# Patient Record
Sex: FEMALE | Race: WHITE | Employment: FULL TIME | URBAN - METROPOLITAN AREA
[De-identification: names, ages, dates, MRNs, and addresses within clinical notes are randomized per-mention and may not be internally consistent; named-entity substitution may affect disease eponyms.]

---

## 2015-10-23 LAB — HEMOCCULT STL QL: NEGATIVE

## 2017-02-06 DIAGNOSIS — L29.9 PRURITIC DERMATITIS: ICD-10-CM

## 2017-02-06 NOTE — TELEPHONE ENCOUNTER
She on the schedule for follow up mid March. Labs current as of December 2016.  She had visit for well woman in November

## 2017-02-09 RX ORDER — HYDROXYZINE 25 MG/1
TABLET, FILM COATED ORAL
Qty: 90 TAB | Refills: 1 | Status: SHIPPED | OUTPATIENT
Start: 2017-02-09 | End: 2017-04-07 | Stop reason: SDUPTHER

## 2017-02-16 DIAGNOSIS — M54.5 CHRONIC LOW BACK PAIN, UNSPECIFIED BACK PAIN LATERALITY, WITH SCIATICA PRESENCE UNSPECIFIED: ICD-10-CM

## 2017-02-16 DIAGNOSIS — G89.29 CHRONIC LOW BACK PAIN, UNSPECIFIED BACK PAIN LATERALITY, WITH SCIATICA PRESENCE UNSPECIFIED: ICD-10-CM

## 2017-02-17 RX ORDER — CYCLOBENZAPRINE HCL 10 MG
TABLET ORAL
Qty: 90 TAB | Refills: 2 | Status: SHIPPED | OUTPATIENT
Start: 2017-02-17 | End: 2017-06-22 | Stop reason: SDUPTHER

## 2017-02-17 NOTE — TELEPHONE ENCOUNTER
She was in the office for her well woman exam in November and is on the schedule for follow up in March

## 2017-03-10 ENCOUNTER — OFFICE VISIT (OUTPATIENT)
Dept: FAMILY MEDICINE CLINIC | Age: 47
End: 2017-03-10

## 2017-03-10 VITALS
DIASTOLIC BLOOD PRESSURE: 80 MMHG | HEIGHT: 61 IN | TEMPERATURE: 98.3 F | SYSTOLIC BLOOD PRESSURE: 122 MMHG | OXYGEN SATURATION: 99 % | WEIGHT: 293 LBS | BODY MASS INDEX: 55.32 KG/M2 | HEART RATE: 58 BPM | RESPIRATION RATE: 18 BRPM

## 2017-03-10 DIAGNOSIS — H91.91 HEARING LOSS, RIGHT: ICD-10-CM

## 2017-03-10 DIAGNOSIS — G43.809 OTHER MIGRAINE WITHOUT STATUS MIGRAINOSUS, NOT INTRACTABLE: ICD-10-CM

## 2017-03-10 DIAGNOSIS — F43.22 ADJUSTMENT DISORDER WITH ANXIOUS MOOD: ICD-10-CM

## 2017-03-10 DIAGNOSIS — R20.0 NUMBNESS AND TINGLING OF LEFT SIDE OF FACE: ICD-10-CM

## 2017-03-10 DIAGNOSIS — J32.1 CHRONIC FRONTAL SINUSITIS: Primary | ICD-10-CM

## 2017-03-10 DIAGNOSIS — H10.13 ALLERGIC CONJUNCTIVITIS AND RHINITIS, BILATERAL: ICD-10-CM

## 2017-03-10 DIAGNOSIS — E03.4 HYPOTHYROIDISM DUE TO ACQUIRED ATROPHY OF THYROID: ICD-10-CM

## 2017-03-10 DIAGNOSIS — H40.053 INTRAOCULAR PRESSURE INCREASE, BILATERAL: ICD-10-CM

## 2017-03-10 DIAGNOSIS — R63.5 WEIGHT GAIN: ICD-10-CM

## 2017-03-10 DIAGNOSIS — E55.9 VITAMIN D DEFICIENCY: ICD-10-CM

## 2017-03-10 DIAGNOSIS — E78.5 DYSLIPIDEMIA: ICD-10-CM

## 2017-03-10 DIAGNOSIS — J30.9 ALLERGIC CONJUNCTIVITIS AND RHINITIS, BILATERAL: ICD-10-CM

## 2017-03-10 DIAGNOSIS — L71.9 ACNE ROSACEA: ICD-10-CM

## 2017-03-10 DIAGNOSIS — H53.9 VISION CHANGES: ICD-10-CM

## 2017-03-10 DIAGNOSIS — R20.2 NUMBNESS AND TINGLING OF LEFT SIDE OF FACE: ICD-10-CM

## 2017-03-10 DIAGNOSIS — D50.8 OTHER IRON DEFICIENCY ANEMIA: ICD-10-CM

## 2017-03-10 RX ORDER — CIPROFLOXACIN 250 MG/1
250 TABLET, FILM COATED ORAL EVERY 12 HOURS
Qty: 42 TAB | Refills: 0 | Status: SHIPPED | OUTPATIENT
Start: 2017-03-10 | End: 2017-03-31

## 2017-03-10 RX ORDER — ALPRAZOLAM 0.5 MG/1
0.5 TABLET ORAL
Qty: 30 TAB | Refills: 0 | Status: SHIPPED | OUTPATIENT
Start: 2017-03-10 | End: 2017-06-29 | Stop reason: SDUPTHER

## 2017-03-10 RX ORDER — AZELASTINE 1 MG/ML
1 SPRAY, METERED NASAL 2 TIMES DAILY
Qty: 1 BOTTLE | Refills: 5 | Status: SHIPPED | OUTPATIENT
Start: 2017-03-10 | End: 2017-11-09 | Stop reason: SDUPTHER

## 2017-03-10 RX ORDER — DOXYCYCLINE HYCLATE 50 MG/1
CAPSULE ORAL
COMMUNITY
Start: 2017-03-05 | End: 2017-04-18

## 2017-03-10 NOTE — PROGRESS NOTES
Chief Complaint   Patient presents with    Medication Refill    Sinus Infection     1. Have you been to the ER, urgent care clinic since your last visit? Hospitalized since your last visit? No    2. Have you seen or consulted any other health care providers outside of the [address] [place] [address] since your last visit? Include any pap smears or colon screening. Yes, PT went to see ENT MD, Dr. Alycia Munoz, in 11/2016 and 12/2016    In the event something were to happen to you and you were unable to speak on your behalf, do you have an Advance Directive/ Living Will in place stating your wishes? NO    If yes, do we have a copy on file NO    If no, would you like information:    Patient offered and declined. PT wants thinks she has a sinus infection and would like to talk to Dr. Rajni Solorzano about it. PT did not bring bottles with her to this visit.

## 2017-03-10 NOTE — ACP (ADVANCE CARE PLANNING)
Discussed ACP with patient. Gave pt an [facility]. Patient prefers to read it on her own. Declines referral to [facility] team at this time. Patient will bring document to her next office visit or attach it to her MyChart record.

## 2017-03-10 NOTE — MR AVS SNAPSHOT
Visit Information Date & Time Provider Department Dept. Phone Encounter #  
 3/10/2017  8:45 AM [facility] [phone] [id number] Follow-up Instructions Return in about 3 months (around 6/10/2017) for med refill. Upcoming Health Maintenance Date Due  
 PAP AKA CERVICAL CYTOLOGY 8/18/2017* BREAST CANCER SCRN MAMMOGRAM 12/2/2017 DTaP/Tdap/Td series (2 - Td) 11/3/2025 *Topic was postponed. The date shown is not the original due date. Allergies as of 3/10/2017  Review Complete On: 3/10/2017 By: Mary Hargrove DO Severity Noted Reaction Type Reactions Latex  11/20/2014    Rash Pcn [Penicillins] High 07/31/2009    Hives Prednisone High 07/31/2009    Hives Sulfa (Sulfonamide Antibiotics) High 07/31/2009    Hives Garamycin [Gentamicin]  07/31/2009    Other (comments) Itchy eyes due to sulfate Metformin  11/03/2015    Diarrhea Other Medication  07/31/2009    Hives, Rash SUN Current Immunizations  Reviewed on 3/10/2017 Name Date Influenza Vaccine 11/7/2016, 10/15/2013 Influenza Vaccine Alcario Ravens) 11/3/2015 Influenza Vaccine (Quad) PF 11/7/2016, 11/20/2014 Influenza Vaccine Split 11/7/2012, 11/18/2011 Influenza Vaccine Whole 10/1/2010 TD Vaccine 9/1/2003 Tdap 11/3/2015 Reviewed by Mary Hargrove DO on 3/10/2017 at  9:54 AM  
You Were Diagnosed With   
  
 Codes Comments Chronic frontal sinusitis    -  Primary ICD-10-CM: J32.1 ICD-9-CM: 451.1 Other migraine without status migrainosus, not intractable     ICD-10-CM: R43.148 Allergic conjunctivitis and rhinitis, bilateral     ICD-10-CM: H10.13, J30.9 ICD-9-CM: 372.05, 477.9 Hypothyroidism due to acquired atrophy of thyroid     ICD-10-CM: E03.4 ICD-9-CM: 244.8, 246.8 stable  Adjustment disorder with anxious mood     ICD-10-CM: M33.93 
 ICD-9-CM: 309.24 worse with grief from Mom's death 2012 and cat's death vs friends father's recent   
 Numbness and tingling of left side of face     ICD-10-CM: R20.0 ICD-9-CM: 782.0 due migraine vs other Vision changes     ICD-10-CM: H53.9 ICD-9-CM: 368.9 Left Hearing loss, right     ICD-10-CM: H91.91 
ICD-9-CM: 389. 9 Acne rosacea     ICD-10-CM: L71.9 ICD-9-CM: 695.3 Dyslipidemia     ICD-10-CM: E78.5 ICD-9-CM: 272.4 Intraocular pressure increase, bilateral     ICD-10-CM: H40.053 ICD-9-CM: 365.00 Other iron deficiency anemia     ICD-10-CM: D50.8 RESOLVED Vitamin D deficiency     ICD-10-CM: E55.9 ICD-9-CM: 268.9 stable Weight gain     ICD-10-CM: R63.5 ICD-9-CM: 783.1 due to holiday eating Vitals BP Pulse Temp Resp Height(growth percentile) Weight(growth percentile) 122/80 (BP 1 Location: Right arm, BP Patient Position: Sitting) (!) 58 98.3 °F (36.8 °C) (Oral) 18 5' 1\" (1.549 m) 299 lb 4.8 oz (135.8 kg) LMP SpO2 BMI OB Status Smoking Status 2017 (Within Days) 99% 56.55 kg/m2 Having regular periods Never Smoker BMI and BSA Data Body Mass Index Body Surface Area 56.55 kg/m 2 2.42 m 2 Preferred Pharmacy Pharmacy Name Phone [facility][id number] - [place], [address] [phone] Your Updated Medication List  
  
   
This list is accurate as of: 3/10/17 10:01 AM.  Always use your most recent med list.  
  
  
  
  
 albuterol 90 mcg/actuation inhaler Commonly known as:  PROVENTIL HFA, VENTOLIN HFA, PROAIR HFA Take 2 Puffs by inhalation every four (4) hours as needed for Wheezing. Indications: BRONCHOSPASM PREVENTION  
  
 ALLEGRA 180 mg tablet Generic drug:  fexofenadine Take  by mouth daily. ALPRAZolam 0.5 mg tablet Commonly known as:  Salma Cortes Take 1 Tab by mouth two (2) times daily as needed for Anxiety.  Max Daily Amount: 1 mg. Indications: ANXIETY  
  
 azelastine 137 mcg (0.1 %) nasal spray Commonly known as:  ASTELIN  
1 Spray by Both Nostrils route two (2) times a day. Use in each nostril as directed  Indications: SEASONAL ALLERGIC RHINITIS  
  
 butalbital-acetaminophen-caffeine -40 mg per tablet Commonly known as:  FIORICET, ESGIC  
TAKE 1 TAB BY MOUTH EVERY SIX (6) HOURS AS NEEDED FOR PAIN OR HEADACHE. ciprofloxacin HCl 250 mg tablet Commonly known as:  CIPRO Take 1 Tab by mouth every twelve (12) hours for 21 days. cyclobenzaprine 10 mg tablet Commonly known as:  FLEXERIL  
TAKE 1 TABLET BY MOUTH 3 TIMES A DAY AS NEEDED FOR MUSCLE SPASM  
  
 diclofenac EC 75 mg EC tablet Commonly known as:  VOLTAREN Take 75 mg by mouth two (2) times a day. doxycycline 50 mg capsule Commonly known as:  VIBRAMYCIN  
  
 doxycycline 50 mg tablet Commonly known as:  ADOXA Take 50 mg by mouth two (2) times a day. EPICERAM Emul Generic drug:  Emollient Combination No.32  
by Apply Externally route. From Dr Charmayne Phalen. FISH OIL 1,000 mg Cap Generic drug:  omega-3 fatty acids-vitamin e Take 1 Cap by mouth daily. FLONASE 50 mcg/actuation nasal spray Generic drug:  fluticasone  
nightly. hydrOXYzine HCl 25 mg tablet Commonly known as:  ATARAX TAKE 1 TAB BY MOUTH EVERY EIGHT (8) HOURS AS NEEDED FOR ITCHING. INDICATIONS: PRURITUS OF SKIN  
  
 JUBLIA Nereyda topical solution Generic drug:  efinaconazole JARRETT TO AFFECTED NAILS QD  
  
 latanoprost 0.005 % ophthalmic solution Commonly known as:  Latasha Furbish Administer 1 Drop to both eyes nightly. levothyroxine 125 mcg tablet Commonly known as:  SYNTHROID Take 1 Tab by mouth Daily (before breakfast). Indications: hypothyroidism LOCOID 0.1 % Lotn Generic drug:  Hydrocortisone Butyrate  
  
 magnesium oxide 400 mg tablet Commonly known as:  MAG-OX Take 400 mg by mouth daily. METROGEL 1 % topical gel Generic drug:  metroNIDAZOLE Apply  to affected area daily. Use a thin layer to affected areas after washing from Dr Mayfield Nail   Indications: ACNE ROSACEA  
  
 montelukast 10 mg tablet Commonly known as:  SINGULAIR Take 1 Tab by mouth daily. Indications: ALLERGIC RHINITIS, MAINTENANCE THERAPY FOR ASTHMA MOTRIN 800 mg tablet Generic drug:  ibuprofen Take  by mouth every six (6) hours as needed for Pain.  
  
 multivitamin tablet Commonly known as:  ONE A DAY Take 1 tablet by mouth daily. NAFTIN 2 % Gel Generic drug:  naftifine APPLY EXTERNALLY TWICE A DAY  
  
 norethindrone-ethinyl estradiol 1 mg-20 mcg (21)/75 mg (7) Tab Commonly known as:  Sarah [place] FE 1/20 Take  by mouth. omeprazole 20 mg capsule Commonly known as:  PRILOSEC  
TAKE ONE CAPSULE BY MOUTH EVERY DAY  
  
 OTHER(NON-FORMULARY) Osteo Biflex PROBIOTIC COMPLEX PO Take  by mouth daily. SYSTANE (PF) OP Apply  to eye four (4) times daily. * terconazole 80 mg vaginal suppository Commonly known as:  TERAZOL 3  
  
 * terconazole 0.4 % vaginal cream  
Commonly known as:  TERAZOL 7  
  
 valACYclovir 500 mg tablet Commonly known as:  VALTREX  
  
 VITAMIN D3 1,000 unit tablet Generic drug:  cholecalciferol Take 1,000 Units by mouth daily. * Notice: This list has 2 medication(s) that are the same as other medications prescribed for you. Read the directions carefully, and ask your doctor or other care provider to review them with you. Prescriptions Printed Refills ALPRAZolam (XANAX) 0.5 mg tablet 0 Sig: Take 1 Tab by mouth two (2) times daily as needed for Anxiety. Max Daily Amount: 1 mg. Indications: ANXIETY Class: Print Route: Oral  
  
Prescriptions Sent to Pharmacy Refills  
 ciprofloxacin HCl (CIPRO) 250 mg tablet 0 Sig: Take 1 Tab by mouth every twelve (12) hours for 21 days.   
 Class: Normal  
 Pharmacy: [facility]/pharmacy #[id number] - [address] Ph #: [phone] Route: Oral  
 azelastine (ASTELIN) 137 mcg (0.1 %) nasal spray 5 [place] by Both Nostrils route two (2) times a day. Use in each nostril as directed  Indications: SEASONAL ALLERGIC RHINITIS Class: Normal  
 Pharmacy: [facility] [address] Ph #: [phone] Route: Both Nostrils We Performed the Following REFERRAL TO NEUROLOGY [JCD55 Custom] Comments:  
 Please evaluate patient for migraine worse with L face numbness and vision loss, recurrent Follow-up Instructions Return in about 3 months (around 6/10/2017) for med refill. Referral Information Referral ID Referred By Referred To  
  
 [id number] Janet Arden Natan Dy, [address] [facility], [facility] Phone: [phone] Fax: [phone] Visits Status Start Date End Date 1 New Request 3/10/17 3/10/18 If your referral has a status of pending review or denied, additional information will be sent to support the outcome of this decision. Patient Instructions Numbness and Tingling: Care Instructions Your Care Instructions Many things can cause numbness or tingling. Swelling may put pressure on a nerve. This could cause you to lose feeling or have a pins-and-needles sensation on part of your body. Nerves may be damaged from trauma, toxins, or diseases, such as diabetes or multiple sclerosis (MS). Sometimes, though, the cause is not clear. If there is no clear reason for your symptoms, and you are not having any other symptoms, your doctor may suggest watching and waiting for a while to see if the numbness or tingling goes away on its own.  Your doctor may want you to have blood or nerve tests to find the cause of your symptoms. Follow-up care is a key part of your treatment and safety. Be sure to make and go to all appointments, and call your doctor if you are having problems. It's also a good idea to know your test results and keep a list of the medicines you take. How can you care for yourself at home? · If your doctor prescribes medicine, take it exactly as directed. Call your doctor if you think you are having a problem with your medicine. · If you have any swelling, put ice or a cold pack on the area for 10 to 20 minutes at a time. Put a thin cloth between the ice and your skin. When should you call for help? Call 911 anytime you think you may need emergency care. For example, call if: 
· You have weakness, numbness, or tingling in both legs. · You lose bowel or bladder control. · You have symptoms of a stroke. These may include: 
¨ Sudden numbness, tingling, weakness, or loss of movement in your face, arm, or leg, especially on only one side of your body. ¨ Sudden vision changes. ¨ Sudden trouble speaking. ¨ Sudden confusion or trouble understanding simple statements. ¨ Sudden problems with walking or balance. ¨ A sudden, severe headache that is different from past headaches. Watch closely for changes in your health, and be sure to contact your doctor if you have any problems, or if: 
· You do not get better as expected. Where can you learn more? Go to http://alejandro-eddie.info/. Enter J515 in the search box to learn more about \"Numbness and Tingling: Care Instructions. \" Current as of: February 19, 2016 Content Version: 11.1 © 9425-5537 [employer]. Care instructions adapted under license by [employer] (which disclaims liability or warranty for this information).  If you have questions about a medical condition or this instruction, always ask your healthcare professional. [employer] disclaims any warranty or liability for your use of this information. Saline Nasal Washes: Care Instructions Your Care Instructions Saline nasal washes help keep the nasal passages open by washing out thick or dried mucus. This simple remedy can help relieve symptoms of allergies, sinusitis, and colds. It also can make the nose feel more comfortable by keeping the mucous membranes moist. You may notice a little burning sensation in your nose the first few times you use the solution, but this usually gets better in a few days. Follow-up care is a key part of your treatment and safety. Be sure to make and go to all appointments, and call your doctor if you are having problems. It's also a good idea to know your test results and keep a list of the medicines you take. How can you care for yourself at home? · You can buy premixed saline solution in a squeeze bottle or other sinus rinse products at a drugstore. Read and follow the instructions on the label. · You also can make your own saline solution by adding 1 teaspoon of salt and 1 teaspoon of baking soda to 2 cups of distilled water. · If you use a homemade solution, pour a small amount into a clean bowl. Using a rubber bulb syringe, squeeze the syringe and place the tip in the salt water. Pull a small amount of the salt water into the syringe by relaxing your hand. · Sit down with your head tilted slightly back. Do not lie down. Put the tip of the bulb syringe or the squeeze bottle a little way into one of your nostrils. Gently drip or squirt a few drops into the nostril. Repeat with the other nostril. Some sneezing and gagging are normal at first. 
· Gently blow your nose. · Wipe the syringe or bottle tip clean after each use. · Repeat this 2 or 3 times a day. · Use nasal washes gently if you have nosebleeds often. When should you call for help? Watch closely for changes in your health, and be sure to contact your doctor if: · You often get nosebleeds. · You have problems doing the nasal washes. Where can you learn more? Go to http://alejandro-eddie.info/. Enter [id number] in the search box to learn more about \"Saline Nasal Washes: Care Instructions. \" Current as of: July 29, 2016 Content Version: 11.1 © 6907-5922 [employer]anda[employer]. Care instructions adapted under license by [employer] (which disclaims liability or warranty for this information). If you have questions about a medical condition or this instruction, always ask your healthcare professional. [place] [address] any warranty or liability for your use of this information. Sinusitis: Care Instructions Your Care Instructions Sinusitis is an infection of the lining of the sinus cavities in your head. Sinusitis often follows a cold. It causes pain and pressure in your head and face. In most cases, sinusitis gets better on its own in 1 to 2 weeks. But some mild symptoms may last for several weeks. Sometimes antibiotics are needed. Follow-up care is a key part of your treatment and safety. Be sure to make and go to all appointments, and call your doctor if you are having problems. It's also a good idea to know your test results and keep a list of the medicines you take. How can you care for yourself at home? · Take an over-the-counter pain medicine, such as acetaminophen (Tylenol), ibuprofen (Advil, Motrin), or naproxen (Aleve). Read and follow all instructions on the label. · If the doctor prescribed antibiotics, take them as directed. Do not stop taking them just because you feel better. You need to take the full course of antibiotics. · Be careful when taking over-the-counter cold or flu medicines and Tylenol at the same time. Many of these medicines have acetaminophen, which is Tylenol. Read the labels to make sure that you are not taking more than the recommended dose.  Too much acetaminophen (Tylenol) can be harmful. · Breathe warm, moist air from a steamy shower, a hot bath, or a sink filled with hot water. Avoid cold, dry air. Using a humidifier in your home may help. Follow the directions for cleaning the machine. · Use saline (saltwater) nasal washes to help keep your nasal passages open and wash out mucus and bacteria. You can buy saline nose drops at a grocery store or drugstore. Or you can make your own at home by adding 1 teaspoon of salt and 1 teaspoon of baking soda to 2 cups of distilled water. If you make your own, fill a bulb syringe with the solution, insert the tip into your nostril, and squeeze gently. Daniele Rafa your nose. · Put a hot, wet towel or a warm gel pack on your face 3 or 4 times a day for 5 to 10 minutes each time. · Try a decongestant nasal spray like oxymetazoline (Afrin). Do not use it for more than 3 days in a row. Using it for more than 3 days can make your congestion worse. When should you call for help? Call your doctor now or seek immediate medical care if: 
· You have new or worse swelling or redness in your face or around your eyes. · You have a new or higher fever. Watch closely for changes in your health, and be sure to contact your doctor if: 
· You have new or worse facial pain. · The mucus from your nose becomes thicker (like pus) or has new blood in it. · You are not getting better as expected. Where can you learn more? Go to http://alejandro-eddie.info/. Enter E030 in the search box to learn more about \"Sinusitis: Care Instructions. \" Current as of: July 29, 2016 Content Version: 11.1 © 4930-5687 [employer] Company. Care instructions adapted under license by [employer] (which disclaims liability or warranty for this information). If you have questions about a medical condition or this instruction, always ask your healthcare professional. [address] any warranty or liability for your use of this information. Introducing [facility]! Dear Orellana Area: Thank you for requesting a iSentium account. Our records indicate that you already have an active iSentium account. You can access your account anytime at [email or web address] Did you know that you can access your hospital and ER discharge instructions at any time in iSentium? You can also review all of your test results from your hospital stay or ER visit. Additional Information If you have questions, please visit the Frequently Asked Questions section of the iSentium website at [email or web address] Remember, iSentium is NOT to be used for urgent needs. For medical emergencies, dial 911. Now available from your iPhone and Android! Please provide this summary of care documentation to your next provider. Your primary care clinician is listed as Magdaleno Nielson. If you have any questions after today's visit, please call [phone].

## 2017-03-10 NOTE — PROGRESS NOTES
HISTORY OF PRESENT ILLNESS  Elda Rodriguez is a 48 y.o. female presents with Medication Refill; Sinus Infection; Migraine; Vision Change; Numbness; Referral Follow Up; and Results    Agree with nurse note. Pt with hypothyroidism, dyslipidemia, Vit D deficiency, and hx of iron deficiency anemia presents to the office with a BP of 122/80 and 58 bpm. She requests her most recent labs from 12/2/16. CBC was normal. Iron was 96, up from 77. CMP was normal. LDL was 109. Vit D was 36.9. In 08/2016, TSH was 1.51. She takes OTC Vit D 1,000IU daily. For hypothyroidism, she takes Synthroid 125 mcg daily, tolerating well. Pt weighs 299 lbs, gained 5 lbs since last ov, which she attributes to her menstrual cycle starting next week. She reports that last week she weighed 295 and the week prior 291. Pt with adjustments disorder with anxious mood. She takes Xanax 0.5 mg PRN up to BID x2 days weekly. Requests refill. Pt complains of sinus pain, watery eyes, sneezing, productive cough with yellow sputum off and on, and nasal congestion with green mucus and sometimes blood-tinged x2 weeks. She alternates with Allegra and Allegra-D. She takes Singulair 10 mg and uses Flonase. Of note, she takes Doxycycline 50 mg daily to treat acne rosacea. She is allergic to Penicillin and Sulfa. Per chart review, she sent an email to our office on 11/12/16 after her 11/5/16 visit. I sent in Z-theodore due to her worsening cough and congestion. She reports that Z-theodore did not work. She followed up with her ENT, Dr. Cheri William who rx'd Doxycycline, which also did not work. She followed up with Dr. Nicole Haney again and he rx'd Cipro. Per pt, Dr. Nicoel Haney planned to order an MRI if her sxs persisted. Pt had a migraine yesterday with L sided facial numbness and she felt like her L eye \"glazed over\" because things seemed magnified. This occurred about 30 minutes after walking on the treadmill x45 minutes on a higher incline.  She took a Fioricet with relief. She recalls a previous episode on the R side a few months ago. Denies weakness, slurred speech, or other associated sxs. She has an appointment with her ophthalmologist, Dr. Jarret Calvillo today and plans to mention these episodes to him. Health Maintenance    Pt is followed by GYN, Dr. Kandice Moore, whom she sees yearly. Pt's most recent mammogram was on 12/2/16 at the [address] [place] [address]; normal.      Written by tracy Mariee, as dictated by Dr. Charity Justin DO.    ROS    Review of Systems negative except as noted above in HPI. ALLERGIES:    Allergies   Allergen Reactions    Latex Rash    Pcn [Penicillins] Hives    Prednisone Hives    Sulfa (Sulfonamide Antibiotics) Hives    Garamycin [Gentamicin] Other (comments)     Itchy eyes due to sulfate    Metformin Diarrhea    Other Medication Hives and Rash     SUN       CURRENT MEDICATIONS:      PAST MEDICAL HISTORY:    Past Medical History:   Diagnosis Date    Acne rosacea     Dr. Chaz Streeter.  Allergy, unspecified not elsewhere classified childhood     Txd with immunotherapy. Dr. Guillermina Rodríguez Anemia NEC     borderline    Ankle sprain 2007    Right. Dr. Anton Camargo    Ankle sprain 11/2012    Right. Dr. Ramez Strickland.  Asthma childhood    Chickenpox childhood    Chronic low back pain with right-sided sciatica     and SI joint dysfunction. Dr. Haleigh Art.  Chronic otitis media     Dr. Guillermina Rodríguez Dry eye syndrome 2013    Dr. Michael Calvillo.  EBV infection 6/27/2011    Hearing loss     Mild high frequency sensorineuronal hearing loss. Dr. Shannen Santamaria murmur     Hernia of abdominal wall 09/2003, 65/3648    umbilical.  Dr. Jessica Beckett. Dr. Meri Benoit    Hyperglycemia 2013    Hypothyroidism 06/2010    Dr. Cheema Cargo pressure increase 12/2011    Dr. Princess Rodas. Dr. Michael Calvillo. Dr. Jarret Calvillo.     Knee pain, left 04/2012    Left. Dr. Arti Arita Measles childhood    Migraine     Mumps childhood    Plantar fasciitis, bilateral 2010    Dr. Rohan Santo    Sciatica 2008    Right.  with OA. Dr. Moon Prim Tinnitus of right ear 2012    Dr. Candace Pompa:    Past Surgical History:   Procedure Laterality Date   Selma Bianchi  2011    Dr. Keegan Richardson.  HAND/FINGER SURGERY UNLISTED  09/2003    Right Index finger repair due to cut    HX HERNIA REPAIR  67/2732    Umbilical.  Dr. Florencio Gilman.  HX HERNIA REPAIR  9/23/2011    recurrent umbilical.  Laparoscopy incisional.  Dr. Sotero Wright.     HX TONSILLECTOMY  childhood    LAP,CHOLECYSTECTOMY  07/2001       FAMILY HISTORY:    Family History   Problem Relation Age of Onset    Hypertension Mother     Cancer Mother      small cell lung with bone mets to spine/MELANOMA    Cancer Father      melanoma    Arthritis-osteo Father     Colon Polyps Father     Diabetes Maternal Grandmother     Stroke Maternal Grandmother     Seizures Maternal Grandmother     Colon Polyps Maternal Grandmother     Breast Cancer Maternal Grandmother     Heart Disease Paternal Grandmother      heart failure    Heart Attack Paternal Grandmother     Asthma Paternal Grandmother        SOCIAL HISTORY:    Social History     Social History    Marital status: SINGLE     Spouse name: N/A    Number of children: N/A    Years of education: N/A     Social History Main Topics    Smoking status: Never Smoker    Smokeless tobacco: Never Used      Comment: lived with smoker dad and mom then step mom x 20 yrs    Alcohol use 0.0 oz/week      Comment: RARE    Drug use: No    Sexual activity: No     Other Topics Concern    None     Social History Narrative       IMMUNIZATIONS:    Immunization History   Administered Date(s) Administered    Influenza Vaccine 10/15/2013, 11/07/2016    Influenza Vaccine (Quad) 11/03/2015    Influenza Vaccine (Quad) PF 11/20/2014, 11/07/2016    Influenza Vaccine Split 11/18/2011, 11/07/2012    Influenza Vaccine Whole 10/01/2010    TD Vaccine 09/01/2003    Tdap 11/03/2015         PHYSICAL EXAMINATION    Vital Signs    Visit Vitals    /80 (BP 1 Location: Right arm, BP Patient Position: Sitting)    Pulse (!) 58    Temp 98.3 °F (36.8 °C) (Oral)    Resp 18    Ht 5' 1\" (1.549 m)    Wt 299 lb 4.8 oz (135.8 kg)    LMP 02/20/2017 (Within Days)    SpO2 99%    BMI 56.55 kg/m2       Weight Metrics 3/10/2017 11/7/2016 8/22/2016 4/21/2016 11/3/2015 6/23/2015 9/22/2014   Weight 299 lb 4.8 oz 294 lb 3.2 oz 297 lb 11.2 oz 285 lb 3.2 oz 285 lb 9.6 oz 285 lb 290 lb 6.4 oz   BMI 56.55 kg/m2 54.84 kg/m2 56.25 kg/m2 53.92 kg/m2 52.69 kg/m2 52.58 kg/m2 53.58 kg/m2       General appearance - Well nourished. Well appearing. Well developed. No acute distress. Obese. Head - Normocephalic. Atraumatic. Non tender sinuses x 4. Eyes - pupils equal and reactive. Extraocular eye movements intact. Sclera anicteric. Mildly injected sclera. Ears - Hearing is grossly normal bilaterally. Nose - normal and patent. No polyps noted. No erythema. No discharge. Mouth - mucous membranes with adequate moisture. Posterior pharynx normal with cobblestone appearance. No erythema, white exudate or obstruction. Neck - supple. Midline trachea. No carotid bruits noted bilaterally. No thyromegaly noted. Chest - clear to auscultation bilaterally anteriorly and posteriorly. No wheezes. No rales or rhonchi. Breath sounds are symmetrical bilaterally. Unlabored respirations. Heart - normal rate. Regular rhythm. Normal S1, S2. No murmur noted. No rubs, clicks or gallops noted. Abdomen - soft and distended. No masses or organomegaly. No rebound, rigidity or guarding. Bowel sounds normal x 4 quadrants. No tenderness noted. Neurological - awake, alert and oriented to person, place, and time and event.   Cranial nerves II through XII intact. Clear speech. Muscle strength is +5/5 x 4 extremities. Sensation is intact to light touch bilaterally. Steady gait. Heme/Lymph - peripheral pulses normal x 4 extremities. No peripheral edema is noted. Musculoskeletal - Intact x 4 extremities. Full ROM x 4 extremities. No pain with movement. Back exam - normal range of motion. No pain on palpation of the spinous processes in the cervical, thoracic, lumbar, sacral regions. No CVA tenderness. Skin - no rashes, erythema, ecchymosis, lacerations, abrasions, suspicious moles noted  Psychological -   normal behavior, dress and thought processes. Good insight. Good eye contact. Normal affect. Appropriate mood. Normal speech. DATA REVIEWED    Results for orders placed or performed in visit on 11/07/16   LIPID PANEL   Result Value Ref Range    Cholesterol, total 177 100 - 199 mg/dL    Triglyceride 81 0 - 149 mg/dL    HDL Cholesterol 52 >39 mg/dL    VLDL, calculated 16 5 - 40 mg/dL    LDL, calculated 109 (H) 0 - 99 mg/dL   METABOLIC PANEL, COMPREHENSIVE   Result Value Ref Range    Glucose 85 65 - 99 mg/dL    BUN 13 6 - 24 mg/dL    Creatinine 0.70 0.57 - 1.00 mg/dL    GFR est non- >59 mL/min/1.73    GFR est  >59 mL/min/1.73    BUN/Creatinine ratio 19 9 - 23    Sodium 138 136 - 144 mmol/L    Potassium 5.0 3.5 - 5.2 mmol/L    Chloride 102 97 - 106 mmol/L    CO2 21 18 - 29 mmol/L    Calcium 9.2 8.7 - 10.2 mg/dL    Protein, total 6.4 6.0 - 8.5 g/dL    Albumin 4.1 3.5 - 5.5 g/dL    GLOBULIN, TOTAL 2.3 1.5 - 4.5 g/dL    A-G Ratio 1.8 1.1 - 2.5    Bilirubin, total 0.4 0.0 - 1.2 mg/dL    Alk.  phosphatase 65 39 - 117 IU/L    AST (SGOT) 14 0 - 40 IU/L    ALT (SGPT) 20 0 - 32 IU/L   VITAMIN D, 25 HYDROXY   Result Value Ref Range    VITAMIN D, 25-HYDROXY 36.9 30.0 - 100.0 ng/mL   URINALYSIS W/ RFLX MICROSCOPIC   Result Value Ref Range    Specific Gravity 1.029 1.005 - 1.030    pH (UA) 5.5 5.0 - 7.5    Color Yellow Yellow    Appearance Clear Clear    Leukocyte Esterase 2+ (A) Negative    Protein Negative Negative/Trace    Glucose Negative Negative    Ketone Negative Negative    Blood Negative Negative    Bilirubin Negative Negative    Urobilinogen 0.2 0.2 - 1.0 mg/dL    Nitrites Negative Negative    Microscopic Examination See additional order    CBC W/O DIFF   Result Value Ref Range    WBC 7.6 3.4 - 10.8 x10E3/uL    RBC 4.22 3.77 - 5.28 x10E6/uL    HGB 12.5 11.1 - 15.9 g/dL    HCT 37.7 34.0 - 46.6 %    MCV 89 79 - 97 fL    MCH 29.6 26.6 - 33.0 pg    MCHC 33.2 31.5 - 35.7 g/dL    RDW 13.7 12.3 - 15.4 %    PLATELET 556 952 - 122 x10E3/uL   IRON   Result Value Ref Range    Iron 96 27 - 159 ug/dL   MICROSCOPIC EXAMINATION   Result Value Ref Range    WBC 11-30 (A) 0 - 5 /hpf    RBC 0-2 0 - 2 /hpf    Epithelial cells 0-10 0 - 10 /hpf    Casts None seen None seen /lpf    Mucus Present Not Estab. Bacteria Few None seen/Few   CVD REPORT   Result Value Ref Range    INTERPRETATION Note      Lab Results   Component Value Date/Time    TSH 1.510 2016 03:44 PM    T4, Free 1.48 2016 03:44 PM       ASSESSMENT and PLAN      ICD-10-CM ICD-9-CM    1. Chronic frontal sinusitis J32.1 473.1 ciprofloxacin HCl (CIPRO) 250 mg tablet   2. Other migraine without status migrainosus, not intractable G43.809  REFERRAL TO NEUROLOGY   3. Allergic conjunctivitis and rhinitis, bilateral H10.13 372.05 azelastine (ASTELIN) 137 mcg (0.1 %) nasal spray    J30.9 477.9    4. Hypothyroidism due to acquired atrophy of thyroid E03.4 244.8      246.8     stable   5. Adjustment disorder with anxious mood F43.22 309.24 ALPRAZolam (XANAX) 0.5 mg tablet    worse with grief from Mom's death 2012 and cat's death vs friends father's recent    6. Numbness and tingling of left side of face R20.0 782.0 REFERRAL TO NEUROLOGY    due migraine vs other   7. Vision changes H53.9 368.9 REFERRAL TO NEUROLOGY    Left   8. Hearing loss, right H91.91 389.9    9.  Acne rosacea L71.9 695.3 doxycycline (VIBRAMYCIN) 50 mg capsule   10. Dyslipidemia E78.5 272.4    11. Intraocular pressure increase, bilateral H40.053 365.00    12. Other iron deficiency anemia D50.8      RESOLVED   13. Vitamin D deficiency E55.9 268.9     stable   14. Weight gain R63.5 783.1     due to holiday eating       Discussed the patient's BMI with her. The BMI follow up plan is as follows: I have counseled this patient on diet and exercise regimens. Addressed weight, diet and exercise with patient. Decrease carbohydrates (white foods, sweet foods, sweet drinks and alcohol), increase green leafy vegetables and protein (lean meats and beans) with each meal.  Avoid fried foods. Eat 3-5 small meals daily. Do not skip meals. Increase water intake. Increase physical activity to 30 minutes daily for health benefit or 60 minutes daily to prevent weight regain, as tolerated. Get 7-8 hours uninterrupted sleep nightly. Chart reviewed and updated. [facility] reviewed. Xanax 0.5 mg last filled on 2/12/17 for 30 tablets. Controlled Substance Agreement reviewed and signed. Advise pt to bring in pill bottle(s) for pill counts. Continue current medications and care. Start Cipro 250 mg BID x21 days. Advised pt to avoid Atarax use while taking Cipro. Recommend OTC Vitamin D 5,000IU daily during the winter months. Prescriptions written and sent to pharmacy; medication side effects discussed. Cipro 250 mg.   Prescription given to patient during office visit today. Xanax 0.5 mg. Cautioned pt about addictive potential and drowsiness. Most recent tests reviewed from 12/2/16. Get recent office visit notes from Dr. Clarita Madison and Dr. Jt Mane. Advised pt to sign release. Referrals given; patient urged to keep appointments with specialists. Neurology. Counseled patient on health concerns:  Vision change, facial numbness, migraines, chronic sinusitis, cholesterol, and Vit D deficiency.    Relevant handouts given and discussed with patient. Immunizations noted. Offered empathy, support, legitimation, prayers, partnership to patient. Praised patient for progress. Advance Care Booklet given at office visit. Discussed with pt today. Declines honoring choices referral.   Follow-up Disposition:  Return in about 3 months (around 6/10/2017) for med refill. Patient was offered a choice/choices in the treatment plan today. Patient expresses understanding of the plan and agrees with recommendations. More than 40 mins spent face to face with patient and more than 50% of this time spent in counseling and coordinating care. Written by tracy Mcintosh, as dictated by Dr. Cony Bell DO. Documentation True and Accepted by Blanche Flor. Frederic Elkins. Patient Instructions        Numbness and Tingling: Care Instructions  Your Care Instructions  Many things can cause numbness or tingling. Swelling may put pressure on a nerve. This could cause you to lose feeling or have a pins-and-needles sensation on part of your body. Nerves may be damaged from trauma, toxins, or diseases, such as diabetes or multiple sclerosis (MS). Sometimes, though, the cause is not clear. If there is no clear reason for your symptoms, and you are not having any other symptoms, your doctor may suggest watching and waiting for a while to see if the numbness or tingling goes away on its own. Your doctor may want you to have blood or nerve tests to find the cause of your symptoms. Follow-up care is a key part of your treatment and safety. Be sure to make and go to all appointments, and call your doctor if you are having problems. It's also a good idea to know your test results and keep a list of the medicines you take. How can you care for yourself at home? · If your doctor prescribes medicine, take it exactly as directed. Call your doctor if you think you are having a problem with your medicine.   · If you have any swelling, put ice or a cold pack on the area for 10 to 20 minutes at a time. Put a thin cloth between the ice and your skin. When should you call for help? Call 911 anytime you think you may need emergency care. For example, call if:  · You have weakness, numbness, or tingling in both legs. · You lose bowel or bladder control. · You have symptoms of a stroke. These may include:  ¨ Sudden numbness, tingling, weakness, or loss of movement in your face, arm, or leg, especially on only one side of your body. ¨ Sudden vision changes. ¨ Sudden trouble speaking. ¨ Sudden confusion or trouble understanding simple statements. ¨ Sudden problems with walking or balance. ¨ A sudden, severe headache that is different from past headaches. Watch closely for changes in your health, and be sure to contact your doctor if you have any problems, or if:  · You do not get better as expected. Where can you learn more? Go to http://alejandroGeneral Mobile Corporationeddie.info/. Enter O822 in the search box to learn more about \"Numbness and Tingling: Care Instructions. \"  Current as of: February 19, 2016  Content Version: 11.1  © 3971-6739 [employer]. Care instructions adapted under license by [employer] (which disclaims liability or warranty for this information). If you have questions about a medical condition or this instruction, always ask your healthcare professional. [address] any warranty or liability for your use of this information. Saline Nasal Washes: Care Instructions  Your Care Instructions  Saline nasal washes help keep the nasal passages open by washing out thick or dried mucus. This simple remedy can help relieve symptoms of allergies, sinusitis, and colds. It also can make the nose feel more comfortable by keeping the mucous membranes moist. You may notice a little burning sensation in your nose the first few times you use the solution, but this usually gets better in a few days.   Follow-up care is a key part of your treatment and safety. Be sure to make and go to all appointments, and call your doctor if you are having problems. It's also a good idea to know your test results and keep a list of the medicines you take. How can you care for yourself at home? · You can buy premixed saline solution in a squeeze bottle or other sinus rinse products at a drugstore. Read and follow the instructions on the label. · You also can make your own saline solution by adding 1 teaspoon of salt and 1 teaspoon of baking soda to 2 cups of distilled water. · If you use a homemade solution, pour a small amount into a clean bowl. Using a rubber bulb syringe, squeeze the syringe and place the tip in the salt water. Pull a small amount of the salt water into the syringe by relaxing your hand. · Sit down with your head tilted slightly back. Do not lie down. Put the tip of the bulb syringe or the squeeze bottle a little way into one of your nostrils. Gently drip or squirt a few drops into the nostril. Repeat with the other nostril. Some sneezing and gagging are normal at first.  · Gently blow your nose. · Wipe the syringe or bottle tip clean after each use. · Repeat this 2 or 3 times a day. · Use nasal washes gently if you have nosebleeds often. When should you call for help? Watch closely for changes in your health, and be sure to contact your doctor if:  · You often get nosebleeds. · You have problems doing the nasal washes. Where can you learn more? Go to http://alejandro-eddie.info/. Enter 071 [id number] [id number] in the search box to learn more about \"Saline Nasal Washes: Care Instructions. \"  Current as of: July 29, 2016  Content Version: 11.1  © 2097-3934 [employer]. Care instructions adapted under license by [employer] (which disclaims liability or warranty for this information).  If you have questions about a medical condition or this instruction, always ask your healthcare professional. [employer], [facility] disclaims any warranty or liability for your use of this information. Sinusitis: Care Instructions  Your Care Instructions    Sinusitis is an infection of the lining of the sinus cavities in your head. Sinusitis often follows a cold. It causes pain and pressure in your head and face. In most cases, sinusitis gets better on its own in 1 to 2 weeks. But some mild symptoms may last for several weeks. Sometimes antibiotics are needed. Follow-up care is a key part of your treatment and safety. Be sure to make and go to all appointments, and call your doctor if you are having problems. It's also a good idea to know your test results and keep a list of the medicines you take. How can you care for yourself at home? · Take an over-the-counter pain medicine, such as acetaminophen (Tylenol), ibuprofen (Advil, Motrin), or naproxen (Aleve). Read and follow all instructions on the label. · If the doctor prescribed antibiotics, take them as directed. Do not stop taking them just because you feel better. You need to take the full course of antibiotics. · Be careful when taking over-the-counter cold or flu medicines and Tylenol at the same time. Many of these medicines have acetaminophen, which is Tylenol. Read the labels to make sure that you are not taking more than the recommended dose. Too much acetaminophen (Tylenol) can be harmful. · Breathe warm, moist air from a steamy shower, a hot bath, or a sink filled with hot water. Avoid cold, dry air. Using a humidifier in your home may help. Follow the directions for cleaning the machine. · Use saline (saltwater) nasal washes to help keep your nasal passages open and wash out mucus and bacteria. You can buy saline nose drops at a grocery store or drugstore. Or you can make your own at home by adding 1 teaspoon of salt and 1 teaspoon of baking soda to 2 cups of distilled water.  If you make your own, fill a bulb syringe with the solution, insert the tip into your nostril, and squeeze gently. Sherice Rueda your nose. · Put a hot, wet towel or a warm gel pack on your face 3 or 4 times a day for 5 to 10 minutes each time. · Try a decongestant nasal spray like oxymetazoline (Afrin). Do not use it for more than 3 days in a row. Using it for more than 3 days can make your congestion worse. When should you call for help? Call your doctor now or seek immediate medical care if:  · You have new or worse swelling or redness in your face or around your eyes. · You have a new or higher fever. Watch closely for changes in your health, and be sure to contact your doctor if:  · You have new or worse facial pain. · The mucus from your nose becomes thicker (like pus) or has new blood in it. · You are not getting better as expected. Where can you learn more? Go to http://alejandro-eddie.info/. Enter R700 in the search box to learn more about \"Sinusitis: Care Instructions. \"  Current as of: July 29, 2016  Content Version: 11.1  © 1240-8008 [employer]. Care instructions adapted under license by [employer] (which disclaims liability or warranty for this information). If you have questions about a medical condition or this instruction, always ask your healthcare professional. [address] any warranty or liability for your use of this information.

## 2017-03-10 NOTE — PATIENT INSTRUCTIONS
Numbness and Tingling: Care Instructions  Your Care Instructions  Many things can cause numbness or tingling. Swelling may put pressure on a nerve. This could cause you to lose feeling or have a pins-and-needles sensation on part of your body. Nerves may be damaged from trauma, toxins, or diseases, such as diabetes or multiple sclerosis (MS). Sometimes, though, the cause is not clear. If there is no clear reason for your symptoms, and you are not having any other symptoms, your doctor may suggest watching and waiting for a while to see if the numbness or tingling goes away on its own. Your doctor may want you to have blood or nerve tests to find the cause of your symptoms. Follow-up care is a key part of your treatment and safety. Be sure to make and go to all appointments, and call your doctor if you are having problems. It's also a good idea to know your test results and keep a list of the medicines you take. How can you care for yourself at home? · If your doctor prescribes medicine, take it exactly as directed. Call your doctor if you think you are having a problem with your medicine. · If you have any swelling, put ice or a cold pack on the area for 10 to 20 minutes at a time. Put a thin cloth between the ice and your skin. When should you call for help? Call 911 anytime you think you may need emergency care. For example, call if:  · You have weakness, numbness, or tingling in both legs. · You lose bowel or bladder control. · You have symptoms of a stroke. These may include:  ¨ Sudden numbness, tingling, weakness, or loss of movement in your face, arm, or leg, especially on only one side of your body. ¨ Sudden vision changes. ¨ Sudden trouble speaking. ¨ Sudden confusion or trouble understanding simple statements. ¨ Sudden problems with walking or balance. ¨ A sudden, severe headache that is different from past headaches.   Watch closely for changes in your health, and be sure to contact your doctor if you have any problems, or if:  · You do not get better as expected. Where can you learn more? Go to http://alejandro-eddie.info/. Enter D271 in the search box to learn more about \"Numbness and Tingling: Care Instructions. \"  Current as of: February 19, 2016  Content Version: 11.1  © 1107-4519 [employer]. Care instructions adapted under license by [employer] (which disclaims liability or warranty for this information). If you have questions about a medical condition or this instruction, always ask your healthcare professional. [place] [address] any warranty or liability for your use of this information. Saline Nasal Washes: Care Instructions  Your Care Instructions  Saline nasal washes help keep the nasal passages open by washing out thick or dried mucus. This simple remedy can help relieve symptoms of allergies, sinusitis, and colds. It also can make the nose feel more comfortable by keeping the mucous membranes moist. You may notice a little burning sensation in your nose the first few times you use the solution, but this usually gets better in a few days. Follow-up care is a key part of your treatment and safety. Be sure to make and go to all appointments, and call your doctor if you are having problems. It's also a good idea to know your test results and keep a list of the medicines you take. How can you care for yourself at home? · You can buy premixed saline solution in a squeeze bottle or other sinus rinse products at a drugstore. Read and follow the instructions on the label. · You also can make your own saline solution by adding 1 teaspoon of salt and 1 teaspoon of baking soda to 2 cups of distilled water. · If you use a homemade solution, pour a small amount into a clean bowl. Using a rubber bulb syringe, squeeze the syringe and place the tip in the salt water.  Pull a small amount of the salt water into the syringe by relaxing your hand. · Sit down with your head tilted slightly back. Do not lie down. Put the tip of the bulb syringe or the squeeze bottle a little way into one of your nostrils. Gently drip or squirt a few drops into the nostril. Repeat with the other nostril. Some sneezing and gagging are normal at first.  · Gently blow your nose. · Wipe the syringe or bottle tip clean after each use. · Repeat this 2 or 3 times a day. · Use nasal washes gently if you have nosebleeds often. When should you call for help? Watch closely for changes in your health, and be sure to contact your doctor if:  · You often get nosebleeds. · You have problems doing the nasal washes. Where can you learn more? Go to http://alejandro-eddie.info/. Enter [id number] in the search box to learn more about \"Saline Nasal Washes: Care Instructions. \"  Current as of: July 29, 2016  Content Version: 11.1  © 1914-5685 [employer]. Care instructions adapted under license by [employer] (which disclaims liability or warranty for this information). If you have questions about a medical condition or this instruction, always ask your healthcare professional. [place] [address] any warranty or liability for your use of this information. Sinusitis: Care Instructions  Your Care Instructions    Sinusitis is an infection of the lining of the sinus cavities in your head. Sinusitis often follows a cold. It causes pain and pressure in your head and face. In most cases, sinusitis gets better on its own in 1 to 2 weeks. But some mild symptoms may last for several weeks. Sometimes antibiotics are needed. Follow-up care is a key part of your treatment and safety. Be sure to make and go to all appointments, and call your doctor if you are having problems. It's also a good idea to know your test results and keep a list of the medicines you take. How can you care for yourself at home?   · Take an over-the-counter pain medicine, such as acetaminophen (Tylenol), ibuprofen (Advil, Motrin), or naproxen (Aleve). Read and follow all instructions on the label. · If the doctor prescribed antibiotics, take them as directed. Do not stop taking them just because you feel better. You need to take the full course of antibiotics. · Be careful when taking over-the-counter cold or flu medicines and Tylenol at the same time. Many of these medicines have acetaminophen, which is Tylenol. Read the labels to make sure that you are not taking more than the recommended dose. Too much acetaminophen (Tylenol) can be harmful. · Breathe warm, moist air from a steamy shower, a hot bath, or a sink filled with hot water. Avoid cold, dry air. Using a humidifier in your home may help. Follow the directions for cleaning the machine. · Use saline (saltwater) nasal washes to help keep your nasal passages open and wash out mucus and bacteria. You can buy saline nose drops at a grocery store or drugstore. Or you can make your own at home by adding 1 teaspoon of salt and 1 teaspoon of baking soda to 2 cups of distilled water. If you make your own, fill a bulb syringe with the solution, insert the tip into your nostril, and squeeze gently. [place]  your nose. · Put a hot, wet towel or a warm gel pack on your face 3 or 4 times a day for 5 to 10 minutes each time. · Try a decongestant nasal spray like oxymetazoline (Afrin). Do not use it for more than 3 days in a row. Using it for more than 3 days can make your congestion worse. When should you call for help? Call your doctor now or seek immediate medical care if:  · You have new or worse swelling or redness in your face or around your eyes. · You have a new or higher fever. Watch closely for changes in your health, and be sure to contact your doctor if:  · You have new or worse facial pain. · The mucus from your nose becomes thicker (like pus) or has new blood in it.   · You are not getting better as expected. Where can you learn more? Go to http://alejandro-eddie.info/. Enter U423 in the search box to learn more about \"Sinusitis: Care Instructions. \"  Current as of: July 29, 2016  Content Version: 11.1  © 1803-5475 [employer]. Care instructions adapted under license by [employer] (which disclaims liability or warranty for this information). If you have questions about a medical condition or this instruction, always ask your healthcare professional. [address] any warranty or liability for your use of this information.

## 2017-03-10 NOTE — LETTER
NOTIFICATION RETURN TO WORK / SCHOOL 
 
3/10/2017 10:16 AM 
 
Ms. Tennis Reyes [address] [address] To Whom It May Concern: 
 
Tennis Reyes is currently under the care of Derek [facility]. She will return to work/school on: 3/13/17 If there are questions or concerns please have the patient contact our office.  
 
 
 
Sincerely, 
 
 
Yee Po, DO

## 2017-04-07 DIAGNOSIS — L29.9 PRURITIC DERMATITIS: ICD-10-CM

## 2017-04-07 RX ORDER — HYDROXYZINE 25 MG/1
TABLET, FILM COATED ORAL
Qty: 90 TAB | Refills: 0 | Status: SHIPPED | OUTPATIENT
Start: 2017-04-07 | End: 2017-05-04 | Stop reason: SDUPTHER

## 2017-04-18 ENCOUNTER — OFFICE VISIT (OUTPATIENT)
Dept: NEUROLOGY | Age: 47
End: 2017-04-18

## 2017-04-18 VITALS
BODY MASS INDEX: 54.83 KG/M2 | HEIGHT: 61 IN | HEART RATE: 62 BPM | RESPIRATION RATE: 18 BRPM | WEIGHT: 290.4 LBS | SYSTOLIC BLOOD PRESSURE: 138 MMHG | OXYGEN SATURATION: 98 % | DIASTOLIC BLOOD PRESSURE: 84 MMHG

## 2017-04-18 DIAGNOSIS — G43.109 MIGRAINE WITH AURA AND WITHOUT STATUS MIGRAINOSUS, NOT INTRACTABLE: Primary | ICD-10-CM

## 2017-04-18 NOTE — PATIENT INSTRUCTIONS
[facility]   Statement to Patients  April 1, 2014      In an effort to ensure the large volume of patient prescription refills is processed in the most efficient and expeditious manner, we are asking our patients to assist us by calling your Pharmacy for all prescription refills, this will include also your  Mail Order Pharmacy. The pharmacy will contact our office electronically to continue the refill process. Please do not wait until the last minute to call your pharmacy. We need at least 48 hours (2days) to fill prescriptions. We also encourage you to call your pharmacy before going to  your prescription to make sure it is ready. With regard to controlled substance prescription refill requests (narcotic refills) that need to be picked up at our office, we ask your cooperation by providing us with at least 72 hours (3days) notice that you will need a refill. We will not refill narcotic prescription refill requests after 4:00pm on any weekday, Monday through Thursday, or after 2:00pm on Fridays, or on the weekends. We encourage everyone to explore another way of getting your prescription refill request processed using [employer], our patient web portal through our electronic medical record system. [employer] is an efficient and effective way to communicate your medication request directly to the office and  downloadable as an russell on your smart phone . [employer] also features a review functionality that allows you to view your medication list as well as leave messages for your physician. Are you ready to get connected? If so please review the attatched instructions or speak to any of our staff to get you set up right away! Thank you so much for your cooperation. Should you have any questions please contact our Practice Administrator.     The Physicians and Staff,  [facility]

## 2017-04-18 NOTE — MR AVS SNAPSHOT
Visit Information Date & Time Provider Department Dept. Phone Encounter #  
 4/18/2017  8:00 AM Jasson Pratt DO [facility] at [address] [id number] Follow-up Instructions Return if symptoms worsen or fail to improve. Your Appointments 6/29/2017  1:30 PM  
ROUTINE CARE with Myra Ordoñez DO [address] ([facility]) Appt Note: 3 MO F/U Med Refill [address] [facility] [address] [place] [address]  
[phone]  
  
   
 [address] [facility] [address] Upcoming Health Maintenance Date Due  
 BREAST CANCER SCRN MAMMOGRAM 12/2/2017 PAP AKA CERVICAL CYTOLOGY 11/7/2019 DTaP/Tdap/Td series (2 - Td) 11/3/2025 Allergies as of 4/18/2017  Review Complete On: 4/18/2017 By: Jasson Pratt DO Severity Noted Reaction Type Reactions Latex  11/20/2014    Rash Pcn [Penicillins] High 07/31/2009    Hives Prednisone High 07/31/2009    Hives Sulfa (Sulfonamide Antibiotics) High 07/31/2009    Hives Garamycin [Gentamicin]  07/31/2009    Other (comments) Itchy eyes due to sulfate Metformin  11/03/2015    Diarrhea Other Medication  07/31/2009    Hives, Rash SUN Current Immunizations  Reviewed on 3/10/2017 Name Date Influenza Vaccine 11/7/2016, 10/15/2013 Influenza Vaccine Levi Roney) 11/3/2015 Influenza Vaccine (Quad) PF 11/7/2016, 11/20/2014 Influenza Vaccine Split 11/7/2012, 11/18/2011 Influenza Vaccine Whole 10/1/2010 TD Vaccine 9/1/2003 Tdap 11/3/2015 Not reviewed this visit You Were Diagnosed With   
  
 Codes Comments Migraine with aura and without status migrainosus, not intractable    -  Primary ICD-10-CM: G43.109 ICD-9-CM: 346.00 Vitals BP Pulse Resp Height(growth percentile) Weight(growth percentile) SpO2  
 138/84 62 18 5' 1\" (1.549 m) 290 lb 6.4 oz (131.7 kg) 98% BMI OB Status Smoking Status 54.87 kg/m2 Having regular periods Never Smoker Vitals History BMI and BSA Data Body Mass Index Body Surface Area 54.87 kg/m 2 2.38 m 2 Preferred Pharmacy Pharmacy Name Phone [facility]Katharina PRAKASH [phone] Your Updated Medication List  
  
   
This list is accurate as of: 4/18/17  8:41 AM.  Always use your most recent med list.  
  
  
  
  
 albuterol 90 mcg/actuation inhaler Commonly known as:  PROVENTIL HFA, VENTOLIN HFA, PROAIR HFA Take 2 Puffs by inhalation every four (4) hours as needed for Wheezing. Indications: BRONCHOSPASM PREVENTION  
  
 ALLEGRA 180 mg tablet Generic drug:  fexofenadine Take  by mouth daily. ALPRAZolam 0.5 mg tablet Commonly known as:  Fatoumata Chandu Take 1 Tab by mouth two (2) times daily as needed for Anxiety. Max Daily Amount: 1 mg. Indications: ANXIETY  
  
 azelastine 137 mcg (0.1 %) nasal spray Commonly known as:  ASTELIN  
1 Spray by Both Nostrils route two (2) times a day. Use in each nostril as directed  Indications: SEASONAL ALLERGIC RHINITIS  
  
 butalbital-acetaminophen-caffeine -40 mg per tablet Commonly known as:  FIORICET, ESGIC  
TAKE 1 TAB BY MOUTH EVERY SIX (6) HOURS AS NEEDED FOR PAIN OR HEADACHE. cyclobenzaprine 10 mg tablet Commonly known as:  FLEXERIL  
TAKE 1 TABLET BY MOUTH 3 TIMES A DAY AS NEEDED FOR MUSCLE SPASM  
  
 diclofenac EC 75 mg EC tablet Commonly known as:  VOLTAREN Take 75 mg by mouth two (2) times a day. doxycycline 50 mg capsule Commonly known as:  VIBRAMYCIN  
  
 doxycycline 50 mg tablet Commonly known as:  ADOXA Take 50 mg by mouth two (2) times a day. EPICERAM Emul Generic drug:  Emollient Combination No.32  
by Apply Externally route. From Dr Monica Rodriges. FISH OIL 1,000 mg Cap Generic drug:  omega-3 fatty acids-vitamin e  
 Take 1 Cap by mouth daily. FLONASE 50 mcg/actuation nasal spray Generic drug:  fluticasone  
nightly. hydrOXYzine HCl 25 mg tablet Commonly known as:  ATARAX TAKE 1 TAB BY MOUTH EVERY EIGHT (8) HOURS AS NEEDED FOR ITCHING. INDICATIONS: PRURITUS OF SKIN  
  
 JUBLIA Nereyda topical solution Generic drug:  efinaconazole JARRETT TO AFFECTED NAILS QD  
  
 latanoprost 0.005 % ophthalmic solution Commonly known as:  Albesa Lesch Administer 1 Drop to both eyes nightly. levothyroxine 125 mcg tablet Commonly known as:  SYNTHROID Take 1 Tab by mouth Daily (before breakfast). Indications: hypothyroidism LOCOID 0.1 % Lotn Generic drug:  Hydrocortisone Butyrate  
  
 magnesium oxide 400 mg tablet Commonly known as:  MAG-OX Take 400 mg by mouth daily. METROGEL 1 % topical gel Generic drug:  metroNIDAZOLE Apply  to affected area daily. Use a thin layer to affected areas after washing from Dr Guerline Luis   Indications: ACNE ROSACEA  
  
 montelukast 10 mg tablet Commonly known as:  SINGULAIR Take 1 Tab by mouth daily. Indications: ALLERGIC RHINITIS, MAINTENANCE THERAPY FOR ASTHMA MOTRIN 800 mg tablet Generic drug:  ibuprofen Take  by mouth every six (6) hours as needed for Pain.  
  
 multivitamin tablet Commonly known as:  ONE A DAY Take 1 tablet by mouth daily. NAFTIN 2 % Gel Generic drug:  naftifine APPLY EXTERNALLY TWICE A DAY  
  
 norethindrone-ethinyl estradiol 1 mg-20 mcg (21)/75 mg (7) Tab Commonly known as:  Sherman Noss FE 1/20 Take  by mouth. omeprazole 20 mg capsule Commonly known as:  PRILOSEC  
TAKE ONE CAPSULE BY MOUTH EVERY DAY  
  
 OTHER(NON-FORMULARY) Osteo Biflex PROBIOTIC COMPLEX PO Take  by mouth daily. SYSTANE (PF) OP Apply  to eye four (4) times daily. * terconazole 80 mg vaginal suppository Commonly known as:  TERAZOL 3  
  
 * terconazole 0.4 % vaginal cream  
Commonly known as:  TERAZOL 7  
  
 valACYclovir 500 mg tablet Commonly known as:  VALTREX  
  
 VITAMIN D3 1,000 unit tablet Generic drug:  cholecalciferol Take 1,000 Units by mouth daily. * Notice: This list has 2 medication(s) that are the same as other medications prescribed for you. Read the directions carefully, and ask your doctor or other care provider to review them with you. Follow-up Instructions Return if symptoms worsen or fail to improve. Patient Instructions PRESCRIPTION REFILL POLICY [facility] Statement to Patients April 1, 2014 In an effort to ensure the large volume of patient prescription refills is processed in the most efficient and expeditious manner, we are asking our patients to assist us by calling your Pharmacy for all prescription refills, this will include also your  Mail Order Pharmacy. The pharmacy will contact our office electronically to continue the refill process. Please do not wait until the last minute to call your pharmacy. We need at least 48 hours (2days) to fill prescriptions. We also encourage you to call your pharmacy before going to  your prescription to make sure it is ready. With regard to controlled substance prescription refill requests (narcotic refills) that need to be picked up at our office, we ask your cooperation by providing us with at least 72 hours (3days) notice that you will need a refill. We will not refill narcotic prescription refill requests after 4:00pm on any weekday, Monday through Thursday, or after 2:00pm on Fridays, or on the weekends. We encourage everyone to explore another way of getting your prescription refill request processed using [employer], our patient web portal through our electronic medical record system. [employer] is an efficient and effective way to communicate your medication request directly to the office and  downloadable as an russell on your smart phone .  [employer] also features a review functionality that allows you to view your medication list as well as leave messages for your physician. Are you ready to get connected? If so please review the attatched instructions or speak to any of our staff to get you set up right away! Thank you so much for your cooperation. Should you have any questions please contact our Practice Administrator. The Physicians and Staff,  [facility] Introducing [facility] SERVICES! Dear Alfred Manner: Thank you for requesting a [email or web address] account. Our records indicate that you already have an active [email or web address] account. You can access your account anytime at [email or web address] Did you know that you can access your hospital and ER discharge instructions at any time in [email or web address]? You can also review all of your test results from your hospital stay or ER visit. Additional Information If you have questions, please visit the Frequently Asked Questions section of the Moment.me website at [email or web address]/"[email or web address][email or web address] Remember, Moment.me is NOT to be used for urgent needs. For medical emergencies, dial 911. Now available from your iPhone and Android! Please provide this summary of care documentation to your next provider. Your primary care clinician is listed as Jono Ocampo. If you have any questions after today's visit, please call [phone].

## 2017-04-18 NOTE — PROGRESS NOTES
[facility] PATIENT EVALUATION/CONSULTATION       PATIENT NAME: Mello Worthy    MRN: [id number]    REASON FOR CONSULTATION: Headaches    04/18/17      Previous records (physician notes, laboratory reports, and radiology reports) and imaging studies were reviewed and summarized. My recommendations will be communicated back to the patient's physician(s) via electronic medical record and/or by [facility] mail. HISTORY OF PRESENT ILLNESS:  Mello Worthy is a 48 y.o. right handed female presenting for evaluation of headaches. HAs present x 10 years. She reports her last headache was associated with L eye blurred vision and L facial numbness prompting current evaluation. Location: R hemisphere/temporal  Character: throbbing  Intensity: On average 10/10  Frequency: 2x/monthly  # HA free days per month: 28-29  Duration: >4 hours  Aura: yes  Associated Sx with HA: no nausea/vomiting, +photophobia. Denies unilateral ptosis, conjunctival injection, lacrimation, sweating  Neurological ROS: Denies focal weakness associated with headaches. Rarely associated focal numbness, recent blurred vision/vision loss  Systemic ROS:   Caffeine use: not using this daily  H/O Head trauma: yes, no LOC  Depressive or anxiety Sx: +anxiety    Any change in pattern of HA? As above    Triggers: Lack of sleep. Alleviating factors: Ice pack  FHx HA/migraine: Sister migraines, maternal aunt    Treatment so far: Excedrin migraine or Fioricet    Investigations so far:  MRI/MRA/MRV performed by ENT 2 years ago- normal per pt (no reports available)      PAST MEDICAL HISTORY:  Past Medical History:   Diagnosis Date    Acne rosacea     Dr. Emil Cage.  Allergy, unspecified not elsewhere classified childhood     Txd with immunotherapy. Dr. Anita Sanchez Anemia NEC     borderline    Ankle sprain 2007    Right. Dr. Kylah Rich    Ankle sprain 11/2012    Right. Dr. Charly Garcia.     Asthma childhood    Chickenpox childhood    Chronic low back pain with right-sided sciatica     and SI joint dysfunction. Dr. Ranjana Barba.  Chronic otitis media     Dr. Zaynab Gandara Dry eye syndrome 2013    Dr. Nona Cisneros.  EBV infection 6/27/2011    Hearing loss     Mild high frequency sensorineuronal hearing loss. Dr. Richard Barragan Led murmur     Hernia of abdominal wall 09/2003, 70/2073    umbilical.  Dr. Danielle Comer. Dr. Argelia Romano    Hyperglycemia 2013    Hypothyroidism 06/2010    Dr. Severa Guile pressure increase 12/2011    Dr. Nicolle Cornelius. Dr. Nona Cisneros. Dr. Tiffanie Garrido.  Knee pain, left 04/2012    Left. Dr. Corinne Newman Measles childhood    Migraine     Mumps childhood    Plantar fasciitis, bilateral 2010    Dr. Nathalia Camacho    Sciatica 2008    Right.  with OA. Dr. Alejandro Sun Tinnitus of right ear 2012    Dr. Margaret Liao:  Past Surgical History:   Procedure Laterality Date   Yue Pierson  2011    Dr. Mary Obrien.  HAND/FINGER SURGERY UNLISTED  09/2003    Right Index finger repair due to cut    HX HERNIA REPAIR  69/9190    Umbilical.  Dr. Danielle Comer.  HX HERNIA REPAIR  9/23/2011    recurrent umbilical.  Laparoscopy incisional.  Dr. Argelia Romano.     HX TONSILLECTOMY  childhood    LAP,CHOLECYSTECTOMY  07/2001       FAMILY HISTORY:   Family History   Problem Relation Age of Onset    Hypertension Mother     Cancer Mother      small cell lung with bone mets to spine/MELANOMA    Cancer Father      melanoma    Arthritis-osteo Father     Colon Polyps Father     Diabetes Maternal Grandmother     Stroke Maternal Grandmother     Seizures Maternal Grandmother     Colon Polyps Maternal Grandmother     Breast Cancer Maternal Grandmother     Heart Disease Paternal Grandmother      heart failure    Heart Attack Paternal Grandmother     Asthma Paternal Grandmother          SOCIAL HISTORY:  Social History     Social History    Marital status: SINGLE     Spouse name: N/A    Number of children: N/A    Years of education: N/A     Social History Main Topics    Smoking status: Never Smoker    Smokeless tobacco: Never Used      Comment: lived with smoker dad and mom then step mom x 20 yrs    Alcohol use 0.0 oz/week      Comment: RARE    Drug use: No    Sexual activity: No     Other Topics Concern    None     Social History Narrative         MEDICATIONS:   Current Outpatient Prescriptions   Medication Sig Dispense Refill    hydrOXYzine HCl (ATARAX) 25 mg tablet TAKE 1 TAB BY MOUTH EVERY EIGHT (8) HOURS AS NEEDED FOR ITCHING. INDICATIONS: PRURITUS OF SKIN 90 Tab 0    ALPRAZolam (XANAX) 0.5 mg tablet Take 1 Tab by mouth two (2) times daily as needed for Anxiety. Max Daily Amount: 1 mg. Indications: ANXIETY 30 Tab 0    azelastine (ASTELIN) 137 mcg (0.1 %) nasal spray 1 [place] by Both Nostrils route two (2) times a day. Use in each nostril as directed  Indications: SEASONAL ALLERGIC RHINITIS 1 Bottle 5    cyclobenzaprine (FLEXERIL) 10 mg tablet TAKE 1 TABLET BY MOUTH 3 TIMES A DAY AS NEEDED FOR MUSCLE SPASM 90 Tab 2    levothyroxine (SYNTHROID) 125 mcg tablet Take 1 Tab by mouth Daily (before breakfast). Indications: hypothyroidism 90 Tab 3    albuterol (PROVENTIL HFA, VENTOLIN HFA, PROAIR HFA) 90 mcg/actuation inhaler Take 2 Puffs by inhalation every four (4) hours as needed for Wheezing. Indications: BRONCHOSPASM PREVENTION 1 Inhaler 5    montelukast (SINGULAIR) 10 mg tablet Take 1 Tab by mouth daily. Indications: ALLERGIC RHINITIS, MAINTENANCE THERAPY FOR ASTHMA 30 Tab 11    NAFTIN 2 % gel APPLY EXTERNALLY TWICE A DAY  2    butalbital-acetaminophen-caffeine (FIORICET, ESGIC) -40 mg per tablet TAKE 1 TAB BY MOUTH EVERY SIX (6) HOURS AS NEEDED FOR PAIN OR HEADACHE. 40 Tab 2    norethindrone-ethinyl estradiol (JUNEL FE 1/20) 1 mg-20 mcg (21)/75 mg (7) tab Take  by mouth.       terconazole (TERAZOL 7) 0.4 % vaginal cream   6    LOCOID 0.1 % lotn   1    fluticasone (FLONASE) 50 mcg/actuation nasal spray nightly.  valACYclovir (VALTREX) 500 mg tablet   1    terconazole (TERAZOL 3) 80 mg vaginal suppository   4    multivitamin (ONE A DAY) tablet Take 1 tablet by mouth daily.  omeprazole (PRILOSEC) 20 mg capsule TAKE ONE CAPSULE BY MOUTH EVERY DAY 30 Cap 1    PROPYLENE GLYCOL//PF (SYSTANE, PF, OP) Apply  to eye four (4) times daily.  magnesium oxide (MAG-OX) 400 mg tablet Take 400 mg by mouth daily.  doxycycline (ADOXA) 50 mg tablet Take 50 mg by mouth two (2) times a day.  Emollient Combination No.32 (EPICERAM) Emul by Apply Externally route. From Dr Caterina Cho.  metroNIDAZOLE (METROGEL) 1 % topical gel Apply  to affected area daily. Use a thin layer to affected areas after washing from Dr Caterina Cho   Indications: ACNE ROSACEA      diclofenac EC (VOLTAREN) 75 mg EC tablet Take 75 mg by mouth two (2) times a day.  latanoprost (XALATAN) 0.005 % ophthalmic solution Administer 1 Drop to both eyes nightly.  OTHER,NON-FORMULARY, Osteo Biflex       omega-3 fatty acids-vitamin e (FISH OIL) 1,000 mg Cap Take 1 Cap by mouth daily.  fexofenadine (ALLEGRA) 180 mg tablet Take  by mouth daily.  LACTOBACILLUS/FOS/PECTIN (PROBIOTIC COMPLEX PO) Take  by mouth daily.  cholecalciferol, vitamin d3, (VITAMIN D) 1,000 unit tablet Take 1,000 Units by mouth daily.  ibuprofen (MOTRIN) 800 mg tablet Take  by mouth every six (6) hours as needed for Pain.       doxycycline (VIBRAMYCIN) 50 mg capsule       JUBLIA anatoliy topical solution JARRETT TO AFFECTED NAILS QD  11         ALLERGIES:  Allergies   Allergen Reactions    Latex Rash    Pcn [Penicillins] Hives    Prednisone Hives    Sulfa (Sulfonamide Antibiotics) Hives    Garamycin [Gentamicin] Other (comments)     Itchy eyes due to sulfate    Metformin Diarrhea    Other Medication Hives and Rash SUN         REVIEW OF SYSTEMS:  10 point ROS reviewed with patient. Please see scanned document under media. PHYSICAL EXAM:  Vital Signs:   Visit Vitals    /84    Pulse 62    Resp 18    Ht 5' 1\" (1.549 m)    Wt 131.7 kg (290 lb 6.4 oz)    SpO2 98%    BMI 54.87 kg/m2        General Medical Exam:  General:  Well appearing, comfortable, in no apparent distress. Eyes/ENT: see cranial nerve examination. Neck: No masses appreciated. Full range of motion without tenderness. Respiratory:  Clear to auscultation, good air entry bilaterally. Cardiac:  Regular rate and rhythm, no murmur. GI:  Soft, non-tender, non-distended abdomen. Bowel sounds normal. No masses, organomegaly. Extremities:  No deformities, edema, or skin discoloration. Skin:  No rashes or lesions. Neurological:  · Mental Status:  Alert and oriented to person, place, and time with fluent speech. · Cranial Nerves:   CNII/III/IV/VI: Optic disc w/clear margins b/l, visual fields full to confrontation, EOMI, PERRL, no ptosis or nystagmus. CN V: Facial sensation intact bilaterally, masseter 5/5   CN VII: Facial muscles symmetric and strong   CN VIII: Hears finger rub well bilaterally, intact vestibular function   CN IX/X: Normal palatal movement   CN XI: Full strength shoulder shrug bilaterally   CN XII: Tongue protrusion full and midline without fasciculation or atrophy  · Motor: Normal tone and muscle bulk with no pronator drift. No atrophy or fasciculations present on examination.   Individual muscle group testing:  Shoulder abduction:   Left:5/5   Right : 5/5    Shoulder adduction:   Left:5/5   Right : 5/5    Elbow flexion:      Left:5/5   Right : 5/5  Elbow extension:    Left:5/5   Right : 5/5   Wrist flexion:    Left:5/5   Right : 5/5  Wrist extension:    Left:5/5   Right : 5/5  Arm pronation:   Left:5/5   Right : 5/5  Arm supination:   Left:5/5   Right : 5/5    Finger flexion:    Left:5/5   Right : 5/5    Finger extension:   Left:5/5   Right : 5/5   Finger abduction:  Left:5/5   Right : 5/5   Finger adduction:   Left:5/5   Right : 5/5  Hip flexion:     Left:5/5   Right : 5/5         Hip extension:   Left:5/5   Right : 5/5    Knee flexion:    Left:5/5   Right : 5/5    Knee extension:   Left:5/5   Right : 5/5    Dorsiflexion:     Left:5/5   Right : 5/5  Plantar flexion:    Left:5/5   Right : 5/5      · MSRs: No crossed adductors or clonus. RIGHT  LEFT   Brachioradialis 2+ 2+   Biceps 2+ 2+   Triceps 2+ 2+   Knee 2+ 2+   Achilles 2+ 2+        Plantar response Downward Downward          · Sensation: Normal and symmetric perception of pinprick, temperature, light touch, proprioception, and vibration; (-) Romberg. · Coordination: No dysmetria. Normal rapid alternating movements; finger-to-nose and heel-to- shin testing are within normal limits. · Gait: Normal native and stress (tandem/heel/toe walking). ASSESSMENT:      ICD-10-CM ICD-9-CM    1. Migraine with aura and without status migrainosus, not intractable G43.109 29.00    48year old pleasant female presenting with intermittent migraine w/aura, recent onset of focal paresthesias and blurred vision. Neurological examination is non-focal and essentially normal.    Reassurance provided that transient focal paresthesias and blurred vision are likely associated migraine phenomenon. Discussed migraine and related stroke risk especially in combination with OCP use- she was advised to discontinue OCPs. If headaches should worsen or fail to respond to current therapy, she should return for repeat clinical assessment. Headache education  Discussed pathophysiology of headache. Discussed use of headache diary. Discussed treatment options, both abortive and preventive medications. Instructed patient about medications and potential side effects. PLAN:  · Cont.  Fioricet and/or Excedrin for abortive headache therapy  · D/C OCP due to stroke risk    Follow-up Disposition:  Return if symptoms worsen or fail to improve. Bianca Paula DO  Staff Neurologist  Diplomate, American Board of Psychiatry & Neurology       CC Referring provider:    Collin Cordoba DO

## 2017-05-04 DIAGNOSIS — L29.9 PRURITIC DERMATITIS: ICD-10-CM

## 2017-05-04 RX ORDER — HYDROXYZINE 25 MG/1
TABLET, FILM COATED ORAL
Qty: 90 TAB | Refills: 3 | Status: SHIPPED | OUTPATIENT
Start: 2017-05-04 | End: 2017-09-01 | Stop reason: SDUPTHER

## 2017-06-22 DIAGNOSIS — M54.5 CHRONIC LOW BACK PAIN, UNSPECIFIED BACK PAIN LATERALITY, WITH SCIATICA PRESENCE UNSPECIFIED: ICD-10-CM

## 2017-06-22 DIAGNOSIS — G89.29 CHRONIC LOW BACK PAIN, UNSPECIFIED BACK PAIN LATERALITY, WITH SCIATICA PRESENCE UNSPECIFIED: ICD-10-CM

## 2017-06-23 RX ORDER — CYCLOBENZAPRINE HCL 10 MG
TABLET ORAL
Qty: 90 TAB | Refills: 0 | Status: SHIPPED | OUTPATIENT
Start: 2017-06-23 | End: 2017-06-30 | Stop reason: SDUPTHER

## 2017-06-29 ENCOUNTER — OFFICE VISIT (OUTPATIENT)
Dept: FAMILY MEDICINE CLINIC | Age: 47
End: 2017-06-29

## 2017-06-29 VITALS
DIASTOLIC BLOOD PRESSURE: 75 MMHG | WEIGHT: 293 LBS | SYSTOLIC BLOOD PRESSURE: 123 MMHG | RESPIRATION RATE: 18 BRPM | HEART RATE: 76 BPM | OXYGEN SATURATION: 98 % | HEIGHT: 61 IN | TEMPERATURE: 98.3 F | BODY MASS INDEX: 55.32 KG/M2

## 2017-06-29 DIAGNOSIS — E03.4 HYPOTHYROIDISM DUE TO ACQUIRED ATROPHY OF THYROID: Primary | ICD-10-CM

## 2017-06-29 DIAGNOSIS — R73.9 HYPERGLYCEMIA: ICD-10-CM

## 2017-06-29 DIAGNOSIS — E55.9 VITAMIN D DEFICIENCY: ICD-10-CM

## 2017-06-29 DIAGNOSIS — M25.552 HIP PAIN, BILATERAL: ICD-10-CM

## 2017-06-29 DIAGNOSIS — E78.5 DYSLIPIDEMIA: ICD-10-CM

## 2017-06-29 DIAGNOSIS — B35.3 TINEA PEDIS OF BOTH FEET: ICD-10-CM

## 2017-06-29 DIAGNOSIS — F51.04 CHRONIC INSOMNIA: ICD-10-CM

## 2017-06-29 DIAGNOSIS — F43.23 ADJUSTMENT DISORDER WITH MIXED ANXIETY AND DEPRESSED MOOD: ICD-10-CM

## 2017-06-29 DIAGNOSIS — D50.8 IRON DEFICIENCY ANEMIA SECONDARY TO INADEQUATE DIETARY IRON INTAKE: ICD-10-CM

## 2017-06-29 DIAGNOSIS — M25.551 HIP PAIN, BILATERAL: ICD-10-CM

## 2017-06-29 DIAGNOSIS — F43.21 ADJUSTMENT DISORDER WITH DEPRESSED MOOD: ICD-10-CM

## 2017-06-29 RX ORDER — NAFTIFINE HYDROCHLORIDE 2 G/100G
1 GEL TOPICAL 2 TIMES DAILY
Qty: 1 BOTTLE | Refills: 2 | Status: SHIPPED | OUTPATIENT
Start: 2017-06-29 | End: 2018-05-29 | Stop reason: SDUPTHER

## 2017-06-29 RX ORDER — LEVOTHYROXINE SODIUM 125 UG/1
125 TABLET ORAL
Qty: 90 TAB | Refills: 3 | Status: SHIPPED | OUTPATIENT
Start: 2017-06-29 | End: 2018-07-27 | Stop reason: SDUPTHER

## 2017-06-29 RX ORDER — ALPRAZOLAM 0.5 MG/1
0.5 TABLET ORAL
Qty: 60 TAB | Refills: 2 | Status: SHIPPED | OUTPATIENT
Start: 2017-06-29 | End: 2021-12-02 | Stop reason: SDUPTHER

## 2017-06-29 RX ORDER — NORETHINDRONE ACETATE AND ETHINYL ESTRADIOL, AND FERROUS FUMARATE 1.5-30(21)
KIT ORAL
COMMUNITY
Start: 2017-06-28 | End: 2017-11-09 | Stop reason: SDUPTHER

## 2017-06-29 RX ORDER — DOXYCYCLINE HYCLATE 50 MG/1
CAPSULE ORAL
Refills: 11 | COMMUNITY
Start: 2017-06-02 | End: 2017-06-29 | Stop reason: SDUPTHER

## 2017-06-29 NOTE — PATIENT INSTRUCTIONS
Grief (Actual/Anticipated): Care Instructions  Your Care Instructions    Grief is your emotional reaction to a major loss. The words \"sorrow\" and \"heartache\" often are used to describe feelings of grief. You feel grief when you lose a beloved person, pet, place, or thing. It is also natural to feel grief when you lose a valued way of life, such as a job, marriage, or good health. You may begin to grieve before a loss occurs. You may grieve for a loved one who is sick and dying. Children and adults often feel the pain of loss before a big move or divorce. This type of grief helps you get ready for a loss. Grief is different for each person. There is no \"normal\" or \"expected\" period of time for grieving. Some people adjust to their loss within a couple of months. Others may take 2 years or longer, especially if their lives were changed a lot or if the loss was sudden and shocking. Grieving can cause problems such as headaches, loss of appetite, and trouble with thinking or sleeping. You may withdraw from friends and family and behave in ways that are unusual for you. Grief may cause you to question your beliefs and views about life. Grief is natural and does not require medical treatment. But if you have trouble sleeping, it may help to take sleeping pills for a short time. It may help to talk with people who have been through or are going through similar losses. You may also want to talk to a counselor about your feelings. Talking about your loss, sharing your cares and concerns, and getting support from others are important parts of healthy grieving. Follow-up care is a key part of your treatment and safety. Be sure to make and go to all appointments, and call your doctor if you are having problems. It's also a good idea to know your test results and keep a list of the medicines you take. How can you care for yourself at home? · Get enough sleep. Your mind helps make sense of your life while you sleep. Missing sleep can lead to illness and make it harder for you to deal with your grief. · Eat healthy foods. Try to avoid eating only foods that give you comfort. Ask someone to join you for a meal if you do not like eating alone. Consider taking a multivitamin every day. · Get some exercise every day. Even a walk can help you deal with your grief. Other exercises, such as yoga, can also help you manage stress. · Comfort yourself. Take time to look at photos or use special items that make you feel better. · Stay involved in your life. Do not withdraw from the activities you enjoy. People you know at work, [employer], clubs, or other groups can help you get through your period of grief. · Think about joining a support group to help you deal with your grief. There are many support groups to help people recover from grief. When should you call for help? Call 911 anytime you think you may need emergency care. For example, call if:  · You feel you cannot stop from hurting yourself or someone else. Watch closely for changes in your health, and be sure to contact your doctor if:  · You think you may be depressed. · You do not get better as expected. Where can you learn more? Go to http://alejandro-eddie.info/. Enter H249 in the search box to learn more about \"Grief (Actual/Anticipated): Care Instructions. \"  Current as of: May 2, 2017  Content Version: 11.3  © 0240-9256 [employer]. Care instructions adapted under license by [employer] (which disclaims liability or warranty for this information). If you have questions about a medical condition or this instruction, always ask your healthcare professional. [place] [address] any warranty or liability for your use of this information. Athlete's Foot: Care Instructions  Your Care Instructions  Athlete's foot is an itchy rash on the foot caused by an infection with a fungus.  You can get it by going barefoot in wet public areas, such as swimming pools or locker rooms. Many times there is no clear reason why you get athlete's foot. You can easily treat athlete's foot by putting medicine on your feet for 1 to 6 weeks. In some cases, a doctor may prescribe pills to kill the fungus. Follow-up care is a key part of your treatment and safety. Be sure to make and go to all appointments, and call your doctor if you are having problems. It's also a good idea to know your test results and keep a list of the medicines you take. How can you care for yourself at home? · Your doctor may suggest an over-the counter lotion or spray or may prescribe a medicine. Take your medicines exactly as prescribed. Call your doctor if you think you are having a problem with your medicine. · Keep your feet clean and dry. · When you get dressed, put your socks on before your underwear. This can prevent the fungus from spreading from your feet to your groin. To prevent athlete's foot  · Wear flip-flops or other shower sandals in public locker rooms and showers and by the pool. · Dry between your toes after swimming or bathing. · Wear leather shoes or sandals, which let air get to your feet. · Change your socks as needed so your feet stay as dry as possible. · Use antifungal powder on your feet. When should you call for help? Watch closely for changes in your health, and be sure to contact your doctor if:  · You do not get better as expected. Where can you learn more? Go to http://alejandro-eddie.info/. Enter M498 in the search box to learn more about \"Athlete's Foot: Care Instructions. \"  Current as of: October 13, 2016  Content Version: 11.3  © 2575-6565 [employer]. Care instructions adapted under license by [employer] (which disclaims liability or warranty for this information).  If you have questions about a medical condition or this instruction, always ask your healthcare professional. [employer] disclaims any warranty or liability for your use of this information.

## 2017-06-29 NOTE — MR AVS SNAPSHOT
Visit Information Date & Time Provider Department Dept. Phone Encounter #  
 6/29/2017  1:30 PM DO Mayo Rodriges [phone] [id number] Follow-up Instructions Return in about 6 months (around 12/29/2017) for results, weight. Your Appointments 11/9/2017  9:00 AM  
COMPLETE PHYSICAL with DO Chris Rodrigese-Palmolimarcelino (LIZ Rodriges) Appt Note: chp  
 [address] 
[address] [facility] [address]  
[phone]  
  
   
 [address] [facility] [address] [facility] Upcoming Health Maintenance Date Due INFLUENZA AGE 9 TO ADULT 8/1/2017 BREAST CANCER SCRN MAMMOGRAM 12/2/2017 PAP AKA CERVICAL CYTOLOGY 11/10/2019 DTaP/Tdap/Td series (2 - Td) 11/3/2025 Allergies as of 6/29/2017  Review Complete On: 6/29/2017 By: Jeevan Toure DO Severity Noted Reaction Type Reactions Latex  11/20/2014    Rash Pcn [Penicillins] High 07/31/2009    Hives Prednisone High 07/31/2009    Hives Sulfa (Sulfonamide Antibiotics) High 07/31/2009    Hives Garamycin [Gentamicin]  07/31/2009    Other (comments) Itchy eyes due to sulfate Metformin  11/03/2015    Diarrhea Other Medication  07/31/2009    Hives, Rash SUN Current Immunizations  Reviewed on 3/10/2017 Name Date Influenza Vaccine 11/7/2016, 10/15/2013 Influenza Vaccine Joesph Tinajero) 11/3/2015 Influenza Vaccine (Quad) PF 11/7/2016, 11/20/2014 Influenza Vaccine Split 11/7/2012, 11/18/2011 Influenza Vaccine Whole 10/1/2010 TD Vaccine 9/1/2003 Tdap 11/3/2015 Not reviewed this visit You Were Diagnosed With   
  
 Codes Comments Hypothyroidism due to acquired atrophy of thyroid    -  Primary ICD-10-CM: E03.4 ICD-9-CM: 244.8, 246.8  Adjustment disorder with mixed anxiety and depressed mood     ICD-10-CM: F43.23 
ICD-9-CM: 309.28 due to grief from grandmother's recent death and 4 other recent deaths, improving with walking Adjustment disorder with depressed mood     ICD-10-CM: F43.21 ICD-9-CM: 309.0 due to grief from grandmother's recent death and 4 other recent deaths, improving with walking Dyslipidemia     ICD-10-CM: E78.5 ICD-9-CM: 272.4 Hip pain, bilateral     ICD-10-CM: M25.551, M25.552 ICD-9-CM: 719.45 due to SI joint pain vs other, Hyperglycemia     ICD-10-CM: R73.9 ICD-9-CM: 790.29 Vitamin D deficiency     ICD-10-CM: E55.9 ICD-9-CM: 268.9 Tinea pedis of both feet     ICD-10-CM: B35.3 ICD-9-CM: 110.4 Iron deficiency anemia secondary to inadequate dietary iron intake     ICD-10-CM: D50.8 ICD-9-CM: 280. 1 Chronic insomnia     ICD-10-CM: F51.04 
ICD-9-CM: 780.52 Vitals BP Pulse Temp Resp Height(growth percentile) Weight(growth percentile) 123/75 (BP 1 Location: Left arm, BP Patient Position: Sitting) 76 98.3 °F (36.8 °C) (Oral) 18 5' 0.98\" (1.549 m) 300 lb 14.4 oz (136.5 kg) LMP SpO2 BMI OB Status Smoking Status 06/01/2017 98% 56.88 kg/m2 Having regular periods Never Smoker BMI and BSA Data Body Mass Index Body Surface Area  
 56.88 kg/m 2 2.42 m 2 Preferred Pharmacy Pharmacy Name Phone [id number] [place] [facility] [phone] Your Updated Medication List  
  
   
This list is accurate as of: 6/29/17  3:12 PM.  Always use your most recent med list.  
  
  
  
  
 albuterol 90 mcg/actuation inhaler Commonly known as:  PROVENTIL HFA, VENTOLIN HFA, PROAIR HFA Take 2 Puffs by inhalation every four (4) hours as needed for Wheezing. Indications: BRONCHOSPASM PREVENTION  
  
 ALLEGRA 180 mg tablet Generic drug:  fexofenadine Take  by mouth daily. ALPRAZolam 0.5 mg tablet Commonly known as:  Dawn Sen Take 1 Tab by mouth two (2) times daily as needed for Anxiety or Sleep. Max Daily Amount: 1 mg. Indications: anxiety azelastine 137 mcg (0.1 %) nasal spray Commonly known as:  ASTELIN  
1 Spray by Both Nostrils route two (2) times a day. Use in each nostril as directed  Indications: SEASONAL ALLERGIC RHINITIS  
  
 butalbital-acetaminophen-caffeine -40 mg per tablet Commonly known as:  FIORICET, ESGIC  
TAKE 1 TAB BY MOUTH EVERY SIX (6) HOURS AS NEEDED FOR PAIN OR HEADACHE. cyclobenzaprine 10 mg tablet Commonly known as:  FLEXERIL  
TAKE 1 TABLET BY MOUTH 3 TIMES A DAY AS NEEDED FOR MUSCLE SPASM  
  
 diclofenac EC 75 mg EC tablet Commonly known as:  VOLTAREN Take 75 mg by mouth two (2) times a day. doxycycline 50 mg tablet Commonly known as:  ADOXA Take 50 mg by mouth two (2) times a day. EPICERAM Emul Generic drug:  Emollient Combination No.32  
by Apply Externally route. From Dr Benny Valdez. FISH OIL 1,000 mg Cap Generic drug:  omega-3 fatty acids-vitamin e Take 1 Cap by mouth daily. FLONASE 50 mcg/actuation nasal spray Generic drug:  fluticasone  
nightly. hydrOXYzine HCl 25 mg tablet Commonly known as:  ATARAX TAKE 1 TABLET BY MOUTH EVERY 8 HOURS AS NEEDED FOR ITCHING  
  
 latanoprost 0.005 % ophthalmic solution Commonly known as:  Zach Sames Administer 1 Drop to both eyes nightly. levothyroxine 125 mcg tablet Commonly known as:  SYNTHROID Take 1 Tab by mouth Daily (before breakfast). Indications: hypothyroidism LOCOID 0.1 % Lotn Generic drug:  Hydrocortisone Butyrate  
  
 magnesium oxide 400 mg tablet Commonly known as:  MAG-OX Take 400 mg by mouth daily. METROGEL 1 % topical gel Generic drug:  metroNIDAZOLE Apply  to affected area daily. Use a thin layer to affected areas after washing from Dr Benny Valdez   Indications: ACNE ROSACEA  
  
 montelukast 10 mg tablet Commonly known as:  SINGULAIR Take 1 Tab by mouth daily. Indications: ALLERGIC RHINITIS, MAINTENANCE THERAPY FOR ASTHMA MOTRIN 800 mg tablet Generic drug:  ibuprofen Take  by mouth every six (6) hours as needed for Pain.  
  
 multivitamin tablet Commonly known as:  ONE A DAY Take 1 tablet by mouth daily. NAFTIN 2 % Gel Generic drug:  naftifine Apply 1 Dose to affected area two (2) times a day. Indications: TINEA PEDIS  
  
 * norethindrone-ethinyl estradiol 1 mg-20 mcg (21)/75 mg (7) Tab Commonly known as:  Charlotta Kuldip FE 1/20 Take  by mouth. * JUNEL FE 1.5/30 (28) 1.5 mg-30 mcg (21)/75 mg (7) Tab Generic drug:  norethindrone-ethinyl estradiol-iron  
  
 omeprazole 20 mg capsule Commonly known as:  PRILOSEC  
TAKE ONE CAPSULE BY MOUTH EVERY DAY  
  
 OTHER(NON-FORMULARY) Osteo Biflex PROBIOTIC COMPLEX PO Take  by mouth daily. SYSTANE (PF) OP Apply  to eye four (4) times daily. * terconazole 80 mg vaginal suppository Commonly known as:  TERAZOL 3  
  
 * terconazole 0.4 % vaginal cream  
Commonly known as:  TERAZOL 7  
  
 valACYclovir 500 mg tablet Commonly known as:  VALTREX  
  
 VITAMIN D3 1,000 unit tablet Generic drug:  cholecalciferol Take 1,000 Units by mouth daily. * Notice: This list has 4 medication(s) that are the same as other medications prescribed for you. Read the directions carefully, and ask your doctor or other care provider to review them with you. Prescriptions Printed Refills ALPRAZolam (XANAX) 0.5 mg tablet 2 Sig: Take 1 Tab by mouth two (2) times daily as needed for Anxiety or Sleep. Max Daily Amount: 1 mg. Indications: anxiety Class: Print Route: Oral  
  
Prescriptions Sent to Pharmacy Refills  
 levothyroxine (SYNTHROID) 125 mcg tablet 3 Sig: Take 1 Tab by mouth Daily (before breakfast). Indications: hypothyroidism Class: Normal  
 Pharmacy: [facility] #: [phone]  Route: Oral  
 NAFTIN 2 % gel 2  
 Sig: Apply 1 Dose to affected area two (2) times a day. Indications: TINEA PEDIS Class: Normal  
 Pharmacy: [facility]  #: [phone] Route: Topical  
  
We Performed the Following CBC W/O DIFF [35432 CPT(R)] HEMOGLOBIN A1C WITH EAG [63441 CPT(R)] IRON [id number] CPT(R)] LIPID PANEL [62704 CPT(R)] METABOLIC PANEL, COMPREHENSIVE [64666 CPT(R)] T4, FREE [id number] CPT(R)] TSH 3RD GENERATION [20300 CPT(R)] VITAMIN D, 25 HYDROXY [id number] CPT(R)] Follow-up Instructions Return in about 6 months (around 12/29/2017) for results, weight. Patient Instructions Grief (Actual/Anticipated): Care Instructions Your Care Instructions Grief is your emotional reaction to a major loss. The words \"sorrow\" and \"heartache\" often are used to describe feelings of grief. You feel grief when you lose a beloved person, pet, place, or thing. It is also natural to feel grief when you lose a valued way of life, such as a job, marriage, or good health. You may begin to grieve before a loss occurs. You may grieve for a loved one who is sick and dying. Children and adults often feel the pain of loss before a big move or divorce. This type of grief helps you get ready for a loss. Grief is different for each person. There is no \"normal\" or \"expected\" period of time for grieving. Some people adjust to their loss within a couple of months. Others may take 2 years or longer, especially if their lives were changed a lot or if the loss was sudden and shocking. Grieving can cause problems such as headaches, loss of appetite, and trouble with thinking or sleeping. You may withdraw from friends and family and behave in ways that are unusual for you. Grief may cause you to question your beliefs and views about life. Grief is natural and does not require medical treatment.  But if you have trouble sleeping, it may help to take sleeping pills for a short time. It may help to talk with people who have been through or are going through similar losses. You may also want to talk to a counselor about your feelings. Talking about your loss, sharing your cares and concerns, and getting support from others are important parts of healthy grieving. Follow-up care is a key part of your treatment and safety. Be sure to make and go to all appointments, and call your doctor if you are having problems. It's also a good idea to know your test results and keep a list of the medicines you take. How can you care for yourself at home? · Get enough sleep. Your mind helps make sense of your life while you sleep. Missing sleep can lead to illness and make it harder for you to deal with your grief. · Eat healthy foods. Try to avoid eating only foods that give you comfort. Ask someone to join you for a meal if you do not like eating alone. Consider taking a multivitamin every day. · Get some exercise every day. Even a walk can help you deal with your grief. Other exercises, such as yoga, can also help you manage stress. · Comfort yourself. Take time to look at photos or use special items that make you feel better. · Stay involved in your life. Do not withdraw from the activities you enjoy. People you know at work, Voodoo, clubs, or other groups can help you get through your period of grief. · Think about joining a support group to help you deal with your grief. There are many support groups to help people recover from grief. When should you call for help? Call 911 anytime you think you may need emergency care. For example, call if: 
· You feel you cannot stop from hurting yourself or someone else. Watch closely for changes in your health, and be sure to contact your doctor if: 
· You think you may be depressed. · You do not get better as expected. Where can you learn more? Go to http://alejandro-eddie.info/. Enter H249 in the search box to learn more about \"Grief (Actual/Anticipated): Care Instructions. \" Current as of: May 2, 2017 Content Version: 11.3 © 3254-6192 [employer]. Care instructions adapted under license by [employer] (which disclaims liability or warranty for this information). If you have questions about a medical condition or this instruction, always ask your healthcare professional. [address] any warranty or liability for your use of this information. Athlete's Foot: Care Instructions Your Care Instructions Athlete's foot is an itchy rash on the foot caused by an infection with a fungus. You can get it by going barefoot in wet public areas, such as swimming pools or locker rooms. Many times there is no clear reason why you get athlete's foot. You can easily treat athlete's foot by putting medicine on your feet for 1 to 6 weeks. In some cases, a doctor may prescribe pills to kill the fungus. Follow-up care is a key part of your treatment and safety. Be sure to make and go to all appointments, and call your doctor if you are having problems. It's also a good idea to know your test results and keep a list of the medicines you take. How can you care for yourself at home? · Your doctor may suggest an over-the counter lotion or spray or may prescribe a medicine. Take your medicines exactly as prescribed. Call your doctor if you think you are having a problem with your medicine. · Keep your feet clean and dry. · When you get dressed, put your socks on before your underwear. This can prevent the fungus from spreading from your feet to your groin. To prevent athlete's foot · Wear flip-flops or other shower sandals in public locker rooms and showers and by the pool. · Dry between your toes after swimming or bathing. · Wear leather shoes or sandals, which let air get to your feet. · Change your socks as needed so your feet stay as dry as possible. · Use antifungal powder on your feet. When should you call for help? Watch closely for changes in your health, and be sure to contact your doctor if: 
· You do not get better as expected. Where can you learn more? Go to http://alejandro-eddie.info/. Enter M498 in the search box to learn more about \"Athlete's Foot: Care Instructions. \" Current as of: October 13, 2016 Content Version: 11.3 © 0705-3258 [employer]. Care instructions adapted under license by [employer] (which disclaims liability or warranty for this information). If you have questions about a medical condition or this instruction, always ask your healthcare professional. [address] any warranty or liability for your use of this information. Introducing [facility] HE[facility]! Dear Arti Santiago: Thank you for requesting a Kismet account. Our records indicate that you already have an active Kismet account. You can access your account anytime at [email or web address] Did you know that you can access your hospital and ER discharge instructions at any time in Kismet? You can also review all of your test results from your hospital stay or ER visit. Additional Information If you have questions, please visit the Frequently Asked Questions section of the Kismet website at [email or web address] Remember, Kismet is NOT to be used for urgent needs. For medical emergencies, dial 911. Now available from your iPhone and Android! Please provide this summary of care documentation to your next provider. Your primary care clinician is listed as Renae Mckeon. If you have any questions after today's visit, please call [phone].

## 2017-06-29 NOTE — PROGRESS NOTES
HISTORY OF PRESENT ILLNESS  Simran Pedraza is a 48 y.o. female presents with Medication Refill (Xanax 0.5 mg, Naftin, the MD that prescribe it out on sick with cancer stated by patient); Documentation (patient would like paperwork for DMV handicap parking place card); and Stress    Agree with nurse note. Hyperglycemic pt with dyslipidemia, hypothyroidism, hx of iron deficiency anemia, and Vit D deficiency presents to the office with a BP of 123/75. She requests a refill of Synthroid 125 mcg, which she takes daily and tolerates well. Pt weighs 300 lbs, gained 10 b since 04/2017. She attributes her weight gain to stress eating. She walks x3-4 miles x5 days weekly but last week only walked twice due to going to funerals. She struggles with drinking enough water everyday. Pt with adjustment disorder with anxious mood. She takes Xanax 0.5 mg x1-2 tablets daily with increased stress. Requests a refill. She notes that she has not been sleeping well due to having everything on her mind. Stressors: her grandma passed away, uncle passed away, friend's son passed away, and another friend's son passed away    Pt with tinea pedis of BL feet. She requests a refill of Naftin 2% gel which was previously rx'd by podiatrist, Perez Rich who is no longer practicing. Her job is moving locations to [facility] and as of right now they are unsure of where the employees will be parking. She has BL hip pain off and on that causes difficulty with walking. She requests a DMV temporary placard because there are closer handicap parking spaces available. Written by tracy Taveras, as dictated by Dr. Yudy Valdez DO.    IMER    Review of Systems negative except as noted above in HPI.     ALLERGIES:    Allergies   Allergen Reactions    Latex Rash    Pcn [Penicillins] Hives    Prednisone Hives    Sulfa (Sulfonamide Antibiotics) Hives    Garamycin [Gentamicin] Other (comments)     Itchy eyes due to sulfate  Metformin Diarrhea    Other Medication Hives and Rash     SUN       CURRENT MEDICATIONS:    Outpatient Prescriptions Marked as Taking for the 6/29/17 encounter (Office Visit) with Mandy Ross, DO   Medication Sig Dispense Refill    levothyroxine (SYNTHROID) 125 mcg tablet Take 1 Tab by mouth Daily (before breakfast). Indications: hypothyroidism 90 Tab 3    JUNEL FE 1.5/30, 28, 1.5 mg-30 mcg (21)/75 mg (7) tab       NAFTIN 2 % gel Apply 1 Dose to affected area two (2) times a day. Indications: TINEA PEDIS 1 Bottle 2    ALPRAZolam (XANAX) 0.5 mg tablet Take 1 Tab by mouth two (2) times daily as needed for Anxiety or Sleep. Max Daily Amount: 1 mg. Indications: anxiety 60 Tab 2    cyclobenzaprine (FLEXERIL) 10 mg tablet TAKE 1 TABLET BY MOUTH 3 TIMES A DAY AS NEEDED FOR MUSCLE SPASM 90 Tab 0    azelastine (ASTELIN) 137 mcg (0.1 %) nasal spray 1 Gordon by Both Nostrils route two (2) times a day. Use in each nostril as directed  Indications: SEASONAL ALLERGIC RHINITIS 1 Bottle 5    albuterol (PROVENTIL HFA, VENTOLIN HFA, PROAIR HFA) 90 mcg/actuation inhaler Take 2 Puffs by inhalation every four (4) hours as needed for Wheezing. Indications: BRONCHOSPASM PREVENTION 1 Inhaler 5    montelukast (SINGULAIR) 10 mg tablet Take 1 Tab by mouth daily. Indications: ALLERGIC RHINITIS, MAINTENANCE THERAPY FOR ASTHMA 30 Tab 11    butalbital-acetaminophen-caffeine (FIORICET, ESGIC) -40 mg per tablet TAKE 1 TAB BY MOUTH EVERY SIX (6) HOURS AS NEEDED FOR PAIN OR HEADACHE. 40 Tab 2    norethindrone-ethinyl estradiol (JUNEL FE 1/20) 1 mg-20 mcg (21)/75 mg (7) tab Take  by mouth.  terconazole (TERAZOL 7) 0.4 % vaginal cream   6    LOCOID 0.1 % lotn   1    fluticasone (FLONASE) 50 mcg/actuation nasal spray nightly.  valACYclovir (VALTREX) 500 mg tablet   1    terconazole (TERAZOL 3) 80 mg vaginal suppository   4    multivitamin (ONE A DAY) tablet Take 1 tablet by mouth daily.       omeprazole (PRILOSEC) 20 mg capsule TAKE ONE CAPSULE BY MOUTH EVERY DAY 30 Cap 1    PROPYLENE GLYCOL//PF (SYSTANE, PF, OP) Apply  to eye four (4) times daily.  magnesium oxide (MAG-OX) 400 mg tablet Take 400 mg by mouth daily.  doxycycline (ADOXA) 50 mg tablet Take 50 mg by mouth two (2) times a day.  Emollient Combination No.32 (EPICERAM) Emul by Apply Externally route. From Dr Travis Maynard.  metroNIDAZOLE (METROGEL) 1 % topical gel Apply  to affected area daily. Use a thin layer to affected areas after washing from Dr Travis Maynard   Indications: ACNE ROSACEA      diclofenac EC (VOLTAREN) 75 mg EC tablet Take 75 mg by mouth two (2) times a day.  latanoprost (XALATAN) 0.005 % ophthalmic solution Administer 1 Drop to both eyes nightly.  OTHER,NON-FORMULARY, Osteo Biflex       omega-3 fatty acids-vitamin e (FISH OIL) 1,000 mg Cap Take 1 Cap by mouth daily.  fexofenadine (ALLEGRA) 180 mg tablet Take  by mouth daily.  LACTOBACILLUS/FOS/PECTIN (PROBIOTIC COMPLEX PO) Take  by mouth daily.  cholecalciferol, vitamin d3, (VITAMIN D) 1,000 unit tablet Take 1,000 Units by mouth daily.  ibuprofen (MOTRIN) 800 mg tablet Take  by mouth every six (6) hours as needed for Pain. PAST MEDICAL HISTORY:    Past Medical History:   Diagnosis Date    Acne rosacea     Dr. Travis Maynard.  Allergy, unspecified not elsewhere classified childhood     Txd with immunotherapy. Dr. Sarah Jonas Anemia NEC     borderline    Ankle sprain 2007    Right. Dr. Jennifer Aponte    Ankle sprain 11/2012    Right. Dr. Michael Bermudez.  Asthma childhood    Chickenpox childhood    Chronic low back pain with right-sided sciatica     and SI joint dysfunction. Dr. Claudell Shell.  Chronic otitis media     Dr. Sarah Jonas Dry eye syndrome 2013    Dr. Olga Dunlap.  EBV infection 6/27/2011    Hearing loss     Mild high frequency sensorineuronal hearing loss.   [facility] Supriya Lawrence murmur     Hernia of abdominal wall 09/2003, 74/9329    umbilical.  Dr. Norm Arellano. Dr. Simón Strauss    Hyperglycemia 2013    Hypothyroidism 06/2010    Dr. Matt Sweeney pressure increase 12/2011    Dr. Tia Ricketts. Dr. Kuldeep Gold. Dr. Manuel Irwin.  Knee pain, left 04/2012    Left. Dr. Aaron Shah Measles childhood    Migraine     Mumps childhood    Plantar fasciitis, bilateral 2010    Dr. Kelley Like    Sciatica 2008    Right.  with OA. Dr. Blossom Flowers Tinnitus of right ear 2012    Dr. Joyce Bermudez:    Past Surgical History:   Procedure Laterality Date   Amy Hicks  2011    Dr. Jade Ch.  HAND/FINGER SURGERY UNLISTED  09/2003    Right Index finger repair due to cut    HX HERNIA REPAIR  93/7034    Umbilical.  Dr. Norm Arellano.  HX HERNIA REPAIR  9/23/2011    recurrent umbilical.  Laparoscopy incisional.  Dr. Simón Strauss.     HX TONSILLECTOMY  childhood    LAP,CHOLECYSTECTOMY  07/2001       FAMILY HISTORY:    Family History   Problem Relation Age of Onset    Hypertension Mother     Cancer Mother      small cell lung with bone mets to spine/MELANOMA    Cancer Father      melanoma    Arthritis-osteo Father     Colon Polyps Father     Diabetes Maternal Grandmother     Stroke Maternal Grandmother     Seizures Maternal Grandmother     Colon Polyps Maternal Grandmother     Breast Cancer Maternal Grandmother     Heart Disease Paternal Grandmother      heart failure    Heart Attack Paternal Grandmother     Asthma Paternal Grandmother        SOCIAL HISTORY:    Social History     Social History    Marital status: SINGLE     Spouse name: N/A    Number of children: N/A    Years of education: N/A     Social History Main Topics    Smoking status: Never Smoker    Smokeless tobacco: Never Used      Comment: lived with smoker dad and mom then step mom x 20 yrs    Alcohol use 0.0 oz/week Comment: RARE    Drug use: No    Sexual activity: No     Other Topics Concern    None     Social History Narrative       IMMUNIZATIONS:    Immunization History   Administered Date(s) Administered    Influenza Vaccine 10/15/2013, 11/07/2016    Influenza Vaccine (Quad) 11/03/2015    Influenza Vaccine (Quad) PF 11/20/2014, 11/07/2016    Influenza Vaccine Split 11/18/2011, 11/07/2012    Influenza Vaccine Whole 10/01/2010    TD Vaccine 09/01/2003    Tdap 11/03/2015         PHYSICAL EXAMINATION    Vital Signs    Visit Vitals    /75 (BP 1 Location: Left arm, BP Patient Position: Sitting)    Pulse 76    Temp 98.3 °F (36.8 °C) (Oral)    Resp 18    Ht 5' 0.98\" (1.549 m)    Wt 300 lb 14.4 oz (136.5 kg)    LMP 06/01/2017    SpO2 98%    BMI 56.88 kg/m2       Weight Metrics 6/29/2017 4/18/2017 3/10/2017 11/7/2016 8/22/2016 4/21/2016 11/3/2015   Weight 300 lb 14.4 oz 290 lb 6.4 oz 299 lb 4.8 oz 294 lb 3.2 oz 297 lb 11.2 oz 285 lb 3.2 oz 285 lb 9.6 oz   BMI 56.88 kg/m2 54.87 kg/m2 56.55 kg/m2 54.84 kg/m2 56.25 kg/m2 53.92 kg/m2 52.69 kg/m2       General appearance - Well nourished. Well appearing. Well developed. No acute distress. Obese. Head - Normocephalic. Atraumatic. Eyes - pupils equal and reactive. Extraocular eye movements intact. Sclera anicteric. Mildly injected sclera. Ears - Hearing is grossly normal bilaterally. Nose - normal and patent. No polyps noted. No erythema. No discharge. Mouth - mucous membranes with adequate moisture. Posterior pharynx normal with cobblestone appearance. No erythema, white exudate or obstruction. Neck - supple. Midline trachea. No carotid bruits noted bilaterally. No thyromegaly noted. Chest - clear to auscultation bilaterally anteriorly and posteriorly. No wheezes. No rales or rhonchi. Breath sounds are symmetrical bilaterally. Unlabored respirations. Heart - normal rate. Regular rhythm. Normal S1, S2. No murmur noted.   No rubs, clicks or gallops noted. Abdomen - soft and distended. No masses or organomegaly. No rebound, rigidity or guarding. Bowel sounds normal x 4 quadrants. No tenderness noted. Neurological - awake, alert and oriented to person, place, and time and event. Cranial nerves II through XII intact. Clear speech. Muscle strength is +5/5 x 4 extremities. Sensation is intact to light touch bilaterally. Steady gait. Heme/Lymph - peripheral pulses normal x 4 extremities. No peripheral edema is noted. Musculoskeletal - Intact x 4 extremities. Full ROM x 4 extremities. No pain with movement. Back exam - normal range of motion. No pain on palpation of the spinous processes in the cervical, thoracic, lumbar, sacral regions. No CVA tenderness. Skin - no rashes, erythema, ecchymosis, lacerations, abrasions, suspicious moles noted  Psychological -   normal behavior, dress and thought processes. Good insight. Good eye contact. Normal affect. Appropriate mood. Normal speech. DATA REVIEWED    Results for orders placed or performed in visit on 04/04/17   AMB EXT OCCULT BLOOD, STOOL   Result Value Ref Range    Occult Blood, External negative      Lab Results   Component Value Date/Time    WBC 7.6 12/02/2016 10:00 AM    HGB 12.5 12/02/2016 10:00 AM    HCT 37.7 12/02/2016 10:00 AM    PLATELET 511 23/76/8492 10:00 AM    MCV 89 12/02/2016 10:00 AM     Lab Results   Component Value Date/Time    Sodium 138 12/02/2016 10:00 AM    Potassium 5.0 12/02/2016 10:00 AM    Chloride 102 12/02/2016 10:00 AM    CO2 21 12/02/2016 10:00 AM    Anion gap 10 09/24/2011 02:30 AM    Glucose 85 12/02/2016 10:00 AM    BUN 13 12/02/2016 10:00 AM    Creatinine 0.70 12/02/2016 10:00 AM    BUN/Creatinine ratio 19 12/02/2016 10:00 AM    GFR est  12/02/2016 10:00 AM    GFR est non- 12/02/2016 10:00 AM    Calcium 9.2 12/02/2016 10:00 AM    Bilirubin, total 0.4 12/02/2016 10:00 AM    AST (SGOT) 14 12/02/2016 10:00 AM    Alk. phosphatase 65 12/02/2016 10:00 AM    Protein, total 6.4 12/02/2016 10:00 AM    Albumin 4.1 12/02/2016 10:00 AM    Globulin 3.4 05/19/2010 10:46 AM    A-G Ratio 1.8 12/02/2016 10:00 AM    ALT (SGPT) 20 12/02/2016 10:00 AM     Lab Results   Component Value Date/Time    Cholesterol, total 177 12/02/2016 10:00 AM    HDL Cholesterol 52 12/02/2016 10:00 AM    LDL, calculated 109 12/02/2016 10:00 AM    VLDL, calculated 16 12/02/2016 10:00 AM    Triglyceride 81 12/02/2016 10:00 AM    CHOL/HDL Ratio 2.5 05/19/2010 10:46 AM     Lab Results   Component Value Date/Time    Vitamin D 25-Hydroxy 32.2 08/11/2011 08:15 AM    VITAMIN D, 25-HYDROXY 36.9 12/02/2016 10:00 AM       Lab Results   Component Value Date/Time    Hemoglobin A1c 6.0 11/23/2015 08:20 AM     Lab Results   Component Value Date/Time    TSH 1.510 08/22/2016 03:44 PM       ASSESSMENT and PLAN      ICD-10-CM ICD-9-CM    1. Hypothyroidism due to acquired atrophy of thyroid E03.4 244.8 levothyroxine (SYNTHROID) 125 mcg tablet     246.8 TSH 3RD GENERATION      T4, FREE   2. Adjustment disorder with mixed anxiety and depressed mood F43.23 309.28     due to grief from grandmother's recent death and 4 other recent deaths, improving with walking   3. Adjustment disorder with depressed mood F43.21 309.0 ALPRAZolam (XANAX) 0.5 mg tablet    due to grief from grandmother's recent death and 4 other recent deaths, improving with walking   4. Dyslipidemia E78.5 272.4 LIPID PANEL      METABOLIC PANEL, COMPREHENSIVE   5. Hip pain, bilateral M25.551 719.45     M25.552      due to SI joint pain vs other,    6. Hyperglycemia R73.9 790.29 HEMOGLOBIN A1C WITH EAG   7. Vitamin D deficiency E55.9 268.9 VITAMIN D, 25 HYDROXY   8. Tinea pedis of both feet B35.3 110.4 NAFTIN 2 % gel   9. Iron deficiency anemia secondary to inadequate dietary iron intake D50.8 280.1 CBC W/O DIFF      IRON   10.  Chronic insomnia F51.04 780.52 ALPRAZolam (XANAX) 0.5 mg tablet       Discussed the patient's BMI with her. The BMI follow up plan is as follows: I have counseled this patient on diet and exercise regimens. Decrease carbohydrates (white foods, sweet foods, sweet drinks and alcohol), increase green leafy vegetables and protein (lean meats and beans) with each meal.  Avoid fried foods. Eat 3-5 small meals daily. Do not skip meals. Increase water intake. Increase physical activity to 30 minutes daily for health benefit or 60 minutes daily to prevent weight regain, as tolerated. Get 7-8 hours uninterrupted sleep nightly. Chart reviewed and updated. [place]  reviewed. Xanax 0.5 mg last filled on 05/12/17 for 30 tablets. Controlled Substance Agreement reviewed and signed. Prescription given to patient during office visit today. Xanax 0.5 mg. Cautioned pt about addictive potential.     Continue current medications and care. Increase Xanax 0.5 mg from x30 tablets with 0 refills to 60 tablets with 2 refills due to increased stress and grief. Prescriptions written and sent to pharmacy; medication side effects discussed. Synthroid 125 mcg. Most recent tests reviewed from 12/2016. Counseled patient on health concerns:  Stressors, grief, and feet care. Relevant handouts given and discussed with patient. Immunizations noted. Offered empathy, support, legitimation, prayers, partnership to patient. Praised patient for progress. DMV paperwork completed. Original given to patient today and copied to the chart. SEE SCANNED DOCUMENT. Follow-up Disposition:  Return in about 6 months (around 12/29/2017) for results, weight. Patient was offered a choice/choices in the treatment plan today. Patient expresses understanding of the plan and agrees with recommendations. Written by trcay Miranda, as dictated by Dr. Sharan Luna, DO. Documentation True and Accepted by Ange Oliver. Laura Christie.       Patient Instructions        Grief (Actual/Anticipated): Care Instructions  Your Care Instructions    Grief is your emotional reaction to a major loss. The words \"sorrow\" and \"heartache\" often are used to describe feelings of grief. You feel grief when you lose a beloved person, pet, place, or thing. It is also natural to feel grief when you lose a valued way of life, such as a job, marriage, or good health. You may begin to grieve before a loss occurs. You may grieve for a loved one who is sick and dying. Children and adults often feel the pain of loss before a big move or divorce. This type of grief helps you get ready for a loss. Grief is different for each person. There is no \"normal\" or \"expected\" period of time for grieving. Some people adjust to their loss within a couple of months. Others may take 2 years or longer, especially if their lives were changed a lot or if the loss was sudden and shocking. Grieving can cause problems such as headaches, loss of appetite, and trouble with thinking or sleeping. You may withdraw from friends and family and behave in ways that are unusual for you. Grief may cause you to question your beliefs and views about life. Grief is natural and does not require medical treatment. But if you have trouble sleeping, it may help to take sleeping pills for a short time. It may help to talk with people who have been through or are going through similar losses. You may also want to talk to a counselor about your feelings. Talking about your loss, sharing your cares and concerns, and getting support from others are important parts of healthy grieving. Follow-up care is a key part of your treatment and safety. Be sure to make and go to all appointments, and call your doctor if you are having problems. It's also a good idea to know your test results and keep a list of the medicines you take. How can you care for yourself at home? · Get enough sleep. Your mind helps make sense of your life while you sleep.  Missing sleep can lead to illness and make it harder for you to deal with your grief. · Eat healthy foods. Try to avoid eating only foods that give you comfort. Ask someone to join you for a meal if you do not like eating alone. Consider taking a multivitamin every day. · Get some exercise every day. Even a walk can help you deal with your grief. Other exercises, such as yoga, can also help you manage stress. · Comfort yourself. Take time to look at photos or use special items that make you feel better. · Stay involved in your life. Do not withdraw from the activities you enjoy. People you know at work, [employer], clubs, or other groups can help you get through your period of grief. · Think about joining a support group to help you deal with your grief. There are many support groups to help people recover from grief. When should you call for help? Call 911 anytime you think you may need emergency care. For example, call if:  · You feel you cannot stop from hurting yourself or someone else. Watch closely for changes in your health, and be sure to contact your doctor if:  · You think you may be depressed. · You do not get better as expected. Where can you learn more? Go to http://alejandro-eddie.info/. Enter H249 in the search box to learn more about \"Grief (Actual/Anticipated): Care Instructions. \"  Current as of: May 2, 2017  Content Version: 11.3  © 2863-1954 [employer]. Care instructions adapted under license by [employer] (which disclaims liability or warranty for this information). If you have questions about a medical condition or this instruction, always ask your healthcare professional. [place] [address] any warranty or liability for your use of this information. Athlete's Foot: Care Instructions  Your Care Instructions  Athlete's foot is an itchy rash on the foot caused by an infection with a fungus. You can get it by going barefoot in wet public areas, such as swimming pools or locker rooms. Many times there is no clear reason why you get athlete's foot. You can easily treat athlete's foot by putting medicine on your feet for 1 to 6 weeks. In some cases, a doctor may prescribe pills to kill the fungus. Follow-up care is a key part of your treatment and safety. Be sure to make and go to all appointments, and call your doctor if you are having problems. It's also a good idea to know your test results and keep a list of the medicines you take. How can you care for yourself at home? · Your doctor may suggest an over-the counter lotion or spray or may prescribe a medicine. Take your medicines exactly as prescribed. Call your doctor if you think you are having a problem with your medicine. · Keep your feet clean and dry. · When you get dressed, put your socks on before your underwear. This can prevent the fungus from spreading from your feet to your groin. To prevent athlete's foot  · Wear flip-flops or other shower sandals in public locker rooms and showers and by the pool. · Dry between your toes after swimming or bathing. · Wear leather shoes or sandals, which let air get to your feet. · Change your socks as needed so your feet stay as dry as possible. · Use antifungal powder on your feet. When should you call for help? Watch closely for changes in your health, and be sure to contact your doctor if:  · You do not get better as expected. Where can you learn more? Go to http://alejandro-eddie.info/. Enter M498 in the search box to learn more about \"Athlete's Foot: Care Instructions. \"  Current as of: October 13, 2016  Content Version: 11.3  © 5976-4128 [employer]. Care instructions adapted under license by [employer] (which disclaims liability or warranty for this information).  If you have questions about a medical condition or this instruction, always ask your healthcare professional. [address] any warranty or liability for your use of this information.

## 2017-06-29 NOTE — PROGRESS NOTES
Chief Complaint   Patient presents with    Medication Refill     Xanax 0.5 mg, patient would like a refill on the Naftin, the MD that prescribe it out on sick with cancer stated by patient    Documentation     patient would like paperwork for [facility] handicap parking place card           1. Have you been to the ER, urgent care clinic since your last visit? Hospitalized since your last visit? NO    2. Have you seen or consulted any other health care providers outside of the [address] [place] [address] since your last visit? Include any pap smears or colon screening. NO    The patient was counseled on the dangers of tobacco use, and was advised to quit and does not smoke. Reviewed strategies to maximize success, including continue not to smoke.     RANDOM PILL COUNT   Medication Name:   Xanax   Fill Date:  05/12/2017  Dose:  0.5mg  Pill Count:  0  Last Time Taken:  Took last pill this morning

## 2017-06-30 DIAGNOSIS — M54.5 CHRONIC LOW BACK PAIN, UNSPECIFIED BACK PAIN LATERALITY, WITH SCIATICA PRESENCE UNSPECIFIED: ICD-10-CM

## 2017-06-30 DIAGNOSIS — G89.29 CHRONIC LOW BACK PAIN, UNSPECIFIED BACK PAIN LATERALITY, WITH SCIATICA PRESENCE UNSPECIFIED: ICD-10-CM

## 2017-07-03 RX ORDER — CYCLOBENZAPRINE HCL 10 MG
TABLET ORAL
Qty: 90 TAB | Refills: 5 | Status: SHIPPED | OUTPATIENT
Start: 2017-07-03 | End: 2017-11-09 | Stop reason: SDUPTHER

## 2017-08-24 LAB
25(OH)D3+25(OH)D2 SERPL-MCNC: 36.3 NG/ML (ref 30–100)
ALBUMIN SERPL-MCNC: 3.8 G/DL (ref 3.5–5.5)
ALBUMIN/GLOB SERPL: 2 {RATIO} (ref 1.2–2.2)
ALP SERPL-CCNC: 60 IU/L (ref 39–117)
ALT SERPL-CCNC: 10 IU/L (ref 0–32)
AST SERPL-CCNC: 9 IU/L (ref 0–40)
BILIRUB SERPL-MCNC: 0.2 MG/DL (ref 0–1.2)
BUN SERPL-MCNC: 14 MG/DL (ref 6–24)
BUN/CREAT SERPL: 16 (ref 9–23)
CALCIUM SERPL-MCNC: 8.9 MG/DL (ref 8.7–10.2)
CHLORIDE SERPL-SCNC: 103 MMOL/L (ref 96–106)
CHOLEST SERPL-MCNC: 168 MG/DL (ref 100–199)
CO2 SERPL-SCNC: 22 MMOL/L (ref 18–29)
CREAT SERPL-MCNC: 0.85 MG/DL (ref 0.57–1)
ERYTHROCYTE [DISTWIDTH] IN BLOOD BY AUTOMATED COUNT: 14 % (ref 12.3–15.4)
EST. AVERAGE GLUCOSE BLD GHB EST-MCNC: 120 MG/DL
GLOBULIN SER CALC-MCNC: 1.9 G/DL (ref 1.5–4.5)
GLUCOSE SERPL-MCNC: 90 MG/DL (ref 65–99)
HBA1C MFR BLD: 5.8 % (ref 4.8–5.6)
HCT VFR BLD AUTO: 35.4 % (ref 34–46.6)
HDLC SERPL-MCNC: 56 MG/DL
HGB BLD-MCNC: 11.4 G/DL (ref 11.1–15.9)
INTERPRETATION, 910389: NORMAL
IRON SERPL-MCNC: 68 UG/DL (ref 27–159)
LDLC SERPL CALC-MCNC: 86 MG/DL (ref 0–99)
MCH RBC QN AUTO: 29.1 PG (ref 26.6–33)
MCHC RBC AUTO-ENTMCNC: 32.2 G/DL (ref 31.5–35.7)
MCV RBC AUTO: 90 FL (ref 79–97)
PLATELET # BLD AUTO: 272 X10E3/UL (ref 150–379)
POTASSIUM SERPL-SCNC: 4.5 MMOL/L (ref 3.5–5.2)
PROT SERPL-MCNC: 5.7 G/DL (ref 6–8.5)
RBC # BLD AUTO: 3.92 X10E6/UL (ref 3.77–5.28)
SODIUM SERPL-SCNC: 139 MMOL/L (ref 134–144)
T4 FREE SERPL-MCNC: 1.3 NG/DL (ref 0.82–1.77)
TRIGL SERPL-MCNC: 129 MG/DL (ref 0–149)
TSH SERPL DL<=0.005 MIU/L-ACNC: 1.9 UIU/ML (ref 0.45–4.5)
VLDLC SERPL CALC-MCNC: 26 MG/DL (ref 5–40)
WBC # BLD AUTO: 8.8 X10E3/UL (ref 3.4–10.8)

## 2017-09-01 DIAGNOSIS — L29.9 PRURITIC DERMATITIS: ICD-10-CM

## 2017-09-01 RX ORDER — HYDROXYZINE 25 MG/1
TABLET, FILM COATED ORAL
Qty: 90 TAB | Refills: 1 | Status: SHIPPED | OUTPATIENT
Start: 2017-09-01 | End: 2017-10-27 | Stop reason: SDUPTHER

## 2017-10-27 DIAGNOSIS — J45.20 MILD INTERMITTENT ASTHMA WITHOUT COMPLICATION: ICD-10-CM

## 2017-10-27 DIAGNOSIS — L29.9 PRURITIC DERMATITIS: ICD-10-CM

## 2017-10-27 RX ORDER — HYDROXYZINE 25 MG/1
TABLET, FILM COATED ORAL
Qty: 90 TAB | Refills: 1 | Status: SHIPPED | OUTPATIENT
Start: 2017-10-27 | End: 2017-11-09 | Stop reason: SDUPTHER

## 2017-10-27 RX ORDER — MONTELUKAST SODIUM 10 MG/1
TABLET ORAL
Qty: 30 TAB | Refills: 11 | Status: SHIPPED | OUTPATIENT
Start: 2017-10-27 | End: 2018-09-25 | Stop reason: SDUPTHER

## 2017-11-09 ENCOUNTER — OFFICE VISIT (OUTPATIENT)
Dept: FAMILY MEDICINE CLINIC | Age: 47
End: 2017-11-09

## 2017-11-09 VITALS
TEMPERATURE: 98.6 F | SYSTOLIC BLOOD PRESSURE: 135 MMHG | WEIGHT: 293 LBS | OXYGEN SATURATION: 98 % | HEIGHT: 61 IN | DIASTOLIC BLOOD PRESSURE: 71 MMHG | RESPIRATION RATE: 16 BRPM | BODY MASS INDEX: 55.32 KG/M2 | HEART RATE: 65 BPM

## 2017-11-09 DIAGNOSIS — G43.809 OTHER MIGRAINE WITHOUT STATUS MIGRAINOSUS, NOT INTRACTABLE: ICD-10-CM

## 2017-11-09 DIAGNOSIS — H10.13 ALLERGIC CONJUNCTIVITIS AND RHINITIS, BILATERAL: ICD-10-CM

## 2017-11-09 DIAGNOSIS — M54.5 CHRONIC LOW BACK PAIN, UNSPECIFIED BACK PAIN LATERALITY, WITH SCIATICA PRESENCE UNSPECIFIED: ICD-10-CM

## 2017-11-09 DIAGNOSIS — L29.9 PRURITIC DERMATITIS: ICD-10-CM

## 2017-11-09 DIAGNOSIS — G43.009 MIGRAINE WITHOUT AURA AND WITHOUT STATUS MIGRAINOSUS, NOT INTRACTABLE: ICD-10-CM

## 2017-11-09 DIAGNOSIS — Z00.00 ANNUAL PHYSICAL EXAM: Primary | ICD-10-CM

## 2017-11-09 DIAGNOSIS — G89.29 CHRONIC LOW BACK PAIN, UNSPECIFIED BACK PAIN LATERALITY, WITH SCIATICA PRESENCE UNSPECIFIED: ICD-10-CM

## 2017-11-09 DIAGNOSIS — J30.9 ALLERGIC CONJUNCTIVITIS AND RHINITIS, BILATERAL: ICD-10-CM

## 2017-11-09 LAB
BILIRUB UR QL STRIP: NEGATIVE
GLUCOSE UR-MCNC: NEGATIVE MG/DL
KETONES P FAST UR STRIP-MCNC: NEGATIVE MG/DL
PH UR STRIP: 6.5 [PH] (ref 4.6–8)
PROT UR QL STRIP: NEGATIVE
SP GR UR STRIP: 1.02 (ref 1–1.03)
UA UROBILINOGEN AMB POC: NORMAL (ref 0.2–1)
URINALYSIS CLARITY POC: CLEAR
URINALYSIS COLOR POC: YELLOW
URINE BLOOD POC: NEGATIVE
URINE LEUKOCYTES POC: NORMAL
URINE NITRITES POC: NEGATIVE

## 2017-11-09 RX ORDER — HYDROXYZINE 25 MG/1
TABLET, FILM COATED ORAL
Qty: 90 TAB | Refills: 1 | Status: SHIPPED | OUTPATIENT
Start: 2017-11-09 | End: 2018-07-13 | Stop reason: SDUPTHER

## 2017-11-09 RX ORDER — CYCLOBENZAPRINE HCL 10 MG
TABLET ORAL
Qty: 90 TAB | Refills: 0 | Status: SHIPPED | OUTPATIENT
Start: 2017-11-09 | End: 2021-12-02 | Stop reason: SDUPTHER

## 2017-11-09 RX ORDER — BUTALBITAL, ACETAMINOPHEN AND CAFFEINE 50; 325; 40 MG/1; MG/1; MG/1
TABLET ORAL
Qty: 40 TAB | Refills: 0 | Status: SHIPPED | OUTPATIENT
Start: 2017-11-09 | End: 2018-11-12 | Stop reason: SDUPTHER

## 2017-11-09 RX ORDER — AZELASTINE 1 MG/ML
1 SPRAY, METERED NASAL 2 TIMES DAILY
Qty: 1 BOTTLE | Refills: 5 | Status: SHIPPED | OUTPATIENT
Start: 2017-11-09 | End: 2018-02-21 | Stop reason: SDUPTHER

## 2017-11-09 NOTE — PROGRESS NOTES
Reviewed record in preparation for visit and have obtained necessary documentation. Identified pt with two pt identifiers(name and ). Chief Complaint   Patient presents with    Complete Physical       Vitals:    17 1003   BP: 135/71   Pulse: 65   Resp: 16   Temp: 98.6 °F (37 °C)   TempSrc: Oral   SpO2: 98%   Weight: 312 lb 1.6 oz (141.6 kg)   Height: 5' 0.98\" (1.549 m)   PainSc:   0 - No pain   LMP: 10/25/2017       Coordination of Care Questionnaire:  :     1) Have you been to an emergency room, urgent care clinic since your last visit? no   Hospitalized since your last visit? no             2) Have you seen or consulted any other health care providers outside of [address] [place] [address] since your last visit? yes, Dermatologist, Opthamologist, and OB/GYN  (Include any pap smears or colon screenings in this section.)    3) In the event something were to happen to you and you were unable to speak on your behalf, do you have an Advance Directive/ Living Will in place stating your wishes? NO    Do you have an Advance Directive on file? no    4) Are you interested in receiving information on Advance Directives? NO    Health Maintenance Due   Topic Date Due    BREAST CANCER SCRN MAMMOGRAM  2017     Health Maintenance  · Diet:   · What do you want to improve about your diet? Wants to eat healthier  · What is going well? Not going well because pt has been stressed  · Any foods you do not eat at all? Liver and olives  · Exercise:  · What do you want to improve about your physical activity? Wants to walk more, was previously walking 4 miles per day  · What is going well? Exercising has improved her mobility   · Dental screening:   · Brushing twice a day? Yes, sometime 3 times a day  · Seeing Dentist every 6 months? Yes  · Vision screening:   · Glasses or contacts? Glasses (At night for driving)  · Seeing eye doctor at least every 2 years?  Pt goes every 6 months for pressure in eyes

## 2017-11-09 NOTE — PATIENT INSTRUCTIONS
Migraine Headache: Care Instructions  Your Care Instructions  Migraines are painful, throbbing headaches that often start on one side of the head. They may cause nausea and vomiting and make you sensitive to light, sound, or smell. Without treatment, migraines can last from 4 hours to a few days. Medicines can help prevent migraines or stop them after they have started. Your doctor can help you find which ones work best for you. Follow-up care is a key part of your treatment and safety. Be sure to make and go to all appointments, and call your doctor if you are having problems. It's also a good idea to know your test results and keep a list of the medicines you take. How can you care for yourself at home? · Do not drive if you have taken a prescription pain medicine. · Rest in a quiet, dark room until your headache is gone. Close your eyes, and try to relax or go to sleep. Don't watch TV or read. · Put a cold, moist cloth or cold pack on the painful area for 10 to 20 minutes at a time. Put a thin cloth between the cold pack and your skin. · Use a warm, moist towel or a heating pad set on low to relax tight shoulder and neck muscles. · Have someone gently massage your neck and shoulders. · Take your medicines exactly as prescribed. Call your doctor if you think you are having a problem with your medicine. You will get more details on the specific medicines your doctor prescribes. · Be careful not to take pain medicine more often than the instructions allow. You could get worse or more frequent headaches when the medicine wears off. To prevent migraines  · Keep a headache diary so you can figure out what triggers your headaches. Avoiding triggers may help you prevent headaches. Record when each headache began, how long it lasted, and what the pain was like.  (Was it throbbing, aching, stabbing, or dull?) Write down any other symptoms you had with the headache, such as nausea, flashing lights or dark spots, or sensitivity to bright light or loud noise. Note if the headache occurred near your period. List anything that might have triggered the headache. Triggers may include certain foods (chocolate, cheese, wine) or odors, smoke, bright light, stress, or lack of sleep. · If your doctor has prescribed medicine for your migraines, take it as directed. You may have medicine that you take only when you get a migraine and medicine that you take all the time to help prevent migraines. ¨ If your doctor has prescribed medicine for when you get a headache, take it at the first sign of a migraine, unless your doctor has given you other instructions. ¨ If your doctor has prescribed medicine to prevent migraines, take it exactly as prescribed. Call your doctor if you think you are having a problem with your medicine. · Find healthy ways to deal with stress. Migraines are most common during or right after stressful times. Take time to relax before and after you do something that has caused a migraine in the past.  · Try to keep your muscles relaxed by keeping good posture. Check your jaw, face, neck, and shoulder muscles for tension. Try to relax them. When you sit at a desk, change positions often. And make sure to stretch for 30 seconds each hour. · Get plenty of sleep and exercise. · Eat meals on a regular schedule. Avoid foods and drinks that often trigger migraines. These include chocolate, alcohol (especially red wine and port), aspartame, monosodium glutamate (MSG), and some additives found in foods (such as hot dogs, gordon, cold cuts, aged cheeses, and pickled foods). · Limit caffeine. Don't drink too much coffee, tea, or soda. But don't quit caffeine suddenly. That can also give you migraines. · Do not smoke or allow others to smoke around you. If you need help quitting, talk to your doctor about stop-smoking programs and medicines. These can increase your chances of quitting for good.   · If you are taking birth control pills or hormone therapy, talk to your doctor about whether they are triggering your migraines. When should you call for help? Call 911 anytime you think you may need emergency care. For example, call if:  ? · You have signs of a stroke. These may include:  ¨ Sudden numbness, paralysis, or weakness in your face, arm, or leg, especially on only one side of your body. ¨ Sudden vision changes. ¨ Sudden trouble speaking. ¨ Sudden confusion or trouble understanding simple statements. ¨ Sudden problems with walking or balance. ¨ A sudden, severe headache that is different from past headaches. ?Call your doctor now or seek immediate medical care if:  ? · You have new or worse nausea and vomiting. ? · You have a new or higher fever. ? · Your headache gets much worse. ? Watch closely for changes in your health, and be sure to contact your doctor if:  ? · You are not getting better after 2 days (48 hours). Where can you learn more? Go to http://alejandro-eddie.info/. Enter K408 in the search box to learn more about \"Migraine Headache: Care Instructions. \"  Current as of: October 14, 2016  Content Version: 11.4  © 3525-2647 [employer]. Care instructions adapted under license by [employer] (which disclaims liability or warranty for this information). If you have questions about a medical condition or this instruction, always ask your healthcare professional. [place] [address] any warranty or liability for your use of this information. Using a Nasal Steroid Spray: Care Instructions  Your Care Instructions    Your doctor may suggest using a corticosteroid nasal spray for your allergy symptoms or sinus problems. These sprays reduce the swelling inside the nose and sinuses. Unlike decongestant nasal sprays, steroid sprays won't lead to more swelling when you stop taking them.   These sprays start working in a few days, but it may take several weeks before you get the full effect. Most side effects are minor. The most common complaint is a burning feeling in the nose right after the spray is used. Some people get nosebleeds. Follow-up care is a key part of your treatment and safety. Be sure to make and go to all appointments, and call your doctor if you are having problems. It's also a good idea to know your test results and keep a list of the medicines you take. How can you care for yourself at home? Here are some tips for using these sprays:  · You may need to prime the sprayer before you use it. This means spraying it into the air a few times to make sure you get the right amount of medicine. Follow the directions on the label. · Blow your nose before you spray. This will help clear out your nostrils. · Gently sniff the medicine into your nose as you spray. Don't snort, or the medicine will go all the way into your throat where it won't do much good. · Aim the nozzle straight toward the outer wall of your nostril. This will help keep the medicine from irritating the inner walls of your nose, especially your septum (the wall that separates your left and right nostrils). · Don't blow your nose for 10 minutes or so after you spray. And try not to sneeze. · Be safe with medicines. Use this medicine exactly as prescribed. Call your doctor if you think you are having a problem with your medicine. · Clean your sprayer once a week. Read the label to learn how. When should you call for help? Watch closely for changes in your health, and be sure to contact your doctor if you have any problems. Where can you learn more? Go to http://alejandro-eddie.info/. Enter Q751 in the search box to learn more about \"Using a Nasal Steroid Spray: Care Instructions. \"  Current as of: May 12, 2017  Content Version: 11.4  © 8993-5007 [employer].  Care instructions adapted under license by [employer] (which disclaims liability or warranty for this information). If you have questions about a medical condition or this instruction, always ask your healthcare professional. [place] [address] any warranty or liability for your use of this information.

## 2017-11-09 NOTE — PROGRESS NOTES
HISTORY OF PRESENT ILLNESS  Stevie Severs is a 50 y.o. female presents with Complete Physical    Agree with nurse note. Pt states no concerns today in the office. Health Maintenance reviewed - patient asked to schedule her pap smear, patient asked to schedule her mammogram, she plans to schedule for December 2017, otherwise her health maintenance items are all UTD. She is going for an eye exam later today. Seeing the dentist twice a year. Health Maintenance   Topic Date Due    BREAST CANCER SCRN MAMMOGRAM  12/02/2017    PAP AKA CERVICAL CYTOLOGY  11/10/2019    DTaP/Tdap/Td series (2 - Td) 11/03/2025    Pneumococcal 19-64 Medium Risk  Addressed    Influenza Age 5 to Adult  Completed     Review of Systems   Constitutional: Negative for chills, fever, malaise/fatigue and weight loss. HENT: Negative for congestion, ear pain, sinus pain and sore throat. Eyes: Negative for pain and redness. Respiratory: Negative for cough, shortness of breath and wheezing. Cardiovascular: Negative for chest pain, palpitations, orthopnea and leg swelling. Gastrointestinal: Negative for abdominal pain, blood in stool, constipation, diarrhea, heartburn, nausea and vomiting. Genitourinary: Negative for dysuria, frequency and urgency. Musculoskeletal: Negative for joint pain and myalgias. Skin: Negative for rash. Neurological: Negative for dizziness, tingling and headaches. Endo/Heme/Allergies: Negative for environmental allergies. Psychiatric/Behavioral: Negative for depression. The patient is not nervous/anxious and does not have insomnia.       ALLERGIES:    Allergies   Allergen Reactions    Latex Rash    Pcn [Penicillins] Hives    Prednisone Hives    Sulfa (Sulfonamide Antibiotics) Hives    Garamycin [Gentamicin] Other (comments)     Itchy eyes due to sulfate    Metformin Diarrhea    Other Medication Hives and Rash     SUN       CURRENT MEDICATIONS:    Outpatient Prescriptions Marked as Taking for the 11/9/17 encounter (Office Visit) with Carolann Mcgregor NP   Medication Sig Dispense Refill    hydrOXYzine HCl (ATARAX) 25 mg tablet TAKE 1 TABLET BY MOUTH EVERY 8 HOURS AS NEEDED FOR ITCHING 90 Tab 1    montelukast (SINGULAIR) 10 mg tablet TAKE 1 TABLET BY MOUTH EVERY DAY 30 Tab 11    cyclobenzaprine (FLEXERIL) 10 mg tablet TAKE 1 TABLET BY MOUTH 3 TIMES A DAY AS NEEDED FOR MUSCLE SPASM 90 Tab 5    levothyroxine (SYNTHROID) 125 mcg tablet Take 1 Tab by mouth Daily (before breakfast). Indications: hypothyroidism 90 Tab 3    NAFTIN 2 % gel Apply 1 Dose to affected area two (2) times a day. Indications: TINEA PEDIS 1 Bottle 2    ALPRAZolam (XANAX) 0.5 mg tablet Take 1 Tab by mouth two (2) times daily as needed for Anxiety or Sleep. Max Daily Amount: 1 mg. Indications: anxiety 60 Tab 2    azelastine (ASTELIN) 137 mcg (0.1 %) nasal spray 1 [place]edar by Both Nostrils route two (2) times a day. Use in each nostril as directed  Indications: SEASONAL ALLERGIC RHINITIS 1 Bottle 5    butalbital-acetaminophen-caffeine (FIORICET, ESGIC) -40 mg per tablet TAKE 1 TAB BY MOUTH EVERY SIX (6) HOURS AS NEEDED FOR PAIN OR HEADACHE. 40 Tab 2    norethindrone-ethinyl estradiol (JUNEL FE 1/20) 1 mg-20 mcg (21)/75 mg (7) tab Take  by mouth.  terconazole (TERAZOL 7) 0.4 % vaginal cream   6    LOCOID 0.1 % lotn   1    fluticasone (FLONASE) 50 mcg/actuation nasal spray nightly.  valACYclovir (VALTREX) 500 mg tablet   1    terconazole (TERAZOL 3) 80 mg vaginal suppository   4    multivitamin (ONE A DAY) tablet Take 1 tablet by mouth daily.  omeprazole (PRILOSEC) 20 mg capsule TAKE ONE CAPSULE BY MOUTH EVERY DAY 30 Cap 1    PROPYLENE GLYCOL//PF (SYSTANE, PF, OP) Apply  to eye four (4) times daily.  magnesium oxide (MAG-OX) 400 mg tablet Take 400 mg by mouth daily.  doxycycline (ADOXA) 50 mg tablet Take 50 mg by mouth two (2) times a day.         Emollient Combination No.32 (EPICERAM) Emul by Apply Externally route. From Dr Raissa Avendano.  metroNIDAZOLE (METROGEL) 1 % topical gel Apply  to affected area daily. Use a thin layer to affected areas after washing from Dr Raissa Avendano   Indications: ACNE ROSACEA      diclofenac EC (VOLTAREN) 75 mg EC tablet Take 75 mg by mouth two (2) times a day.  latanoprost (XALATAN) 0.005 % ophthalmic solution Administer 1 Drop to both eyes nightly.  OTHER,NON-FORMULARY, Osteo Biflex       omega-3 fatty acids-vitamin e (FISH OIL) 1,000 mg Cap Take 1 Cap by mouth daily.  fexofenadine (ALLEGRA) 180 mg tablet Take  by mouth daily.  LACTOBACILLUS/FOS/PECTIN (PROBIOTIC COMPLEX PO) Take  by mouth daily.  cholecalciferol, vitamin d3, (VITAMIN D) 1,000 unit tablet Take 1,000 Units by mouth daily.  ibuprofen (MOTRIN) 800 mg tablet Take  by mouth every six (6) hours as needed for Pain. PAST MEDICAL HISTORY:    Past Medical History:   Diagnosis Date    Acne rosacea     Dr. Raissa Avendano.  Allergy, unspecified not elsewhere classified childhood     Txd with immunotherapy. Dr. Jeanne Aguilar Anemia NEC     borderline    Ankle sprain 2007    Right. Dr. Ranjit Tinoco    Ankle sprain 11/2012    Right. Dr. Ronald Carrera.  Asthma childhood    Chickenpox childhood    Chronic low back pain with right-sided sciatica     and SI joint dysfunction. Dr. Faye Matthews.  Chronic otitis media     Dr. Jeanne Aguilar Dry eye syndrome 2013    Dr. Latrelle Sacks.  EBV infection 6/27/2011    Hearing loss     Mild high frequency sensorineuronal hearing loss. Dr. Phoenix Teresa murmur     Hernia of abdominal wall 09/2003, 39/1463    umbilical.  Dr. Marissa Martin. Dr. Wild Whitley    Hyperglycemia 2013    Hypothyroidism 06/2010    Dr. Chris Dye pressure increase 12/2011    Dr. Addy Alaniz. Dr. Latrelle Sacks. Dr. Faina Restrepo.  Knee pain, left 04/2012    Left. Dr. Ari Brown Measles childhood    Migraine     Mumps childhood    Plantar fasciitis, bilateral 2010    Dr. Jaya Hernandez    Sciatica 2008    Right.  with OA. Dr. Nicol Singh Tinnitus of right ear 2012    Dr. Issac Fernando:    Past Surgical History:   Procedure Laterality Date   Beny Milan  2011    Dr. Sherry Weiss.  HAND/FINGER SURGERY UNLISTED  09/2003    Right Index finger repair due to cut    HX HERNIA REPAIR  14/8884    Umbilical.  Dr. Marissa Martin.  HX HERNIA REPAIR  9/23/2011    recurrent umbilical.  Laparoscopy incisional.  Dr. Wild Whitley.     HX TONSILLECTOMY  childhood    LAP,CHOLECYSTECTOMY  07/2001       FAMILY HISTORY:    Family History   Problem Relation Age of Onset    Hypertension Mother     Cancer Mother      small cell lung with bone mets to spine/MELANOMA    Cancer Father      melanoma    Arthritis-osteo Father     Colon Polyps Father     Diabetes Maternal Grandmother     Stroke Maternal Grandmother     Seizures Maternal Grandmother     Colon Polyps Maternal Grandmother     Breast Cancer Maternal Grandmother     Heart Disease Paternal Grandmother      heart failure    Heart Attack Paternal Grandmother     Asthma Paternal Grandmother        SOCIAL HISTORY:    Social History     Social History    Marital status: SINGLE     Spouse name: N/A    Number of children: N/A    Years of education: N/A     Social History Main Topics    Smoking status: Never Smoker    Smokeless tobacco: Never Used      Comment: lived with smoker dad and mom then step mom x 20 yrs    Alcohol use 0.0 oz/week      Comment: RARE    Drug use: No    Sexual activity: No     Other Topics Concern    None     Social History Narrative       IMMUNIZATIONS:    Immunization History   Administered Date(s) Administered    Influenza Vaccine 10/15/2013, 11/07/2016, 11/03/2017    Influenza Vaccine (Quad) 11/03/2015    Influenza Vaccine (Quad) PF 11/20/2014, 11/07/2016    Influenza Vaccine Split 11/18/2011, 11/07/2012    Influenza Vaccine Whole 10/01/2010    TD Vaccine 09/01/2003    Tdap 11/03/2015     Vital Signs    Visit Vitals    /71 (BP 1 Location: Left arm, BP Patient Position: Sitting)    Pulse 65    Temp 98.6 °F (37 °C) (Oral)    Resp 16    Ht 5' 0.98\" (1.549 m)    Wt 312 lb 1.6 oz (141.6 kg)    LMP 10/25/2017    SpO2 98%    BMI 59.01 kg/m2       Weight Metrics 11/9/2017 6/29/2017 4/18/2017 3/10/2017 11/7/2016 8/22/2016 4/21/2016   Weight 312 lb 1.6 oz 300 lb 14.4 oz 290 lb 6.4 oz 299 lb 4.8 oz 294 lb 3.2 oz 297 lb 11.2 oz 285 lb 3.2 oz   BMI 59.01 kg/m2 56.88 kg/m2 54.87 kg/m2 56.55 kg/m2 54.84 kg/m2 56.25 kg/m2 53.92 kg/m2        Physical Exam   Constitutional: She is oriented to person, place, and time and well-developed, well-nourished, and in no distress. No distress. HENT:   Head: Normocephalic. Right Ear: External ear normal.   Left Ear: External ear normal.   Nose: Nose normal.   Mouth/Throat: Oropharynx is clear and moist. No oropharyngeal exudate. Eyes: Conjunctivae and EOM are normal. Pupils are equal, round, and reactive to light. Right eye exhibits no discharge. Left eye exhibits no discharge. No scleral icterus. Neck: Normal range of motion. Neck supple. No JVD present. No tracheal deviation present. No thyromegaly present. Cardiovascular: Normal rate, regular rhythm, normal heart sounds and intact distal pulses. Exam reveals no gallop and no friction rub. No murmur heard. Pulmonary/Chest: Effort normal and breath sounds normal. No respiratory distress. She has no wheezes. She has no rales. She exhibits no tenderness. Abdominal: Soft. Bowel sounds are normal. She exhibits no distension and no mass. There is no tenderness. There is no rebound. Musculoskeletal: Normal range of motion. She exhibits no edema, tenderness or deformity. Lymphadenopathy:     She has no cervical adenopathy.    Neurological: She is alert and oriented to person, place, and time. She displays normal reflexes. No cranial nerve deficit. She exhibits normal muscle tone. Gait normal. Coordination normal.   Skin: Skin is warm and dry. No rash noted. She is not diaphoretic. No erythema. No pallor. Psychiatric: Mood, memory, affect and judgment normal.       DATA REVIEWED    Results for orders placed or performed in visit on 06/29/17   LIPID PANEL   Result Value Ref Range    Cholesterol, total 168 100 - 199 mg/dL    Triglyceride 129 0 - 149 mg/dL    HDL Cholesterol 56 >39 mg/dL    VLDL, calculated 26 5 - 40 mg/dL    LDL, calculated 86 0 - 99 mg/dL   METABOLIC PANEL, COMPREHENSIVE   Result Value Ref Range    Glucose 90 65 - 99 mg/dL    BUN 14 6 - 24 mg/dL    Creatinine 0.85 0.57 - 1.00 mg/dL    GFR est non-AA 81 >59 mL/min/1.73    GFR est AA 93 >59 mL/min/1.73    BUN/Creatinine ratio 16 9 - 23    Sodium 139 134 - 144 mmol/L    Potassium 4.5 3.5 - 5.2 mmol/L    Chloride 103 96 - 106 mmol/L    CO2 22 18 - 29 mmol/L    Calcium 8.9 8.7 - 10.2 mg/dL    Protein, total 5.7 (L) 6.0 - 8.5 g/dL    Albumin 3.8 3.5 - 5.5 g/dL    GLOBULIN, TOTAL 1.9 1.5 - 4.5 g/dL    A-G Ratio 2.0 1.2 - 2.2    Bilirubin, total 0.2 0.0 - 1.2 mg/dL    Alk.  phosphatase 60 39 - 117 IU/L    AST (SGOT) 9 0 - 40 IU/L    ALT (SGPT) 10 0 - 32 IU/L   TSH 3RD GENERATION   Result Value Ref Range    TSH 1.900 0.450 - 4.500 uIU/mL   VITAMIN D, 25 HYDROXY   Result Value Ref Range    VITAMIN D, 25-HYDROXY 36.3 30.0 - 100.0 ng/mL   T4, FREE   Result Value Ref Range    T4, Free 1.30 0.82 - 1.77 ng/dL   CBC W/O DIFF   Result Value Ref Range    WBC 8.8 3.4 - 10.8 x10E3/uL    RBC 3.92 3.77 - 5.28 x10E6/uL    HGB 11.4 11.1 - 15.9 g/dL    HCT 35.4 34.0 - 46.6 %    MCV 90 79 - 97 fL    MCH 29.1 26.6 - 33.0 pg    MCHC 32.2 31.5 - 35.7 g/dL    RDW 14.0 12.3 - 15.4 %    PLATELET 633 488 - 144 x10E3/uL   IRON   Result Value Ref Range    Iron 68 27 - 159 ug/dL   HEMOGLOBIN A1C WITH EAG   Result Value Ref Range    Hemoglobin A1c 5.8 (H) 4.8 - 5.6 %    Estimated average glucose 120 mg/dL   CVD REPORT   Result Value Ref Range    INTERPRETATION Note        ASSESSMENT and PLAN      ICD-10-CM ICD-9-CM    1. Annual physical exam Z00.00 V70.0    2. Other migraine without status migrainosus, not intractable G43.809 346.80    3. Pruritic dermatitis L29.9 698.9 hydrOXYzine HCl (ATARAX) 25 mg tablet   4. Chronic low back pain, unspecified back pain laterality, with sciatica presence unspecified M54.5 724.2 cyclobenzaprine (FLEXERIL) 10 mg tablet    G89.29 338.29    5. Allergic conjunctivitis and rhinitis, bilateral H10.13 372.05 azelastine (ASTELIN) 137 mcg (0.1 %) nasal spray    J30.9 477.9    6. Migraine without aura and without status migrainosus, not intractable G43.009 346.10 butalbital-acetaminophen-caffeine (FIORICET, ESGIC) -40 mg per tablet     Follow-up Disposition: Not on File    Discussed the patient's BMI with her. The BMI follow up plan is as follows: I have counseled this patient on diet and exercise regimens. Addressed weight, diet and exercise with patient. Decrease carbohydrates (white foods, sweet foods, sweet drinks and alcohol), increase protein (lean meats and beans) with each meal.  Avoid fried foods. Eat 3-4 small meals daily. Do not skip meals. Increase water intake. Start activity starting with 30 minutes of exercise daily 3x/week then increase physical activity to 30 minutes daily for health benefit. Get 7-8 hours of  uninterrupted sleep nightly. Chart reviewed and updated. Continue current medications and care  Prescriptions written and sent to pharmacy; medication side effects discussed  Prescription given to patient during office visit today.   Most recent tests reviewed  Recent office visit notes from 4 months ago reviewed  Addressed weight, diet and exercise with patient  Counseled patient on health concerns:  as above  Relevant handouts given and discussed with patient  Immunizations noted; all are UTD  Offered empathy, support, legitamation, prayers, partnership to patient  Praised patient for progress    Patient was offered a choice/choices in the treatment plan today. Patient expresses understanding of the plan and agrees with recommendations. More than 50% of a face to face 40 minutes visit was spent in counseling and coordinating care. Patient education material and visit summary given to the patient. Documentation True and Accepted by Angie L. Cherylene Crooks, TABITHA-BC.

## 2017-11-09 NOTE — MR AVS SNAPSHOT
Visit Information Date & Time Provider Department Dept. Phone Encounter #  
 11/9/2017  9:40 AM [facility] [phone] [id number] Follow-up Instructions Return in about 4 weeks (around 12/7/2017) for Medication Check, F/U with Dr. Jay Jay Jansen. Your Appointments 12/29/2017 11:30 AM  
ROUTINE CARE with Jeff Kat, DO [employer] (LIZ Rodriges) Appt Note: 6 MO F/U Weight & Results 3979 Counts include 234 beds at the [facility] [address]  
[phone]  
  
   
 [address] [facility] [address] [facility] [address] [place] [address] Upcoming Health Maintenance Date Due  
 BREAST CANCER SCRN MAMMOGRAM 12/2/2017 PAP AKA CERVICAL CYTOLOGY 11/10/2019 DTaP/Tdap/Td series (2 - Td) 11/3/2025 Allergies as of 11/9/2017  Review Complete On: 11/9/2017 By: Kourtney Garrett NP Severity Noted Reaction Type Reactions Latex  11/20/2014    Rash Pcn [Penicillins] High 07/31/2009    Hives Prednisone High 07/31/2009    Hives Sulfa (Sulfonamide Antibiotics) High 07/31/2009    Hives Garamycin [Gentamicin]  07/31/2009    Other (comments) Itchy eyes due to sulfate Metformin  11/03/2015    Diarrhea Other Medication  07/31/2009    Hives, Rash SUN Current Immunizations  Reviewed on 3/10/2017 Name Date Influenza Vaccine 11/3/2017, 11/7/2016, 10/15/2013 Influenza Vaccine Loletha Ringer) 11/3/2015 Influenza Vaccine (Quad) PF 11/7/2016, 11/20/2014 Influenza Vaccine Split 11/7/2012, 11/18/2011 Influenza Vaccine Whole 10/1/2010 TD Vaccine 9/1/2003 Tdap 11/3/2015 Not reviewed this visit You Were Diagnosed With   
  
 Codes Comments Annual physical exam    -  Primary ICD-10-CM: Z00.00 ICD-9-CM: V70.0 Other migraine without status migrainosus, not intractable     ICD-10-CM: K68.734 ICD-9-CM: 346.80 Pruritic dermatitis     ICD-10-CM: L29.9 ICD-9-CM: 698.9 Chronic low back pain, unspecified back pain laterality, with sciatica presence unspecified     ICD-10-CM: M54.5, G89.29 ICD-9-CM: 724.2, 338.29 Allergic conjunctivitis and rhinitis, bilateral     ICD-10-CM: H10.13, J30.9 ICD-9-CM: 372.05, 477.9 Migraine without aura and without status migrainosus, not intractable     ICD-10-CM: E86.042 ICD-9-CM: 346.10 Vitals BP Pulse Temp Resp Height(growth percentile) Weight(growth percentile) 135/71 (BP 1 Location: Left arm, BP Patient Position: Sitting) 65 98.6 °F (37 °C) (Oral) 16 5' 0.98\" (1.549 m) 312 lb 1.6 oz (141.6 kg) LMP SpO2 BMI OB Status Smoking Status 10/25/2017 98% 59.01 kg/m2 Having regular periods Never Smoker BMI and BSA Data Body Mass Index Body Surface Area 59.01 kg/m 2 2.47 m 2 Preferred Pharmacy Pharm[facility] Name Phone [id number] [phone] Your Updated Medication List  
  
   
This list is accurate as of: 11/9/17 11:07 AM.  Always use your most recent med list.  
  
  
  
  
 albuterol 90 mcg/actuation inhaler Commonly known as:  PROVENTIL HFA, VENTOLIN HFA, PROAIR HFA Take 2 Puffs by inhalation every four (4) hours as needed for Wheezing. Indications: BRONCHOSPASM PREVENTION  
  
 ALLEGRA 180 mg tablet Generic drug:  fexofenadine Take  by mouth daily. ALPRAZolam 0.5 mg tablet Commonly known as:  Sara Hearing Take 1 Tab by mouth two (2) times daily as needed for Anxiety or Sleep. Max Daily Amount: 1 mg. Indications: anxiety  
  
 azelastine 137 mcg (0.1 %) nasal spray Commonly known as:  ASTELIN  
1 Spray by Both Nostrils route two (2) times a day. Indications: SEASONAL ALLERGIC RHINITIS  
  
 butalbital-acetaminophen-caffeine -40 mg per tablet Commonly known as:  FIORICET, ESGIC  
TAKE 1 TAB BY MOUTH EVERY SIX (6) HOURS AS NEEDED FOR PAIN OR HEADACHE. cyclobenzaprine 10 mg tablet Commonly known as:  FLEXERIL  
TAKE 1 TABLET BY MOUTH 3 TIMES A DAY AS NEEDED FOR MUSCLE SPASM  
  
 diclofenac EC 75 mg EC tablet Commonly known as:  VOLTAREN Take 75 mg by mouth two (2) times a day. doxycycline 50 mg tablet Commonly known as:  ADOXA Take 50 mg by mouth two (2) times a day. EPICERAM Emul Generic drug:  Emollient Combination No.32  
by Apply Externally route. From Dr Bhupendra Phelps. FISH OIL 1,000 mg Cap Generic drug:  omega-3 fatty acids-vitamin e Take 1 Cap by mouth daily. FLONASE 50 mcg/actuation nasal spray Generic drug:  fluticasone  
nightly. hydrOXYzine HCl 25 mg tablet Commonly known as:  ATARAX TAKE 1 TABLET BY MOUTH EVERY 8 HOURS AS NEEDED FOR ITCHING  
  
 latanoprost 0.005 % ophthalmic solution Commonly known as:  Mira Sidle Administer 1 Drop to both eyes nightly. levothyroxine 125 mcg tablet Commonly known as:  SYNTHROID Take 1 Tab by mouth Daily (before breakfast). Indications: hypothyroidism LOCOID 0.1 % Lotn Generic drug:  Hydrocortisone Butyrate  
  
 magnesium oxide 400 mg tablet Commonly known as:  MAG-OX Take 400 mg by mouth daily. METROGEL 1 % topical gel Generic drug:  metroNIDAZOLE Apply  to affected area daily. Use a thin layer to affected areas after washing from Dr Bhupendra Phelps   Indications: ACNE ROSACEA  
  
 montelukast 10 mg tablet Commonly known as:  SINGULAIR  
TAKE 1 TABLET BY MOUTH EVERY DAY  
  
 MOTRIN 800 mg tablet Generic drug:  ibuprofen Take  by mouth every six (6) hours as needed for Pain.  
  
 multivitamin tablet Commonly known as:  ONE A DAY Take 1 tablet by mouth daily. NAFTIN 2 % Gel Generic drug:  naftifine Apply 1 Dose to affected area two (2) times a day. Indications: TINEA PEDIS  
  
 norethindrone-ethinyl estradiol 1 mg-20 mcg (21)/75 mg (7) Tab Commonly known as:  Tenny Guy FE 1/20 Take  by mouth. omeprazole 20 mg capsule Commonly known as:  PRILOSEC  
TAKE ONE CAPSULE BY MOUTH EVERY DAY  
  
 OTHER(NON-FORMULARY) Osteo Biflex PROBIOTIC COMPLEX PO Take  by mouth daily. SYSTANE (PF) OP Apply  to eye four (4) times daily. * terconazole 80 mg vaginal suppository Commonly known as:  TERAZOL 3  
  
 * terconazole 0.4 % vaginal cream  
Commonly known as:  TERAZOL 7  
  
 valACYclovir 500 mg tablet Commonly known as:  VALTREX  
  
 VITAMIN D3 1,000 unit tablet Generic drug:  cholecalciferol Take 1,000 Units by mouth daily. * Notice: This list has 2 medication(s) that are the same as other medications prescribed for you. Read the directions carefully, and ask your doctor or other care provider to review them with you. Prescriptions Printed Refills  
 butalbital-acetaminophen-caffeine (FIORICET, ESGIC) -40 mg per tablet 0 Sig: TAKE 1 TAB BY MOUTH EVERY SIX (6) HOURS AS NEEDED FOR PAIN OR HEADACHE. Class: Print Prescriptions Sent to Pharmacy Refills  
 azelastine (ASTELIN) 137 mcg (0.1 %) nasal spray 5 [place] by Both Nostrils route two (2) times a day. Indications: SEASONAL ALLERGIC RHINITIS Class: Normal  
 Pharmacy: [email or web address]  Ph #: [phone] Route: Both Nostrils  
 cyclobenzaprine (FLEXERIL) 10 mg tablet 0 Sig: TAKE 1 TABLET BY MOUTH 3 TIMES A DAY AS NEEDED FOR MUSCLE SPASM Class: Normal  
 Pharmacy: [facility]/pharmacy #[id number] - [place], [address] Ph #: [phone]  
 hydrOXYzine HCl (ATARAX) 25 mg tablet 1 Sig: TAKE 1 TABLET BY MOUTH EVERY 8 HOURS AS NEEDED FOR ITCHING Class: Normal  
 Pharmacy: [email or web address]  Ph #: [phone] Follow-up Instructions Return in about 4 weeks (around 12/7/2017) for Medication Check, F/U with Dr. Neelam Myles. Patient Instructions Migraine Headache: Care Instructions Your Care Instructions Migraines are painful, throbbing headaches that often start on one side of the head. They may cause nausea and vomiting and make you sensitive to light, sound, or smell. Without treatment, migraines can last from 4 hours to a few days. Medicines can help prevent migraines or stop them after they have started. Your doctor can help you find which ones work best for you. Follow-up care is a key part of your treatment and safety. Be sure to make and go to all appointments, and call your doctor if you are having problems. It's also a good idea to know your test results and keep a list of the medicines you take. How can you care for yourself at home? · Do not drive if you have taken a prescription pain medicine. · Rest in a quiet, dark room until your headache is gone. Close your eyes, and try to relax or go to sleep. Don't watch TV or read. · Put a cold, moist cloth or cold pack on the painful area for 10 to 20 minutes at a time. Put a thin cloth between the cold pack and your skin. · Use a warm, moist towel or a heating pad set on low to relax tight shoulder and neck muscles. · Have someone gently massage your neck and shoulders. · Take your medicines exactly as prescribed. Call your doctor if you think you are having a problem with your medicine. You will get more details on the specific medicines your doctor prescribes. · Be careful not to take pain medicine more often than the instructions allow. You could get worse or more frequent headaches when the medicine wears off. To prevent migraines · Keep a headache diary so you can figure out what triggers your headaches. Avoiding triggers may help you prevent headaches. Record when each headache began, how long it lasted, and what the pain was like.  (Was it throbbing, aching, stabbing, or dull?) Write down any other symptoms you had with the headache, such as nausea, flashing lights or dark spots, or sensitivity to bright light or loud noise. Note if the headache occurred near your period. List anything that might have triggered the headache. Triggers may include certain foods (chocolate, cheese, wine) or odors, smoke, bright light, stress, or lack of sleep. · If your doctor has prescribed medicine for your migraines, take it as directed. You may have medicine that you take only when you get a migraine and medicine that you take all the time to help prevent migraines. ¨ If your doctor has prescribed medicine for when you get a headache, take it at the first sign of a migraine, unless your doctor has given you other instructions. ¨ If your doctor has prescribed medicine to prevent migraines, take it exactly as prescribed. Call your doctor if you think you are having a problem with your medicine. · Find healthy ways to deal with stress. Migraines are most common during or right after stressful times. Take time to relax before and after you do something that has caused a migraine in the past. 
· Try to keep your muscles relaxed by keeping good posture. Check your jaw, face, neck, and shoulder muscles for tension. Try to relax them. When you sit at a desk, change positions often. And make sure to stretch for 30 seconds each hour. · Get plenty of sleep and exercise. · Eat meals on a regular schedule. Avoid foods and drinks that often trigger migraines. These include chocolate, alcohol (especially red wine and port), aspartame, monosodium glutamate (MSG), and some additives found in foods (such as hot dogs, gordon, cold cuts, aged cheeses, and pickled foods). · Limit caffeine. Don't drink too much coffee, tea, or soda. But don't quit caffeine suddenly. That can also give you migraines. · Do not smoke or allow others to smoke around you.  If you need help quitting, talk to your doctor about stop-smoking programs and medicines. These can increase your chances of quitting for good. · If you are taking birth control pills or hormone therapy, talk to your doctor about whether they are triggering your migraines. When should you call for help? Call 911 anytime you think you may need emergency care. For example, call if: 
? · You have signs of a stroke. These may include: 
¨ Sudden numbness, paralysis, or weakness in your face, arm, or leg, especially on only one side of your body. ¨ Sudden vision changes. ¨ Sudden trouble speaking. ¨ Sudden confusion or trouble understanding simple statements. ¨ Sudden problems with walking or balance. ¨ A sudden, severe headache that is different from past headaches. ?Call your doctor now or seek immediate medical care if: 
? · You have new or worse nausea and vomiting. ? · You have a new or higher fever. ? · Your headache gets much worse. ? Watch closely for changes in your health, and be sure to contact your doctor if: 
? · You are not getting better after 2 days (48 hours). Where can you learn more? Go to http://alejandro"[employer]eddie.info/. Enter V926 in the search box to learn more about \"Migraine Headache: Care Instructions. \" Current as of: October 14, 2016 Content Version: 11.4 © 9909-3247 Healthwise, Incorporated. Care instructions adapted under license by [employer] (which disclaims liability or warranty for this information). If you have questions about a medical condition or this instruction, always ask your healthcare professional. [place] [address] any warranty or liability for your use of this information. Using a Nasal Steroid Spray: Care Instructions Your Care Instructions Your doctor may suggest using a corticosteroid nasal spray for your allergy symptoms or sinus problems. These sprays reduce the swelling inside the nose and sinuses.  Unlike decongestant nasal sprays, steroid sprays won't lead to more swelling when you stop taking them. These sprays start working in a few days, but it may take several weeks before you get the full effect. Most side effects are minor. The most common complaint is a burning feeling in the nose right after the spray is used. Some people get nosebleeds. Follow-up care is a key part of your treatment and safety. Be sure to make and go to all appointments, and call your doctor if you are having problems. It's also a good idea to know your test results and keep a list of the medicines you take. How can you care for yourself at home? Here are some tips for using these sprays: 
· You may need to prime the sprayer before you use it. This means spraying it into the air a few times to make sure you get the right amount of medicine. Follow the directions on the label. · Blow your nose before you spray. This will help clear out your nostrils. · Gently sniff the medicine into your nose as you spray. Don't snort, or the medicine will go all the way into your throat where it won't do much good. · Aim the nozzle straight toward the outer wall of your nostril. This will help keep the medicine from irritating the inner walls of your nose, especially your septum (the wall that separates your left and right nostrils). · Don't blow your nose for 10 minutes or so after you spray. And try not to sneeze. · Be safe with medicines. Use this medicine exactly as prescribed. Call your doctor if you think you are having a problem with your medicine. · Clean your sprayer once a week. Read the label to learn how. When should you call for help? Watch closely for changes in your health, and be sure to contact your doctor if you have any problems. Where can you learn more? Go to http://alejandro-eddie.info/. Enter O250 in the search box to learn more about \"Using a Nasal Steroid Spray: Care Instructions. \" 
 Current as of: May 12, 2017 Content Version: 11.4 © 5806-1920 Healthwise, [employer]. Care instructions adapted under license by [employer] (which disclaims liability or warranty for this information). If you have questions about a medical condition or this instruction, always ask your healthcare professional. [address] any warranty or liability for your use of this information. Introducing [facility]! Dear Rigoberto Erickson: Thank you for requesting a [employer] account. Our records indicate that you already have an active [employer] account. You can access your account anytime at [email or web address] Did you know that you can access your hospital and ER discharge instructions at any time in [employer]? You can also review all of your test results from your hospital stay or ER visit. Additional Information If you have questions, please visit the Frequently Asked Questions section of the [employer] website at [email or web address] Remember, [employer] is NOT to be used for urgent needs. For medical emergencies, dial 911. Now available from your iPhone and Android! Please provide this summary of care documentation to your next provider. Your primary care clinician is listed as Diandra Roberson. If you have any questions after today's visit, please call [phone].

## 2017-11-20 ENCOUNTER — OFFICE VISIT (OUTPATIENT)
Dept: FAMILY MEDICINE CLINIC | Age: 47
End: 2017-11-20

## 2017-11-20 VITALS
OXYGEN SATURATION: 99 % | HEIGHT: 61 IN | RESPIRATION RATE: 17 BRPM | DIASTOLIC BLOOD PRESSURE: 75 MMHG | HEART RATE: 77 BPM | SYSTOLIC BLOOD PRESSURE: 134 MMHG | BODY MASS INDEX: 55.32 KG/M2 | TEMPERATURE: 98.4 F | WEIGHT: 293 LBS

## 2017-11-20 DIAGNOSIS — J01.11 ACUTE RECURRENT FRONTAL SINUSITIS: Primary | ICD-10-CM

## 2017-11-20 DIAGNOSIS — J30.89 CHRONIC ALLERGIC RHINITIS DUE TO OTHER ALLERGIC TRIGGER, UNSPECIFIED SEASONALITY: ICD-10-CM

## 2017-11-20 RX ORDER — AZITHROMYCIN 250 MG/1
TABLET, FILM COATED ORAL
Qty: 6 TAB | Refills: 1 | Status: SHIPPED | OUTPATIENT
Start: 2017-11-20 | End: 2017-12-22 | Stop reason: ALTCHOICE

## 2017-11-20 NOTE — PROGRESS NOTES
Reviewed record in preparation for visit and have obtained necessary documentation. Identified pt with two pt identifiers(name and ). Chief Complaint   Patient presents with    Sinus Infection     Headache, Congestion, Yellow and Green Mucus       Vitals:    17 0955   BP: 134/75   Pulse: 77   Resp: 17   Temp: 98.4 °F (36.9 °C)   TempSrc: Oral   SpO2: 99%   Weight: 312 lb 4.8 oz (141.7 kg)   Height: 5' 0.98\" (1.549 m)   PainSc:   0 - No pain   LMP: 10/25/2017       Coordination of Care Questionnaire:  :     1) Have you been to an emergency room, urgent care clinic since your last visit? no   Hospitalized since your last visit? no             2) Have you seen or consulted any other health care providers outside of [address] [place] [address] since your last visit? Yes, [facility] Nuno -Ophthalmology     3) In the event something were to happen to you and you were unable to speak on your behalf, do you have an Advance Directive/ Living Will in place stating your wishes? NO    Do you have an Advance Directive on file? no    4) Are you interested in receiving information on Advance Directives?  NO

## 2017-11-20 NOTE — MR AVS SNAPSHOT
Visit Information Date & Time Provider Department Dept. Phone Encounter #  
 11/20/2017  9:40 AM Pretty Mederos NP [facility] [phone] [id number] Follow-up Instructions Return if symptoms worsen or fail to improve. Your Appointments 12/29/2017 11:30 AM  
ROUTINE CARE with DO Xiao Pearce 72 (LIZ Rodriges) Appt Note: 6 MO F/U Weight & Results [address] Donah Goodell [address] [facility] [address]  
[phone]  
  
   
 [address] [facility] [address] [facility] [address] [facility] Upcoming Health Maintenance Date Due  
 BREAST CANCER SCRN MAMMOGRAM 11/20/2018 PAP AKA CERVICAL CYTOLOGY 11/10/2019 DTaP/Tdap/Td series (2 - Td) 11/3/2025 Allergies as of 11/20/2017  Review Complete On: 11/20/2017 By: Pretty Mederos NP Severity Noted Reaction Type Reactions Latex  11/20/2014    Rash Pcn [Penicillins] High 07/31/2009    Hives Prednisone High 07/31/2009    Hives Sulfa (Sulfonamide Antibiotics) High 07/31/2009    Hives Garamycin [Gentamicin]  07/31/2009    Other (comments) Itchy eyes due to sulfate Metformin  11/03/2015    Diarrhea Other Medication  07/31/2009    Hives, Rash SUN Current Immunizations  Reviewed on 3/10/2017 Name Date Influenza Vaccine 11/3/2017, 11/7/2016, 10/15/2013 Influenza Vaccine Yasmeen Bouchra) 11/3/2015 Influenza Vaccine (Quad) PF 11/7/2016, 11/20/2014 Influenza Vaccine Split 11/7/2012, 11/18/2011 Influenza Vaccine Whole 10/1/2010 TD Vaccine 9/1/2003 Tdap 11/3/2015 Not reviewed this visit You Were Diagnosed With   
  
 Codes Comments Acute recurrent frontal sinusitis    -  Primary ICD-10-CM: J01.11 
ICD-9-CM: 461.1 Chronic allergic rhinitis due to other allergic trigger, unspecified seasonality     ICD-10-CM: J30.89 ICD-9-CM: 477.8 Vitals BP Pulse Temp Resp Height(growth percentile) Weight(growth percentile) 134/75 (BP 1 Location: Right arm, BP Patient Position: Sitting) 77 98.4 °F (36.9 °C) (Oral) 17 5' 0.98\" (1.549 m) 312 lb 4.8 oz (141.7 kg) LMP SpO2 BMI OB Status Smoking Status 10/25/2017 99% 59.05 kg/m2 Having regular periods Never Smoker BMI and BSA Data Body Mass Index Body Surface Area 59.05 kg/m 2 2.47 m 2 Preferred Pharmacy Pharmacy Name Phone [facility] #[id number] - [address] [phone] Your Updated Medication List  
  
   
This list is accurate as of: 11/20/17 11:09 AM.  Always use your most recent med list.  
  
  
  
  
 albuterol 90 mcg/actuation inhaler Commonly known as:  PROVENTIL HFA, VENTOLIN HFA, PROAIR HFA Take 2 Puffs by inhalation every four (4) hours as needed for Wheezing. Indications: BRONCHOSPASM PREVENTION  
  
 ALLEGRA 180 mg tablet Generic drug:  fexofenadine Take  by mouth daily. ALPRAZolam 0.5 mg tablet Commonly known as:  Cecillia Sophia Take 1 Tab by mouth two (2) times daily as needed for Anxiety or Sleep. Max Daily Amount: 1 mg. Indications: anxiety  
  
 azelastine 137 mcg (0.1 %) nasal spray Commonly known as:  ASTELIN  
1 Spray by Both Nostrils route two (2) times a day. Indications: SEASONAL ALLERGIC RHINITIS  
  
 azithromycin 250 mg tablet Commonly known as:  Con Chant Take 2 tablets today, then take 1 tablet daily  Indications: ACUTE EXACERBATION OBSTR CHR BRONCHITIS S. PNEUMONIAE  
  
 butalbital-acetaminophen-caffeine -40 mg per tablet Commonly known as:  FIORICET, ESGIC  
TAKE 1 TAB BY MOUTH EVERY SIX (6) HOURS AS NEEDED FOR PAIN OR HEADACHE. cyclobenzaprine 10 mg tablet Commonly known as:  FLEXERIL  
TAKE 1 TABLET BY MOUTH 3 TIMES A DAY AS NEEDED FOR MUSCLE SPASM  
  
 diclofenac EC 75 mg EC tablet Commonly known as:  VOLTAREN Take 75 mg by mouth two (2) times a day. doxycycline 50 mg tablet Commonly known as:  ADOXA Take 50 mg by mouth two (2) times a day. EPICERAM Emul Generic drug:  Emollient Combination No.32  
by Apply Externally route. From Dr Jessica Miller. FISH OIL 1,000 mg Cap Generic drug:  omega-3 fatty acids-vitamin e Take 1 Cap by mouth daily. FLONASE 50 mcg/actuation nasal spray Generic drug:  fluticasone  
nightly. hydrOXYzine HCl 25 mg tablet Commonly known as:  ATARAX TAKE 1 TABLET BY MOUTH EVERY 8 HOURS AS NEEDED FOR ITCHING  
  
 latanoprost 0.005 % ophthalmic solution Commonly known as:  Kenith Standing Administer 1 Drop to both eyes nightly. levothyroxine 125 mcg tablet Commonly known as:  SYNTHROID Take 1 Tab by mouth Daily (before breakfast). Indications: hypothyroidism LOCOID 0.1 % Lotn Generic drug:  Hydrocortisone Butyrate  
  
 magnesium oxide 400 mg tablet Commonly known as:  MAG-OX Take 400 mg by mouth daily. METROGEL 1 % topical gel Generic drug:  metroNIDAZOLE Apply  to affected area daily. Use a thin layer to affected areas after washing from Dr Jessica Miller   Indications: ACNE ROSACEA  
  
 montelukast 10 mg tablet Commonly known as:  SINGULAIR  
TAKE 1 TABLET BY MOUTH EVERY DAY  
  
 MOTRIN 800 mg tablet Generic drug:  ibuprofen Take  by mouth every six (6) hours as needed for Pain.  
  
 multivitamin tablet Commonly known as:  ONE A DAY Take 1 tablet by mouth daily. NAFTIN 2 % Gel Generic drug:  naftifine Apply 1 Dose to affected area two (2) times a day. Indications: TINEA PEDIS  
  
 norethindrone-ethinyl estradiol 1 mg-20 mcg (21)/75 mg (7) Tab Commonly known as:  Selina Jake FE 1/20 Take  by mouth. omeprazole 20 mg capsule Commonly known as:  PRILOSEC  
TAKE ONE CAPSULE BY MOUTH EVERY DAY  
  
 OTHER(NON-FORMULARY) Osteo Biflex PROBIOTIC COMPLEX PO Take  by mouth daily. SYSTANE (PF) OP Apply  to eye four (4) times daily. * terconazole 80 mg vaginal suppository Commonly known as:  TERAZOL 3  
  
 * terconazole 0.4 % vaginal cream  
Commonly known as:  TERAZOL 7  
  
 valACYclovir 500 mg tablet Commonly known as:  VALTREX  
  
 VITAMIN D3 1,000 unit tablet Generic drug:  cholecalciferol Take 1,000 Units by mouth daily. * Notice: This list has 2 medication(s) that are the same as other medications prescribed for you. Read the directions carefully, and ask your doctor or other care provider to review them with you. Prescriptions Sent to Pharmacy Refills  
 azithromycin (ZITHROMAX) 250 mg tablet 1 Sig: Take 2 tablets today, then take 1 tablet daily  Indications: ACUTE EXACERBATION OBSTR CHR BRONCHITIS S. PNEUMONIAE Class: Normal  
 [facility] #: [phone] Follow-up Instructions Return if symptoms worsen or fail to improve. Introducing [facility]! Dear Don Ocampo: Thank you for requesting a [employer] account. Our records indicate that you already have an active [employer] account. You can access your account anytime at [email or web address] Did you know that you can access your hospital and ER discharge instructions at any time in [employer]? You can also review all of your test results from your hospital stay or ER visit. Additional Information If you have questions, please visit the Frequently Asked Questions section of the [employer] website at [email or web address] Remember, [employer] is NOT to be used for urgent needs. For medical emergencies, dial 911. Now available from your iPhone and Android! Please provide this summary of care documentation to your next provider. Your primary care clinician is listed as Rogers Don. If you have any questions after today's visit, please call [phone].

## 2017-11-20 NOTE — PROGRESS NOTES
S: Ryan Oates is a 50 y.o. female who presents with URI symptoms    HPI:    Sx started: 3 weeks  Cough worse at night? no  Productive cough? Yes, yellow/green  Nasal discharge: yes, yellow/green  Watery eyes: yes, woke up this morning with \"eyes sealed shut\"  Fever/chills? LGF per the patient, 100.2 F is highest pt reports  Sore throat: \"some\"  HA: yes  Sinus pressure:yes  SOB: \"a little bit\"  Wheezing: \"a little bit\"  +myalgia, ears \"throbbing\", sneezing, LGF    Relieving factors: dayquil, astelin, mucinex improving symptoms, has nebulizer at home but forgot about it and has not used it yet  Aggravating factors: worse first thing in the morning  Hx of allergies? yes  Hx of asthma? Yes, \"slightly\"    ROS:  Gen: no fatigue, + fever, chills  Eyes: + tearing, itching, and discharge  Nose: + rhinorrhea, + sinus pain  Mouth: no oral lesions, + sore throat  Resp: mild shortness of breath and wheezing, + productive cough  CV: no chest pain, no paroxysmal nocturnal dyspnea  Abd: no nausea, no heartburn, no diarrhea, no constipation, no abdominal pain  Neuro: no headaches, no syncope or presyncopal episodes  Endo: no polyuria, no polydipsia  Heme: no lymphadenopathy, no easy bruising or bleeding      I reviewed the following:  Past Medical History:   Diagnosis Date    Acne rosacea     Dr. Lindsey Augustin.  Allergy, unspecified not elsewhere classified childhood     Txd with immunotherapy. Dr. Fabienne Gaytan Anemia NEC     borderline    Ankle sprain 2007    Right. Dr. Frances Burk    Ankle sprain 11/2012    Right. Dr. Jeana Guerrier.  Asthma childhood    Chickenpox childhood    Chronic low back pain with right-sided sciatica     and SI joint dysfunction. Dr. Regan Meredith.  Chronic otitis media     Dr. Fabienne Gaytan Dry eye syndrome 2013    Dr. Mayela Sue.  EBV infection 6/27/2011    Hearing loss     Mild high frequency sensorineuronal hearing loss.   Dr. Priscilla Rubin Heart murmur     Hernia of abdominal wall 09/2003, 44/1172    umbilical.  Dr. Jarad Ballesteros. Dr. Alvin Buckner    Hyperglycemia 2013    Hypothyroidism 06/2010    Dr. Sangeetha Deshpande pressure increase 12/2011    Dr. Ferny Gutierrez. Dr. Shoshana Andrade. Dr. Priscila Orozco.  Knee pain, left 04/2012    Left. Dr. Rudolpho Klinefelter Measles childhood    Migraine     Mumps childhood    Plantar fasciitis, bilateral 2010    Dr. Jenny Stephenson    Sciatica 2008    Right.  with OA. Dr. Osorio Vaughan Tinnitus of right ear 2012    Dr. Armani Jacobs        Current Outpatient Prescriptions   Medication Sig Dispense Refill    azelastine (ASTELIN) 137 mcg (0.1 %) nasal spray 1 Hendricks by Both Nostrils route two (2) times a day. Indications: SEASONAL ALLERGIC RHINITIS 1 Bottle 5    butalbital-acetaminophen-caffeine (FIORICET, ESGIC) -40 mg per tablet TAKE 1 TAB BY MOUTH EVERY SIX (6) HOURS AS NEEDED FOR PAIN OR HEADACHE. 40 Tab 0    cyclobenzaprine (FLEXERIL) 10 mg tablet TAKE 1 TABLET BY MOUTH 3 TIMES A DAY AS NEEDED FOR MUSCLE SPASM 90 Tab 0    hydrOXYzine HCl (ATARAX) 25 mg tablet TAKE 1 TABLET BY MOUTH EVERY 8 HOURS AS NEEDED FOR ITCHING 90 Tab 1    montelukast (SINGULAIR) 10 mg tablet TAKE 1 TABLET BY MOUTH EVERY DAY 30 Tab 11    levothyroxine (SYNTHROID) 125 mcg tablet Take 1 Tab by mouth Daily (before breakfast). Indications: hypothyroidism 90 Tab 3    NAFTIN 2 % gel Apply 1 Dose to affected area two (2) times a day. Indications: TINEA PEDIS 1 Bottle 2    ALPRAZolam (XANAX) 0.5 mg tablet Take 1 Tab by mouth two (2) times daily as needed for Anxiety or Sleep. Max Daily Amount: 1 mg. Indications: anxiety 60 Tab 2    albuterol (PROVENTIL HFA, VENTOLIN HFA, PROAIR HFA) 90 mcg/actuation inhaler Take 2 Puffs by inhalation every four (4) hours as needed for Wheezing.  Indications: BRONCHOSPASM PREVENTION 1 Inhaler 5    norethindrone-ethinyl estradiol (JUNEL FE 1/20) 1 mg-20 mcg (21)/75 mg (7) tab Take  by mouth.      terconazole (TERAZOL 7) 0.4 % vaginal cream   6    LOCOID 0.1 % lotn   1    fluticasone (FLONASE) 50 mcg/actuation nasal spray nightly.  valACYclovir (VALTREX) 500 mg tablet   1    terconazole (TERAZOL 3) 80 mg vaginal suppository   4    multivitamin (ONE A DAY) tablet Take 1 tablet by mouth daily.  omeprazole (PRILOSEC) 20 mg capsule TAKE ONE CAPSULE BY MOUTH EVERY DAY 30 Cap 1    PROPYLENE GLYCOL//PF (SYSTANE, PF, OP) Apply  to eye four (4) times daily.  magnesium oxide (MAG-OX) 400 mg tablet Take 400 mg by mouth daily.  doxycycline (ADOXA) 50 mg tablet Take 50 mg by mouth two (2) times a day.  Emollient Combination No.32 (EPICERAM) Emul by Apply Externally route. From Dr Kiya Salazar.  metroNIDAZOLE (METROGEL) 1 % topical gel Apply  to affected area daily. Use a thin layer to affected areas after washing from Dr Kiya Salazar   Indications: ACNE ROSACEA      diclofenac EC (VOLTAREN) 75 mg EC tablet Take 75 mg by mouth two (2) times a day.  latanoprost (XALATAN) 0.005 % ophthalmic solution Administer 1 Drop to both eyes nightly.  OTHER,NON-FORMULARY, Osteo Biflex       omega-3 fatty acids-vitamin e (FISH OIL) 1,000 mg Cap Take 1 Cap by mouth daily.  fexofenadine (ALLEGRA) 180 mg tablet Take  by mouth daily.  LACTOBACILLUS/FOS/PECTIN (PROBIOTIC COMPLEX PO) Take  by mouth daily.  cholecalciferol, vitamin d3, (VITAMIN D) 1,000 unit tablet Take 1,000 Units by mouth daily.  ibuprofen (MOTRIN) 800 mg tablet Take  by mouth every six (6) hours as needed for Pain.           Allergies   Allergen Reactions    Latex Rash    Pcn [Penicillins] Hives    Prednisone Hives    Sulfa (Sulfonamide Antibiotics) Hives    Garamycin [Gentamicin] Other (comments)     Itchy eyes due to sulfate    Metformin Diarrhea    Other Medication Hives and Rash     SUN     Social History     Social History    Marital status: SINGLE     Spouse name: N/A  Number of children: N/A    Years of education: N/A     Occupational History    Not on file. Social History Main Topics    Smoking status: Never Smoker    Smokeless tobacco: Never Used      Comment: lived with smoker dad and mom then step mom x 20 yrs    Alcohol use 0.0 oz/week      Comment: RARE    Drug use: No    Sexual activity: No     Other Topics Concern    Not on file     Social History Narrative         O:     VS:   Visit Vitals    /75 (BP 1 Location: Right arm, BP Patient Position: Sitting)    Pulse 77    Temp 98.4 °F (36.9 °C) (Oral)    Resp 17    Ht 5' 0.98\" (1.549 m)    Wt 312 lb 4.8 oz (141.7 kg)    LMP 10/25/2017    SpO2 99%    BMI 59.05 kg/m2     GENERAL: Ev Potter is in no acute distress. HEAD:  Normocephalic. Atraumatic.  + frontal tender sinuses. EYE: PERRLA. EOMs intact. Sclera anicteric without injection. No drainage or discharge. EARS: Hearing intact bilaterally. External ear canals normal without evidence of blood or swelling. Bilateral TM's intact, pearly grey with landmarks visible. No erythema or effusion. NOSE: Patent. Nasal turbinates pink. No polyps noted. + erythema. +rhinorrhea    MOUTH: mucous membranes pink and moist. Posterior pharynx normal with cobblestone appearance. + erythema, no white exudate or obstruction. NECK: supple. Midline trachea. No anterior cervical lymphadenopathy noted. RESP: Breath sounds are symmetrical bilaterally. Unlabored without SOB. Speaking in full sentences. Clear to auscultation bilaterally anteriorly and posteriorly but diminished throughout. No wheezes. No rales or rhonchi. CV: normal rate. Regular rhythm. S1, S2 audible. No murmur noted. No rubs, clicks or gallops noted. SKIN: Skin is warm and dry. Turgor is normal. No petechiae, no purpura, no rash. No cyanosis. No jaundice or pallor.    ______________________________________________________________________  Plan:  Diagnoses and all orders for this visit:    1. Acute recurrent frontal sinusitis  -     azithromycin (ZITHROMAX) 250 mg tablet; Take 2 tablets today, then take 1 tablet daily  Indications: ACUTE EXACERBATION OBSTR CHR BRONCHITIS S. PNEUMONIAE    2. Chronic allergic rhinitis due to other allergic trigger, unspecified seasonality  -     azithromycin (ZITHROMAX) 250 mg tablet; Take 2 tablets today, then take 1 tablet daily  Indications: ACUTE EXACERBATION OBSTR CHR BRONCHITIS S. PNEUMONIAE    Patient education was done. Advised on nutrition, tobacco, and alcohol . Counseling included discussion of diagnosis, differentials, treatment options, prescribed treatment, warning signs and follow up. Medication risks/benefits, costs, interactions and alternatives discussed with patient.      Patient verbalized understanding and agreed to plan of care. If you are not feeling better or you begin to feel worse or develop new symptoms in 3-4 days please call. Complications of sinusitis include an infection of the skin around the eye and other infections. Watch for signs of fever, chills, body aches or any areas of red, warm, swollen and tender skin. Call immediately if you notice these signs. Follow up in 1-2 weeks as needed or if symptoms progress. There are no Patient Instructions on file for this visit.

## 2017-11-27 PROBLEM — E66.01 OBESITY, MORBID (HCC): Status: ACTIVE | Noted: 2017-11-27

## 2017-12-22 ENCOUNTER — OFFICE VISIT (OUTPATIENT)
Dept: FAMILY MEDICINE CLINIC | Age: 47
End: 2017-12-22

## 2017-12-22 VITALS
TEMPERATURE: 98.4 F | SYSTOLIC BLOOD PRESSURE: 110 MMHG | HEIGHT: 61 IN | OXYGEN SATURATION: 98 % | HEART RATE: 80 BPM | RESPIRATION RATE: 18 BRPM | WEIGHT: 293 LBS | BODY MASS INDEX: 55.32 KG/M2 | DIASTOLIC BLOOD PRESSURE: 73 MMHG

## 2017-12-22 DIAGNOSIS — J20.8 ACUTE BRONCHITIS DUE TO OTHER SPECIFIED ORGANISMS: Primary | ICD-10-CM

## 2017-12-22 DIAGNOSIS — Z20.818 STREP THROAT EXPOSURE: ICD-10-CM

## 2017-12-22 DIAGNOSIS — J45.20 MILD INTERMITTENT ASTHMA WITHOUT COMPLICATION: ICD-10-CM

## 2017-12-22 DIAGNOSIS — H04.123 DRY EYES: ICD-10-CM

## 2017-12-22 DIAGNOSIS — J04.0 ACUTE LARYNGITIS: ICD-10-CM

## 2017-12-22 DIAGNOSIS — G47.09 OTHER INSOMNIA: ICD-10-CM

## 2017-12-22 DIAGNOSIS — R05.9 COUGH: ICD-10-CM

## 2017-12-22 DIAGNOSIS — J30.89 CHRONIC ALLERGIC RHINITIS DUE TO OTHER ALLERGIC TRIGGER, UNSPECIFIED SEASONALITY: ICD-10-CM

## 2017-12-22 PROBLEM — E66.01 OBESITY, MORBID (HCC): Status: ACTIVE | Noted: 2017-12-22

## 2017-12-22 PROBLEM — E66.01 OBESITY, MORBID (HCC): Status: RESOLVED | Noted: 2017-11-27 | Resolved: 2017-12-22

## 2017-12-22 LAB
QUICKVUE INFLUENZA TEST: NEGATIVE
S PYO AG THROAT QL: NEGATIVE
VALID INTERNAL CONTROL?: YES
VALID INTERNAL CONTROL?: YES

## 2017-12-22 RX ORDER — ALBUTEROL SULFATE 90 UG/1
2 AEROSOL, METERED RESPIRATORY (INHALATION)
Qty: 1 INHALER | Refills: 5 | Status: SHIPPED | OUTPATIENT
Start: 2017-12-22 | End: 2018-11-12 | Stop reason: SDUPTHER

## 2017-12-22 RX ORDER — HYDROCODONE POLISTIREX AND CHLORPHENIRAMINE POLISTIREX 10; 8 MG/5ML; MG/5ML
5 SUSPENSION, EXTENDED RELEASE ORAL
Qty: 200 ML | Refills: 0 | Status: SHIPPED | OUTPATIENT
Start: 2017-12-22 | End: 2018-07-13 | Stop reason: ALTCHOICE

## 2017-12-22 RX ORDER — AZITHROMYCIN 250 MG/1
TABLET, FILM COATED ORAL
Qty: 6 TAB | Refills: 0 | Status: SHIPPED | OUTPATIENT
Start: 2017-12-22 | End: 2017-12-27

## 2017-12-22 NOTE — PATIENT INSTRUCTIONS
Asthma Action Plan: After Your Visit  Your Care Instructions  An asthma action plan is based on peak flow and asthma symptoms. Sorting symptoms and peak flow into red, yellow, and green \"zones\" can help you know how bad your asthma is and what actions you should take. Work with your doctor to make your plan. An action plan may include:  · The peak flow readings and symptoms for each zone. · What medicines to take in each zone. · When to call a doctor. · A list of emergency contact numbers. · A list of your asthma triggers. Follow-up care is a key part of your treatment and safety. Be sure to make and go to all appointments, and call your doctor if you are having problems. It's also a good idea to know your test results and keep a list of the medicines you take. How can you care for yourself at home? · Take your daily medicines to help minimize long-term damage and avoid asthma attacks. · Check your peak flow every morning and evening. This is the best way to know how well your lungs are working. · Check your action plan to see what zone you are in.  ¨ If you are in the green zone, keep taking your daily asthma medicines as prescribed. ¨ If you are in the yellow zone, you may be having or will soon have an asthma attack. You may not have any symptoms, but your lungs are not working as well as they should. Take the medicines listed in your action plan. If you stay in the yellow zone, your doctor may need to increase the dose or add a medicine. ¨ If you are in the red zone, follow your action plan. If your symptoms or peak flow don't improve soon, you may need to go to the emergency room or be admitted to the hospital.  · Use an asthma diary. Write down your peak flow readings in the asthma diary. If you have an attack, write down what caused it (if you know), the symptoms, and what medicine you took.   · Make sure you know how and when to call your doctor or go to the hospital.  · Take both the asthma action plan and the asthma diary--along with your peak flow meter and medicines--when you see your doctor. Tell your doctor if you are having trouble following your action plan. When should you call for help? Call 911 anytime you think you may need emergency care. For example, call if:  · You have severe trouble breathing. Call your doctor now or seek immediate medical care if:  · Your symptoms do not get better after you have followed your asthma action plan. · You cough up yellow, dark brown, or bloody mucus (sputum). Watch closely for changes in your health, and be sure to contact your doctor if:  · Your coughing and wheezing get worse. · You need to use quick-relief medicine on more than 2 days a week (unless it is just for exercise). · You need help figuring out what is triggering your asthma attacks. Where can you learn more? Go to [employer]  Enter B511 in the search box to learn more about \"Asthma Action Plan: After Your Visit. \"   © 9338-0823 [employer]. Care instructions adapted under license by [employer] (which disclaims liability or warranty for this information). This care instruction is for use with your licensed healthcare professional. If you have questions about a medical condition or this instruction, always ask your healthcare professional. [address] any warranty or liability for your use of this information. Content Version: 02.8.310629; Last Revised: March 9, 2012                 Bronchitis: Care Instructions  Your Care Instructions    Bronchitis is inflammation of the bronchial tubes, which carry air to the lungs. The tubes swell and produce mucus, or phlegm. The mucus and inflamed bronchial tubes make you cough. You may have trouble breathing. Most cases of bronchitis are caused by viruses like those that cause colds. Antibiotics usually do not help and they may be harmful.   Bronchitis usually develops rapidly and lasts about 2 to 3 weeks in otherwise healthy people. Follow-up care is a key part of your treatment and safety. Be sure to make and go to all appointments, and call your doctor if you are having problems. It's also a good idea to know your test results and keep a list of the medicines you take. How can you care for yourself at home? · Take all medicines exactly as prescribed. Call your doctor if you think you are having a problem with your medicine. · Get some extra rest.  · Take an over-the-counter pain medicine, such as acetaminophen (Tylenol), ibuprofen (Advil, Motrin), or naproxen (Aleve) to reduce fever and relieve body aches. Read and follow all instructions on the label. · Do not take two or more pain medicines at the same time unless the doctor told you to. Many pain medicines have acetaminophen, which is Tylenol. Too much acetaminophen (Tylenol) can be harmful. · Take an over-the-counter cough medicine that contains dextromethorphan to help quiet a dry, hacking cough so that you can sleep. Avoid cough medicines that have more than one active ingredient. Read and follow all instructions on the label. · Breathe moist air from a humidifier, hot shower, or sink filled with hot water. The heat and moisture will thin mucus so you can cough it out. · Do not smoke. Smoking can make bronchitis worse. If you need help quitting, talk to your doctor about stop-smoking programs and medicines. These can increase your chances of quitting for good. When should you call for help? Call 911 anytime you think you may need emergency care. For example, call if:  ? · You have severe trouble breathing. ?Call your doctor now or seek immediate medical care if:  ? · You have new or worse trouble breathing. ? · You cough up dark brown or bloody mucus (sputum). ? · You have a new or higher fever. ? · You have a new rash. ? Watch closely for changes in your health, and be sure to contact your doctor if:  ? · You cough more deeply or more often, especially if you notice more mucus or a change in the color of your mucus. ? · You are not getting better as expected. Where can you learn more? Go to http://alejandro-eddie.info/. Enter H333 in the search box to learn more about \"Bronchitis: Care Instructions. \"  Current as of: May 12, 2017  Content Version: 11.4  © 6620-7828 [employer]. Care instructions adapted under license by [employer] (which disclaims liability or warranty for this information). If you have questions about a medical condition or this instruction, always ask your healthcare professional. [address] any warranty or liability for your use of this information. Advised protocol for clearing congestion:  Increase fluid intake, especially water to thin mucous and boost the immune system. Avoid sugar and dairy while congested since they thicken mucous. Get plenty of rest!  Gargle 3 times daily and as needed in Listerine or warm salt water vinegar solutions (1 tsp salt, 1 tsp vinegar in 1 cup lukewarm water.)  Use OTC nasal saline spray up each nostril twice daily. Use humidifier at bedtime. Use OTC Mucinex 600 mg twice daily to loosen mucous. Use OTC Tylenol Arthritis or Ibuprofen up to 800 mg up to 3 times daily as needed for pain, fever or headaches. Avoid decongestants and Ibuprofen if you have high blood pressure! If mucous is consistently discolored yellow or green throughout the day for more than a week, call the doctor for an evaluation. Advise patient to start taking Over The Counter allergy medication (Allegra, Zyrtec, Claritin, Xyzal or Alavert) daily. If you have itchy, watery eyes you can try OTC Zaditor or Zyrtec Allergy Eye drops.   If you have a stuffy nose, please try OTC Flonase, Flonase Sensimist, Rhinocort, or Nasacort AQ 2 squirts up each nostril once a day (adults) or 1 squirt up each nostril once a day (children). Use allergy free, dye free, fragrance free products on your body and hair. After being outdoors, brush hair vigorously, wash face and arms, rinse nostrils with a nasal saline spray and consider changing your clothing. Dust furniture frequently and wear a mask while doing it. Vacuum floors weekly. Remove stuffed animals or extra pillows from the bed. Clean bedding in hot water weekly. Sometimes allergies can be so severe that 2 nasal sprays and 2 pills may be needed to control symptoms. Bronchitis: Care Instructions  Your Care Instructions    Bronchitis is inflammation of the bronchial tubes, which carry air to the lungs. The tubes swell and produce mucus, or phlegm. The mucus and inflamed bronchial tubes make you cough. You may have trouble breathing. Most cases of bronchitis are caused by viruses like those that cause colds. Antibiotics usually do not help and they may be harmful. Bronchitis usually develops rapidly and lasts about 2 to 3 weeks in otherwise healthy people. Follow-up care is a key part of your treatment and safety. Be sure to make and go to all appointments, and call your doctor if you are having problems. It's also a good idea to know your test results and keep a list of the medicines you take. How can you care for yourself at home? · Take all medicines exactly as prescribed. Call your doctor if you think you are having a problem with your medicine. · Get some extra rest.  · Take an over-the-counter pain medicine, such as acetaminophen (Tylenol), ibuprofen (Advil, Motrin), or naproxen (Aleve) to reduce fever and relieve body aches. Read and follow all instructions on the label. · Do not take two or more pain medicines at the same time unless the doctor told you to. Many pain medicines have acetaminophen, which is Tylenol. Too much acetaminophen (Tylenol) can be harmful.   · Take an over-the-counter cough medicine that contains dextromethorphan to help quiet a dry, hacking cough so that you can sleep. Avoid cough medicines that have more than one active ingredient. Read and follow all instructions on the label. · Breathe moist air from a humidifier, hot shower, or sink filled with hot water. The heat and moisture will thin mucus so you can cough it out. · Do not smoke. Smoking can make bronchitis worse. If you need help quitting, talk to your doctor about stop-smoking programs and medicines. These can increase your chances of quitting for good. When should you call for help? Call 911 anytime you think you may need emergency care. For example, call if:  ? · You have severe trouble breathing. ?Call your doctor now or seek immediate medical care if:  ? · You have new or worse trouble breathing. ? · You cough up dark brown or bloody mucus (sputum). ? · You have a new or higher fever. ? · You have a new rash. ? Watch closely for changes in your health, and be sure to contact your doctor if:  ? · You cough more deeply or more often, especially if you notice more mucus or a change in the color of your mucus. ? · You are not getting better as expected. Where can you learn more? Go to http://alejandro-eddie.info/. Enter H333 in the search box to learn more about \"Bronchitis: Care Instructions. \"  Current as of: May 12, 2017  Content Version: 11.4  © 9130-7481 [employer], [employer]. Care instructions adapted under license by [employer] (which disclaims liability or warranty for this information). If you have questions about a medical condition or this instruction, always ask your healthcare professional. [place] [address] any warranty or liability for your use of this information.

## 2017-12-22 NOTE — PROGRESS NOTES
HISTORY OF PRESENT ILLNESS  Jarad Phan is a 50 y.o. female presents with Hoarse (pt states s/s  for a week(Started on Sunday)NO Fever, NO chills); Nasal Congestion; and Cough    Agree with nurse note. Pt with mild intermittent asthma and chronic allergic rhinitis presents to the office with a BP of 110/73. She weighs 321 lbs, up 9 lbs since 11/2017. She saw NP Ashok Iniguez on 11/20/17. Dx'd acute recurrent frontal sinusitis and chronic allergic rhinitis. Rx'd Z-theodore 250 mg with 1 refill. She took both Rxs. She has a productive cough with yellow/green mucus, hoarseness, nasal congestion with clear to white discharge, PND, itchy/watery eyes, and insomnia x5 days. She has felt wheezy. This morning her eye was crusted over. She had body aches at onset that have since improved. Her co-worker just had strep. She has been taking Robitussin cough syrup, Sudafed, and Mucinex. She takes Singulair 10 mg daily and uses Astelin BID. Patient denies fever, chills, ear pain, dizziness, headache,  chest pain or tightness, SOB, GI symptoms, bladder symptoms and body aches. Written by tracy Hankins, as dictated by Dr. Christa Gowers, DO.    ROS    Review of Systems negative except as noted above in HPI. ALLERGIES:    Allergies   Allergen Reactions    Latex Rash    Pcn [Penicillins] Hives    Prednisone Hives    Sulfa (Sulfonamide Antibiotics) Hives    Garamycin [Gentamicin] Other (comments)     Itchy eyes due to sulfate    Metformin Diarrhea    Other Medication Hives and Rash     SUN       CURRENT MEDICATIONS:    Outpatient Prescriptions Marked as Taking for the 12/22/17 encounter (Office Visit) with Altagracia Berman DO   Medication Sig Dispense Refill    chlorpheniramine-HYDROcodone (TUSSIONEX) 10-8 mg/5 mL suspension Take 5 mL by mouth every twelve (12) hours as needed for Cough. Max Daily Amount: 10 mL.  Indications: Cough 200 mL 0    azithromycin (ZITHROMAX) 250 mg tablet Take 2 tablets today, then take 1 tablet daily 6 Tab 0    azelastine (ASTELIN) 137 mcg (0.1 %) nasal spray 1 [place] by Both Nostrils route two (2) times a day. Indications: SEASONAL ALLERGIC RHINITIS 1 Bottle 5    butalbital-acetaminophen-caffeine (FIORICET, ESGIC) -40 mg per tablet TAKE 1 TAB BY MOUTH EVERY SIX (6) HOURS AS NEEDED FOR PAIN OR HEADACHE. 40 Tab 0    cyclobenzaprine (FLEXERIL) 10 mg tablet TAKE 1 TABLET BY MOUTH 3 TIMES A DAY AS NEEDED FOR MUSCLE SPASM 90 Tab 0    hydrOXYzine HCl (ATARAX) 25 mg tablet TAKE 1 TABLET BY MOUTH EVERY 8 HOURS AS NEEDED FOR ITCHING 90 Tab 1    montelukast (SINGULAIR) 10 mg tablet TAKE 1 TABLET BY MOUTH EVERY DAY 30 Tab 11    levothyroxine (SYNTHROID) 125 mcg tablet Take 1 Tab by mouth Daily (before breakfast). Indications: hypothyroidism 90 Tab 3    NAFTIN 2 % gel Apply 1 Dose to affected area two (2) times a day. Indications: TINEA PEDIS 1 Bottle 2    ALPRAZolam (XANAX) 0.5 mg tablet Take 1 Tab by mouth two (2) times daily as needed for Anxiety or Sleep. Max Daily Amount: 1 mg. Indications: anxiety 60 Tab 2    albuterol (PROVENTIL HFA, VENTOLIN HFA, PROAIR HFA) 90 mcg/actuation inhaler Take 2 Puffs by inhalation every four (4) hours as needed for Wheezing. Indications: BRONCHOSPASM PREVENTION 1 Inhaler 5    norethindrone-ethinyl estradiol (JUNEL FE 1/20) 1 mg-20 mcg (21)/75 mg (7) tab Take  by mouth.  terconazole (TERAZOL 7) 0.4 % vaginal cream   6    LOCOID 0.1 % lotn   1    fluticasone (FLONASE) 50 mcg/actuation nasal spray nightly.  valACYclovir (VALTREX) 500 mg tablet   1    terconazole (TERAZOL 3) 80 mg vaginal suppository   4    multivitamin (ONE A DAY) tablet Take 1 tablet by mouth daily.  omeprazole (PRILOSEC) 20 mg capsule TAKE ONE CAPSULE BY MOUTH EVERY DAY 30 Cap 1    PROPYLENE GLYCOL//PF (SYSTANE, PF, OP) Apply  to eye four (4) times daily.  magnesium oxide (MAG-OX) 400 mg tablet Take 400 mg by mouth daily.       doxycycline (ADOXA) 50 mg tablet Take 50 mg by mouth two (2) times a day.  Emollient Combination No.32 (EPICERAM) Emul by Apply Externally route. From Dr Gianna Wright.  metroNIDAZOLE (METROGEL) 1 % topical gel Apply  to affected area daily. Use a thin layer to affected areas after washing from Dr Gianna Wright   Indications: ACNE ROSACEA      diclofenac EC (VOLTAREN) 75 mg EC tablet Take 75 mg by mouth two (2) times a day.  latanoprost (XALATAN) 0.005 % ophthalmic solution Administer 1 Drop to both eyes nightly.  OTHER,NON-FORMULARY, Osteo Biflex       omega-3 fatty acids-vitamin e (FISH OIL) 1,000 mg Cap Take 1 Cap by mouth daily.  fexofenadine (ALLEGRA) 180 mg tablet Take  by mouth daily.  LACTOBACILLUS/FOS/PECTIN (PROBIOTIC COMPLEX PO) Take  by mouth daily.  cholecalciferol, vitamin d3, (VITAMIN D) 1,000 unit tablet Take 1,000 Units by mouth daily.  ibuprofen (MOTRIN) 800 mg tablet Take  by mouth every six (6) hours as needed for Pain. PAST MEDICAL HISTORY:    Past Medical History:   Diagnosis Date    Acne rosacea     Dr. Gianna Wright.  Allergy, unspecified not elsewhere classified childhood     Txd with immunotherapy. Dr. Puente Patient Anemia NEC     borderline    Ankle sprain 2007    Right. Dr. Harrison Astorga    Ankle sprain 11/2012    Right. Dr. Wan Greene.  Asthma childhood    Chickenpox childhood    Chronic low back pain with right-sided sciatica     and SI joint dysfunction. Dr. Joann Jasso.  Chronic otitis media     Dr. Puente Patient Dry eye syndrome 2013    Dr. Belia Owusu.  EBV infection 6/27/2011    Hearing loss     Mild high frequency sensorineuronal hearing loss. Dr. Librado Isaac Dr. Steve Gottron murmur     Hernia of abdominal wall 09/2003, 07/1903    umbilical.  Dr. Eusebia Barreto.   Dr. White Lamp    Hyperglycemia 2013    Hypothyroidism 06/2010    Dr. Falguni Sweeney pressure increase 12/2011    Dr. Bob Retana. Dr. Leslie Douglass. Dr. Farideh Ordaz.  Knee pain, left 04/2012    Left. Dr. Simmons Reverzackery Measles childhood    Migraine     Mumps childhood    Plantar fasciitis, bilateral 2010    Dr. Jessica Griffin    Sciatica 2008    Right.  with OA. Dr. Aris Torres Tinnitus of right ear 2012    Dr. Rebecca Del Toro:    Past Surgical History:   Procedure Laterality Date   [facility] Sideleuterio  2011    Dr. Hilda Walker.  HAND/FINGER SURGERY UNLISTED  09/2003    Right Index finger repair due to cut    HX HERNIA REPAIR  07/4988    Umbilical.  Dr. Lynette Walshar.  HX HERNIA REPAIR  9/23/2011    recurrent umbilical.  Laparoscopy incisional.  Dr. Nick Hansen.     HX TONSILLECTOMY  childhood    LAP,CHOLECYSTECTOMY  07/2001       FAMILY HISTORY:    Family History   Problem Relation Age of Onset    Hypertension Mother     Cancer Mother      small cell lung with bone mets to spine/MELANOMA    Cancer Father      melanoma    Arthritis-osteo Father     Colon Polyps Father     Diabetes Maternal Grandmother     Stroke Maternal Grandmother     Seizures Maternal Grandmother     Colon Polyps Maternal Grandmother     Breast Cancer Maternal Grandmother     Heart Disease Paternal Grandmother      heart failure    Heart Attack Paternal Grandmother     Asthma Paternal Grandmother        SOCIAL HISTORY:    Social History     Social History    Marital status: SINGLE     Spouse name: N/A    Number of children: N/A    Years of education: N/A     Social History Main Topics    Smoking status: Never Smoker    Smokeless tobacco: Never Used      Comment: lived with smoker dad and mom then step mom x 20 yrs    Alcohol use 0.0 oz/week      Comment: RARE    Drug use: No    Sexual activity: No     Other Topics Concern    None     Social History Narrative       IMMUNIZATIONS:    Immunization History   Administered Date(s) Administered    Influenza Vaccine 10/15/2013, 11/07/2016, 11/03/2017    Influenza Vaccine (Quad) 11/03/2015    Influenza Vaccine (Quad) PF 11/20/2014, 11/07/2016    Influenza Vaccine Split 11/18/2011, 11/07/2012    Influenza Vaccine Whole 10/01/2010    TD Vaccine 09/01/2003    Tdap 11/03/2015         PHYSICAL EXAMINATION    Vital Signs    Visit Vitals    /73    Pulse 80    Temp 98.4 °F (36.9 °C) (Oral)    Resp 18    Ht 5' 0.9\" (1.547 m)    Wt 321 lb 1.6 oz (145.7 kg)    SpO2 98%    BMI 60.87 kg/m2       Weight Metrics 12/22/2017 11/20/2017 11/9/2017 6/29/2017 4/18/2017 3/10/2017 11/7/2016   Weight 321 lb 1.6 oz 312 lb 4.8 oz 312 lb 1.6 oz 300 lb 14.4 oz 290 lb 6.4 oz 299 lb 4.8 oz 294 lb 3.2 oz   BMI 60.87 kg/m2 59.05 kg/m2 59.01 kg/m2 56.88 kg/m2 54.87 kg/m2 56.55 kg/m2 54.84 kg/m2       General appearance - Well nourished. Well appearing. Well developed. No acute distress. Obese. Sounds hoarse. Talks nasally. Head - Normocephalic. Atraumatic. Non tender sinuses x 4. Eyes - pupils equal and reactive. Extraocular eye movements intact. Sclera anicteric. Mildly injected sclera. Ears - Hearing is grossly normal bilaterally. Nose - normal and patent. No polyps noted. No erythema. No discharge. Mouth - mucous membranes with adequate moisture. Posterior pharynx normal with cobblestone appearance. No erythema, white exudate or obstruction. Neck - supple. Midline trachea. No carotid bruits noted bilaterally. No thyromegaly noted. Chest -  Breath sounds are symmetrical bilaterally. Unlabored respirations. Low pitched expiratory wheeze scattered throughout with some high pitched sounds. Did not clear with forced cough. Occasional moist cough. Heart - normal rate. Regular rhythm. Normal S1, S2. No murmur noted. No rubs, clicks or gallops noted. Abdomen - soft and distended. No masses or organomegaly. No rebound, rigidity or guarding. Bowel sounds normal x 4 quadrants. No tenderness noted.   Neurological - awake, alert and oriented to person, place, and time and event. Cranial nerves II through XII intact. Clear speech. Muscle strength is +5/5 x 4 extremities. Sensation is intact to light touch bilaterally. Steady gait. Heme/Lymph - peripheral pulses normal x 4 extremities. No peripheral edema is noted. Psychological -   normal behavior, dress and thought processes. Good insight. Good eye contact. Normal affect. Appropriate mood. Normal speech. DATA REVIEWED    Lab Results   Component Value Date/Time    WBC 8.8 08/23/2017 08:12 AM    HGB 11.4 08/23/2017 08:12 AM    HCT 35.4 08/23/2017 08:12 AM    PLATELET 490 00/98/5832 08:12 AM    MCV 90 08/23/2017 08:12 AM     Lab Results   Component Value Date/Time    Sodium 139 08/23/2017 08:12 AM    Potassium 4.5 08/23/2017 08:12 AM    Chloride 103 08/23/2017 08:12 AM    CO2 22 08/23/2017 08:12 AM    Anion gap 10 09/24/2011 02:30 AM    Glucose 90 08/23/2017 08:12 AM    BUN 14 08/23/2017 08:12 AM    Creatinine 0.85 08/23/2017 08:12 AM    BUN/Creatinine ratio 16 08/23/2017 08:12 AM    GFR est AA 93 08/23/2017 08:12 AM    GFR est non-AA 81 08/23/2017 08:12 AM    Calcium 8.9 08/23/2017 08:12 AM    Bilirubin, total 0.2 08/23/2017 08:12 AM    AST (SGOT) 9 08/23/2017 08:12 AM    Alk.  phosphatase 60 08/23/2017 08:12 AM    Protein, total 5.7 08/23/2017 08:12 AM    Albumin 3.8 08/23/2017 08:12 AM    Globulin 3.4 05/19/2010 10:46 AM    A-G Ratio 2.0 08/23/2017 08:12 AM    ALT (SGPT) 10 08/23/2017 08:12 AM     Lab Results   Component Value Date/Time    Cholesterol, total 168 08/23/2017 08:12 AM    HDL Cholesterol 56 08/23/2017 08:12 AM    LDL, calculated 86 08/23/2017 08:12 AM    VLDL, calculated 26 08/23/2017 08:12 AM    Triglyceride 129 08/23/2017 08:12 AM    CHOL/HDL Ratio 2.5 05/19/2010 10:46 AM     Lab Results   Component Value Date/Time    Vitamin D 25-Hydroxy 32.2 08/11/2011 08:15 AM    VITAMIN D, 25-HYDROXY 36.3 08/23/2017 08:12 AM       Lab Results   Component Value Date/Time    Hemoglobin A1c 5.8 08/23/2017 08:12 AM     Lab Results   Component Value Date/Time    TSH 1.900 08/23/2017 08:12 AM       ASSESSMENT and PLAN      ICD-10-CM ICD-9-CM    1. Acute bronchitis due to other specified organisms J20.8 466.0 azithromycin (ZITHROMAX) 250 mg tablet   2. Mild intermittent asthma without complication Y17.08 630.62 albuterol (PROVENTIL HFA, VENTOLIN HFA, PROAIR HFA) 90 mcg/actuation inhaler   3. Chronic allergic rhinitis due to other allergic trigger, unspecified seasonality J30.89 477.8    4. Other insomnia G47.09 780.52 chlorpheniramine-HYDROcodone (TUSSIONEX) 10-8 mg/5 mL suspension   5. Strep throat exposure Z20.818 V01.89 AMB POC RAPID STREP A      AMB POC RAPID INFLUENZA TEST   6. Cough R05 786.2    7. Dry eyes H04. 123 375.15        Discussed the patient's BMI with her. The BMI follow up plan is as follows: I have counseled this patient on diet and exercise regimens. Increase water intake. Avoid sugar and dairy to thin mucus. Gargle with warm salt water and add tsp of vinegar. Get an extra hour of rest.   Chart reviewed and updated. Continue current medications and care. Take Tussionex 5 mL q12 hours prn. Wait to see if you improve before taking Z-theodore. Add OTC Allergy eye drops. Prescriptions written and sent to pharmacy; medication side effects discussed. Zithromax 250 mg. Albuterol HFA. Prescription given to patient during office visit today. Tussionex suspension. Most recent tests reviewed. Recheck pertinent labs today. Recent office visit notes from NP [facility] reviewed. Counseled patient on health concerns: Cough, URI, allergies, and congestion protocol. Relevant handouts given and discussed with patient. Immunizations noted. Offered empathy, support, legitimation, prayers, partnership to patient. Praised patient for progress. Follow-up Disposition:  Return if symptoms worsen or fail to improve.     Patient was offered a choice/choices in the treatment plan today. Patient expresses understanding of the plan and agrees with recommendations. Written by tracy Jefferson, as dictated by Dr. Mely Magdaleno DO. Documentation True and Accepted by Yoanna Young. Estephania Mott. Patient Instructions       Asthma Action Plan: After Your Visit  Your Care Instructions  An asthma action plan is based on peak flow and asthma symptoms. Sorting symptoms and peak flow into red, yellow, and green \"zones\" can help you know how bad your asthma is and what actions you should take. Work with your doctor to make your plan. An action plan may include:  · The peak flow readings and symptoms for each zone. · What medicines to take in each zone. · When to call a doctor. · A list of emergency contact numbers. · A list of your asthma triggers. Follow-up care is a key part of your treatment and safety. Be sure to make and go to all appointments, and call your doctor if you are having problems. It's also a good idea to know your test results and keep a list of the medicines you take. How can you care for yourself at home? · Take your daily medicines to help minimize long-term damage and avoid asthma attacks. · Check your peak flow every morning and evening. This is the best way to know how well your lungs are working. · Check your action plan to see what zone you are in.  ¨ If you are in the green zone, keep taking your daily asthma medicines as prescribed. ¨ If you are in the yellow zone, you may be having or will soon have an asthma attack. You may not have any symptoms, but your lungs are not working as well as they should. Take the medicines listed in your action plan. If you stay in the yellow zone, your doctor may need to increase the dose or add a medicine. ¨ If you are in the red zone, follow your action plan.  If your symptoms or peak flow don't improve soon, you may need to go to the emergency room or be admitted to the hospital.  · Use an asthma diary. Write down your peak flow readings in the asthma diary. If you have an attack, write down what caused it (if you know), the symptoms, and what medicine you took. · Make sure you know how and when to call your doctor or go to the hospital.  · Take both the asthma action plan and the asthma diary--along with your peak flow meter and medicines--when you see your doctor. Tell your doctor if you are having trouble following your action plan. When should you call for help? Call 911 anytime you think you may need emergency care. For example, call if:  · You have severe trouble breathing. Call your doctor now or seek immediate medical care if:  · Your symptoms do not get better after you have followed your asthma action plan. · You cough up yellow, dark brown, or bloody mucus (sputum). Watch closely for changes in your health, and be sure to contact your doctor if:  · Your coughing and wheezing get worse. · You need to use quick-relief medicine on more than 2 days a week (unless it is just for exercise). · You need help figuring out what is triggering your asthma attacks. Where can you learn more? Go to OnTheRoad.be  Enter B511 in the search box to learn more about \"Asthma Action Plan: After Your Visit. \"   © 8177-2872 [employer]. Care instructions adapted under license by [employer] (which disclaims liability or warranty for this information). This care instruction is for use with your licensed healthcare professional. If you have questions about a medical condition or this instruction, always ask your healthcare professional. [place] [address] any warranty or liability for your use of this information. Content Version: 52.7.103315; Last Revised: March 9, 2012                 Bronchitis: Care Instructions  Your Care Instructions    Bronchitis is inflammation of the bronchial tubes, which carry air to the lungs.  The tubes swell and produce mucus, or phlegm. The mucus and inflamed bronchial tubes make you cough. You may have trouble breathing. Most cases of bronchitis are caused by viruses like those that cause colds. Antibiotics usually do not help and they may be harmful. Bronchitis usually develops rapidly and lasts about 2 to 3 weeks in otherwise healthy people. Follow-up care is a key part of your treatment and safety. Be sure to make and go to all appointments, and call your doctor if you are having problems. It's also a good idea to know your test results and keep a list of the medicines you take. How can you care for yourself at home? · Take all medicines exactly as prescribed. Call your doctor if you think you are having a problem with your medicine. · Get some extra rest.  · Take an over-the-counter pain medicine, such as acetaminophen (Tylenol), ibuprofen (Advil, Motrin), or naproxen (Aleve) to reduce fever and relieve body aches. Read and follow all instructions on the label. · Do not take two or more pain medicines at the same time unless the doctor told you to. Many pain medicines have acetaminophen, which is Tylenol. Too much acetaminophen (Tylenol) can be harmful. · Take an over-the-counter cough medicine that contains dextromethorphan to help quiet a dry, hacking cough so that you can sleep. Avoid cough medicines that have more than one active ingredient. Read and follow all instructions on the label. · Breathe moist air from a humidifier, hot shower, or sink filled with hot water. The heat and moisture will thin mucus so you can cough it out. · Do not smoke. Smoking can make bronchitis worse. If you need help quitting, talk to your doctor about stop-smoking programs and medicines. These can increase your chances of quitting for good. When should you call for help? Call 911 anytime you think you may need emergency care. For example, call if:  ? · You have severe trouble breathing.    ?Call your doctor now or seek immediate medical care if:  ? · You have new or worse trouble breathing. ? · You cough up dark brown or bloody mucus (sputum). ? · You have a new or higher fever. ? · You have a new rash. ? Watch closely for changes in your health, and be sure to contact your doctor if:  ? · You cough more deeply or more often, especially if you notice more mucus or a change in the color of your mucus. ? · You are not getting better as expected. Where can you learn more? Go to http://alejandro-eddie.info/. Enter H333 in the search box to learn more about \"Bronchitis: Care Instructions. \"  Current as of: May 12, 2017  Content Version: 11.4  © 2976-4758 [employer]. Care instructions adapted under license by [employer] (which disclaims liability or warranty for this information). If you have questions about a medical condition or this instruction, always ask your healthcare professional. [place] [address] any warranty or liability for your use of this information. Advised protocol for clearing congestion:  Increase fluid intake, especially water to thin mucous and boost the immune system. Avoid sugar and dairy while congested since they thicken mucous. Get plenty of rest!  Gargle 3 times daily and as needed in Listerine or warm salt water vinegar solutions (1 tsp salt, 1 tsp vinegar in 1 cup lukewarm water.)  Use OTC nasal saline spray up each nostril twice daily. Use humidifier at bedtime. Use OTC Mucinex 600 mg twice daily to loosen mucous. Use OTC Tylenol Arthritis or Ibuprofen up to 800 mg up to 3 times daily as needed for pain, fever or headaches. Avoid decongestants and Ibuprofen if you have high blood pressure! If mucous is consistently discolored yellow or green throughout the day for more than a week, call the doctor for an evaluation.         Advise patient to start taking Over The Counter allergy medication (Allegra, Zyrtec, Claritin, Xyzal or Alavert) daily. If you have itchy, watery eyes you can try OTC Zaditor or Zyrtec Allergy Eye drops. If you have a stuffy nose, please try OTC Flonase, Flonase Sensimist, Rhinocort, or Nasacort AQ 2 squirts up each nostril once a day (adults) or 1 squirt up each nostril once a day (children). Use allergy free, dye free, fragrance free products on your body and hair. After being outdoors, brush hair vigorously, wash face and arms, rinse nostrils with a nasal saline spray and consider changing your clothing. Dust furniture frequently and wear a mask while doing it. Vacuum floors weekly. Remove stuffed animals or extra pillows from the bed. Clean bedding in hot water weekly. Sometimes allergies can be so severe that 2 nasal sprays and 2 pills may be needed to control symptoms. Bronchitis: Care Instructions  Your Care Instructions    Bronchitis is inflammation of the bronchial tubes, which carry air to the lungs. The tubes swell and produce mucus, or phlegm. The mucus and inflamed bronchial tubes make you cough. You may have trouble breathing. Most cases of bronchitis are caused by viruses like those that cause colds. Antibiotics usually do not help and they may be harmful. Bronchitis usually develops rapidly and lasts about 2 to 3 weeks in otherwise healthy people. Follow-up care is a key part of your treatment and safety. Be sure to make and go to all appointments, and call your doctor if you are having problems. It's also a good idea to know your test results and keep a list of the medicines you take. How can you care for yourself at home? · Take all medicines exactly as prescribed. Call your doctor if you think you are having a problem with your medicine. · Get some extra rest.  · Take an over-the-counter pain medicine, such as acetaminophen (Tylenol), ibuprofen (Advil, Motrin), or naproxen (Aleve) to reduce fever and relieve body aches. Read and follow all instructions on the label.   · Do not take two or more pain medicines at the same time unless the doctor told you to. Many pain medicines have acetaminophen, which is Tylenol. Too much acetaminophen (Tylenol) can be harmful. · Take an over-the-counter cough medicine that contains dextromethorphan to help quiet a dry, hacking cough so that you can sleep. Avoid cough medicines that have more than one active ingredient. Read and follow all instructions on the label. · Breathe moist air from a humidifier, hot shower, or sink filled with hot water. The heat and moisture will thin mucus so you can cough it out. · Do not smoke. Smoking can make bronchitis worse. If you need help quitting, talk to your doctor about stop-smoking programs and medicines. These can increase your chances of quitting for good. When should you call for help? Call 911 anytime you think you may need emergency care. For example, call if:  ? · You have severe trouble breathing. ?Call your doctor now or seek immediate medical care if:  ? · You have new or worse trouble breathing. ? · You cough up dark brown or bloody mucus (sputum). ? · You have a new or higher fever. ? · You have a new rash. ? Watch closely for changes in your health, and be sure to contact your doctor if:  ? · You cough more deeply or more often, especially if you notice more mucus or a change in the color of your mucus. ? · You are not getting better as expected. Where can you learn more? Go to http://alejandro-eddie.info/. Enter H333 in the search box to learn more about \"Bronchitis: Care Instructions. \"  Current as of: May 12, 2017  Content Version: 11.4  © 4966-9381 [employer]. Care instructions adapted under license by [employer] (which disclaims liability or warranty for this information).  If you have questions about a medical condition or this instruction, always ask your healthcare professional. Matt Osorio disclaims any warranty or liability for your use of this information.

## 2017-12-22 NOTE — MR AVS SNAPSHOT
Visit Information Date & Time Provider Department Dept. Phone Encounter #  
 12/22/2017 11:00 AM DO Xiao Fierro 72 [phone] [id number] Follow-up Instructions Return if symptoms worsen or fail to improve. Your Appointments 12/29/2017 11:30 AM  
ROUTINE CARE with DO Xiao Fierro 72 (LIZ Rodriges) Appt Note: 6 MO F/U Weight & Results [address] [facility] [address]  
[phone]  
  
   
 [address] [facility] [address] [facility] [address] Upcoming Health Maintenance Date Due  
 BREAST CANCER SCRN MAMMOGRAM 12/5/2018 PAP AKA CERVICAL CYTOLOGY 11/10/2019 DTaP/Tdap/Td series (2 - Td) 11/3/2025 Allergies as of 12/22/2017  Review Complete On: 12/22/2017 By: Miladys Napoles Severity Noted Reaction Type Reactions Latex  11/20/2014    Rash Pcn [Penicillins] High 07/31/2009    Hives Prednisone High 07/31/2009    Hives Sulfa (Sulfonamide Antibiotics) High 07/31/2009    Hives Garamycin [Gentamicin]  07/31/2009    Other (comments) Itchy eyes due to sulfate Metformin  11/03/2015    Diarrhea Other Medication  07/31/2009    Hives, Rash SUN Current Immunizations  Reviewed on 12/22/2017 Name Date Influenza Vaccine 11/3/2017, 11/7/2016, 10/15/2013 Influenza Vaccine Alcario Ravens) 11/3/2015 Influenza Vaccine (Quad) PF 11/7/2016, 11/20/2014 Influenza Vaccine Split 11/7/2012, 11/18/2011 Influenza Vaccine Whole 10/1/2010 TD Vaccine 9/1/2003 Tdap 11/3/2015 Reviewed by Mary Hargrove DO on 12/22/2017 at 12:11 PM  
You Were Diagnosed With   
  
 Codes Comments Acute bronchitis due to other specified organisms    -  Primary ICD-10-CM: J20.8 ICD-9-CM: 466.0 Mild intermittent asthma without complication     OHP-22-LC: J45.20 ICD-9-CM: 493.90   
 Chronic allergic rhinitis due to other allergic trigger, unspecified seasonality     ICD-10-CM: J30.89 ICD-9-CM: 477.8 Acute laryngitis     ICD-10-CM: J04.0 ICD-9-CM: 464.00 Other insomnia     ICD-10-CM: G47.09 
ICD-9-CM: 780.52 Strep throat exposure     ICD-10-CM: J16.556 ICD-9-CM: V01.89 Cough     ICD-10-CM: R05 ICD-9-CM: 786.2 Dry eyes     ICD-10-CM: K06.182 ICD-9-CM: 375.15 Vitals BP Pulse Temp Resp Height(growth percentile) Weight(growth percentile) 110/73 80 98.4 °F (36.9 °C) (Oral) 18 5' 0.9\" (1.547 m) 321 lb 1.6 oz (145.7 kg) SpO2 BMI OB Status Smoking Status 98% 60.87 kg/m2 Having regular periods Never Smoker BMI and BSA Data Body Mass Index Body Surface Area 60.87 kg/m 2 2.5 m 2 Preferred Pharmacy Pharmacy Name Phone [id number] [phone] Your Updated Medication List  
  
   
This list is accurate as of: 12/22/17 12:33 PM.  Always use your most recent med list.  
  
  
  
  
 albuterol 90 mcg/actuation inhaler Commonly known as:  PROVENTIL HFA, VENTOLIN HFA, PROAIR HFA Take 2 Puffs by inhalation every four (4) hours as needed for Wheezing. Indications: BRONCHOSPASM PREVENTION  
  
 ALLEGRA 180 mg tablet Generic drug:  fexofenadine Take  by mouth daily. ALPRAZolam 0.5 mg tablet Commonly known as:  Kaleta Emilie Take 1 Tab by mouth two (2) times daily as needed for Anxiety or Sleep. Max Daily Amount: 1 mg. Indications: anxiety  
  
 azelastine 137 mcg (0.1 %) nasal spray Commonly known as:  ASTELIN  
1 Spray by Both Nostrils route two (2) times a day. Indications: SEASONAL ALLERGIC RHINITIS  
  
 azithromycin 250 mg tablet Commonly known as:  Mars Listen Take 2 tablets today, then take 1 tablet daily  
  
 butalbital-acetaminophen-caffeine -40 mg per tablet Commonly known as:  Donald Fraga  
 TAKE 1 TAB BY MOUTH EVERY SIX (6) HOURS AS NEEDED FOR PAIN OR HEADACHE. chlorpheniramine-HYDROcodone 10-8 mg/5 mL suspension Commonly known as:  Bonita Deiters Take 5 mL by mouth every twelve (12) hours as needed for Cough. Max Daily Amount: 10 mL. Indications: Cough  
  
 cyclobenzaprine 10 mg tablet Commonly known as:  FLEXERIL  
TAKE 1 TABLET BY MOUTH 3 TIMES A DAY AS NEEDED FOR MUSCLE SPASM  
  
 diclofenac EC 75 mg EC tablet Commonly known as:  VOLTAREN Take 75 mg by mouth two (2) times a day. doxycycline 50 mg tablet Commonly known as:  ADOXA Take 50 mg by mouth two (2) times a day. EPICERAM Emul Generic drug:  Emollient Combination No.32  
by Apply Externally route. From Dr Natasha Campo. FISH OIL 1,000 mg Cap Generic drug:  omega-3 fatty acids-vitamin e Take 1 Cap by mouth daily. FLONASE 50 mcg/actuation nasal spray Generic drug:  fluticasone  
nightly. hydrOXYzine HCl 25 mg tablet Commonly known as:  ATARAX TAKE 1 TABLET BY MOUTH EVERY 8 HOURS AS NEEDED FOR ITCHING  
  
 latanoprost 0.005 % ophthalmic solution Commonly known as:  Cliff Elina Administer 1 Drop to both eyes nightly. levothyroxine 125 mcg tablet Commonly known as:  SYNTHROID Take 1 Tab by mouth Daily (before breakfast). Indications: hypothyroidism LOCOID 0.1 % Lotn Generic drug:  Hydrocortisone Butyrate  
  
 magnesium oxide 400 mg tablet Commonly known as:  MAG-OX Take 400 mg by mouth daily. METROGEL 1 % topical gel Generic drug:  metroNIDAZOLE Apply  to affected area daily. Use a thin layer to affected areas after washing from Dr Natasha Campo   Indications: ACNE ROSACEA  
  
 montelukast 10 mg tablet Commonly known as:  SINGULAIR  
TAKE 1 TABLET BY MOUTH EVERY DAY  
  
 MOTRIN 800 mg tablet Generic drug:  ibuprofen Take  by mouth every six (6) hours as needed for Pain.  
  
 multivitamin tablet Commonly known as:  ONE A DAY  
 Take 1 tablet by mouth daily. NAFTIN 2 % Gel Generic drug:  naftifine Apply 1 Dose to affected area two (2) times a day. Indications: TINEA PEDIS  
  
 norethindrone-ethinyl estradiol 1 mg-20 mcg (21)/75 mg (7) Tab Commonly known as:  Mac Awe FE 1/20 Take  by mouth. omeprazole 20 mg capsule Commonly known as:  PRILOSEC  
TAKE ONE CAPSULE BY MOUTH EVERY DAY  
  
 OTHER(NON-FORMULARY) Osteo Biflex PROBIOTIC COMPLEX PO Take  by mouth daily. SYSTANE (PF) OP Apply  to eye four (4) times daily. * terconazole 80 mg vaginal suppository Commonly known as:  TERAZOL 3  
  
 * terconazole 0.4 % vaginal cream  
Commonly known as:  TERAZOL 7  
  
 valACYclovir 500 mg tablet Commonly known as:  VALTREX  
  
 VITAMIN D3 1,000 unit tablet Generic drug:  cholecalciferol Take 1,000 Units by mouth daily. * Notice: This list has 2 medication(s) that are the same as other medications prescribed for you. Read the directions carefully, and ask your doctor or other care provider to review them with you. Prescriptions Printed Refills  
 chlorpheniramine-HYDROcodone (TUSSIONEX) 10-8 mg/5 mL suspension 0 Sig: Take 5 mL by mouth every twelve (12) hours as needed for Cough. Max Daily Amount: 10 mL. Indications: Cough Class: Print Route: Oral  
  
Prescriptions Sent to Pharmacy Refills  
 azithromycin (ZITHROMAX) 250 mg tablet 0 Sig: Take 2 tablets today, then take 1 tablet daily Class: Normal  
 Pharmacy: [facility]/pharmacy #[id number] - Katharina SMITH [address] Ph #: [phone]  
 albuterol (PROVENTIL HFA, VENTOLIN HFA, PROAIR HFA) 90 mcg/actuation inhaler 5 Sig: Take 2 Puffs by inhalation every four (4) hours as needed for Wheezing. Indications: BRONCHOSPASM PREVENTION  Class: Normal  
 Pharmacy: [facility]/pharmacy #[id number] - [address]  #: [phone] Route: Inhalation We Performed the Following AMB POC RAPID INFLUENZA TEST [07082 CPT(R)] AMB POC RAPID STREP A [20813 CPT(R)] Follow-up Instructions Return if symptoms worsen or fail to improve. Patient Instructions Asthma Action Plan: After Your Visit Your Care Instructions An asthma action plan is based on peak flow and asthma symptoms. Sorting symptoms and peak flow into red, yellow, and green \"zones\" can help you know how bad your asthma is and what actions you should take. Work with your doctor to make your plan. An action plan may include: · The peak flow readings and symptoms for each zone. · What medicines to take in each zone. · When to call a doctor. · A list of emergency contact numbers. · A list of your asthma triggers. Follow-up care is a key part of your treatment and safety. Be sure to make and go to all appointments, and call your doctor if you are having problems. It's also a good idea to know your test results and keep a list of the medicines you take. How can you care for yourself at home? · Take your daily medicines to help minimize long-term damage and avoid asthma attacks. · Check your peak flow every morning and evening. This is the best way to know how well your lungs are working. · Check your action plan to see what zone you are in. 
¨ If you are in the green zone, keep taking your daily asthma medicines as prescribed. ¨ If you are in the yellow zone, you may be having or will soon have an asthma attack. You may not have any symptoms, but your lungs are not working as well as they should. Take the medicines listed in your action plan. If you stay in the yellow zone, your doctor may need to increase the dose or add a medicine. ¨ If you are in the red zone, follow your action plan.  If your symptoms or peak flow don't improve soon, you may need to go to the emergency room or be admitted to the hospital. 
· Use an asthma diary. Write down your peak flow readings in the asthma diary. If you have an attack, write down what caused it (if you know), the symptoms, and what medicine you took. · Make sure you know how and when to call your doctor or go to the hospital. 
· Take both the asthma action plan and the asthma diaryalong with your peak flow meter and medicineswhen you see your doctor. Tell your doctor if you are having trouble following your action plan. When should you call for help? Call 911 anytime you think you may need emergency care. For example, call if: 
· You have severe trouble breathing. Call your doctor now or seek immediate medical care if: 
· Your symptoms do not get better after you have followed your asthma action plan. · You cough up yellow, dark brown, or bloody mucus (sputum). Watch closely for changes in your health, and be sure to contact your doctor if: 
· Your coughing and wheezing get worse. · You need to use quick-relief medicine on more than 2 days a week (unless it is just for exercise). · You need help figuring out what is triggering your asthma attacks. Where can you learn more? Go to Databraid.be Enter 499 3896 in the search box to learn more about \"Asthma Action Plan: After Your Visit. \"  
© 4173-6048 Healthwise, Incorporated. Care instructions adapted under license by Jason Weiss (which disclaims liability or warranty for this information). This care instruction is for use with your licensed healthcare professional. If you have questions about a medical condition or this instruction, always ask your healthcare professional. [place] [address] any warranty or liability for your use of this information. Content Version: 64.1.440548; Last Revised: March 9, 2012 Bronchitis: Care Instructions Your Care Instructions Bronchitis is inflammation of the bronchial tubes, which carry air to the lungs. The tubes swell and produce mucus, or phlegm. The mucus and inflamed bronchial tubes make you cough. You may have trouble breathing. Most cases of bronchitis are caused by viruses like those that cause colds. Antibiotics usually do not help and they may be harmful. Bronchitis usually develops rapidly and lasts about 2 to 3 weeks in otherwise healthy people. Follow-up care is a key part of your treatment and safety. Be sure to make and go to all appointments, and call your doctor if you are having problems. It's also a good idea to know your test results and keep a list of the medicines you take. How can you care for yourself at home? · Take all medicines exactly as prescribed. Call your doctor if you think you are having a problem with your medicine. · Get some extra rest. 
· Take an over-the-counter pain medicine, such as acetaminophen (Tylenol), ibuprofen (Advil, Motrin), or naproxen (Aleve) to reduce fever and relieve body aches. Read and follow all instructions on the label. · Do not take two or more pain medicines at the same time unless the doctor told you to. Many pain medicines have acetaminophen, which is Tylenol. Too much acetaminophen (Tylenol) can be harmful. · Take an over-the-counter cough medicine that contains dextromethorphan to help quiet a dry, hacking cough so that you can sleep. Avoid cough medicines that have more than one active ingredient. Read and follow all instructions on the label. · Breathe moist air from a humidifier, hot shower, or sink filled with hot water. The heat and moisture will thin mucus so you can cough it out. · Do not smoke. Smoking can make bronchitis worse. If you need help quitting, talk to your doctor about stop-smoking programs and medicines. These can increase your chances of quitting for good. When should you call for help? Call 911 anytime you think you may need emergency care. For example, call if: 
? · You have severe trouble breathing. ?Call your doctor now or seek immediate medical care if: 
? · You have new or worse trouble breathing. ? · You cough up dark brown or bloody mucus (sputum). ? · You have a new or higher fever. ? · You have a new rash. ? Watch closely for changes in your health, and be sure to contact your doctor if: 
? · You cough more deeply or more often, especially if you notice more mucus or a change in the color of your mucus. ? · You are not getting better as expected. Where can you learn more? Go to http://alejandro-eddie.info/. Enter H333 in the search box to learn more about \"Bronchitis: Care Instructions. \" Current as of: May 12, 2017 Content Version: 11.4 © 1739-6074 [email or web address]. Care instructions adapted under license by [employer] (which disclaims liability or warranty for this information). If you have questions about a medical condition or this instruction, always ask your healthcare professional. [place] [address] any warranty or liability for your use of this information. Advised protocol for clearing congestion:  Increase fluid intake, especially water to thin mucous and boost the immune system. Avoid sugar and dairy while congested since they thicken mucous. Get plenty of rest!  Gargle 3 times daily and as needed in Listerine or warm salt water vinegar solutions (1 tsp salt, 1 tsp vinegar in 1 cup lukewarm water.)  Use OTC nasal saline spray up each nostril twice daily. Use humidifier at bedtime. Use OTC Mucinex 600 mg twice daily to loosen mucous. Use OTC Tylenol Arthritis or Ibuprofen up to 800 mg up to 3 times daily as needed for pain, fever or headaches. Avoid decongestants and Ibuprofen if you have high blood pressure! If mucous is consistently discolored yellow or green throughout the day for more than a week, call the doctor for an evaluation.  
 
 
 
Advise patient to start taking Over The Counter allergy medication (Allegra, Zyrtec, Claritin, Xyzal or Alavert) daily. If you have itchy, watery eyes you can try OTC Zaditor or Zyrtec Allergy Eye drops. If you have a stuffy nose, please try OTC Flonase, Flonase Sensimist, Rhinocort, or Nasacort AQ 2 squirts up each nostril once a day (adults) or 1 squirt up each nostril once a day (children). Use allergy free, dye free, fragrance free products on your body and hair. After being outdoors, brush hair vigorously, wash face and arms, rinse nostrils with a nasal saline spray and consider changing your clothing. Dust furniture frequently and wear a mask while doing it. Vacuum floors weekly. Remove stuffed animals or extra pillows from the bed. Clean bedding in hot water weekly. Sometimes allergies can be so severe that 2 nasal sprays and 2 pills may be needed to control symptoms. Bronchitis: Care Instructions Your Care Instructions Bronchitis is inflammation of the bronchial tubes, which carry air to the lungs. The tubes swell and produce mucus, or phlegm. The mucus and inflamed bronchial tubes make you cough. You may have trouble breathing. Most cases of bronchitis are caused by viruses like those that cause colds. Antibiotics usually do not help and they may be harmful. Bronchitis usually develops rapidly and lasts about 2 to 3 weeks in otherwise healthy people. Follow-up care is a key part of your treatment and safety. Be sure to make and go to all appointments, and call your doctor if you are having problems. It's also a good idea to know your test results and keep a list of the medicines you take. How can you care for yourself at home? · Take all medicines exactly as prescribed. Call your doctor if you think you are having a problem with your medicine.  
· Get some extra rest. 
· Take an over-the-counter pain medicine, such as acetaminophen (Tylenol), ibuprofen (Advil, Motrin), or naproxen (Aleve) to reduce fever and relieve body aches. Read and follow all instructions on the label. · Do not take two or more pain medicines at the same time unless the doctor told you to. Many pain medicines have acetaminophen, which is Tylenol. Too much acetaminophen (Tylenol) can be harmful. · Take an over-the-counter cough medicine that contains dextromethorphan to help quiet a dry, hacking cough so that you can sleep. Avoid cough medicines that have more than one active ingredient. Read and follow all instructions on the label. · Breathe moist air from a humidifier, hot shower, or sink filled with hot water. The heat and moisture will thin mucus so you can cough it out. · Do not smoke. Smoking can make bronchitis worse. If you need help quitting, talk to your doctor about stop-smoking programs and medicines. These can increase your chances of quitting for good. When should you call for help? Call 911 anytime you think you may need emergency care. For example, call if: 
? · You have severe trouble breathing. ?Call your doctor now or seek immediate medical care if: 
? · You have new or worse trouble breathing. ? · You cough up dark brown or bloody mucus (sputum). ? · You have a new or higher fever. ? · You have a new rash. ? Watch closely for changes in your health, and be sure to contact your doctor if: 
? · You cough more deeply or more often, especially if you notice more mucus or a change in the color of your mucus. ? · You are not getting better as expected. Where can you learn more? Go to http://alejandro-eddie.info/. Enter H333 in the search box to learn more about \"Bronchitis: Care Instructions. \" Current as of: May 12, 2017 Content Version: 11.4 © 4006-3663 [employer]. Care instructions adapted under license by [employer] (which disclaims liability or warranty for this information).  If you have questions about a medical condition or this instruction, always ask your healthcare professional. [address] any warranty or liability for your use of this information. Introducing [address]! Dear Felicia Cabrera: Thank you for requesting a [employer] account. Our records indicate that you already have an active [employer] account. You can access your account anytime at [email or web address] Did you know that you can access your hospital and ER discharge instructions at any time in [employer]? You can also review all of your test results from your hospital stay or ER visit. Additional Information If you have questions, please visit the Frequently Asked Questions section of the [employer] website at [email or web address] Remember, [employer] is NOT to be used for urgent needs. For medical emergencies, dial 911. Now available from your iPhone and Android! Please provide this summary of care documentation to your next provider. Your primary care clinician is listed as Tomy Morales. If you have any questions after today's visit, please call [phone].

## 2017-12-22 NOTE — PROGRESS NOTES
Chief Complaint   Patient presents with    Croup     pt states s/s  for a week(Started on Sunday)NO Fever, NO chills    Cold Symptoms    Nasal Congestion    Cough     1. Have you been to the ER, urgent care clinic since your last visit? Hospitalized since your last visit? No    2. Have you seen or consulted any other health care providers outside of the [address] [place] [address] since your last visit? Include any pap smears or colon screening.  No

## 2017-12-29 ENCOUNTER — OFFICE VISIT (OUTPATIENT)
Dept: FAMILY MEDICINE CLINIC | Age: 47
End: 2017-12-29

## 2017-12-29 VITALS
OXYGEN SATURATION: 97 % | RESPIRATION RATE: 18 BRPM | HEART RATE: 78 BPM | HEIGHT: 61 IN | WEIGHT: 293 LBS | SYSTOLIC BLOOD PRESSURE: 124 MMHG | BODY MASS INDEX: 55.32 KG/M2 | TEMPERATURE: 98.9 F | DIASTOLIC BLOOD PRESSURE: 76 MMHG

## 2017-12-29 DIAGNOSIS — M25.552 PAIN OF BOTH HIP JOINTS: ICD-10-CM

## 2017-12-29 DIAGNOSIS — E03.4 HYPOTHYROIDISM DUE TO ACQUIRED ATROPHY OF THYROID: ICD-10-CM

## 2017-12-29 DIAGNOSIS — J20.8 ACUTE BRONCHITIS DUE TO OTHER SPECIFIED ORGANISMS: ICD-10-CM

## 2017-12-29 DIAGNOSIS — Z88.2 ALLERGY TO SULFA DRUGS: ICD-10-CM

## 2017-12-29 DIAGNOSIS — M54.41 CHRONIC BILATERAL LOW BACK PAIN WITH RIGHT-SIDED SCIATICA: ICD-10-CM

## 2017-12-29 DIAGNOSIS — E66.01 MORBID OBESITY WITH BMI OF 60.0-69.9, ADULT (HCC): ICD-10-CM

## 2017-12-29 DIAGNOSIS — M25.562 CHRONIC PAIN OF LEFT KNEE: ICD-10-CM

## 2017-12-29 DIAGNOSIS — G89.29 CHRONIC BILATERAL LOW BACK PAIN WITH RIGHT-SIDED SCIATICA: ICD-10-CM

## 2017-12-29 DIAGNOSIS — L71.9 ACNE ROSACEA: ICD-10-CM

## 2017-12-29 DIAGNOSIS — M25.551 PAIN OF BOTH HIP JOINTS: ICD-10-CM

## 2017-12-29 DIAGNOSIS — J45.20 MILD INTERMITTENT ASTHMA WITHOUT COMPLICATION: Primary | ICD-10-CM

## 2017-12-29 DIAGNOSIS — R63.4 WEIGHT LOSS: ICD-10-CM

## 2017-12-29 DIAGNOSIS — G89.29 CHRONIC PAIN OF LEFT KNEE: ICD-10-CM

## 2017-12-29 DIAGNOSIS — T38.7X5A: ICD-10-CM

## 2017-12-29 DIAGNOSIS — Z88.0 PENICILLIN ALLERGY: ICD-10-CM

## 2017-12-29 DIAGNOSIS — G47.09 OTHER INSOMNIA: ICD-10-CM

## 2017-12-29 DIAGNOSIS — J30.89 CHRONIC ALLERGIC RHINITIS DUE TO OTHER ALLERGIC TRIGGER, UNSPECIFIED SEASONALITY: ICD-10-CM

## 2017-12-29 RX ORDER — DOXYCYCLINE 100 MG/1
100 TABLET ORAL 2 TIMES DAILY
Qty: 14 TAB | Refills: 0 | Status: SHIPPED | OUTPATIENT
Start: 2017-12-29 | End: 2018-01-05

## 2017-12-29 NOTE — PROGRESS NOTES
HISTORY OF PRESENT ILLNESS  Aruna Bhagat is a 50 y.o. female presents with Results; Ear Pain (right ear); and Form Completion (DMV Form Placard)    Agree with nurse note. Pt with hypothyroidism presents to the office with a BP of 124/76. She weighs 318 lbs, down 3 lbs since 12/22/17. At her last visit on 12/22/17, she was dx'd with acute bronchitis, mild intermittent asthma, chronic allergic rhinitis, insomnia, strep exposure, cough, and dry eyes. She took the Z-theodore and used Tussionex with relief. She used Albuterol HFA inhaler x4 days then only twice this week. She still has a productive cough with white to yellow sputum, nasal congestion with white discharge, and PND. Her R ear has started popping. She continues taking Singulair and Astelin. Patient denies fever, chills, dizziness, sore throat, headache, itchy or watery eyes, chest pain or tightness, SOB, wheezing, GI symptoms, bladder symptoms and body aches. She is allergic to Penicillin, Sulfa, and Prednisone. Her job has allocated the parking downtown for her job and it is far from her building. The only parking spots that are close to her building are handicapped. She gets BL (R>L) hip pain and L knee pain off and on that improves with rest, ice, and heat. It seems to flare up during rainy and cold weather. Episodes have lasted up to 2 weeks. Today she is pain free but did have discomfort over the weekend when it was really cold. Written by tracy Justice, as dictated by Dr. Hanna Escalante DO.    IMER    Review of Systems negative except as noted above in HPI.     ALLERGIES:    Allergies   Allergen Reactions    Latex Rash    Other Medication Hives and Rash     SUN    Pcn [Penicillins] Hives    Prednisone Hives    Sulfa (Sulfonamide Antibiotics) Hives    Garamycin [Gentamicin] Other (comments)     Itchy eyes due to sulfate    Metformin Diarrhea       CURRENT MEDICATIONS:    Outpatient Prescriptions Marked as Taking for the 12/29/17 encounter (Office Visit) with Briseida Yee, DO   Medication Sig Dispense Refill    ipratropium (ATROVENT HFA) 17 mcg/actuation inhaler Take 2 Puffs by inhalation every four (4) hours as needed for Wheezing. Indications: MAINTENANCE THERAPY FOR ASTHMA 1 Inhaler 5    doxycycline (ADOXA) 100 mg tablet Take 1 Tab by mouth two (2) times a day for 7 days. 14 Tab 0    chlorpheniramine-HYDROcodone (TUSSIONEX) 10-8 mg/5 mL suspension Take 5 mL by mouth every twelve (12) hours as needed for Cough. Max Daily Amount: 10 mL. Indications: Cough 200 mL 0    albuterol (PROVENTIL HFA, VENTOLIN HFA, PROAIR HFA) 90 mcg/actuation inhaler Take 2 Puffs by inhalation every four (4) hours as needed for Wheezing. Indications: BRONCHOSPASM PREVENTION 1 Inhaler 5    azelastine (ASTELIN) 137 mcg (0.1 %) nasal spray 1 [place] by Both Nostrils route two (2) times a day. Indications: SEASONAL ALLERGIC RHINITIS 1 Bottle 5    butalbital-acetaminophen-caffeine (FIORICET, ESGIC) -40 mg per tablet TAKE 1 TAB BY MOUTH EVERY SIX (6) HOURS AS NEEDED FOR PAIN OR HEADACHE. 40 Tab 0    cyclobenzaprine (FLEXERIL) 10 mg tablet TAKE 1 TABLET BY MOUTH 3 TIMES A DAY AS NEEDED FOR MUSCLE SPASM 90 Tab 0    hydrOXYzine HCl (ATARAX) 25 mg tablet TAKE 1 TABLET BY MOUTH EVERY 8 HOURS AS NEEDED FOR ITCHING 90 Tab 1    montelukast (SINGULAIR) 10 mg tablet TAKE 1 TABLET BY MOUTH EVERY DAY 30 Tab 11    levothyroxine (SYNTHROID) 125 mcg tablet Take 1 Tab by mouth Daily (before breakfast). Indications: hypothyroidism 90 Tab 3    NAFTIN 2 % gel Apply 1 Dose to affected area two (2) times a day. Indications: TINEA PEDIS 1 Bottle 2    ALPRAZolam (XANAX) 0.5 mg tablet Take 1 Tab by mouth two (2) times daily as needed for Anxiety or Sleep. Max Daily Amount: 1 mg. Indications: anxiety 60 Tab 2    norethindrone-ethinyl estradiol (JUNEL FE 1/20) 1 mg-20 mcg (21)/75 mg (7) tab Take  by mouth.       terconazole (TERAZOL 7) 0.4 % vaginal cream   6    LOCOID 0.1 % lotn   1    fluticasone (FLONASE) 50 mcg/actuation nasal spray nightly.  valACYclovir (VALTREX) 500 mg tablet   1    terconazole (TERAZOL 3) 80 mg vaginal suppository   4    multivitamin (ONE A DAY) tablet Take 1 tablet by mouth daily.  omeprazole (PRILOSEC) 20 mg capsule TAKE ONE CAPSULE BY MOUTH EVERY DAY 30 Cap 1    PROPYLENE GLYCOL//PF (SYSTANE, PF, OP) Apply  to eye four (4) times daily.  magnesium oxide (MAG-OX) 400 mg tablet Take 400 mg by mouth daily.  Emollient Combination No.32 (EPICERAM) Emul by Apply Externally route. From Dr Radhames Yu.  metroNIDAZOLE (METROGEL) 1 % topical gel Apply  to affected area daily. Use a thin layer to affected areas after washing from Dr Radhames Yu   Indications: ACNE ROSACEA      diclofenac EC (VOLTAREN) 75 mg EC tablet Take 75 mg by mouth two (2) times a day.  latanoprost (XALATAN) 0.005 % ophthalmic solution Administer 1 Drop to both eyes nightly.  OTHER,NON-FORMULARY, Osteo Biflex       omega-3 fatty acids-vitamin e (FISH OIL) 1,000 mg Cap Take 1 Cap by mouth daily.  fexofenadine (ALLEGRA) 180 mg tablet Take  by mouth daily.  LACTOBACILLUS/FOS/PECTIN (PROBIOTIC COMPLEX PO) Take  by mouth daily.  cholecalciferol, vitamin d3, (VITAMIN D) 1,000 unit tablet Take 1,000 Units by mouth daily.  ibuprofen (MOTRIN) 800 mg tablet Take  by mouth every six (6) hours as needed for Pain. PAST MEDICAL HISTORY:    Past Medical History:   Diagnosis Date    Acne rosacea     Dr. Radhames Yu.  Allergy, unspecified not elsewhere classified childhood     Txd with immunotherapy. Dr. Moreno Part Anemia NEC     borderline    Ankle sprain 2007    Right. Dr. Prasanna Sandy    Ankle sprain 11/2012    Right. Dr. Manny Edward.  Asthma childhood    Chickenpox childhood    Chronic low back pain with right-sided sciatica     and SI joint dysfunction. Dr. Kailee Bhardwaj.     Chronic otitis media     Dr. Guillermina Rodríguez Dry eye syndrome 2013    Dr. Michael Calvillo.  EBV infection 6/27/2011    Hearing loss     Mild high frequency sensorineuronal hearing loss. Dr. Shannen Santamaria murmur     Hernia of abdominal wall 09/2003, 77/1386    umbilical.  Dr. Jessica Beckett. Dr. Meri Benoit    Hyperglycemia 2013    Hypothyroidism 06/2010    Dr. Cheema Cargo pressure increase 12/2011    Dr. Princess Rodas. Dr. Michael Calvillo. Dr. Jarret Calvillo.  Knee pain, left 04/2012    Left. Dr. Honorio Smith Measles childhood    Migraine     Mumps childhood    Plantar fasciitis, bilateral 2010    Dr. Luis Harrison    Sciatica 2008    Right.  with OA. Dr. Odell Monson Tinnitus of right ear 2012    Dr. Lea Mathew:    Past Surgical History:   Procedure Laterality Date   Danella Esters  2011    Dr. Kassandra Huertas.  HAND/FINGER SURGERY UNLISTED  09/2003    Right Index finger repair due to cut    HX HERNIA REPAIR  99/7755    Umbilical.  Dr. Jessica Beckett.  HX HERNIA REPAIR  9/23/2011    recurrent umbilical.  Laparoscopy incisional.  Dr. Meri Benoit.     HX TONSILLECTOMY  childhood    LAP,CHOLECYSTECTOMY  07/2001       FAMILY HISTORY:    Family History   Problem Relation Age of Onset    Hypertension Mother     Cancer Mother      small cell lung with bone mets to spine/MELANOMA    Cancer Father      melanoma    Arthritis-osteo Father     Colon Polyps Father     Diabetes Maternal Grandmother     Stroke Maternal Grandmother     Seizures Maternal Grandmother     Colon Polyps Maternal Grandmother     Breast Cancer Maternal Grandmother     Heart Disease Paternal Grandmother      heart failure    Heart Attack Paternal Grandmother     Asthma Paternal Grandmother        SOCIAL HISTORY:    Social History     Social History    Marital status: SINGLE     Spouse name: N/A    Number of children: N/A    Years of education: N/A Social History Main Topics    Smoking status: Never Smoker    Smokeless tobacco: Never Used      Comment: lived with smoker dad and mom then step mom x 20 yrs    Alcohol use 0.0 oz/week      Comment: RARE    Drug use: No    Sexual activity: No     Other Topics Concern    None     Social History Narrative       IMMUNIZATIONS:    Immunization History   Administered Date(s) Administered    Influenza Vaccine 10/15/2013, 11/07/2016, 11/03/2017    Influenza Vaccine (Quad) 11/03/2015    Influenza Vaccine (Quad) PF 11/20/2014, 11/07/2016    Influenza Vaccine Split 11/18/2011, 11/07/2012    Influenza Vaccine Whole 10/01/2010    TD Vaccine 09/01/2003    Tdap 11/03/2015         PHYSICAL EXAMINATION    Vital Signs    Visit Vitals    /76 (BP 1 Location: Right arm, BP Patient Position: Sitting)    Pulse 78    Temp 98.9 °F (37.2 °C) (Oral)    Resp 18    Ht 5' 0.9\" (1.547 m)    Wt 318 lb 4.8 oz (144.4 kg)    LMP 12/20/2017    SpO2 97%    BMI 60.34 kg/m2       Weight Metrics 12/29/2017 12/22/2017 11/20/2017 11/9/2017 6/29/2017 4/18/2017 3/10/2017   Weight 318 lb 4.8 oz 321 lb 1.6 oz 312 lb 4.8 oz 312 lb 1.6 oz 300 lb 14.4 oz 290 lb 6.4 oz 299 lb 4.8 oz   BMI 60.34 kg/m2 60.87 kg/m2 59.05 kg/m2 59.01 kg/m2 56.88 kg/m2 54.87 kg/m2 56.55 kg/m2       General appearance - Well nourished. Well appearing. Well developed. No acute distress. Obese. Talks nasally. Head - Normocephalic. Atraumatic. Non tender sinuses x 4. Eyes - pupils equal and reactive. Extraocular eye movements intact. Sclera anicteric. Mildly injected sclera. Ears - Hearing is grossly normal bilaterally. BL TM cloudy with dull reflection to light. Nose - normal and patent. No polyps noted. No erythema. No discharge. Mouth - mucous membranes with adequate moisture. Posterior pharynx normal with cobblestone appearance. No erythema, white exudate or obstruction. Neck - supple. Midline trachea.   No carotid bruits noted bilaterally. No thyromegaly noted. Chest - clear to auscultation bilaterally anteriorly and posteriorly. No wheezes. No rales or rhonchi. Breath sounds are symmetrical bilaterally. Unlabored respirations. Occasional dry cough with forced expiration. Heart - normal rate. Regular rhythm. Normal S1, S2.  2/6 murmur noted. No rubs, clicks or gallops noted. Abdomen - soft and distended. No masses or organomegaly. No rebound, rigidity or guarding. Bowel sounds normal x 4 quadrants. No tenderness noted. Neurological - awake, alert and oriented to person, place, and time and event. Cranial nerves II through XII intact. Clear speech. Muscle strength is +5/5 x 4 extremities. Sensation is intact to light touch bilaterally. Steady gait. Heme/Lymph - peripheral pulses normal x 4 extremities. No peripheral edema is noted. Musculoskeletal - Intact x 4 extremities. Full ROM x 4 extremities. No pain with movement. No tenderness in the pelvis, pubic bone, bilateral hips, knees, ankles. Back exam - normal range of motion. No pain on palpation of the spinous processes in the cervical, thoracic, lumbar, sacral regions. No CVA tenderness. Skin - no ecchymosis, lacerations, abrasions, suspicious moles noted. Psychological -   normal behavior, dress and thought processes. Good insight. Good eye contact. Normal affect. Appropriate mood. Normal speech.       DATA REVIEWED    Lab Results   Component Value Date/Time    WBC 8.8 08/23/2017 08:12 AM    HGB 11.4 08/23/2017 08:12 AM    HCT 35.4 08/23/2017 08:12 AM    PLATELET 947 10/70/5182 08:12 AM    MCV 90 08/23/2017 08:12 AM     Lab Results   Component Value Date/Time    Sodium 139 08/23/2017 08:12 AM    Potassium 4.5 08/23/2017 08:12 AM    Chloride 103 08/23/2017 08:12 AM    CO2 22 08/23/2017 08:12 AM    Anion gap 10 09/24/2011 02:30 AM    Glucose 90 08/23/2017 08:12 AM    BUN 14 08/23/2017 08:12 AM    Creatinine 0.85 08/23/2017 08:12 AM BUN/Creatinine ratio 16 08/23/2017 08:12 AM    GFR est AA 93 08/23/2017 08:12 AM    GFR est non-AA 81 08/23/2017 08:12 AM    Calcium 8.9 08/23/2017 08:12 AM    Bilirubin, total 0.2 08/23/2017 08:12 AM    AST (SGOT) 9 08/23/2017 08:12 AM    Alk. phosphatase 60 08/23/2017 08:12 AM    Protein, total 5.7 08/23/2017 08:12 AM    Albumin 3.8 08/23/2017 08:12 AM    Globulin 3.4 05/19/2010 10:46 AM    A-G Ratio 2.0 08/23/2017 08:12 AM    ALT (SGPT) 10 08/23/2017 08:12 AM     Lab Results   Component Value Date/Time    Cholesterol, total 168 08/23/2017 08:12 AM    HDL Cholesterol 56 08/23/2017 08:12 AM    LDL, calculated 86 08/23/2017 08:12 AM    VLDL, calculated 26 08/23/2017 08:12 AM    Triglyceride 129 08/23/2017 08:12 AM    CHOL/HDL Ratio 2.5 05/19/2010 10:46 AM     Lab Results   Component Value Date/Time    Vitamin D 25-Hydroxy 32.2 08/11/2011 08:15 AM    VITAMIN D, 25-HYDROXY 36.3 08/23/2017 08:12 AM       Lab Results   Component Value Date/Time    Hemoglobin A1c 5.8 08/23/2017 08:12 AM     Lab Results   Component Value Date/Time    TSH 1.900 08/23/2017 08:12 AM       ASSESSMENT and PLAN      ICD-10-CM ICD-9-CM    1. Mild intermittent asthma without complication L72.96 689.32 ipratropium (ATROVENT HFA) 17 mcg/actuation inhaler   2. Acute bronchitis due to other specified organisms J20.8 466.0 doxycycline (ADOXA) 100 mg tablet    improving after Zpak   3. Pain of both hip joints M25.551 719.45     M25.552      R>L, due to OA, intermittently, stable today   4. Chronic allergic rhinitis due to other allergic trigger, unspecified seasonality J30.89 477.8    5. Weight loss R63.4 783.21     3# due to efforts vs other   6. Morbid obesity with BMI of 60.0-69.9, adult (HCC) E66.01 278.01     Z68.44 V85.44    7. Hypothyroidism due to acquired atrophy of thyroid E03.4 244.8      246.8    8. Chronic bilateral low back pain with right-sided sciatica M54.41 724.2     G89.29 724.3      338.29     stable   9.  Chronic pain of left knee M25.562 719.46     G89.29 338.29     stable today   10. Other insomnia G47.09 780.52     due to cough, improved   11. Penicillin allergy Z88.0 V14.0    12. Allergy to sulfa drugs Z88.2 V14.2    13. Adverse effect of anabolic steroid, initial encounter T38.7X5A E932.1     flushing and leathery skin after taking oral steroids   14. Acne rosacea L71.9 695.3        Discussed the patient's BMI with her. The BMI follow up plan is as follows: I have counseled this patient on diet and exercise regimens. Increase water intake. Avoid dairy and sugar to thin mucus. Unable to complete DMV application at this time since pt is not handicapped or in continual pain. Chart reviewed and updated. Continue current medications and care. Since pt finished Z-theodore on Tuesday, I recommend she wait 5 days after finishing it before starting Doxycycline and only start it if she continues to have yellow congestion. Advised pt to stop Doxycycline 50 mg for acne rosacea and start increased dose of 100 mg BID x10 days. Use Atrovent 17 mcg inhaler prn. Prescriptions written and sent to pharmacy; medication side effects discussed. Doxycycline 100 mg. Atrovent HFA 17 mcg. Most recent tests reviewed. Counseled patient on health concerns:  Congestion protocol and allergies. Immunizations noted. Offered empathy, support, legitimation, prayers, partnership to patient. Praised patient for progress. Follow-up Disposition:  Return if symptoms worsen or fail to improve. Patient was offered a choice/choices in the treatment plan today. Patient expresses understanding of the plan and agrees with recommendations. .    Patient declines any additional handouts. Patient is satisfied with previous handouts received from our office    Written by tracy Tapia, as dictated by Dr. Andrzej Díaz DO. Documentation True and Accepted by Kathleene Bence.  Shaan Garsia.

## 2017-12-29 NOTE — MR AVS SNAPSHOT
Visit Information Date & Time Provider Department Dept. Phone Encounter #  
 12/29/2017 11:30 AM [facility] [phone] [id number] Upcoming Health Maintenance Date Due  
 BREAST CANCER SCRN MAMMOGRAM 12/5/2018 PAP AKA CERVICAL CYTOLOGY 11/10/2019 DTaP/Tdap/Td series (2 - Td) 11/3/2025 Allergies as of 12/29/2017  Review Complete On: 12/29/2017 By: Devorah Lawrence DO Severity Noted Reaction Type Reactions Latex  11/20/2014    Rash Other Medication High 07/31/2009    Hives, Rash SUN Pcn [Penicillins] High 07/31/2009    Hives Prednisone High 07/31/2009    Hives Sulfa (Sulfonamide Antibiotics) High 07/31/2009    Hives Garamycin [Gentamicin]  07/31/2009    Other (comments) Itchy eyes due to sulfate Metformin  11/03/2015    Diarrhea Current Immunizations  Reviewed on 12/22/2017 Name Date Influenza Vaccine 11/3/2017, 11/7/2016, 10/15/2013 Influenza Vaccine Joie Savory) 11/3/2015 Influenza Vaccine (Quad) PF 11/7/2016, 11/20/2014 Influenza Vaccine Split 11/7/2012, 11/18/2011 Influenza Vaccine Whole 10/1/2010 TD Vaccine 9/1/2003 Tdap 11/3/2015 Not reviewed this visit You Were Diagnosed With   
  
 Codes Comments Mild intermittent asthma without complication    -  Primary ICD-10-CM: J45.20 ICD-9-CM: 493.90 Acute bronchitis due to other specified organisms     ICD-10-CM: J20.8 ICD-9-CM: 466.0 improving after Zpak Pain of both hip joints     ICD-10-CM: M25.551, M25.552 ICD-9-CM: 719.45 R>L, due to OA, intermittently, stable today Chronic allergic rhinitis due to other allergic trigger, unspecified seasonality     ICD-10-CM: J30.89 ICD-9-CM: 477.8 Weight loss     ICD-10-CM: R63.4 ICD-9-CM: 783.21 3# due to efforts vs other  Morbid obesity with BMI of 60.0-69.9, adult (HCC)     ICD-10-CM: E66.01, Z68.44 
ICD-9-CM: 278.01, V85.44   
 Hypothyroidism due to acquired atrophy of thyroid     ICD-10-CM: E03.4 ICD-9-CM: 244.8, 246.8 Chronic bilateral low back pain with right-sided sciatica     ICD-10-CM: M54.41, G89.29 ICD-9-CM: 724.2, 724.3, 338.29 stable Chronic pain of left knee     ICD-10-CM: M25.562, G89.29 ICD-9-CM: 719.46, 338.29 stable today Other insomnia     ICD-10-CM: G47.09 
ICD-9-CM: 780.52 due to cough, improved Penicillin allergy     ICD-10-CM: Z88.0 ICD-9-CM: V14.0 Allergy to sulfa drugs     ICD-10-CM: Z88.2 ICD-9-CM: V14.2 Adverse effect of anabolic steroid, initial encounter     ICD-10-CM: T38.7X5A 
ICD-9-CM: E932.1 flushing and leathery skin after taking oral steroids Acne rosacea     ICD-10-CM: L71.9 ICD-9-CM: 695.3 Vitals BP Pulse Temp Resp Height(growth percentile) Weight(growth percentile) 124/76 (BP 1 Location: Right arm, BP Patient Position: Sitting) 78 98.9 °F (37.2 °C) (Oral) 18 5' 0.9\" (1.547 m) 318 lb 4.8 oz (144.4 kg) LMP SpO2 BMI OB Status Smoking Status 12/20/2017 97% 60.34 kg/m2 Having regular periods Never Smoker Vitals History BMI and BSA Data Body Mass Index Body Surface Area  
 60.34 kg/m 2 2.49 m 2 Preferred Pharmacy Pharmacy Name Phone [id number] [phone] Your Updated Medication List  
  
   
This list is accurate as of: 12/29/17  1:04 PM.  Always use your most recent med list.  
  
  
  
  
 albuterol 90 mcg/actuation inhaler Commonly known as:  PROVENTIL HFA, VENTOLIN HFA, PROAIR HFA Take 2 Puffs by inhalation every four (4) hours as needed for Wheezing. Indications: BRONCHOSPASM PREVENTION  
  
 ALLEGRA 180 mg tablet Generic drug:  fexofenadine Take  by mouth daily. ALPRAZolam 0.5 mg tablet Commonly known as:  Isael Mccoy Take 1 Tab by mouth two (2) times daily as needed for Anxiety or Sleep. Max Daily Amount: 1 mg. Indications: anxiety  
  
 azelastine 137 mcg (0.1 %) nasal spray Commonly known as:  ASTELIN  
1 Spray by Both Nostrils route two (2) times a day. Indications: SEASONAL ALLERGIC RHINITIS  
  
 butalbital-acetaminophen-caffeine -40 mg per tablet Commonly known as:  FIORICET, ESGIC  
TAKE 1 TAB BY MOUTH EVERY SIX (6) HOURS AS NEEDED FOR PAIN OR HEADACHE. chlorpheniramine-HYDROcodone 10-8 mg/5 mL suspension Commonly known as:  Lorette Cheeks Take 5 mL by mouth every twelve (12) hours as needed for Cough. Max Daily Amount: 10 mL. Indications: Cough  
  
 cyclobenzaprine 10 mg tablet Commonly known as:  FLEXERIL  
TAKE 1 TABLET BY MOUTH 3 TIMES A DAY AS NEEDED FOR MUSCLE SPASM  
  
 diclofenac EC 75 mg EC tablet Commonly known as:  VOLTAREN Take 75 mg by mouth two (2) times a day. doxycycline 100 mg tablet Commonly known as:  ADOXA Take 1 Tab by mouth two (2) times a day for 7 days. EPICERAM Emul Generic drug:  Emollient Combination No.32  
by Apply Externally route. From Dr Teressa Rhodes. FISH OIL 1,000 mg Cap Generic drug:  omega-3 fatty acids-vitamin e Take 1 Cap by mouth daily. FLONASE 50 mcg/actuation nasal spray Generic drug:  fluticasone  
nightly. hydrOXYzine HCl 25 mg tablet Commonly known as:  ATARAX TAKE 1 TABLET BY MOUTH EVERY 8 HOURS AS NEEDED FOR ITCHING  
  
 ipratropium 17 mcg/actuation inhaler Commonly known as:  ATROVENT HFA Take 2 Puffs by inhalation every four (4) hours as needed for Wheezing. Indications: MAINTENANCE THERAPY FOR ASTHMA  
  
 latanoprost 0.005 % ophthalmic solution Commonly known as:  Lam Pry Administer 1 Drop to both eyes nightly. levothyroxine 125 mcg tablet Commonly known as:  SYNTHROID Take 1 Tab by mouth Daily (before breakfast). Indications: hypothyroidism LOCOID 0.1 % Lotn Generic drug:  Hydrocortisone Butyrate  
  
 magnesium oxide 400 mg tablet Commonly known as:  MAG-OX Take 400 mg by mouth daily. METROGEL 1 % topical gel Generic drug:  metroNIDAZOLE Apply  to affected area daily. Use a thin layer to affected areas after washing from Dr Teressa Rhodes   Indications: ACNE ROSACEA  
  
 montelukast 10 mg tablet Commonly known as:  SINGULAIR  
TAKE 1 TABLET BY MOUTH EVERY DAY  
  
 MOTRIN 800 mg tablet Generic drug:  ibuprofen Take  by mouth every six (6) hours as needed for Pain.  
  
 multivitamin tablet Commonly known as:  ONE A DAY Take 1 tablet by mouth daily. NAFTIN 2 % Gel Generic drug:  naftifine Apply 1 Dose to affected area two (2) times a day. Indications: TINEA PEDIS  
  
 norethindrone-ethinyl estradiol 1 mg-20 mcg (21)/75 mg (7) Tab Commonly known as:  Loanne Edwardo FE 1/20 Take  by mouth. omeprazole 20 mg capsule Commonly known as:  PRILOSEC  
TAKE ONE CAPSULE BY MOUTH EVERY DAY  
  
 OTHER(NON-FORMULARY) Osteo Biflex PROBIOTIC COMPLEX PO Take  by mouth daily. SYSTANE (PF) OP Apply  to eye four (4) times daily. * terconazole 80 mg vaginal suppository Commonly known as:  TERAZOL 3  
  
 * terconazole 0.4 % vaginal cream  
Commonly known as:  TERAZOL 7  
  
 valACYclovir 500 mg tablet Commonly known as:  VALTREX  
  
 VITAMIN D3 1,000 unit tablet Generic drug:  cholecalciferol Take 1,000 Units by mouth daily. * Notice: This list has 2 medication(s) that are the same as other medications prescribed for you. Read the directions carefully, and ask your doctor or other care provider to review them with you. Prescriptions Sent to Pharmacy Refills  
 ipratropium (ATROVENT HFA) 17 mcg/actuation inhaler 5 Sig: Take 2 Puffs by inhalation every four (4) hours as needed for Wheezing. Indications: MAINTENANCE THERAPY FOR ASTHMA  Class: Normal  
 Pharmacy: [facility]/pharmacy #[id number] - [address] Ph #: [phone] Route: Inhalation  
 doxycycline (ADOXA) 100 mg tablet 0 Sig: Take 1 Tab by mouth two (2) times a day for 7 days. Class: Normal  
 Pharmacy: [facility] Ph #: [phone] Route: Oral  
  
Introducing [facility]! Dear Ramandeep Tracy: Thank you for requesting a [employer] account. Our records indicate that you already have an active [employer] account. You can access your account anytime at [email or web address] Shareholder. [email or web address] Shareholder Did you know that you can access your hospital and ER discharge instructions at any time in [employer]? You can also review all of your test results from your hospital stay or ER visit. Additional Information If you have questions, please visit the Frequently Asked Questions section of the [employer] website at [email or web address] Shareholder/. Remember, [employer] is NOT to be used for urgent needs. For medical emergencies, dial 911. Now available from your iPhone and Android! Please provide this summary of care documentation to your next provider. Your primary care clinician is listed as Tabatha Rodriguez. If you have any questions after today's visit, please call [phone].

## 2017-12-29 NOTE — PROGRESS NOTES
Hortencia Garrett is a 50 y.o. female  Chief Complaint   Patient presents with    Results    Ear Pain     right ear     1. Have you been to the ER, urgent care clinic since your last visit? Hospitalized since your last visit? No    2. Have you seen or consulted any other health care providers outside of the [address] [place] [address] since your last visit? Include any pap smears or colon screening.  No

## 2018-01-11 DIAGNOSIS — L29.9 PRURITIC DERMATITIS: ICD-10-CM

## 2018-01-12 NOTE — TELEPHONE ENCOUNTER
Last Refill: 11/19/17    Last Office Visit: 12/29/17    Upcoming Appointment:     Last Labs: 8/23/17

## 2018-01-14 RX ORDER — HYDROXYZINE 25 MG/1
TABLET, FILM COATED ORAL
Qty: 90 TAB | Refills: 0 | Status: SHIPPED | OUTPATIENT
Start: 2018-01-14 | End: 2018-01-21 | Stop reason: SDUPTHER

## 2018-02-08 ENCOUNTER — OFFICE VISIT (OUTPATIENT)
Dept: FAMILY MEDICINE CLINIC | Age: 48
End: 2018-02-08

## 2018-02-08 VITALS
WEIGHT: 293 LBS | HEART RATE: 82 BPM | RESPIRATION RATE: 18 BRPM | TEMPERATURE: 98.4 F | OXYGEN SATURATION: 96 % | DIASTOLIC BLOOD PRESSURE: 72 MMHG | BODY MASS INDEX: 55.32 KG/M2 | HEIGHT: 61 IN | SYSTOLIC BLOOD PRESSURE: 117 MMHG

## 2018-02-08 DIAGNOSIS — A08.4 VIRAL GASTROENTERITIS: ICD-10-CM

## 2018-02-08 DIAGNOSIS — J45.41 MODERATE PERSISTENT ASTHMA WITH ACUTE EXACERBATION: Primary | ICD-10-CM

## 2018-02-08 DIAGNOSIS — J40 SINOBRONCHITIS: ICD-10-CM

## 2018-02-08 DIAGNOSIS — J02.9 SORE THROAT: ICD-10-CM

## 2018-02-08 DIAGNOSIS — R05.9 COUGH: ICD-10-CM

## 2018-02-08 DIAGNOSIS — J32.9 SINOBRONCHITIS: ICD-10-CM

## 2018-02-08 DIAGNOSIS — R63.4 WEIGHT LOSS: ICD-10-CM

## 2018-02-08 RX ORDER — IPRATROPIUM BROMIDE AND ALBUTEROL SULFATE 2.5; .5 MG/3ML; MG/3ML
3 SOLUTION RESPIRATORY (INHALATION)
Qty: 3 ML | Refills: 0
Start: 2018-02-08 | End: 2018-02-08

## 2018-02-08 RX ORDER — DOXYCYCLINE HYCLATE 50 MG/1
CAPSULE ORAL
COMMUNITY
Start: 2017-12-22 | End: 2018-02-21 | Stop reason: ALTCHOICE

## 2018-02-08 RX ORDER — BENZONATATE 200 MG/1
200 CAPSULE ORAL
Qty: 30 CAP | Refills: 0 | Status: SHIPPED | OUTPATIENT
Start: 2018-02-08 | End: 2018-02-15

## 2018-02-08 RX ORDER — AZITHROMYCIN 250 MG/1
TABLET, FILM COATED ORAL
Qty: 6 TAB | Refills: 0 | Status: SHIPPED | OUTPATIENT
Start: 2018-02-08 | End: 2018-02-13

## 2018-02-08 NOTE — MR AVS SNAPSHOT
[address] [place] [address] [address] 
[address] [place] [address] 
[phone] Patient: Armin Mancilla MRN:  [id number] Visit Information Date & Time Provider Department Dept. Phone Encounter #  
 2018 11:30 AM DO Mayo Mayes [phone] [id number] Follow-up Instructions Return in about 1 week (around 2/15/2018) for asthma. Upcoming Health Maintenance Date Due  
 BREAST CANCER SCRN MAMMOGRAM 2018 PAP AKA CERVICAL CYTOLOGY 11/10/2019 DTaP/Tdap/Td series (2 - Td) 11/3/2025 Allergies as of 2018  Review Complete On: 2018 By: Nancy Stubbs LPN Severity Noted Reaction Type Reactions Latex  2014    Rash Other Medication High 2009    Hives, Rash SUN Pcn [Penicillins] High 2009    Hives Prednisone High 2009    Hives Sulfa (Sulfonamide Antibiotics) High 2009    Hives Garamycin [Gentamicin]  2009    Other (comments) Itchy eyes due to sulfate Metformin  2015    Diarrhea Current Immunizations  Reviewed on 2017 Name Date Influenza Vaccine 11/3/2017, 2016, 10/15/2013 Influenza Vaccine Pixie Cross) 11/3/2015 Influenza Vaccine (Quad) PF 2016, 2014 Influenza Vaccine Split 2012, 2011 Influenza Vaccine Whole 10/1/2010 TD Vaccine 2003 Tdap 11/3/2015 Not reviewed this visit You Were Diagnosed With   
  
 Codes Comments Moderate persistent asthma with acute exacerbation    -  Primary ICD-10-CM: J45.41 
ICD-9-CM: 493.92 Sinobronchitis     ICD-10-CM: J32.9, J40 ICD-9-CM: 473.9, 490 Viral gastroenteritis     ICD-10-CM: A08.4 ICD-9-CM: 775. 8 Cough     ICD-10-CM: R05 ICD-9-CM: 786.2 Sore throat     ICD-10-CM: J02.9 ICD-9-CM: 671 Weight loss     ICD-10-CM: R63.4 ICD-9-CM: 783.21 8# since 2017 due to efforts Vitals BP Pulse Temp Resp Height(growth percentile) Weight(growth percentile) 117/72 (BP 1 Location: Left arm, BP Patient Position: Sitting) 82 98.4 °F (36.9 °C) (Oral) 18 5' 0.9\" (1.547 m) 313 lb (142 kg) SpO2 BMI OB Status Smoking Status 96% 59.34 kg/m2 Having regular periods Never Smoker BMI and BSA Data Body Mass Index Body Surface Area  
 59.34 kg/m 2 2.47 m 2 Preferred Pharmacy Pharmacy Name Phone [facility] #[id number] [address] [phone] Your Updated Medication List  
  
   
This list is accurate as of: 2/8/18  1:16 PM.  Always use your most recent med list.  
  
  
  
  
 albuterol 90 mcg/actuation inhaler Commonly known as:  PROVENTIL HFA, VENTOLIN HFA, PROAIR HFA Take 2 Puffs by inhalation every four (4) hours as needed for Wheezing. Indications: BRONCHOSPASM PREVENTION  
  
 ALLEGRA 180 mg tablet Generic drug:  fexofenadine Take  by mouth daily. ALPRAZolam 0.5 mg tablet Commonly known as:  Grey Snuffer Take 1 Tab by mouth two (2) times daily as needed for Anxiety or Sleep. Max Daily Amount: 1 mg. Indications: anxiety  
  
 azelastine 137 mcg (0.1 %) nasal spray Commonly known as:  ASTELIN  
1 Spray by Both Nostrils route two (2) times a day. Indications: SEASONAL ALLERGIC RHINITIS  
  
 azithromycin 250 mg tablet Commonly known as:  Herminia Canner Take 2 tablets today, then take 1 tablet daily  
  
 benzonatate 200 mg capsule Commonly known as:  TESSALON Take 1 Cap by mouth three (3) times daily as needed for Cough for up to 7 days. Indications: Cough  
  
 butalbital-acetaminophen-caffeine -40 mg per tablet Commonly known as:  FIORICET, ESGIC  
TAKE 1 TAB BY MOUTH EVERY SIX (6) HOURS AS NEEDED FOR PAIN OR HEADACHE. chlorpheniramine-HYDROcodone 10-8 mg/5 mL suspension Commonly known as:  Roe [place]in Take 5 mL by mouth every twelve (12) hours as needed for Cough. Max Daily Amount: 10 mL. Indications: Cough  
  
 cyclobenzaprine 10 mg tablet Commonly known as:  FLEXERIL  
TAKE 1 TABLET BY MOUTH 3 TIMES A DAY AS NEEDED FOR MUSCLE SPASM  
  
 diclofenac EC 75 mg EC tablet Commonly known as:  VOLTAREN Take 75 mg by mouth two (2) times a day. doxycycline 50 mg capsule Commonly known as:  VIBRAMYCIN  
  
 EPICERAM Emul Generic drug:  Emollient Combination No.32  
by Apply Externally route. From Dr Nancy Elam. FISH OIL 1,000 mg Cap Generic drug:  omega-3 fatty acids-vitamin e Take 1 Cap by mouth daily. FLONASE 50 mcg/actuation nasal spray Generic drug:  fluticasone  
nightly. * hydrOXYzine HCl 25 mg tablet Commonly known as:  ATARAX TAKE 1 TABLET BY MOUTH EVERY 8 HOURS AS NEEDED FOR ITCHING  
  
 * hydrOXYzine HCl 25 mg tablet Commonly known as:  ATARAX TAKE 1 TABLET BY MOUTH EVERY 8 HOURS AS NEEDED FOR ITCHING  
  
 ipratropium 17 mcg/actuation inhaler Commonly known as:  ATROVENT HFA Take 2 Puffs by inhalation every four (4) hours as needed for Wheezing. Indications: MAINTENANCE THERAPY FOR ASTHMA  
  
 latanoprost 0.005 % ophthalmic solution Commonly known as:  Denisha Comptche Administer 1 Drop to both eyes nightly. levothyroxine 125 mcg tablet Commonly known as:  SYNTHROID Take 1 Tab by mouth Daily (before breakfast). Indications: hypothyroidism LOCOID 0.1 % Lotn Generic drug:  Hydrocortisone Butyrate  
  
 magnesium oxide 400 mg tablet Commonly known as:  MAG-OX Take 400 mg by mouth daily. METROGEL 1 % topical gel Generic drug:  metroNIDAZOLE Apply  to affected area daily. Use a thin layer to affected areas after washing from Dr Nancy Elam   Indications: ACNE ROSACEA  
  
 montelukast 10 mg tablet Commonly known as:  SINGULAIR  
TAKE 1 TABLET BY MOUTH EVERY DAY  
  
 MOTRIN 800 mg tablet Generic drug:  ibuprofen Take  by mouth every six (6) hours as needed for Pain.  
  
 multivitamin tablet Commonly known as:  ONE A DAY Take 1 tablet by mouth daily. NAFTIN 2 % Gel Generic drug:  naftifine Apply 1 Dose to affected area two (2) times a day. Indications: TINEA PEDIS  
  
 norethindrone-ethinyl estradiol 1 mg-20 mcg (21)/75 mg (7) Tab Commonly known as:  [place] FE 1/20 Take  by mouth. omeprazole 20 mg capsule Commonly known as:  PRILOSEC  
TAKE ONE CAPSULE BY MOUTH EVERY DAY  
  
 OTHER(NON-FORMULARY) Osteo Biflex PROBIOTIC COMPLEX PO Take  by mouth daily. SYSTANE (PF) OP Apply  to eye four (4) times daily. * terconazole 80 mg vaginal suppository Commonly known as:  TERAZOL 3  
  
 * terconazole 0.4 % vaginal cream  
Commonly known as:  TERAZOL 7  
  
 valACYclovir 500 mg tablet Commonly known as:  VALTREX  
  
 VITAMIN D3 1,000 unit tablet Generic drug:  cholecalciferol Take 1,000 Units by mouth daily. * Notice: This list has 4 medication(s) that are the same as other medications prescribed for you. Read the directions carefully, and ask your doctor or other care provider to review them with you. Prescriptions Sent to Pharmacy Refills  
 azithromycin (ZITHROMAX) 250 mg tablet 0 Sig: Take 2 tablets today, then take 1 tablet daily Class: Normal  
 Pharmacy: [facility]/pharmacy [facility][id number] [address] [place] [address] Ph #: [phone]  
 benzonatate (TESSALON) 200 mg capsule 0 Sig: Take 1 Cap by mouth three (3) times daily as needed for Cough for up to 7 days. Indications: Cough Class: Normal  
 Pharmacy: [facility] Ph #: [phone] Route: Oral  
  
We Performed the Following AMB POC RAPID INFLUENZA TEST [79873 CPT(R)] AMB POC RAPID STREP A [40214 CPT(R)] Follow-up Instructions Return in about 1 week (around 2/15/2018) for asthma. Patient Instructions Diarrhea: Care Instructions Your Care Instructions Diarrhea is loose, watery stools (bowel movements). The exact cause is often hard to find. Sometimes diarrhea is your body's way of getting rid of what caused an upset stomach. Viruses, food poisoning, and many medicines can cause diarrhea. Some people get diarrhea in response to emotional stress, anxiety, or certain foods. Almost everyone has diarrhea now and then. It usually isn't serious, and your stools will return to normal soon. The important thing to do is replace the fluids you have lost, so you can prevent dehydration. The doctor has checked you carefully, but problems can develop later. If you notice any problems or new symptoms, get medical treatment right away. Follow-up care is a key part of your treatment and safety. Be sure to make and go to all appointments, and call your doctor if you are having problems. It's also a good idea to know your test results and keep a list of the medicines you take. How can you care for yourself at home? · Watch for signs of dehydration, which means your body has lost too much water. Dehydration is a serious condition and should be treated right away. Signs of dehydration are: 
¨ Increasing thirst and dry eyes and mouth. ¨ Feeling faint or lightheaded. ¨ Darker urine, and a smaller amount of urine than normal. 
· To prevent dehydration, drink plenty of fluids, enough so that your urine is light yellow or clear like water. Choose water and other caffeine-free clear liquids until you feel better. If you have kidney, heart, or liver disease and have to limit fluids, talk with your doctor before you increase the amount of fluids you drink. · Begin eating small amounts of mild foods the next day, if you feel like it. ¨ Try yogurt that has live cultures of Lactobacillus. (Check the label.) ¨ Avoid spicy foods, fruits, alcohol, and caffeine until 48 hours after all symptoms are gone. ¨ Avoid chewing gum that contains sorbitol. ¨ Avoid dairy products (except for yogurt with Lactobacillus) while you have diarrhea and for 3 days after symptoms are gone. · The doctor may recommend that you take over-the-counter medicine, such as loperamide (Imodium), if you still have diarrhea after 6 hours. Read and follow all instructions on the label. Do not use this medicine if you have bloody diarrhea, a high fever, or other signs of serious illness. Call your doctor if you think you are having a problem with your medicine. When should you call for help? Call 911 anytime you think you may need emergency care. For example, call if: 
? · You passed out (lost consciousness). ? · Your stools are maroon or very bloody. ?Call your doctor now or seek immediate medical care if: 
? · You are dizzy or lightheaded, or you feel like you may faint. ? · Your stools are black and look like tar, or they have streaks of blood. ? · You have new or worse belly pain. ? · You have symptoms of dehydration, such as: ¨ Dry eyes and a dry mouth. ¨ Passing only a little dark urine. ¨ Feeling thirstier than usual.  
? · You have a new or higher fever. ? Watch closely for changes in your health, and be sure to contact your doctor if: 
? · Your diarrhea is getting worse. ? · You see pus in the diarrhea. ? · You are not getting better after 2 days (48 hours). Where can you learn more? Go to http://alejandro-eddie.info/. Enter H463 in the search box to learn more about \"Diarrhea: Care Instructions. \" Current as of: March 20, 2017 Content Version: 11.4 © 8740-6975 [employer]. Care instructions adapted under license by [employer] (which disclaims liability or warranty for this information).  If you have questions about a medical condition or this instruction, always ask your healthcare professional. [place] [address] any warranty or liability for your use of this information. Oral Rehydration: Care Instructions Your Care Instructions Dehydration occurs when your body loses too much water. This can happen if you do not drink enough fluids or lose a lot of fluid due to diarrhea, vomiting, or sweating. Being dehydrated can cause health problems and can even be life-threatening. To replace lost fluids, you need to drink liquid that contains special chemicals called electrolytes. Electrolytes keep your body working well. Plain water does not have electrolytes. You also need to rest to prevent more fluid loss. Replacing water and electrolytes (oral rehydration) completely takes about 36 hours. But you should feel better within a few hours. Follow-up care is a key part of your treatment and safety. Be sure to make and go to all appointments, and call your doctor if you are having problems. It's also a good idea to know your test results and keep a list of the medicines you take. How can you care for yourself at home? · Take frequent sips of a drink such as Gatorade, Powerade, or other rehydration drinks that your doctor suggests. These replace both fluid and important chemicals (electrolytes) you need for balance in your blood. · Drink 2 quarts of cool liquid over 2 to 4 hours. You should have at least 10 glasses of liquid a day to replace lost fluid. If you have kidney, heart, or liver disease and have to limit fluids, talk with your doctor before you increase the amount of fluids you drink. · Make your own drink. Measure everything carefully. The drink may not work well or may even be harmful if the amounts are off. ¨ 1 quart water ¨ ½ teaspoon salt ¨ 6 teaspoons sugar · Do not drink liquid with caffeine, such as coffee and monique. · Do not drink any alcohol. It can make you dehydrated. · Drink plenty of fluids, enough so that your urine is light yellow or clear like water. If you have kidney, heart, or liver disease and have to limit fluids, talk with your doctor before you increase the amount of fluids you drink. When should you call for help? Call 911 anytime you think you may need emergency care. For example, call if: 
? · You have signs of severe dehydration, such as: 
¨ You are confused or unable to stay awake. ¨ You passed out (lost consciousness). ?Call your doctor now or seek immediate medical care if: 
? · You still have signs of dehydration. You have sunken eyes and a dry mouth, and you pass only a little dark urine. ? · You are dizzy or lightheaded, or you feel like you may faint. ? · You are not able to keep down fluids. ? Watch closely for changes in your health, and be sure to contact your doctor if: 
? · You do not get better as expected. Where can you learn more? Go to http://alejandro-eddie.info/. Enter I040 in the search box to learn more about \"Oral Rehydration: Care Instructions. \" Current as of: March 20, 2017 Content Version: 11.4 © 8124-2001 [employer]Me. Care instructions adapted under license by [employer] (which disclaims liability or warranty for this information). If you have questions about a medical condition or this instruction, always ask your healthcare professional. [address] any warranty or liability for your use of this information. Asthma: Your Action Plan Sample Action Plan Controller medicine action plan Fill in the blank spaces and boxes that apply for all sections. · Name of your controller medicine: 
¨ ____________________________________________ · How much of this medicine do you take? ¨ ____________________________________________ · How often do you take this medicine? ¨ ____________________________________________ · Other instructions? ¨ ____________________________________________ Quick-relief medicine action plan · Name of your quick-relief medicine: 
¨ ____________________________________________ · How much of this medicine do you take? ¨ ____________________________________________ · How often do you take this medicine? ¨ ____________________________________________ Asthma Zones GREEN ZONE: This is where you want to be! Green zone symptoms · You have no shortness of breath or chest tightness. You are not coughing or wheezing. · You can do all of your usual activities. · You sleep well at night. Green zone peak flow (if you use a peak flow meter) · ______ or more (80% or more of your personal best) Green zone actions (Check the boxes and fill in the blank spaces that apply.) [ ] You take your controller medicine(s) every day. [ ] Queen Tru are staying away from your asthma triggers. [ ] You take quick-relief medicine (called _____________________) ______ minutes before exercise. YELLOW ZONE: Your asthma is getting worse. Yellow zone symptoms · You are short of breath or have chest tightness. You are coughing or wheezing. · You have symptoms that keep you up at night. · You can do some, but not all, of your usual activities. Yellow zone peak flow (if you use a peak flow meter) · ______ to ______ (50% to 79% of your personal best) Yellow zone actions (Check the boxes and fill in the blank spaces that apply.) [ ] Take _____ puff(s) of quick-relief medicine called ______________________. Repeat _____ times. [ ] If your symptoms don't get better or your peak flow has not returned to the green zone in 1 hour, then: · [ ] Take _____ puff(s) of medicine called ______________________. Take it ____ times a day. · [ ] Begin or increase treatment with corticosteroid pills. Take ______ mg of medicine called ____________________________ every __________. · [ ] Call your doctor at this number: ____________________. RED ZONE: Danger! Red zone symptoms · You are very short of breath. · You can't do your usual activities. · Quick-relief medicine doesn't help. Or your symptoms don't get better after 24 hours in the yellow zone. Red zone peak flow (if you use a peak flow meter) · Less than _______ (less than 50% of your personal best) Red zone actions (Check the boxes and fill in the blank spaces that apply.) [ ] Take _____ puff(s) of quick-relief medicine called ____________________________. Repeat ______ times. [ ] Begin or increase treatment with corticosteroid pills. Take ________ mg now. [ ] Call your doctor at this number: _________________. If you can't contact your doctor, go to the emergency department. Call 911 or ___________________. [ ] Other numbers you might call are: ___________________________________. When should you call for help? Call 911 anytime you think you may need emergency care. For example, call if: 
· You have severe trouble breathing. Call your doctor now or seek immediate medical care if: 
· You are in the red zone of your asthma action plan. · You've used your quick-relief medicine but are still having trouble breathing. · You cough up blood. · You have new or worse trouble breathing. · You cough up dark brown or bloody mucus (sputum). Watch closely for changes in your health, and be sure to contact your doctor if: 
· You need to use quick-relief medicine more than 2 days each week (unless it's just for exercise). · Your coughing and wheezing get worse. Follow-up care is a key part of your treatment and safety. Be sure to make and go to all appointments, and call your doctor if you are having problems. It's also a good idea to know your test results and keep a list of the medicines you take. Where can you learn more? Go to http://pretty.info/. Enter 32 69 28 in the search box to learn more about \"Asthma: Your Action Plan. \" Current as of: May 12, 2017 Content Version: 11.4 © 7801-1047 Healthwise, Incorporated. Care instructions adapted under license by [employer] (which disclaims liability or warranty for this information). If you have questions about a medical condition or this instruction, always ask your healthcare professional. [address] any warranty or liability for your use of this information. Asthma: Your Action Plan Sample Action Plan Controller medicine action plan Fill in the blank spaces and boxes that apply for all sections. · Name of your controller medicine: 
¨ ____________________________________________ · How much of this medicine do you take? ¨ ____________________________________________ · How often do you take this medicine? ¨ ____________________________________________ · Other instructions? ¨ ____________________________________________ Quick-relief medicine action plan · Name of your quick-relief medicine: 
¨ ____________________________________________ · How much of this medicine do you take? ¨ ____________________________________________ · How often do you take this medicine? ¨ ____________________________________________ Asthma Zones GREEN ZONE: This is where you want to be! Green zone symptoms · You have no shortness of breath or chest tightness. You are not coughing or wheezing. · You can do all of your usual activities. · You sleep well at night. Green zone peak flow (if you use a peak flow meter) · ______ or more (80% or more of your personal best) Green zone actions (Check the boxes and fill in the blank spaces that apply.) [ ] You take your controller medicine(s) every day. [ ] Sanjiv Fuchs are staying away from your asthma triggers. [ ] You take quick-relief medicine (called _____________________) ______ minutes before exercise. YELLOW ZONE: Your asthma is getting worse. Yellow zone symptoms · You are short of breath or have chest tightness. You are coughing or wheezing. · You have symptoms that keep you up at night. · You can do some, but not all, of your usual activities. Yellow zone peak flow (if you use a peak flow meter) · ______ to ______ (50% to 79% of your personal best) Yellow zone actions (Check the boxes and fill in the blank spaces that apply.) [ ] Take _____ puff(s) of quick-relief medicine called ______________________. Repeat _____ times. [ ] If your symptoms don't get better or your peak flow has not returned to the green zone in 1 hour, then: · [ ] Take _____ puff(s) of medicine called ______________________. Take it ____ times a day. · [ ] Begin or increase treatment with corticosteroid pills. Take ______ mg of medicine called ____________________________ every __________. · [ ] Call your doctor at this number: ____________________. RED ZONE: Danger! Red zone symptoms · You are very short of breath. · You can't do your usual activities. · Quick-relief medicine doesn't help. Or your symptoms don't get better after 24 hours in the yellow zone. Red zone peak flow (if you use a peak flow meter) · Less than _______ (less than 50% of your personal best) Red zone actions (Check the boxes and fill in the blank spaces that apply.) [ ] Take _____ puff(s) of quick-relief medicine called ____________________________. Repeat ______ times. [ ] Begin or increase treatment with corticosteroid pills. Take ________ mg now. [ ] Call your doctor at this number: _________________. If you can't contact your doctor, go to the emergency department. Call 911 or ___________________. [ ] Other numbers you might call are: ___________________________________. When should you call for help? Call 911 anytime you think you may need emergency care. For example, call if: 
· You have severe trouble breathing. Call your doctor now or seek immediate medical care if: 
· You are in the red zone of your asthma action plan. · You've used your quick-relief medicine but are still having trouble breathing. · You cough up blood. · You have new or worse trouble breathing. · You cough up dark brown or bloody mucus (sputum). Watch closely for changes in your health, and be sure to contact your doctor if: 
· You need to use quick-relief medicine more than 2 days each week (unless it's just for exercise). · Your coughing and wheezing get worse. Follow-up care is a key part of your treatment and safety. Be sure to make and go to all appointments, and call your doctor if you are having problems. It's also a good idea to know your test results and keep a list of the medicines you take. Where can you learn more? Go to http://alejandro-eddie.info/. Enter [phone] in the search box to learn more about \"Asthma: Your Action Plan. \" Current as of: May 12, 2017 Content Version: 11.4 © 6187-1208 Well Done. Care instructions adapted under license by [employer] (which disclaims liability or warranty for this information). If you have questions about a medical condition or this instruction, always ask your healthcare professional. [place] [address] any warranty or liability for your use of this information. Introducing [facility]! Dear Doe Roblero: Thank you for requesting a [employer] account. Our records indicate that you already have an active [employer] account. You can access your account anytime at [email or web address] Did you know that you can access your hospital and ER discharge instructions at any time in [employer]? You can also review all of your test results from your hospital stay or ER visit. Additional Information If you have questions, please visit the Frequently Asked Questions section of the [employer] website at [email or web address] Remember, [employer] is NOT to be used for urgent needs. For medical emergencies, dial 911. Now available from your iPhone and [employer]! Please provide this summary of care documentation to your next provider. Your primary care clinician is listed as Rustam Tess. If you have any questions after today's visit, please call [phone].

## 2018-02-08 NOTE — PATIENT INSTRUCTIONS
Diarrhea: Care Instructions  Your Care Instructions    Diarrhea is loose, watery stools (bowel movements). The exact cause is often hard to find. Sometimes diarrhea is your body's way of getting rid of what caused an upset stomach. Viruses, food poisoning, and many medicines can cause diarrhea. Some people get diarrhea in response to emotional stress, anxiety, or certain foods. Almost everyone has diarrhea now and then. It usually isn't serious, and your stools will return to normal soon. The important thing to do is replace the fluids you have lost, so you can prevent dehydration. The doctor has checked you carefully, but problems can develop later. If you notice any problems or new symptoms, get medical treatment right away. Follow-up care is a key part of your treatment and safety. Be sure to make and go to all appointments, and call your doctor if you are having problems. It's also a good idea to know your test results and keep a list of the medicines you take. How can you care for yourself at home? · Watch for signs of dehydration, which means your body has lost too much water. Dehydration is a serious condition and should be treated right away. Signs of dehydration are:  ¨ Increasing thirst and dry eyes and mouth. ¨ Feeling faint or lightheaded. ¨ Darker urine, and a smaller amount of urine than normal.  · To prevent dehydration, drink plenty of fluids, enough so that your urine is light yellow or clear like water. Choose water and other caffeine-free clear liquids until you feel better. If you have kidney, heart, or liver disease and have to limit fluids, talk with your doctor before you increase the amount of fluids you drink. · Begin eating small amounts of mild foods the next day, if you feel like it. ¨ Try yogurt that has live cultures of Lactobacillus. (Check the label.)  ¨ Avoid spicy foods, fruits, alcohol, and caffeine until 48 hours after all symptoms are gone.   ¨ Avoid chewing gum that contains sorbitol. ¨ Avoid dairy products (except for yogurt with Lactobacillus) while you have diarrhea and for 3 days after symptoms are gone. · The doctor may recommend that you take over-the-counter medicine, such as loperamide (Imodium), if you still have diarrhea after 6 hours. Read and follow all instructions on the label. Do not use this medicine if you have bloody diarrhea, a high fever, or other signs of serious illness. Call your doctor if you think you are having a problem with your medicine. When should you call for help? Call 911 anytime you think you may need emergency care. For example, call if:  ? · You passed out (lost consciousness). ? · Your stools are maroon or very bloody. ?Call your doctor now or seek immediate medical care if:  ? · You are dizzy or lightheaded, or you feel like you may faint. ? · Your stools are black and look like tar, or they have streaks of blood. ? · You have new or worse belly pain. ? · You have symptoms of dehydration, such as:  ¨ Dry eyes and a dry mouth. ¨ Passing only a little dark urine. ¨ Feeling thirstier than usual.   ? · You have a new or higher fever. ? Watch closely for changes in your health, and be sure to contact your doctor if:  ? · Your diarrhea is getting worse. ? · You see pus in the diarrhea. ? · You are not getting better after 2 days (48 hours). Where can you learn more? Go to http://alejandro-eddie.info/. Enter C908 in the search box to learn more about \"Diarrhea: Care Instructions. \"  Current as of: March 20, 2017  Content Version: 11.4  © 2561-6862 [employer]. Care instructions adapted under license by [employer] (which disclaims liability or warranty for this information). If you have questions about a medical condition or this instruction, always ask your healthcare professional. [address] any warranty or liability for your use of this information. Oral Rehydration: Care Instructions  Your Care Instructions    Dehydration occurs when your body loses too much water. This can happen if you do not drink enough fluids or lose a lot of fluid due to diarrhea, vomiting, or sweating. Being dehydrated can cause health problems and can even be life-threatening. To replace lost fluids, you need to drink liquid that contains special chemicals called electrolytes. Electrolytes keep your body working well. Plain water does not have electrolytes. You also need to rest to prevent more fluid loss. Replacing water and electrolytes (oral rehydration) completely takes about 36 hours. But you should feel better within a few hours. Follow-up care is a key part of your treatment and safety. Be sure to make and go to all appointments, and call your doctor if you are having problems. It's also a good idea to know your test results and keep a list of the medicines you take. How can you care for yourself at home? · Take frequent sips of a drink such as Gatorade, Powerade, or other rehydration drinks that your doctor suggests. These replace both fluid and important chemicals (electrolytes) you need for balance in your blood. · Drink 2 quarts of cool liquid over 2 to 4 hours. You should have at least 10 glasses of liquid a day to replace lost fluid. If you have kidney, heart, or liver disease and have to limit fluids, talk with your doctor before you increase the amount of fluids you drink. · Make your own drink. Measure everything carefully. The drink may not work well or may even be harmful if the amounts are off. ¨ 1 quart water  ¨ ½ teaspoon salt  ¨ 6 teaspoons sugar  · Do not drink liquid with caffeine, such as coffee and monique. · Do not drink any alcohol. It can make you dehydrated. · Drink plenty of fluids, enough so that your urine is light yellow or clear like water.  If you have kidney, heart, or liver disease and have to limit fluids, talk with your doctor before you increase the amount of fluids you drink. When should you call for help? Call 911 anytime you think you may need emergency care. For example, call if:  ? · You have signs of severe dehydration, such as:  ¨ You are confused or unable to stay awake. ¨ You passed out (lost consciousness). ?Call your doctor now or seek immediate medical care if:  ? · You still have signs of dehydration. You have sunken eyes and a dry mouth, and you pass only a little dark urine. ? · You are dizzy or lightheaded, or you feel like you may faint. ? · You are not able to keep down fluids. ? Watch closely for changes in your health, and be sure to contact your doctor if:  ? · You do not get better as expected. Where can you learn more? Go to http://alejandro-eddie.info/. Enter I040 in the search box to learn more about \"Oral Rehydration: Care Instructions. \"  Current as of: March 20, 2017  Content Version: 11.4  © 6957-1416 [employer]. Care instructions adapted under license by [employer] (which disclaims liability or warranty for this information). If you have questions about a medical condition or this instruction, always ask your healthcare professional. [address] any warranty or liability for your use of this information. Asthma: Your Action Plan  Sample Action Plan    Controller medicine action plan  Fill in the blank spaces and boxes that apply for all sections. · Name of your controller medicine:  ¨ ____________________________________________  · How much of this medicine do you take? ¨ ____________________________________________  · How often do you take this medicine? ¨ ____________________________________________  · Other instructions?   ¨ ____________________________________________  Quick-relief medicine action plan  · Name of your quick-relief medicine:  ¨ ____________________________________________  · How much of this medicine do you take?  ¨ ____________________________________________  · How often do you take this medicine? ¨ ____________________________________________  Asthma Zones  GREEN ZONE: This is where you want to be! Green zone symptoms  · You have no shortness of breath or chest tightness. You are not coughing or wheezing. · You can do all of your usual activities. · You sleep well at night. Green zone peak flow (if you use a peak flow meter)  · ______ or more (80% or more of your personal best)  Green zone actions (Check the boxes and fill in the blank spaces that apply.)  [ ] You take your controller medicine(s) every day. [ ] Tanner Gross are staying away from your asthma triggers. [ ] You take quick-relief medicine (called _____________________) ______ minutes before exercise. YELLOW ZONE: Your asthma is getting worse. Yellow zone symptoms  · You are short of breath or have chest tightness. You are coughing or wheezing. · You have symptoms that keep you up at night. · You can do some, but not all, of your usual activities. Yellow zone peak flow (if you use a peak flow meter)  · ______ to ______ (50% to 79% of your personal best)  Yellow zone actions (Check the boxes and fill in the blank spaces that apply.)  [ ] Take _____ puff(s) of quick-relief medicine called ______________________. Repeat _____ times. [ ] If your symptoms don't get better or your peak flow has not returned to the green zone in 1 hour, then:  · [ ] Take _____ puff(s) of medicine called ______________________. Take it ____ times a day. · [ ] Begin or increase treatment with corticosteroid pills. Take ______ mg of medicine called ____________________________ every __________. · [ ] Call your doctor at this number: ____________________. RED ZONE: Danger! Red zone symptoms  · You are very short of breath. · You can't do your usual activities. · Quick-relief medicine doesn't help. Or your symptoms don't get better after 24 hours in the yellow zone.   Red zone peak flow (if you use a peak flow meter)  · Less than _______ (less than 50% of your personal best)  Red zone actions (Check the boxes and fill in the blank spaces that apply.)  [ ] Take _____ puff(s) of quick-relief medicine called ____________________________. Repeat ______ times. [ ] Begin or increase treatment with corticosteroid pills. Take ________ mg now. [ ] Call your doctor at this number: _________________. If you can't contact your doctor, go to the emergency department. Call 911 or ___________________. [ ] Other numbers you might call are: ___________________________________. When should you call for help? Call 911 anytime you think you may need emergency care. For example, call if:  · You have severe trouble breathing. Call your doctor now or seek immediate medical care if:  · You are in the red zone of your asthma action plan. · You've used your quick-relief medicine but are still having trouble breathing. · You cough up blood. · You have new or worse trouble breathing. · You cough up dark brown or bloody mucus (sputum). Watch closely for changes in your health, and be sure to contact your doctor if:  · You need to use quick-relief medicine more than 2 days each week (unless it's just for exercise). · Your coughing and wheezing get worse. Follow-up care is a key part of your treatment and safety. Be sure to make and go to all appointments, and call your doctor if you are having problems. It's also a good idea to know your test results and keep a list of the medicines you take. Where can you learn more? Go to http://alejandro-eddie.info/. Enter 32 [id number] in the search box to learn more about \"Asthma: Your Action Plan. \"  Current as of: May 12, 2017  Content Version: 11.4  © 8966-4074 [employer]. Care instructions adapted under license by [employer] (which disclaims liability or warranty for this information).  If you have questions about a medical condition or this instruction, always ask your healthcare professional. [address] any warranty or liability for your use of this information. Asthma: Your Action Plan  Sample Action Plan    Controller medicine action plan  Fill in the blank spaces and boxes that apply for all sections. · Name of your controller medicine:  ¨ ____________________________________________  · How much of this medicine do you take? ¨ ____________________________________________  · How often do you take this medicine? ¨ ____________________________________________  · Other instructions? ¨ ____________________________________________  Quick-relief medicine action plan  · Name of your quick-relief medicine:  ¨ ____________________________________________  · How much of this medicine do you take? ¨ ____________________________________________  · How often do you take this medicine? ¨ ____________________________________________  Asthma Zones  GREEN ZONE: This is where you want to be! Green zone symptoms  · You have no shortness of breath or chest tightness. You are not coughing or wheezing. · You can do all of your usual activities. · You sleep well at night. Green zone peak flow (if you use a peak flow meter)  · ______ or more (80% or more of your personal best)  Green zone actions (Check the boxes and fill in the blank spaces that apply.)  [ ] You take your controller medicine(s) every day. [ ] Shakira Child are staying away from your asthma triggers. [ ] You take quick-relief medicine (called _____________________) ______ minutes before exercise. YELLOW ZONE: Your asthma is getting worse. Yellow zone symptoms  · You are short of breath or have chest tightness. You are coughing or wheezing. · You have symptoms that keep you up at night. · You can do some, but not all, of your usual activities.   Yellow zone peak flow (if you use a peak flow meter)  · ______ to ______ (50% to 79% of your personal best)  Yellow zone actions (Check the boxes and fill in the blank spaces that apply.)  [ ] Take _____ puff(s) of quick-relief medicine called ______________________. Repeat _____ times. [ ] If your symptoms don't get better or your peak flow has not returned to the green zone in 1 hour, then:  · [ ] Take _____ puff(s) of medicine called ______________________. Take it ____ times a day. · [ ] Begin or increase treatment with corticosteroid pills. Take ______ mg of medicine called ____________________________ every __________. · [ ] Call your doctor at this number: ____________________. RED ZONE: Danger! Red zone symptoms  · You are very short of breath. · You can't do your usual activities. · Quick-relief medicine doesn't help. Or your symptoms don't get better after 24 hours in the yellow zone. Red zone peak flow (if you use a peak flow meter)  · Less than _______ (less than 50% of your personal best)  Red zone actions (Check the boxes and fill in the blank spaces that apply.)  [ ] Take _____ puff(s) of quick-relief medicine called ____________________________. Repeat ______ times. [ ] Begin or increase treatment with corticosteroid pills. Take ________ mg now. [ ] Call your doctor at this number: _________________. If you can't contact your doctor, go to the emergency department. Call 911 or ___________________. [ ] Other numbers you might call are: ___________________________________. When should you call for help? Call 911 anytime you think you may need emergency care. For example, call if:  · You have severe trouble breathing. Call your doctor now or seek immediate medical care if:  · You are in the red zone of your asthma action plan. · You've used your quick-relief medicine but are still having trouble breathing. · You cough up blood. · You have new or worse trouble breathing. · You cough up dark brown or bloody mucus (sputum).   Watch closely for changes in your health, and be sure to contact your doctor if:  · You need to use quick-relief medicine more than 2 days each week (unless it's just for exercise). · Your coughing and wheezing get worse. Follow-up care is a key part of your treatment and safety. Be sure to make and go to all appointments, and call your doctor if you are having problems. It's also a good idea to know your test results and keep a list of the medicines you take. Where can you learn more? Go to http://alejandro-eddie.info/. Enter 09 [id number] 33 in the search box to learn more about \"Asthma: Your Action Plan. \"  Current as of: May 12, 2017  Content Version: 11.4  © 0786-8669 [employer]. Care instructions adapted under license by [employer] (which disclaims liability or warranty for this information). If you have questions about a medical condition or this instruction, always ask your healthcare professional. [address] any warranty or liability for your use of this information.

## 2018-02-08 NOTE — PROGRESS NOTES
VORB to give sample medication of QVAR 80mcg inhaler 2 puffs BID rinse mouth after each use  VORB for nebulizer breathing tx with albuterol and atrovent

## 2018-02-08 NOTE — PROGRESS NOTES
Jaclyn Pal is a 50 y.o. female    Chief Complaint   Patient presents with    Cough     states this started last satursday, having coughing with fever of 102.6, runny nose, and wheezing    Fever    Nasal Congestion    Wheezing    Diarrhea     1. Have you been to the ER, urgent care clinic since your last visit? Hospitalized since your last visit? No    2. Have you seen or consulted any other health care providers outside of the [address] [place] [address] since your last visit? Include any pap smears or colon screening.  No

## 2018-02-08 NOTE — PROGRESS NOTES
HISTORY OF PRESENT ILLNESS  Igor Andino is a 50 y.o. female presents with Cough (states this started last satursday, having coughing with fever of 102.6, runny nose, and wheezing); Fever; Nasal Congestion; Wheezing; and Diarrhea    Agree with nurse note. Pt with moderate persistent asthma. She complains of cough, fever up to 102.6 (102 last night), sore throat, running nose, chest tightness, and wheezing since Saturday. She is taking Singulair, using Astelin, and using Albuterol 4-5x daily with temporary relief. She has Tussionex leftover but took Robitussin last night. She stayed at home Monday and Tuesday of this week. Some co-workers have had flu and stomach bug recently. Patient denies chills, ear pain, dizziness, headache, itchy or watery eyes, SOB, bladder symptoms and body aches. Allergic to Penicillin, Prednisone, and Sulfa. Intolerant of Gentamicin. She also has had 2 episodes of diarrhea. This morning stool was very dark. Appetite has shaina decreased so she has not been eating much. Pt presents to the office with a BP of 117/72. She weighs 313 lbs, down 8 lbs since 12/2017. Written by Chris Holsteintracy, as dictated by Dr. Unique Pfeiffer DO.    ROS    Review of Systems negative except as noted above in HPI.     ALLERGIES:    Allergies   Allergen Reactions    Latex Rash    Other Medication Hives and Rash     SUN    Pcn [Penicillins] Hives    Prednisone Hives    Sulfa (Sulfonamide Antibiotics) Hives    Garamycin [Gentamicin] Other (comments)     Itchy eyes due to sulfate    Metformin Diarrhea       CURRENT MEDICATIONS:    Outpatient Prescriptions Marked as Taking for the 2/8/18 encounter (Office Visit) with Srikanth Lopez DO   Medication Sig Dispense Refill    doxycycline (VIBRAMYCIN) 50 mg capsule       azithromycin (ZITHROMAX) 250 mg tablet Take 2 tablets today, then take 1 tablet daily 6 Tab 0    benzonatate (TESSALON) 200 mg capsule Take 1 Cap by mouth three (3) times daily as needed for Cough for up to 7 days. Indications: Cough 30 Cap 0    hydrOXYzine HCl (ATARAX) 25 mg tablet TAKE 1 TABLET BY MOUTH EVERY 8 HOURS AS NEEDED FOR ITCHING 90 Tab 1    chlorpheniramine-HYDROcodone (TUSSIONEX) 10-8 mg/5 mL suspension Take 5 mL by mouth every twelve (12) hours as needed for Cough. Max Daily Amount: 10 mL. Indications: Cough 200 mL 0    albuterol (PROVENTIL HFA, VENTOLIN HFA, PROAIR HFA) 90 mcg/actuation inhaler Take 2 Puffs by inhalation every four (4) hours as needed for Wheezing. Indications: BRONCHOSPASM PREVENTION 1 Inhaler 5    azelastine (ASTELIN) 137 mcg (0.1 %) nasal spray 1 [place] by Both Nostrils route two (2) times a day. Indications: SEASONAL ALLERGIC RHINITIS 1 Bottle 5    butalbital-acetaminophen-caffeine (FIORICET, ESGIC) -40 mg per tablet TAKE 1 TAB BY MOUTH EVERY SIX (6) HOURS AS NEEDED FOR PAIN OR HEADACHE. 40 Tab 0    cyclobenzaprine (FLEXERIL) 10 mg tablet TAKE 1 TABLET BY MOUTH 3 TIMES A DAY AS NEEDED FOR MUSCLE SPASM 90 Tab 0    hydrOXYzine HCl (ATARAX) 25 mg tablet TAKE 1 TABLET BY MOUTH EVERY 8 HOURS AS NEEDED FOR ITCHING 90 Tab 1    montelukast (SINGULAIR) 10 mg tablet TAKE 1 TABLET BY MOUTH EVERY DAY 30 Tab 11    levothyroxine (SYNTHROID) 125 mcg tablet Take 1 Tab by mouth Daily (before breakfast). Indications: hypothyroidism 90 Tab 3    NAFTIN 2 % gel Apply 1 Dose to affected area two (2) times a day. Indications: TINEA PEDIS 1 Bottle 2    ALPRAZolam (XANAX) 0.5 mg tablet Take 1 Tab by mouth two (2) times daily as needed for Anxiety or Sleep. Max Daily Amount: 1 mg. Indications: anxiety 60 Tab 2    norethindrone-ethinyl estradiol (JUNEL FE 1/20) 1 mg-20 mcg (21)/75 mg (7) tab Take  by mouth.  terconazole (TERAZOL 7) 0.4 % vaginal cream   6    LOCOID 0.1 % lotn   1    fluticasone (FLONASE) 50 mcg/actuation nasal spray nightly.       valACYclovir (VALTREX) 500 mg tablet   1    terconazole (TERAZOL 3) 80 mg vaginal suppository   4  multivitamin (ONE A DAY) tablet Take 1 tablet by mouth daily.  omeprazole (PRILOSEC) 20 mg capsule TAKE ONE CAPSULE BY MOUTH EVERY DAY 30 Cap 1    PROPYLENE GLYCOL//PF (SYSTANE, PF, OP) Apply  to eye four (4) times daily.  magnesium oxide (MAG-OX) 400 mg tablet Take 400 mg by mouth daily.  Emollient Combination No.32 (EPICERAM) Emul by Apply Externally route. From Dr Monaco Cons.  metroNIDAZOLE (METROGEL) 1 % topical gel Apply  to affected area daily. Use a thin layer to affected areas after washing from Dr Monaco Cons   Indications: ACNE ROSACEA      diclofenac EC (VOLTAREN) 75 mg EC tablet Take 75 mg by mouth two (2) times a day.  latanoprost (XALATAN) 0.005 % ophthalmic solution Administer 1 Drop to both eyes nightly.  OTHER,NON-FORMULARY, Osteo Biflex       omega-3 fatty acids-vitamin e (FISH OIL) 1,000 mg Cap Take 1 Cap by mouth daily.  fexofenadine (ALLEGRA) 180 mg tablet Take  by mouth daily.  LACTOBACILLUS/FOS/PECTIN (PROBIOTIC COMPLEX PO) Take  by mouth daily.  cholecalciferol, vitamin d3, (VITAMIN D) 1,000 unit tablet Take 1,000 Units by mouth daily.  ibuprofen (MOTRIN) 800 mg tablet Take  by mouth every six (6) hours as needed for Pain. PAST MEDICAL HISTORY:    Past Medical History:   Diagnosis Date    Acne rosacea     Dr. Monaco Cons.  Allergy, unspecified not elsewhere classified childhood     Txd with immunotherapy. Dr. Rose Reason Anemia NEC     borderline    Ankle sprain 2007    Right. Dr. Katherine Marshall    Ankle sprain 11/2012    Right. Dr. Maddox Apo.  Asthma childhood    Chickenpox childhood    Chronic low back pain with right-sided sciatica     and SI joint dysfunction. Dr. Kim Rodríguez.  Chronic otitis media     Dr. Rose Reason Dry eye syndrome 2013    Dr. Sue Benedict.  EBV infection 6/27/2011    Hearing loss     Mild high frequency sensorineuronal hearing loss.   Dr. Shabana Gillis Dr. Lorie Rosas murmur     Hernia of abdominal wall 09/2003, 60/9714    umbilical.  Dr. Dowell Gist. Dr. Isabelle Theodore    Hyperglycemia 2013    Hypothyroidism 06/2010    Dr. Otilio Ortega pressure increase 12/2011    Dr. Jeannette Rebolledo. Dr. Luna . Dr. Chacha Rivera.  Knee pain, left 04/2012    Left. Dr. Niyah Nova Measles childhood    Migraine     Mumps childhood    Plantar fasciitis, bilateral 2010    Dr. Da Silva Poster    Sciatica 2008    Right.  with OA. Dr. Violette Spencer Tinnitus of right ear 2012    Dr. Branda Hodgkin:    Past Surgical History:   Procedure Laterality Date   Efraín Erwin  2011    Dr. Gabe Shah.  HAND/FINGER SURGERY UNLISTED  09/2003    Right Index finger repair due to cut    HX HERNIA REPAIR  33/0748    Umbilical.  Dr. Magalys Hines.  HX HERNIA REPAIR  9/23/2011    recurrent umbilical.  Laparoscopy incisional.  Dr. Isabelle Theodore.     HX TONSILLECTOMY  childhood    LAP,CHOLECYSTECTOMY  07/2001       FAMILY HISTORY:    Family History   Problem Relation Age of Onset    Hypertension Mother     Cancer Mother      small cell lung with bone mets to spine/MELANOMA    Cancer Father      melanoma    Arthritis-osteo Father     Colon Polyps Father     Diabetes Maternal Grandmother     Stroke Maternal Grandmother     Seizures Maternal Grandmother     Colon Polyps Maternal Grandmother     Breast Cancer Maternal Grandmother     Heart Disease Paternal Grandmother      heart failure    Heart Attack Paternal Grandmother     Asthma Paternal Grandmother        SOCIAL HISTORY:    Social History     Social History    Marital status: SINGLE     Spouse name: N/A    Number of children: N/A    Years of education: N/A     Social History Main Topics    Smoking status: Never Smoker    Smokeless tobacco: Never Used      Comment: lived with smoker dad and mom then step mom x 20 yrs    Alcohol use 0.0 oz/week Comment: RARE    Drug use: No    Sexual activity: No     Other Topics Concern    None     Social History Narrative       IMMUNIZATIONS:    Immunization History   Administered Date(s) Administered    Influenza Vaccine 10/15/2013, 11/07/2016, 11/03/2017    Influenza Vaccine (Quad) 11/03/2015    Influenza Vaccine (Quad) PF 11/20/2014, 11/07/2016    Influenza Vaccine Split 11/18/2011, 11/07/2012    Influenza Vaccine Whole 10/01/2010    TD Vaccine 09/01/2003    Tdap 11/03/2015         PHYSICAL EXAMINATION    Vital Signs    Visit Vitals    /72 (BP 1 Location: Left arm, BP Patient Position: Sitting)    Pulse 82    Temp 98.4 °F (36.9 °C) (Oral)    Resp 18    Ht 5' 0.9\" (1.547 m)    Wt 313 lb (142 kg)    SpO2 96%    BMI 59.34 kg/m2       Weight Metrics 2/8/2018 12/29/2017 12/22/2017 11/20/2017 11/9/2017 6/29/2017 4/18/2017   Weight 313 lb 318 lb 4.8 oz 321 lb 1.6 oz 312 lb 4.8 oz 312 lb 1.6 oz 300 lb 14.4 oz 290 lb 6.4 oz   BMI 59.34 kg/m2 60.34 kg/m2 60.87 kg/m2 59.05 kg/m2 59.01 kg/m2 56.88 kg/m2 54.87 kg/m2       General appearance - Well nourished. Tired appearing. Sounds hoarse. Well developed. No acute distress. Obese. Head - Normocephalic. Atraumatic. Non tender sinuses x 4. Eyes - pupils equal and reactive. Extraocular eye movements intact. Sclera anicteric. Mildly injected sclera. Glossy eyes. Ears - Hearing is grossly normal bilaterally. Nose - normal and patent. No polyps noted. No erythema. No discharge. Mouth - mucous membranes with adequate moisture. Posterior pharynx normal with cobblestone appearance. No erythema, white exudate or obstruction. Neck - supple. Midline trachea. No carotid bruits noted bilaterally. No thyromegaly noted. Chest - clear to auscultation bilaterally anteriorly and posteriorly. No rales or rhonchi. Breath sounds are symmetrical bilaterally. Unlabored respirations.  Inspiratory and expiratory high pitched wheeze scattered throughout. Heart - normal rate. Regular rhythm. Normal S1, S2. No murmur noted. No rubs, clicks or gallops noted. Abdomen - soft and distended. No masses or organomegaly. No rebound, rigidity or guarding. Bowel sounds normal x 4 quadrants. No tenderness noted. Neurological - awake, alert and oriented to person, place, and time and event. Cranial nerves II through XII intact. Clear speech. Muscle strength is +5/5 x 4 extremities. Sensation is intact to light touch bilaterally. Steady gait. Heme/Lymph - peripheral pulses normal x 4 extremities. No peripheral edema is noted. Psychological -   normal behavior, dress and thought processes. Good insight. Good eye contact. Normal affect. Appropriate mood. Normal speech. DATA REVIEWED    Results for orders placed or performed in visit on 02/08/18   AMB POC RAPID INFLUENZA TEST   Result Value Ref Range    VALID INTERNAL CONTROL POC Yes     QuickVue Influenza test Negative Negative   AMB POC RAPID STREP A   Result Value Ref Range    VALID INTERNAL CONTROL POC Yes     Group A Strep Ag Negative Negative       ASSESSMENT and PLAN      ICD-10-CM ICD-9-CM    1. Moderate persistent asthma with acute exacerbation J45.41 493.92 azithromycin (ZITHROMAX) 250 mg tablet      ALBUTEROL IPRATROP NON-COMP      VT PRESSURIZED/NONPRESSURIZED INHALATION TREATMENT      albuterol-ipratropium (DUO-NEB) 2.5 mg-0.5 mg/3 ml nebu      DISCONTINUED: beclomethasone (QVAR) 80 mcg/actuation aero   2. Sinobronchitis J32.9 473.9 azithromycin (ZITHROMAX) 250 mg tablet    J40 490 ALBUTEROL IPRATROP NON-COMP      VT PRESSURIZED/NONPRESSURIZED INHALATION TREATMENT      albuterol-ipratropium (DUO-NEB) 2.5 mg-0.5 mg/3 ml nebu   3. Viral gastroenteritis A08.4 008.8    4.  Cough R05 786.2 AMB POC RAPID INFLUENZA TEST      benzonatate (TESSALON) 200 mg capsule      ALBUTEROL IPRATROP NON-COMP      VT PRESSURIZED/NONPRESSURIZED INHALATION TREATMENT      albuterol-ipratropium (DUO-NEB) 2.5 mg-0.5 mg/3 ml nebu   5. Sore throat J02.9 462 AMB POC RAPID STREP A   6. Weight loss R63.4 783.21     8# since 12/2017 due to efforts       Discussed the patient's BMI with her. The BMI follow up plan is as follows: I have counseled this patient on diet and exercise regimens. Increase water intake. Stick to bland diet. Avoid dairy and sugar to thin mucus. Gargle with warm saltwater and add tsp of vinegar. Get at least 7-8 hours of sleep nightly and extra hour of rest while feeling poorly. Chart reviewed and updated. Continue current medications and care. Nebulizer treatment prior to leaving office. Take Z-pack; cautioned pt not to take with Atarax. Take Tessalon Perles 200 mg up to TID. Continue with Singulair. Start Qvar 80 mcg 2 puffs BID and rinse mouth after use (sample given). Notify office if still needing Albuterol more than twice weekly. Prescriptions written and sent to pharmacy; medication side effects discussed. Zithromax 250 mg. Tessalon Perles 200 mg. Most recent tests reviewed. Counseled patient on health concerns:  URI, diarrhea, and asthma. Congestion protocol. Relevant handouts given and discussed with patient. Immunizations noted. Offered empathy, support, legitimation, prayers, partnership to patient. Praised patient for progress. Work excuse given until 02/12/18. Follow-up Disposition:  Return in about 1 week (around 2/15/2018) for asthma. Patient was offered a choice/choices in the treatment plan today. Patient expresses understanding of the plan and agrees with recommendations. Written by tracy Castrejon, as dictated by Dr. Lynnette Do DO. Documentation True and Accepted by Jonah Wolfe. Estee Ruiz. Patient Instructions          Diarrhea: Care Instructions  Your Care Instructions    Diarrhea is loose, watery stools (bowel movements). The exact cause is often hard to find.  Sometimes diarrhea is your body's way of getting rid of what caused an upset stomach. Viruses, food poisoning, and many medicines can cause diarrhea. Some people get diarrhea in response to emotional stress, anxiety, or certain foods. Almost everyone has diarrhea now and then. It usually isn't serious, and your stools will return to normal soon. The important thing to do is replace the fluids you have lost, so you can prevent dehydration. The doctor has checked you carefully, but problems can develop later. If you notice any problems or new symptoms, get medical treatment right away. Follow-up care is a key part of your treatment and safety. Be sure to make and go to all appointments, and call your doctor if you are having problems. It's also a good idea to know your test results and keep a list of the medicines you take. How can you care for yourself at home? · Watch for signs of dehydration, which means your body has lost too much water. Dehydration is a serious condition and should be treated right away. Signs of dehydration are:  ¨ Increasing thirst and dry eyes and mouth. ¨ Feeling faint or lightheaded. ¨ Darker urine, and a smaller amount of urine than normal.  · To prevent dehydration, drink plenty of fluids, enough so that your urine is light yellow or clear like water. Choose water and other caffeine-free clear liquids until you feel better. If you have kidney, heart, or liver disease and have to limit fluids, talk with your doctor before you increase the amount of fluids you drink. · Begin eating small amounts of mild foods the next day, if you feel like it. ¨ Try yogurt that has live cultures of Lactobacillus. (Check the label.)  ¨ Avoid spicy foods, fruits, alcohol, and caffeine until 48 hours after all symptoms are gone. ¨ Avoid chewing gum that contains sorbitol. ¨ Avoid dairy products (except for yogurt with Lactobacillus) while you have diarrhea and for 3 days after symptoms are gone.   · The doctor may recommend that you take over-the-counter medicine, such as loperamide (Imodium), if you still have diarrhea after 6 hours. Read and follow all instructions on the label. Do not use this medicine if you have bloody diarrhea, a high fever, or other signs of serious illness. Call your doctor if you think you are having a problem with your medicine. When should you call for help? Call 911 anytime you think you may need emergency care. For example, call if:  ? · You passed out (lost consciousness). ? · Your stools are maroon or very bloody. ?Call your doctor now or seek immediate medical care if:  ? · You are dizzy or lightheaded, or you feel like you may faint. ? · Your stools are black and look like tar, or they have streaks of blood. ? · You have new or worse belly pain. ? · You have symptoms of dehydration, such as:  ¨ Dry eyes and a dry mouth. ¨ Passing only a little dark urine. ¨ Feeling thirstier than usual.   ? · You have a new or higher fever. ? Watch closely for changes in your health, and be sure to contact your doctor if:  ? · Your diarrhea is getting worse. ? · You see pus in the diarrhea. ? · You are not getting better after 2 days (48 hours). Where can you learn more? Go to http://alejandro-eddie.info/. Enter S705 in the search box to learn more about \"Diarrhea: Care Instructions. \"  Current as of: March 20, 2017  Content Version: 11.4  © 0259-9355 [employer]. Care instructions adapted under license by [employer] (which disclaims liability or warranty for this information). If you have questions about a medical condition or this instruction, always ask your healthcare professional. [place] [address] any warranty or liability for your use of this information. Oral Rehydration: Care Instructions  Your Care Instructions    Dehydration occurs when your body loses too much water.  This can happen if you do not drink enough fluids or lose a lot of fluid due to diarrhea, vomiting, or sweating. Being dehydrated can cause health problems and can even be life-threatening. To replace lost fluids, you need to drink liquid that contains special chemicals called electrolytes. Electrolytes keep your body working well. Plain water does not have electrolytes. You also need to rest to prevent more fluid loss. Replacing water and electrolytes (oral rehydration) completely takes about 36 hours. But you should feel better within a few hours. Follow-up care is a key part of your treatment and safety. Be sure to make and go to all appointments, and call your doctor if you are having problems. It's also a good idea to know your test results and keep a list of the medicines you take. How can you care for yourself at home? · Take frequent sips of a drink such as Gatorade, Powerade, or other rehydration drinks that your doctor suggests. These replace both fluid and important chemicals (electrolytes) you need for balance in your blood. · Drink 2 quarts of cool liquid over 2 to 4 hours. You should have at least 10 glasses of liquid a day to replace lost fluid. If you have kidney, heart, or liver disease and have to limit fluids, talk with your doctor before you increase the amount of fluids you drink. · Make your own drink. Measure everything carefully. The drink may not work well or may even be harmful if the amounts are off. ¨ 1 quart water  ¨ ½ teaspoon salt  ¨ 6 teaspoons sugar  · Do not drink liquid with caffeine, such as coffee and monique. · Do not drink any alcohol. It can make you dehydrated. · Drink plenty of fluids, enough so that your urine is light yellow or clear like water. If you have kidney, heart, or liver disease and have to limit fluids, talk with your doctor before you increase the amount of fluids you drink. When should you call for help? Call 911 anytime you think you may need emergency care.  For example, call if:  ? · You have signs of severe dehydration, such as:  ¨ You are confused or unable to stay awake. ¨ You passed out (lost consciousness). ?Call your doctor now or seek immediate medical care if:  ? · You still have signs of dehydration. You have sunken eyes and a dry mouth, and you pass only a little dark urine. ? · You are dizzy or lightheaded, or you feel like you may faint. ? · You are not able to keep down fluids. ? Watch closely for changes in your health, and be sure to contact your doctor if:  ? · You do not get better as expected. Where can you learn more? Go to http://alejandro-eddie.info/. Enter I040 in the search box to learn more about \"Oral Rehydration: Care Instructions. \"  Current as of: March 20, 2017  Content Version: 11.4  © 5654-6711 [employer]. Care instructions adapted under license by [employer] (which disclaims liability or warranty for this information). If you have questions about a medical condition or this instruction, always ask your healthcare professional. [address] any warranty or liability for your use of this information. Asthma: Your Action Plan  Sample Action Plan    Controller medicine action plan  Fill in the blank spaces and boxes that apply for all sections. · Name of your controller medicine:  ¨ ____________________________________________  · How much of this medicine do you take? ¨ ____________________________________________  · How often do you take this medicine? ¨ ____________________________________________  · Other instructions? ¨ ____________________________________________  Quick-relief medicine action plan  · Name of your quick-relief medicine:  ¨ ____________________________________________  · How much of this medicine do you take? ¨ ____________________________________________  · How often do you take this medicine? ¨ ____________________________________________  Asthma Zones  GREEN ZONE: This is where you want to be!   Jose Ac zone symptoms  · You have no shortness of breath or chest tightness. You are not coughing or wheezing. · You can do all of your usual activities. · You sleep well at night. Green zone peak flow (if you use a peak flow meter)  · ______ or more (80% or more of your personal best)  Green zone actions (Check the boxes and fill in the blank spaces that apply.)  [ ] You take your controller medicine(s) every day. [ ] Kamilla Rivera are staying away from your asthma triggers. [ ] You take quick-relief medicine (called _____________________) ______ minutes before exercise. YELLOW ZONE: Your asthma is getting worse. Yellow zone symptoms  · You are short of breath or have chest tightness. You are coughing or wheezing. · You have symptoms that keep you up at night. · You can do some, but not all, of your usual activities. Yellow zone peak flow (if you use a peak flow meter)  · ______ to ______ (50% to 79% of your personal best)  Yellow zone actions (Check the boxes and fill in the blank spaces that apply.)  [ ] Take _____ puff(s) of quick-relief medicine called ______________________. Repeat _____ times. [ ] If your symptoms don't get better or your peak flow has not returned to the green zone in 1 hour, then:  · [ ] Take _____ puff(s) of medicine called ______________________. Take it ____ times a day. · [ ] Begin or increase treatment with corticosteroid pills. Take ______ mg of medicine called ____________________________ every __________. · [ ] Call your doctor at this number: ____________________. RED ZONE: Danger! Red zone symptoms  · You are very short of breath. · You can't do your usual activities. · Quick-relief medicine doesn't help. Or your symptoms don't get better after 24 hours in the yellow zone.   Red zone peak flow (if you use a peak flow meter)  · Less than _______ (less than 50% of your personal best)  Red zone actions (Check the boxes and fill in the blank spaces that apply.)  [ ] Take _____ puff(s) of quick-relief medicine called ____________________________. Repeat ______ times. [ ] Begin or increase treatment with corticosteroid pills. Take ________ mg now. [ ] Call your doctor at this number: _________________. If you can't contact your doctor, go to the emergency department. Call 911 or ___________________. [ ] Other numbers you might call are: ___________________________________. When should you call for help? Call 911 anytime you think you may need emergency care. For example, call if:  · You have severe trouble breathing. Call your doctor now or seek immediate medical care if:  · You are in the red zone of your asthma action plan. · You've used your quick-relief medicine but are still having trouble breathing. · You cough up blood. · You have new or worse trouble breathing. · You cough up dark brown or bloody mucus (sputum). Watch closely for changes in your health, and be sure to contact your doctor if:  · You need to use quick-relief medicine more than 2 days each week (unless it's just for exercise). · Your coughing and wheezing get worse. Follow-up care is a key part of your treatment and safety. Be sure to make and go to all appointments, and call your doctor if you are having problems. It's also a good idea to know your test results and keep a list of the medicines you take. Where can you learn more? Go to http://alejandro-eddie.info/. Enter [phone] in the search box to learn more about \"Asthma: Your Action Plan. \"  Current as of: May 12, 2017  Content Version: 11.4  © 2428-8998 [employer]. Care instructions adapted under license by [employer] (which disclaims liability or warranty for this information). If you have questions about a medical condition or this instruction, always ask your healthcare professional. [address] any warranty or liability for your use of this information.        Asthma: Your Action Plan  Sample Action Plan    Controller medicine action plan  Fill in the blank spaces and boxes that apply for all sections. · Name of your controller medicine:  ¨ ____________________________________________  · How much of this medicine do you take? ¨ ____________________________________________  · How often do you take this medicine? ¨ ____________________________________________  · Other instructions? ¨ ____________________________________________  Quick-relief medicine action plan  · Name of your quick-relief medicine:  ¨ ____________________________________________  · How much of this medicine do you take? ¨ ____________________________________________  · How often do you take this medicine? ¨ ____________________________________________  Asthma Zones  GREEN ZONE: This is where you want to be! Green zone symptoms  · You have no shortness of breath or chest tightness. You are not coughing or wheezing. · You can do all of your usual activities. · You sleep well at night. Green zone peak flow (if you use a peak flow meter)  · ______ or more (80% or more of your personal best)  Green zone actions (Check the boxes and fill in the blank spaces that apply.)  [ ] You take your controller medicine(s) every day. [ ] [facility] are staying away from your asthma triggers. [ ] You take quick-relief medicine (called _____________________) ______ minutes before exercise. YELLOW ZONE: Your asthma is getting worse. Yellow zone symptoms  · You are short of breath or have chest tightness. You are coughing or wheezing. · You have symptoms that keep you up at night. · You can do some, but not all, of your usual activities. Yellow zone peak flow (if you use a peak flow meter)  · ______ to ______ (50% to 79% of your personal best)  Yellow zone actions (Check the boxes and fill in the blank spaces that apply.)  [ ] Take _____ puff(s) of quick-relief medicine called ______________________. Repeat _____ times.   [ ] If your symptoms don't get better or your peak flow has not returned to the green zone in 1 hour, then:  · [ ] Take _____ puff(s) of medicine called ______________________. Take it ____ times a day. · [ ] Begin or increase treatment with corticosteroid pills. Take ______ mg of medicine called ____________________________ every __________. · [ ] Call your doctor at this number: ____________________. RED ZONE: Danger! Red zone symptoms  · You are very short of breath. · You can't do your usual activities. · Quick-relief medicine doesn't help. Or your symptoms don't get better after 24 hours in the yellow zone. Red zone peak flow (if you use a peak flow meter)  · Less than _______ (less than 50% of your personal best)  Red zone actions (Check the boxes and fill in the blank spaces that apply.)  [ ] Take _____ puff(s) of quick-relief medicine called ____________________________. Repeat ______ times. [ ] Begin or increase treatment with corticosteroid pills. Take ________ mg now. [ ] Call your doctor at this number: _________________. If you can't contact your doctor, go to the emergency department. Call 911 or ___________________. [ ] Other numbers you might call are: ___________________________________. When should you call for help? Call 911 anytime you think you may need emergency care. For example, call if:  · You have severe trouble breathing. Call your doctor now or seek immediate medical care if:  · You are in the red zone of your asthma action plan. · You've used your quick-relief medicine but are still having trouble breathing. · You cough up blood. · You have new or worse trouble breathing. · You cough up dark brown or bloody mucus (sputum). Watch closely for changes in your health, and be sure to contact your doctor if:  · You need to use quick-relief medicine more than 2 days each week (unless it's just for exercise). · Your coughing and wheezing get worse.   Follow-up care is a key part of your treatment and safety. Be sure to make and go to all appointments, and call your doctor if you are having problems. It's also a good idea to know your test results and keep a list of the medicines you take. Where can you learn more? Go to http://alejandro-eddie.info/. Enter [phone] in the search box to learn more about \"Asthma: Your Action Plan. \"  Current as of: May 12, 2017  Content Version: 11.4  © 5024-5551 [employer], [employer]. Care instructions adapted under license by [employer] (which disclaims liability or warranty for this information). If you have questions about a medical condition or this instruction, always ask your healthcare professional. [address] any warranty or liability for your use of this information.

## 2018-02-08 NOTE — LETTER
NOTIFICATION RETURN TO WORK / SCHOOL 
 
2/8/2018 1:10 PM 
 
Ms. Santa Mac [address] [address] To Whom It May Concern: 
 
Santa Mac is currently under the care of Derek Cox. She will return to work/school on: 02/12/18 due to illness. Please also excuse her for 02/05/18 and 02/06/18 when her symptoms began. If there are questions or concerns please have the patient contact our office.  
 
 
 
Sincerely, 
 
 
Gomez Cope, DO

## 2018-02-20 ENCOUNTER — TELEPHONE (OUTPATIENT)
Dept: FAMILY MEDICINE CLINIC | Age: 48
End: 2018-02-20

## 2018-02-21 ENCOUNTER — OFFICE VISIT (OUTPATIENT)
Dept: FAMILY MEDICINE CLINIC | Age: 48
End: 2018-02-21

## 2018-02-21 VITALS
TEMPERATURE: 98.7 F | OXYGEN SATURATION: 96 % | WEIGHT: 293 LBS | RESPIRATION RATE: 12 BRPM | HEIGHT: 61 IN | SYSTOLIC BLOOD PRESSURE: 113 MMHG | BODY MASS INDEX: 55.32 KG/M2 | DIASTOLIC BLOOD PRESSURE: 66 MMHG | HEART RATE: 74 BPM

## 2018-02-21 DIAGNOSIS — T38.7X5D: ICD-10-CM

## 2018-02-21 DIAGNOSIS — J30.9 ALLERGIC CONJUNCTIVITIS AND RHINITIS, BILATERAL: ICD-10-CM

## 2018-02-21 DIAGNOSIS — R53.83 OTHER FATIGUE: ICD-10-CM

## 2018-02-21 DIAGNOSIS — E03.4 HYPOTHYROIDISM DUE TO ACQUIRED ATROPHY OF THYROID: ICD-10-CM

## 2018-02-21 DIAGNOSIS — Z88.2 ALLERGY TO SULFA DRUGS: ICD-10-CM

## 2018-02-21 DIAGNOSIS — Z88.0 PENICILLIN ALLERGY: ICD-10-CM

## 2018-02-21 DIAGNOSIS — H10.13 ALLERGIC CONJUNCTIVITIS AND RHINITIS, BILATERAL: ICD-10-CM

## 2018-02-21 DIAGNOSIS — J45.41 MODERATE PERSISTENT ASTHMA WITH ACUTE EXACERBATION: Primary | ICD-10-CM

## 2018-02-21 DIAGNOSIS — H91.8X1 OTHER SPECIFIED HEARING LOSS OF RIGHT EAR, UNSPECIFIED HEARING STATUS ON CONTRALATERAL SIDE: ICD-10-CM

## 2018-02-21 DIAGNOSIS — J01.01 ACUTE RECURRENT MAXILLARY SINUSITIS: ICD-10-CM

## 2018-02-21 RX ORDER — NORETHINDRONE ACETATE AND ETHINYL ESTRADIOL, AND FERROUS FUMARATE 1.5-30(21)
KIT ORAL
COMMUNITY
Start: 2018-02-09 | End: 2018-02-21 | Stop reason: SDUPTHER

## 2018-02-21 RX ORDER — DOXYCYCLINE 100 MG/1
100 TABLET ORAL 2 TIMES DAILY
Qty: 14 TAB | Refills: 0 | Status: SHIPPED | OUTPATIENT
Start: 2018-02-21 | End: 2018-02-21 | Stop reason: ALTCHOICE

## 2018-02-21 RX ORDER — ALBUTEROL SULFATE 0.83 MG/ML
5 SOLUTION RESPIRATORY (INHALATION)
Qty: 24 EACH | Refills: 1 | Status: SHIPPED | OUTPATIENT
Start: 2018-02-21 | End: 2020-11-27 | Stop reason: SDUPTHER

## 2018-02-21 RX ORDER — AZITHROMYCIN 250 MG/1
TABLET, FILM COATED ORAL
Qty: 6 TAB | Refills: 0 | Status: SHIPPED | OUTPATIENT
Start: 2018-02-21 | End: 2018-02-26

## 2018-02-21 RX ORDER — BUDESONIDE 1 MG/2ML
1000 INHALANT ORAL 2 TIMES DAILY
Qty: 1 EACH | Refills: 1 | Status: SHIPPED | OUTPATIENT
Start: 2018-02-21 | End: 2021-12-02 | Stop reason: ALTCHOICE

## 2018-02-21 RX ORDER — AZELASTINE 1 MG/ML
1 SPRAY, METERED NASAL 2 TIMES DAILY
Qty: 1 BOTTLE | Refills: 5 | Status: SHIPPED | OUTPATIENT
Start: 2018-02-21 | End: 2018-10-15 | Stop reason: SDUPTHER

## 2018-02-21 RX ORDER — NEBULIZER AND COMPRESSOR
1 EACH MISCELLANEOUS
Qty: 1 EACH | Refills: 0 | Status: SHIPPED | OUTPATIENT
Start: 2018-02-21

## 2018-02-21 NOTE — MR AVS SNAPSHOT
[facility] 
 
 
 [address] [facility] [address] 
[address] [facility] [address] 
[phone] Patient: Laura Murphy MRN:  [id number] Visit Information Date & Time Provider Department Dept. Phone Encounter #  
 2018  8:30 AM Boo JasonDO [address] [phone] [id number] Upcoming Health Maintenance Date Due  
 BREAST CANCER SCRN MAMMOGRAM 2018 PAP AKA CERVICAL CYTOLOGY 11/10/2019 DTaP/Tdap/Td series (2 - Td) 11/3/2025 Allergies as of 2018  Review Complete On: 2018 By: Valery Byrd Severity Noted Reaction Type Reactions Latex  2014    Rash Other Medication High 2009    Hives, Rash SUN Pcn [Penicillins] High 2009    Hives Prednisone High 2009    Hives Sulfa (Sulfonamide Antibiotics) High 2009    Hives Garamycin [Gentamicin]  2009    Other (comments) Itchy eyes due to sulfate Metformin  2015    Diarrhea Current Immunizations  Reviewed on 2017 Name Date Influenza Vaccine 11/3/2017, 2016, 10/15/2013 Influenza Vaccine [place]) 11/3/2015 Influenza Vaccine (Quad) PF 2016, 2014 Influenza Vaccine Split 2012, 2011 Influenza Vaccine Whole 10/1/2010 TD Vaccine 2003 Tdap 11/3/2015 Not reviewed this visit You Were Diagnosed With   
  
 Codes Comments Moderate persistent asthma with acute exacerbation    -  Primary ICD-10-CM: J45.41 
ICD-9-CM: 493.92 improving Allergic conjunctivitis and rhinitis, bilateral     ICD-10-CM: H10.13, J30.9 ICD-9-CM: 372.05, 477.9 Chronic allergic rhinitis due to other allergic trigger, unspecified seasonality     ICD-10-CM: J30.89 ICD-9-CM: 477.8 Other specified hearing loss of right ear, unspecified hearing status on contralateral side     ICD-10-CM: H91.8X1 ICD-9-CM: 389.8 Right  Other fatigue     ICD-10-CM: R53.83 
 ICD-9-CM: 780.79 due to Allergies vs Asthma vs other Hypothyroidism due to acquired atrophy of thyroid     ICD-10-CM: E03.4 ICD-9-CM: 244.8, 246.8 Allergy to sulfa drugs     ICD-10-CM: Z88.2 ICD-9-CM: V14.2 Penicillin allergy     ICD-10-CM: Z88.0 ICD-9-CM: V14.0 Adverse effect of anabolic steroid, subsequent encounter     ICD-10-CM: T38.7X5D ICD-9-CM: V58.89 Vitals BP Pulse Temp Resp Height(growth percentile) Weight(growth percentile) 113/66 (BP 1 Location: Right arm, BP Patient Position: Sitting) 74 98.7 °F (37.1 °C) (Oral) 12 5' 0.9\" (1.547 m) 314 lb 14.4 oz (142.8 kg) LMP SpO2 BMI OB Status Smoking Status 02/14/2018 (Approximate) 96% 59.7 kg/m2 Having regular periods Never Smoker Vitals History BMI and BSA Data Body Mass Index Body Surface Area 59.7 kg/m 2 2.48 m 2 Preferred Pharmacy Pharm[facility] Name Phone [id number] [phone] Your Updated Medication List  
  
   
This list is accurate as of 2/21/18  9:15 AM.  Always use your most recent med list.  
  
  
  
  
 * albuterol 90 mcg/actuation inhaler Commonly known as:  PROVENTIL HFA, VENTOLIN HFA, PROAIR HFA Take 2 Puffs by inhalation every four (4) hours as needed for Wheezing. Indications: BRONCHOSPASM PREVENTION  
  
 * albuterol 2.5 mg /3 mL (0.083 %) nebulizer solution Commonly known as:  PROVENTIL VENTOLIN  
6 mL by Nebulization route every four (4) hours as needed for Wheezing or Shortness of Breath. Indications: Acute Asthma Attack ALLEGRA 180 mg tablet Generic drug:  fexofenadine Take  by mouth daily. ALPRAZolam 0.5 mg tablet Commonly known as:  Winnie Feather Take 1 Tab by mouth two (2) times daily as needed for Anxiety or Sleep. Max Daily Amount: 1 mg. Indications: anxiety  
  
 azelastine 137 mcg (0.1 %) nasal spray Commonly known as:  ASTELIN  
 1 [place] by Both Nostrils route two (2) times a day. Indications: SEASONAL ALLERGIC RHINITIS  
  
 budesonide 1 mg/2 mL Nbsp Commonly known as:  PULMICORT  
2 mL by Nebulization route two (2) times a day. Indications: MAINTENANCE THERAPY FOR ASTHMA  
  
 butalbital-acetaminophen-caffeine -40 mg per tablet Commonly known as:  FIORICET, ESGIC  
TAKE 1 TAB BY MOUTH EVERY SIX (6) HOURS AS NEEDED FOR PAIN OR HEADACHE. chlorpheniramine-HYDROcodone 10-8 mg/5 mL suspension Commonly known as:  Avel Plants Take 5 mL by mouth every twelve (12) hours as needed for Cough. Max Daily Amount: 10 mL. Indications: Cough  
  
 cyclobenzaprine 10 mg tablet Commonly known as:  FLEXERIL  
TAKE 1 TABLET BY MOUTH 3 TIMES A DAY AS NEEDED FOR MUSCLE SPASM  
  
 diclofenac EC 75 mg EC tablet Commonly known as:  VOLTAREN Take 75 mg by mouth two (2) times a day. doxycycline 100 mg tablet Commonly known as:  ADOXA Take 1 Tab by mouth two (2) times a day for 7 days. EPICERAM Emul Generic drug:  Emollient Combination No.32  
by Apply Externally route. From Dr Ru Dawkins. FISH OIL 1,000 mg Cap Generic drug:  omega-3 fatty acids-vitamin e Take 1 Cap by mouth daily. hydrOXYzine HCl 25 mg tablet Commonly known as:  ATARAX TAKE 1 TABLET BY MOUTH EVERY 8 HOURS AS NEEDED FOR ITCHING  
  
 ipratropium 17 mcg/actuation inhaler Commonly known as:  ATROVENT HFA Take 2 Puffs by inhalation every four (4) hours as needed for Wheezing. Indications: MAINTENANCE THERAPY FOR ASTHMA  
  
 latanoprost 0.005 % ophthalmic solution Commonly known as:  Barry Bloch Administer 1 Drop to both eyes nightly. levothyroxine 125 mcg tablet Commonly known as:  SYNTHROID Take 1 Tab by mouth Daily (before breakfast). Indications: hypothyroidism LOCOID 0.1 % Lotn Generic drug:  Hydrocortisone Butyrate  
  
 magnesium oxide 400 mg tablet Commonly known as:  MAG-OX Take 400 mg by mouth daily. METROGEL 1 % topical gel Generic drug:  metroNIDAZOLE Apply  to affected area daily. Use a thin layer to affected areas after washing from Dr Massiel Johnson   Indications: ACNE ROSACEA  
  
 montelukast 10 mg tablet Commonly known as:  SINGULAIR  
TAKE 1 TABLET BY MOUTH EVERY DAY  
  
 MOTRIN 800 mg tablet Generic drug:  ibuprofen Take  by mouth every six (6) hours as needed for Pain.  
  
 multivitamin tablet Commonly known as:  ONE A DAY Take 1 tablet by mouth daily. NAFTIN 2 % Gel Generic drug:  naftifine Apply 1 Dose to affected area two (2) times a day. Indications: TINEA PEDIS Nebulizer & Compressor machine 1 Each by Other route four (4) times daily as needed. norethindrone-ethinyl estradiol 1 mg-20 mcg (21)/75 mg (7) Tab Commonly known as:  Margrett Neena FE  Take  by mouth. omeprazole 20 mg capsule Commonly known as:  PRILOSEC  
TAKE ONE CAPSULE BY MOUTH EVERY DAY  
  
 OTHER(NON-FORMULARY) Osteo Biflex PROBIOTIC COMPLEX PO Take  by mouth daily. SYSTANE (PF) OP Apply  to eye four (4) times daily. * terconazole 80 mg vaginal suppository Commonly known as:  TERAZOL 3  
  
 * terconazole 0.4 % vaginal cream  
Commonly known as:  TERAZOL 7  
  
 valACYclovir 500 mg tablet Commonly known as:  VALTREX  
  
 VITAMIN D3 1,000 unit tablet Generic drug:  cholecalciferol Take 1,000 Units by mouth daily. * Notice: This list has 4 medication(s) that are the same as other medications prescribed for you. Read the directions carefully, and ask your doctor or other care provider to review them with you. Prescriptions Printed Refills Nebulizer & Compressor machine 0 Si Each by Other route four (4) times daily as needed. Class: Print Route: Other Prescriptions Sent to Pharmacy Refills  
 azelastine (ASTELIN) 137 mcg (0.1 %) nasal spray 5 Si [place] by Both Nostrils route two (2) times a day. Indications: SEASONAL ALLERGIC RHINITIS Class: Normal  
 [facility]  Ph #: [phone] Route: Both Nostrils  
 albuterol (PROVENTIL VENTOLIN) 2.5 mg /3 mL (0.083 %) nebulizer solution 1 Si mL by Nebulization route every four (4) hours as needed for Wheezing or Shortness of Breath. Indications: Acute Asthma Attack Class: Normal  
 [facility]  Ph #: [phone] Route: Nebulization  
 budesonide (PULMICORT) 1 mg/2 mL nbsp 1 Si mL by Nebulization route two (2) times a day. Indications: MAINTENANCE THERAPY FOR ASTHMA Class: Normal  
 [facility]  Ph #: [phone] Route: Nebulization  
 doxycycline (ADOXA) 100 mg tablet 0 Sig: Take 1 Tab by mouth two (2) times a day for 7 days. Class: Normal  
 [facility]  Ph #: [phone] Route: Oral  
  
Patient Instructions Use Albuterol neb treatments every 4 hours and use Pulmicort neb treatments twice a day for three days. After 3 days, reduce Pulmicort to once daily. After 3 days, reduce to as needed. Introducing [facility]! Dear Vinicius Richard: Thank you for requesting a BigTw[employer] account. Our records indicate that you already have an active BigTwist account. You can access your account anytime at [email or web address] Did you know that you can access your hospital and ER discharge instructions at any time in BigTwist? You can also review all of your test results from your hospital stay or ER visit. Additional Information If you have questions, please visit the Frequently Asked Questions section of the [email or web address] website at [email or web address] Remember, [employer] is NOT to be used for urgent needs. For medical emergencies, dial 911. Now available from your iPhone and Android! Please provide this summary of care documentation to your next provider. Your primary care clinician is listed as Sherly Neves. If you have any questions after today's visit, please call [phone].

## 2018-02-21 NOTE — PATIENT INSTRUCTIONS
Use Albuterol neb treatments every 4 hours and use Pulmicort neb treatments twice a day for three days. After 3 days, reduce Pulmicort to once daily. After 3 days, reduce to as needed. If unable to get Pulmicort then take [facility] 2 puffs twice a day; rinse mouth after use. Take Z-theodore and continue with nasal sprays.

## 2018-02-21 NOTE — PROGRESS NOTES
HISTORY OF PRESENT ILLNESS  Edwin Bell is a 50 y.o. female presents with Asthma (f/u); Cough (f/u); Ear Pain; and Medication Refill    Agree with nurse note. Pt with asthma and allergies. At her last visit on 02/08/18, she was rx'd Z-theodore, Tessalon Perles, and Qvar. She was also given a breathing treatment before she left and felt that was very helpful. She currently is using Qvar 2 puffs BID and Albuterol BID-TID. She has R ear pain since Sunday and feels it is \"closed up. \" Her productive cough is mainly white with occasional yellow. Patient denies fever, chills,  dizziness, sore throat, headache, itchy or watery eyes, chest pain or tightness, SOB, GI symptoms, bladder symptoms and body aches. Pt with hypothyroidism presents to the office with a BP of 113/66. She weighs 314 lbs, up 1 lb since 02/08/18. Written by tracy Davis, as dictated by Dr. Alexis Peres DO.    ROS    Review of Systems negative except as noted above in HPI. ALLERGIES:    Allergies   Allergen Reactions    Latex Rash    Other Medication Hives and Rash     SUN    Pcn [Penicillins] Hives    Prednisone Hives    Sulfa (Sulfonamide Antibiotics) Hives    Garamycin [Gentamicin] Other (comments)     Itchy eyes due to sulfate    Metformin Diarrhea       CURRENT MEDICATIONS:    Outpatient Prescriptions Marked as Taking for the 2/21/18 encounter (Office Visit) with Ximena Morris DO   Medication Sig Dispense Refill    azelastine (ASTELIN) 137 mcg (0.1 %) nasal spray 1 [place] by Both Nostrils route two (2) times a day. Indications: SEASONAL ALLERGIC RHINITIS 1 Bottle 5    Nebulizer & Compressor machine 1 Each by Other route four (4) times daily as needed. 1 Each 0    albuterol (PROVENTIL VENTOLIN) 2.5 mg /3 mL (0.083 %) nebulizer solution 6 mL by Nebulization route every four (4) hours as needed for Wheezing or Shortness of Breath.  Indications: Acute Asthma Attack 24 Each 1    budesonide (PULMICORT) 1 mg/2 mL nbsp 2 mL by Nebulization route two (2) times a day. Indications: MAINTENANCE THERAPY FOR ASTHMA 1 Each 1    chlorpheniramine-HYDROcodone (TUSSIONEX) 10-8 mg/5 mL suspension Take 5 mL by mouth every twelve (12) hours as needed for Cough. Max Daily Amount: 10 mL. Indications: Cough 200 mL 0    albuterol (PROVENTIL HFA, VENTOLIN HFA, PROAIR HFA) 90 mcg/actuation inhaler Take 2 Puffs by inhalation every four (4) hours as needed for Wheezing. Indications: BRONCHOSPASM PREVENTION 1 Inhaler 5    butalbital-acetaminophen-caffeine (FIORICET, ESGIC) -40 mg per tablet TAKE 1 TAB BY MOUTH EVERY SIX (6) HOURS AS NEEDED FOR PAIN OR HEADACHE. 40 Tab 0    cyclobenzaprine (FLEXERIL) 10 mg tablet TAKE 1 TABLET BY MOUTH 3 TIMES A DAY AS NEEDED FOR MUSCLE SPASM 90 Tab 0    hydrOXYzine HCl (ATARAX) 25 mg tablet TAKE 1 TABLET BY MOUTH EVERY 8 HOURS AS NEEDED FOR ITCHING 90 Tab 1    montelukast (SINGULAIR) 10 mg tablet TAKE 1 TABLET BY MOUTH EVERY DAY 30 Tab 11    levothyroxine (SYNTHROID) 125 mcg tablet Take 1 Tab by mouth Daily (before breakfast). Indications: hypothyroidism 90 Tab 3    NAFTIN 2 % gel Apply 1 Dose to affected area two (2) times a day. Indications: TINEA PEDIS 1 Bottle 2    ALPRAZolam (XANAX) 0.5 mg tablet Take 1 Tab by mouth two (2) times daily as needed for Anxiety or Sleep. Max Daily Amount: 1 mg. Indications: anxiety 60 Tab 2    norethindrone-ethinyl estradiol (JUNEL FE 1/20) 1 mg-20 mcg (21)/75 mg (7) tab Take  by mouth.  terconazole (TERAZOL 7) 0.4 % vaginal cream   6    LOCOID 0.1 % lotn   1    valACYclovir (VALTREX) 500 mg tablet   1    terconazole (TERAZOL 3) 80 mg vaginal suppository   4    multivitamin (ONE A DAY) tablet Take 1 tablet by mouth daily.  omeprazole (PRILOSEC) 20 mg capsule TAKE ONE CAPSULE BY MOUTH EVERY DAY 30 Cap 1    PROPYLENE GLYCOL//PF (SYSTANE, PF, OP) Apply  to eye four (4) times daily.       magnesium oxide (MAG-OX) 400 mg tablet Take 400 mg by mouth daily.  Emollient Combination No.32 (EPICERAM) Emul by Apply Externally route. From Dr Ru Dawkins.  metroNIDAZOLE (METROGEL) 1 % topical gel Apply  to affected area daily. Use a thin layer to affected areas after washing from Dr Ru Dawkins   Indications: ACNE ROSACEA      diclofenac EC (VOLTAREN) 75 mg EC tablet Take 75 mg by mouth two (2) times a day.  latanoprost (XALATAN) 0.005 % ophthalmic solution Administer 1 Drop to both eyes nightly.  OTHER,NON-FORMULARY, Osteo Biflex       omega-3 fatty acids-vitamin e (FISH OIL) 1,000 mg Cap Take 1 Cap by mouth daily.  fexofenadine (ALLEGRA) 180 mg tablet Take  by mouth daily.  LACTOBACILLUS/FOS/PECTIN (PROBIOTIC COMPLEX PO) Take  by mouth daily.  cholecalciferol, vitamin d3, (VITAMIN D) 1,000 unit tablet Take 1,000 Units by mouth daily.  ibuprofen (MOTRIN) 800 mg tablet Take  by mouth every six (6) hours as needed for Pain. PAST MEDICAL HISTORY:    Past Medical History:   Diagnosis Date    Acne rosacea     Dr. Ru Dawkins.  Allergy, unspecified not elsewhere classified childhood     Txd with immunotherapy. Dr. Uri Mojica Anemia NEC     borderline    Ankle sprain 2007    Right. Dr. Erickson Matias    Ankle sprain 11/2012    Right. Dr. Codey Polanco.  Asthma childhood    Chickenpox childhood    Chronic low back pain with right-sided sciatica     and SI joint dysfunction. Dr. Clayton Colindres.  Chronic otitis media     Dr. Uri Mojica Dry eye syndrome 2013    Dr. Radha Cabello.  EBV infection 6/27/2011    Hearing loss     Mild high frequency sensorineuronal hearing loss. Dr. Warren Cruz murmur     Hernia of abdominal wall 09/2003, 12/2075    umbilical.  Dr. Christopher Rivers. Dr. Viki Batres    Hyperglycemia 2013    Hypothyroidism 06/2010    Dr. Martinez Seed pressure increase 12/2011    Dr. Ana Montgomery. Dr. Radha Cabello. Dr. Alejandro Morfin.  Knee pain, left 04/2012    Left. Dr. Benedict Larose Measles childhood    Migraine     Mumps childhood    Plantar fasciitis, bilateral 2010    Dr. Mehul Chaudhary    Sciatica 2008    Right.  with OA. Dr. Patti Corbett Tinnitus of right ear 2012    Dr. Tenzin Rodrigues:    Past Surgical History:   Procedure Laterality Date   Akite Player  2011    Dr. Efe Quiroz.  HAND/FINGER SURGERY UNLISTED  09/2003    Right Index finger repair due to cut    HX HERNIA REPAIR  59/5046    Umbilical.  Dr. Christos Kramer.  HX HERNIA REPAIR  9/23/2011    recurrent umbilical.  Laparoscopy incisional.  Dr. Jorge Luis Schuler.     HX TONSILLECTOMY  childhood    LAP,CHOLECYSTECTOMY  07/2001       FAMILY HISTORY:    Family History   Problem Relation Age of Onset    Hypertension Mother     Cancer Mother      small cell lung with bone mets to spine/MELANOMA    Cancer Father      melanoma    Arthritis-osteo Father     Colon Polyps Father     Diabetes Maternal Grandmother     Stroke Maternal Grandmother     Seizures Maternal Grandmother     Colon Polyps Maternal Grandmother     Breast Cancer Maternal Grandmother     Heart Disease Paternal Grandmother      heart failure    Heart Attack Paternal Grandmother     Asthma Paternal Grandmother        SOCIAL HISTORY:    Social History     Social History    Marital status: SINGLE     Spouse name: N/A    Number of children: N/A    Years of education: N/A     Social History Main Topics    Smoking status: Never Smoker    Smokeless tobacco: Never Used      Comment: lived with smoker dad and mom then step mom x 20 yrs    Alcohol use 0.0 oz/week      Comment: RARE    Drug use: No    Sexual activity: No     Other Topics Concern    None     Social History Narrative       IMMUNIZATIONS:    Immunization History   Administered Date(s) Administered    Influenza Vaccine 10/15/2013, 11/07/2016, 11/03/2017    Influenza Vaccine (Quad) 11/03/2015  Influenza Vaccine (Quad) PF 11/20/2014, 11/07/2016    Influenza Vaccine Split 11/18/2011, 11/07/2012    Influenza Vaccine Whole 10/01/2010    TD Vaccine 09/01/2003    Tdap 11/03/2015         PHYSICAL EXAMINATION    Vital Signs    Visit Vitals    /66 (BP 1 Location: Right arm, BP Patient Position: Sitting)    Pulse 74    Temp 98.7 °F (37.1 °C) (Oral)    Resp 12    Ht 5' 0.9\" (1.547 m)    Wt 314 lb 14.4 oz (142.8 kg)    LMP 02/14/2018 (Approximate)    SpO2 96%    BMI 59.7 kg/m2       Weight Metrics 2/21/2018 2/8/2018 12/29/2017 12/22/2017 11/20/2017 11/9/2017 6/29/2017   Weight 314 lb 14.4 oz 313 lb 318 lb 4.8 oz 321 lb 1.6 oz 312 lb 4.8 oz 312 lb 1.6 oz 300 lb 14.4 oz   BMI 59.7 kg/m2 59.34 kg/m2 60.34 kg/m2 60.87 kg/m2 59.05 kg/m2 59.01 kg/m2 56.88 kg/m2       General appearance - Well nourished. Tired appearing. Well developed. No acute distress. Obese. Head - Normocephalic. Atraumatic. Tenderness at maxillary sinuses. Eyes - pupils equal and reactive. Extraocular eye movements intact. Sclera anicteric. Mildly injected sclera. Ears - Hearing is grossly normal bilaterally. Erythema and yellowish discoloration at superior aspect of L TM. Bubbles at posterior aspect of R TM with dull, yellowish discoloration. Nose - normal and patent. No polyps noted. No erythema. No discharge. Mouth - mucous membranes with adequate moisture. Posterior pharynx normal with cobblestone appearance. No erythema, white exudate or obstruction. Neck - supple. Midline trachea. No carotid bruits noted bilaterally. No thyromegaly noted. Chest -No rales or rhonchi. Unlabored respirations. Coarse, low pitched, expiratory breath sounds. Occasional moist cough. Heart - normal rate. Regular rhythm. Normal S1, S2. No murmur noted. No rubs, clicks or gallops noted. Abdomen - soft and distended. No masses or organomegaly. No rebound, rigidity or guarding. Bowel sounds normal x 4 quadrants. No tenderness noted. Neurological - awake, alert and oriented to person, place, and time and event. Cranial nerves II through XII intact. Clear speech. Muscle strength is +5/5 x 4 extremities. Sensation is intact to light touch bilaterally. Steady gait. Heme/Lymph - peripheral pulses normal x 4 extremities. No peripheral edema is noted. Psychological -   normal behavior, dress and thought processes. Good insight. Good eye contact. Normal affect. Appropriate mood. Normal speech. DATA REVIEWED    Results for orders placed or performed in visit on 02/08/18   AMB POC RAPID INFLUENZA TEST   Result Value Ref Range    VALID INTERNAL CONTROL POC Yes     QuickVue Influenza test Negative Negative   AMB POC RAPID STREP A   Result Value Ref Range    VALID INTERNAL CONTROL POC Yes     Group A Strep Ag Negative Negative       ASSESSMENT and PLAN      ICD-10-CM ICD-9-CM    1. Moderate persistent asthma with acute exacerbation J45.41 493.92 Nebulizer & Compressor machine      albuterol (PROVENTIL VENTOLIN) 2.5 mg /3 mL (0.083 %) nebulizer solution      budesonide (PULMICORT) 1 mg/2 mL nbsp      ALBUTEROL, INHAL. SOL., FDA-APPROVED FINAL, NON-COMPOUND UNIT DOSE, 1 MG      OR PRESSURIZED/NONPRESSURIZED INHALATION TREATMENT      mometasone-formoterol (DULERA) 200-5 mcg/actuation HFA inhaler    improving   2. Allergic conjunctivitis and rhinitis, bilateral H10.13 372.05 azelastine (ASTELIN) 137 mcg (0.1 %) nasal spray    J30.9 477.9    3. Other specified hearing loss of right ear, unspecified hearing status on contralateral side H91.8X1 389.8 azithromycin (ZITHROMAX) 250 mg tablet    Right   4. Other fatigue R53.83 780.79     due to Allergies vs Asthma vs other   5. Hypothyroidism due to acquired atrophy of thyroid E03.4 244.8      246.8    6. Allergy to sulfa drugs Z88.2 V14.2    7. Penicillin allergy Z88.0 V14.0    8. Adverse effect of anabolic steroid, subsequent encounter T38.7X5D V58.89    9.  Acute recurrent maxillary sinusitis J01.01 461.0 azithromycin (ZITHROMAX) 250 mg tablet       Discussed the patient's BMI with her. The BMI follow up plan is as follows: I have counseled this patient on diet and exercise regimens. Increase water intake.  Stick to bland diet. Avoid dairy and sugar to thin mucus. Gargle with warm saltwater and add tsp of vinegar. Get at least 7-8 hours of sleep nightly and extra hour of rest while feeling poorly. Chart reviewed and updated. Continue current medications and care. Breathing treatment in office today. Take Z-theodore to cover sinuses and ears. If ears do not improve then she should follow up with ENT. Start Albuterol and Pulmicort neb treatments as dicussed. If unable to get Pulmicort then use Dulera 200 mcg-5 mcg 2 puffs BID; rinse mouth after use (sample given). Increase rest and hydration. Work excuse. Prescriptions written and sent to pharmacy; medication side effects discussed. Doxycycline 100 mg. Astelin 137 mcg. Nebulizer Machine. Pulmicort 1 mg/2 mL nbsp. Albuterol 2.5 mg/ 3 mL neb. Most recent tests reviewed. Counseled patient on health concerns: Asthma and allergies. Relevant handouts given and discussed with patient. Immunizations noted. Offered empathy, support, legitimation, prayers, partnership to patient. Praised patient for progress. Follow-up Disposition:  Return in about 2 weeks (around 3/7/2018) for asthma, ear . Patient was offered a choice/choices in the treatment plan today. Patient expresses understanding of the plan and agrees with recommendations. Written by tracy Carpenter, as dictated by Dr. Nori Hart DO. Documentation True and Accepted by Baron Graves. Gabe Alvarez. Patient Instructions   Use Albuterol neb treatments every 4 hours and use Pulmicort neb treatments twice a day for three days. After 3 days, reduce Pulmicort to once daily. After 3 days, reduce to as needed.      If unable to get Pulmicort then take Dulera 2 puffs twice a day; rinse mouth after use. Take Z-theodore and continue with nasal sprays.

## 2018-02-21 NOTE — PROGRESS NOTES
Chief Complaint   Patient presents with    Asthma     f/u    Wheezing     f/u    Cough     f/u    Ear Pain     1. Have you been to the ER, urgent care clinic since your last visit? Hospitalized since your last visit? No    2. Have you seen or consulted any other health care providers outside of the [address] [place] [address] since your last visit? Include any pap smears or colon screening. Yes, pt saw Dr. Edwards Mom for eye exam    In the event something were to happen to you and you were unable to speak on your behalf, do you have an Advance Directive/ Living Will in place stating your wishes? NO    If yes, do we have a copy on file NO    If no, would you like information:   Pt declined. Pt c/o still having some wheezing and a cough; and stated that her right has starting hurting over the weekend and is now completely \"closed up\"  Pt stated that even with still having some sx's, she feels a whole better than she did two weeks ago.

## 2018-03-13 ENCOUNTER — OFFICE VISIT (OUTPATIENT)
Dept: FAMILY MEDICINE CLINIC | Age: 48
End: 2018-03-13

## 2018-03-13 VITALS
HEIGHT: 61 IN | WEIGHT: 293 LBS | RESPIRATION RATE: 12 BRPM | TEMPERATURE: 98.1 F | SYSTOLIC BLOOD PRESSURE: 127 MMHG | OXYGEN SATURATION: 97 % | DIASTOLIC BLOOD PRESSURE: 80 MMHG | BODY MASS INDEX: 55.32 KG/M2 | HEART RATE: 71 BPM

## 2018-03-13 DIAGNOSIS — J45.20 MILD INTERMITTENT ASTHMA WITHOUT COMPLICATION: Primary | ICD-10-CM

## 2018-03-13 DIAGNOSIS — Z88.0 PENICILLIN ALLERGY: ICD-10-CM

## 2018-03-13 DIAGNOSIS — J30.2 ACUTE SEASONAL ALLERGIC RHINITIS DUE TO OTHER ALLERGEN: ICD-10-CM

## 2018-03-13 DIAGNOSIS — J01.81 OTHER ACUTE RECURRENT SINUSITIS: ICD-10-CM

## 2018-03-13 DIAGNOSIS — Z88.2 ALLERGY TO SULFA DRUGS: ICD-10-CM

## 2018-03-13 DIAGNOSIS — H91.8X1 OTHER SPECIFIED HEARING LOSS OF RIGHT EAR, UNSPECIFIED HEARING STATUS ON CONTRALATERAL SIDE: ICD-10-CM

## 2018-03-13 DIAGNOSIS — T38.7X5D: ICD-10-CM

## 2018-03-13 RX ORDER — DOXYCYCLINE 100 MG/1
100 CAPSULE ORAL 2 TIMES DAILY
Qty: 14 CAP | Refills: 0 | Status: SHIPPED | OUTPATIENT
Start: 2018-03-13 | End: 2018-11-12 | Stop reason: ALTCHOICE

## 2018-03-13 NOTE — PROGRESS NOTES
Chief Complaint   Patient presents with    Asthma     2 week f/u    Ear Pain    Request For New Medication     ABT     1. Have you been to the ER, urgent care clinic since your last visit? Hospitalized since your last visit? No    2. Have you seen or consulted any other health care providers outside of the [address] [place] [address] since your last visit? Include any pap smears or colon screening. No    In the event something were to happen to you and you were unable to speak on your behalf, do you have an Advance Directive/ Living Will in place stating your wishes? NO    If yes, do we have a copy on file NO    If no, would you like information:   Pt declined. Tried to make an appointment with ENT and was told they do not appointments available at this time. Ear is still hurting; thinks she may need another ABT; still has a small cough, is coughing up yellow mucous; wheezing has gotten a lot better.

## 2018-03-13 NOTE — MR AVS SNAPSHOT
[address] [facility] 
 
 
 [address] [facility] [address] 
[address] [facility] [address] 
[phone] Patient: Meme Estevez MRN:  [id number] Visit Information Date & Time Provider Department Dept. Phone Encounter #  
 3/13/2018 11:30 AM DO Zaire RashidmonicaAbnerLuis [phone] [id number] Follow-up Instructions Return in about 1 month (around 2018) for allergies, sinus, referral f/u. Upcoming Health Maintenance Date Due  
 BREAST CANCER SCRN MAMMOGRAM 2018 PAP AKA CERVICAL CYTOLOGY 11/10/2019 DTaP/Tdap/Td series (2 - Td) 11/3/2025 Allergies as of 3/13/2018  Review Complete On: 3/13/2018 By: Suzanne Wilson Severity Noted Reaction Type Reactions Latex  2014    Rash Other Medication High 2009    Hives, Rash SUN Pcn [Penicillins] High 2009    Hives Prednisone High 2009    Hives Sulfa (Sulfonamide Antibiotics) High 2009    Hives Garamycin [Gentamicin]  2009    Other (comments) Itchy eyes due to sulfate Metformin  2015    Diarrhea Current Immunizations  Reviewed on 2017 Name Date Influenza Vaccine 11/3/2017, 2016, 10/15/2013 Influenza Vaccine Demi Valdez) 11/3/2015 Influenza Vaccine (Quad) PF 2016, 2014 Influenza Vaccine Split 2012, 2011 Influenza Vaccine Whole 10/1/2010 TD Vaccine 2003 Tdap 11/3/2015 Not reviewed this visit You Were Diagnosed With   
  
 Codes Comments Mild intermittent asthma without complication    -  Primary ICD-10-CM: J45.20 ICD-9-CM: 493.90 stable without current inhaler use, taking Singulair Acute seasonal allergic rhinitis due to other allergen     ICD-10-CM: J30.2 ICD-9-CM: 477.8 Other acute recurrent sinusitis     ICD-10-CM: J01.81 
ICD-9-CM: 461.9 improved after Zpak  Adverse effect of anabolic steroid, subsequent encounter     ICD-10-CM: T38.7X5D ICD-9-CM: V58.89 Allergy to sulfa drugs     ICD-10-CM: Z88.2 ICD-9-CM: V14.2 Penicillin allergy     ICD-10-CM: Z88.0 ICD-9-CM: V14.0 Other specified hearing loss of right ear, unspecified hearing status on contralateral side     ICD-10-CM: H91.8X1 ICD-9-CM: 389.8 due to congestion, improving after Zpak and Astelin Vitals BP Pulse Temp Resp Height(growth percentile) Weight(growth percentile) 127/80 (BP 1 Location: Right arm, BP Patient Position: Sitting) 71 98.1 °F (36.7 °C) (Oral) 12 5' 0.9\" (1.547 m) 326 lb 3.2 oz (148 kg) LMP SpO2 BMI OB Status Smoking Status 02/14/2018 (Approximate) 97% 61.84 kg/m2 Having regular periods Never Smoker Vitals History BMI and BSA Data Body Mass Index Body Surface Area  
 61.84 kg/m 2 2.52 m 2 Preferred Pharmacy Pharmacy Name Phone [id number] [address] [phone] Your Updated Medication List  
  
   
This list is accurate as of 3/13/18 12:35 PM.  Always use your most recent med list.  
  
  
  
  
 * albuterol 90 mcg/actuation inhaler Commonly known as:  PROVENTIL HFA, VENTOLIN HFA, PROAIR HFA Take 2 Puffs by inhalation every four (4) hours as needed for Wheezing. Indications: BRONCHOSPASM PREVENTION  
  
 * albuterol 2.5 mg /3 mL (0.083 %) nebulizer solution Commonly known as:  PROVENTIL VENTOLIN  
6 mL by Nebulization route every four (4) hours as needed for Wheezing or Shortness of Breath. Indications: Acute Asthma Attack ALLEGRA 180 mg tablet Generic drug:  fexofenadine Take  by mouth daily. ALPRAZolam 0.5 mg tablet Commonly known as:  Aloma Gaudy Take 1 Tab by mouth two (2) times daily as needed for Anxiety or Sleep. Max Daily Amount: 1 mg. Indications: anxiety  
  
 azelastine 137 mcg (0.1 %) nasal spray Commonly known as:  ASTELIN  
 1 [place] by Both Nostrils route two (2) times a day. Indications: SEASONAL ALLERGIC RHINITIS  
  
 budesonide 1 mg/2 mL Nbsp Commonly known as:  PULMICORT  
2 mL by Nebulization route two (2) times a day. Indications: MAINTENANCE THERAPY FOR ASTHMA  
  
 butalbital-acetaminophen-caffeine -40 mg per tablet Commonly known as:  FIORICET, ESGIC  
TAKE 1 TAB BY MOUTH EVERY SIX (6) HOURS AS NEEDED FOR PAIN OR HEADACHE. chlorpheniramine-HYDROcodone 10-8 mg/5 mL suspension Commonly known as:  Jennifer Gina Take 5 mL by mouth every twelve (12) hours as needed for Cough. Max Daily Amount: 10 mL. Indications: Cough  
  
 cyclobenzaprine 10 mg tablet Commonly known as:  FLEXERIL  
TAKE 1 TABLET BY MOUTH 3 TIMES A DAY AS NEEDED FOR MUSCLE SPASM  
  
 diclofenac EC 75 mg EC tablet Commonly known as:  VOLTAREN Take 75 mg by mouth two (2) times a day. doxycycline 100 mg capsule Commonly known as:  Elizabeth Phenes Take 1 Cap by mouth two (2) times a day. EPICERAM Emul Generic drug:  Emollient Combination No.32  
by Apply Externally route. From Dr Bridget Liriano. FISH OIL 1,000 mg Cap Generic drug:  omega-3 fatty acids-vitamin e Take 1 Cap by mouth daily. hydrOXYzine HCl 25 mg tablet Commonly known as:  ATARAX TAKE 1 TABLET BY MOUTH EVERY 8 HOURS AS NEEDED FOR ITCHING  
  
 ipratropium 17 mcg/actuation inhaler Commonly known as:  ATROVENT HFA Take 2 Puffs by inhalation every four (4) hours as needed for Wheezing. Indications: MAINTENANCE THERAPY FOR ASTHMA  
  
 latanoprost 0.005 % ophthalmic solution Commonly known as:  Kat Hopping Administer 1 Drop to both eyes nightly. levothyroxine 125 mcg tablet Commonly known as:  SYNTHROID Take 1 Tab by mouth Daily (before breakfast). Indications: hypothyroidism LOCOID 0.1 % Lotn Generic drug:  Hydrocortisone Butyrate  
  
 magnesium oxide 400 mg tablet Commonly known as:  MAG-OX Take 400 mg by mouth daily. METROGEL 1 % topical gel Generic drug:  metroNIDAZOLE Apply  to affected area daily. Use a thin layer to affected areas after washing from Dr Colin Jett   Indications: ACNE ROSACEA  
  
 mometasone-formoterol 200-5 mcg/actuation HFA inhaler Commonly known as:  Vladimir Cassiejohn Take 2 Puffs by inhalation two (2) times a day. montelukast 10 mg tablet Commonly known as:  SINGULAIR  
TAKE 1 TABLET BY MOUTH EVERY DAY  
  
 MOTRIN 800 mg tablet Generic drug:  ibuprofen Take  by mouth every six (6) hours as needed for Pain.  
  
 multivitamin tablet Commonly known as:  ONE A DAY Take 1 tablet by mouth daily. NAFTIN 2 % Gel Generic drug:  naftifine Apply 1 Dose to affected area two (2) times a day. Indications: TINEA PEDIS Nebulizer & Compressor machine 1 Each by Other route four (4) times daily as needed. norethindrone-ethinyl estradiol 1 mg-20 mcg (21)/75 mg (7) Tab Commonly known as:  Edythdaxa Carly FE 1/20 Take  by mouth. omeprazole 20 mg capsule Commonly known as:  PRILOSEC  
TAKE ONE CAPSULE BY MOUTH EVERY DAY  
  
 OTHER(NON-FORMULARY) Osteo Biflex PROBIOTIC COMPLEX PO Take  by mouth daily. SYSTANE (PF) OP Apply  to eye four (4) times daily. * terconazole 80 mg vaginal suppository Commonly known as:  TERAZOL 3  
  
 * terconazole 0.4 % vaginal cream  
Commonly known as:  TERAZOL 7  
  
 valACYclovir 500 mg tablet Commonly known as:  VALTREX  
  
 VITAMIN D3 1,000 unit tablet Generic drug:  cholecalciferol Take 1,000 Units by mouth daily. * Notice: This list has 4 medication(s) that are the same as other medications prescribed for you. Read the directions carefully, and ask your doctor or other care provider to review them with you. Prescriptions Sent to Pharmacy Refills  
 doxycycline (MONODOX) 100 mg capsule 0 Sig: Take 1 Cap by mouth two (2) times a day.   
 Class: Normal  
 Pharmacy: [facility]/pharmacy #[id number] - [address]  #: [phone] Route: Oral  
  
Follow-up Instructions Return in about 1 month (around 4/13/2018) for allergies, sinus, referral f/u. Introducing [facility] SERVICES! Dear Emelia Brown: Thank you for requesting a [facility] account. Our records indicate that you already have an active [facility] account. You can access your account anytime at [email or web address] Did you know that you can access your hospital and ER discharge instructions at any time in [facility]? You can also review all of your test results from your hospital stay or ER visit. Additional Information If you have questions, please visit the Frequently Asked Questions section of the [facility] [facility] website at [email or web address] Remember, [facility] is NOT to be used for urgent needs. For medical emergencies, dial 911. Now available from your iPhone and Android! Please provide this summary of care documentation to your next provider. Your primary care clinician is listed as Ruth Kwok. If you have any questions after today's visit, please call [phone].

## 2018-04-16 ENCOUNTER — OFFICE VISIT (OUTPATIENT)
Dept: FAMILY MEDICINE CLINIC | Age: 48
End: 2018-04-16

## 2018-04-16 VITALS
BODY MASS INDEX: 55.32 KG/M2 | HEIGHT: 61 IN | SYSTOLIC BLOOD PRESSURE: 117 MMHG | WEIGHT: 293 LBS | OXYGEN SATURATION: 99 % | RESPIRATION RATE: 18 BRPM | HEART RATE: 80 BPM | DIASTOLIC BLOOD PRESSURE: 71 MMHG | TEMPERATURE: 98.3 F

## 2018-04-16 DIAGNOSIS — K11.20 PAROTIDITIS: ICD-10-CM

## 2018-04-16 DIAGNOSIS — H66.91 CHRONIC INFECTION OF RIGHT EAR: Primary | ICD-10-CM

## 2018-04-16 DIAGNOSIS — J30.89 SEASONAL ALLERGIC RHINITIS DUE TO OTHER ALLERGIC TRIGGER: ICD-10-CM

## 2018-04-16 NOTE — PROGRESS NOTES
Hung Huff is a 50 y.o. female  Chief Complaint   Patient presents with    Follow-up     1. Have you been to the ER, urgent care clinic since your last visit? Hospitalized since your last visit? No     2. Have you seen or consulted any other health care providers outside of the [address] [place] [address] since your last visit? Include any pap smears or colon screening.  No    Visit Vitals    /71 (BP 1 Location: Left arm, BP Patient Position: Sitting)    Pulse 80    Temp 98.3 °F (36.8 °C) (Oral)    Resp 18    Ht 5' 0.9\" (1.547 m)    Wt 326 lb 8 oz (148.1 kg)    SpO2 99%    BMI 61.89 kg/m2

## 2018-04-16 NOTE — PROGRESS NOTES
HISTORY OF PRESENT ILLNESS  Malaika Noel is a 50 y.o. female presents with Allergies; Asthma; and Referral Follow Up (ENT )    Agree with nurse note. Pt with chronic R ear infection. At her last ov, we tx'd her with Doxycycline 100 mg BID x7 days. She reports seeing her ENT, Dr. Nancy Hill for her ear discomfort. Per pt, she had a infection in her parotid gland. She has been massaging her gland as he recommended. She has some nasal congestion that has recently changed to light yellow over the last couple of days. She has itchy/watery eyes and sneezing. She uses Astelin BID, Allegra prn, and Singulair daily. Patient denies fever, chills, ear pain, dizziness, post nasal drainage, sore throat, headache, chest pain or tightness, SOB, wheezing, cough, GI symptoms, bladder symptoms and body aches. Written by tracy Lea, as dictated by Dr. Ruben Hart DO.    ROS    Review of Systems negative except as noted above in HPI. ALLERGIES:    Allergies   Allergen Reactions    Latex Rash    Other Medication Hives and Rash     SUN    Pcn [Penicillins] Hives    Prednisone Hives    Sulfa (Sulfonamide Antibiotics) Hives    Garamycin [Gentamicin] Other (comments)     Itchy eyes due to sulfate    Metformin Diarrhea       CURRENT MEDICATIONS:    Outpatient Prescriptions Marked as Taking for the 4/16/18 encounter (Office Visit) with Myriam Sandhu DO   Medication Sig Dispense Refill    doxycycline (MONODOX) 100 mg capsule Take 1 Cap by mouth two (2) times a day. 14 Cap 0    azelastine (ASTELIN) 137 mcg (0.1 %) nasal spray 1 [place] by Both Nostrils route two (2) times a day. Indications: SEASONAL ALLERGIC RHINITIS 1 Bottle 5    Nebulizer & Compressor machine 1 Each by Other route four (4) times daily as needed.  1 Each 0    albuterol (PROVENTIL VENTOLIN) 2.5 mg /3 mL (0.083 %) nebulizer solution 6 mL by Nebulization route every four (4) hours as needed for Wheezing or Shortness of Breath. Indications: Acute Asthma Attack 24 Each 1    budesonide (PULMICORT) 1 mg/2 mL nbsp 2 mL by Nebulization route two (2) times a day. Indications: MAINTENANCE THERAPY FOR ASTHMA 1 Each 1    mometasone-formoterol (DULERA) 200-5 mcg/actuation HFA inhaler Take 2 Puffs by inhalation two (2) times a day. 1 Inhaler 0    ipratropium (ATROVENT HFA) 17 mcg/actuation inhaler Take 2 Puffs by inhalation every four (4) hours as needed for Wheezing. Indications: MAINTENANCE THERAPY FOR ASTHMA 1 Inhaler 5    chlorpheniramine-HYDROcodone (TUSSIONEX) 10-8 mg/5 mL suspension Take 5 mL by mouth every twelve (12) hours as needed for Cough. Max Daily Amount: 10 mL. Indications: Cough 200 mL 0    albuterol (PROVENTIL HFA, VENTOLIN HFA, PROAIR HFA) 90 mcg/actuation inhaler Take 2 Puffs by inhalation every four (4) hours as needed for Wheezing. Indications: BRONCHOSPASM PREVENTION 1 Inhaler 5    butalbital-acetaminophen-caffeine (FIORICET, ESGIC) -40 mg per tablet TAKE 1 TAB BY MOUTH EVERY SIX (6) HOURS AS NEEDED FOR PAIN OR HEADACHE. 40 Tab 0    cyclobenzaprine (FLEXERIL) 10 mg tablet TAKE 1 TABLET BY MOUTH 3 TIMES A DAY AS NEEDED FOR MUSCLE SPASM 90 Tab 0    hydrOXYzine HCl (ATARAX) 25 mg tablet TAKE 1 TABLET BY MOUTH EVERY 8 HOURS AS NEEDED FOR ITCHING 90 Tab 1    montelukast (SINGULAIR) 10 mg tablet TAKE 1 TABLET BY MOUTH EVERY DAY 30 Tab 11    levothyroxine (SYNTHROID) 125 mcg tablet Take 1 Tab by mouth Daily (before breakfast). Indications: hypothyroidism 90 Tab 3    NAFTIN 2 % gel Apply 1 Dose to affected area two (2) times a day. Indications: TINEA PEDIS 1 Bottle 2    ALPRAZolam (XANAX) 0.5 mg tablet Take 1 Tab by mouth two (2) times daily as needed for Anxiety or Sleep. Max Daily Amount: 1 mg. Indications: anxiety 60 Tab 2    norethindrone-ethinyl estradiol (JUNEL FE 1/20) 1 mg-20 mcg (21)/75 mg (7) tab Take  by mouth.       terconazole (TERAZOL 7) 0.4 % vaginal cream   6    LOCOID 0.1 % lotn   1    valACYclovir (VALTREX) 500 mg tablet   1    terconazole (TERAZOL 3) 80 mg vaginal suppository   4    multivitamin (ONE A DAY) tablet Take 1 tablet by mouth daily.  omeprazole (PRILOSEC) 20 mg capsule TAKE ONE CAPSULE BY MOUTH EVERY DAY 30 Cap 1    PROPYLENE GLYCOL//PF (SYSTANE, PF, OP) Apply  to eye four (4) times daily.  magnesium oxide (MAG-OX) 400 mg tablet Take 400 mg by mouth daily.  Emollient Combination No.32 (EPICERAM) Emul by Apply Externally route. From Dr Maine Dudley.  metroNIDAZOLE (METROGEL) 1 % topical gel Apply  to affected area daily. Use a thin layer to affected areas after washing from Dr Maine Dudley   Indications: ACNE ROSACEA      diclofenac EC (VOLTAREN) 75 mg EC tablet Take 75 mg by mouth two (2) times a day.  latanoprost (XALATAN) 0.005 % ophthalmic solution Administer 1 Drop to both eyes nightly.  OTHER,NON-FORMULARY, Osteo Biflex       omega-3 fatty acids-vitamin e (FISH OIL) 1,000 mg Cap Take 1 Cap by mouth daily.  fexofenadine (ALLEGRA) 180 mg tablet Take  by mouth daily.  LACTOBACILLUS/FOS/PECTIN (PROBIOTIC COMPLEX PO) Take  by mouth daily.  cholecalciferol, vitamin d3, (VITAMIN D) 1,000 unit tablet Take 1,000 Units by mouth daily.  ibuprofen (MOTRIN) 800 mg tablet Take  by mouth every six (6) hours as needed for Pain. PAST MEDICAL HISTORY:    Past Medical History:   Diagnosis Date    Acne rosacea     Dr. Maine Dudley.  Allergy, unspecified not elsewhere classified childhood     Txd with immunotherapy. Dr. Yolande Gaucher Anemia NEC     borderline    Ankle sprain 2007    Right. Dr. Mathis Kittitas    Ankle sprain 11/2012    Right. Dr. Elizabeth Lu.  Asthma childhood    Chickenpox childhood    Chronic low back pain with right-sided sciatica     and SI joint dysfunction. Dr. Brigette Tejada.  Chronic otitis media     Dr. Yolande Gaucher Dry eye syndrome 2013    Dr. Cabrera.     EBV infection 6/27/2011    Hearing loss     Mild high frequency sensorineuronal hearing loss. Dr. Fran Fairchild murmur     Hernia of abdominal wall 09/2003, 22/0619    umbilical.  Dr. Ignacio Garcia. Dr. Jt Calvo    Hyperglycemia 2013    Hypothyroidism 06/2010    Dr. Jackson Lane pressure increase 12/2011    Dr. Satya Newman. Dr. Chelsie Sanchez. Dr. Tayo Vega.  Knee pain, left 04/2012    Left. Dr. Bola Jeter Measles childhood    Migraine     Mumps childhood    Plantar fasciitis, bilateral 2010    Dr. Jyoti Ventura    Sciatica 2008    Right.  with OA. Dr. Yury Mane Tinnitus of right ear 2012    Dr. Kera Prater:    Past Surgical History:   Procedure Laterality Date   Cal Sly 2011    Dr. Tosin Potter.  HAND/FINGER SURGERY UNLISTED  09/2003    Right Index finger repair due to cut    HX HERNIA REPAIR  69/8068    Umbilical.  Dr. Ignacio Garcia.  HX HERNIA REPAIR  9/23/2011    recurrent umbilical.  Laparoscopy incisional.  Dr. Jt Calvo.     HX TONSILLECTOMY  childhood    LAP,CHOLECYSTECTOMY  07/2001       FAMILY HISTORY:    Family History   Problem Relation Age of Onset    Hypertension Mother     Cancer Mother      small cell lung with bone mets to spine/MELANOMA    Cancer Father      melanoma    Arthritis-osteo Father     Colon Polyps Father     Diabetes Maternal Grandmother     Stroke Maternal Grandmother     Seizures Maternal Grandmother     Colon Polyps Maternal Grandmother     Breast Cancer Maternal Grandmother     Heart Disease Paternal Grandmother      heart failure    Heart Attack Paternal Grandmother     Asthma Paternal Grandmother        SOCIAL HISTORY:    Social History     Social History    Marital status: SINGLE     Spouse name: N/A    Number of children: N/A    Years of education: N/A     Social History Main Topics    Smoking status: Never Smoker    Smokeless tobacco: Never Used      Comment: lived with smoker dad and mom then step mom x 20 yrs    Alcohol use 0.0 oz/week      Comment: RARE    Drug use: No    Sexual activity: No     Other Topics Concern    None     Social History Narrative       IMMUNIZATIONS:    Immunization History   Administered Date(s) Administered    Influenza Vaccine 10/15/2013, 11/07/2016, 11/03/2017    Influenza Vaccine (Quad) 11/03/2015    Influenza Vaccine (Quad) PF 11/20/2014, 11/07/2016    Influenza Vaccine Split 11/18/2011, 11/07/2012    Influenza Vaccine Whole 10/01/2010    TD Vaccine 09/01/2003    Tdap 11/03/2015         PHYSICAL EXAMINATION    Vital Signs    Visit Vitals    /71 (BP 1 Location: Left arm, BP Patient Position: Sitting)    Pulse 80    Temp 98.3 °F (36.8 °C) (Oral)    Resp 18    Ht 5' 0.9\" (1.547 m)    Wt 326 lb 8 oz (148.1 kg)    SpO2 99%    BMI 61.89 kg/m2       Weight Metrics 4/16/2018 3/13/2018 2/21/2018 2/8/2018 12/29/2017 12/22/2017 11/20/2017   Weight 326 lb 8 oz 326 lb 3.2 oz 314 lb 14.4 oz 313 lb 318 lb 4.8 oz 321 lb 1.6 oz 312 lb 4.8 oz   BMI 61.89 kg/m2 61.84 kg/m2 59.7 kg/m2 59.34 kg/m2 60.34 kg/m2 60.87 kg/m2 59.05 kg/m2       General appearance - Well nourished. Well appearing. Well developed. No acute distress. Obese. Head - Normocephalic. Atraumatic. Eyes - pupils equal and reactive. Extraocular eye movements intact. Sclera anicteric. Mildly injected sclera. Ears - Hearing is grossly normal bilaterally. Nose - normal and patent. No polyps noted. No erythema. No discharge. Mouth - mucous membranes with adequate moisture. Posterior pharynx normal with cobblestone appearance. No erythema, white exudate or obstruction. Neck - supple. Midline trachea. No carotid bruits noted bilaterally. No thyromegaly noted. Chest - clear to auscultation bilaterally anteriorly and posteriorly. No wheezes. No rales or rhonchi. Breath sounds are symmetrical bilaterally.   Unlabored respirations. Heart - normal rate. Regular rhythm. Normal S1, S2. No murmur noted. No rubs, clicks or gallops noted. Abdomen - soft and distended. No masses or organomegaly. No rebound, rigidity or guarding. Bowel sounds normal x 4 quadrants. No tenderness noted. Neurological - awake, alert and oriented to person, place, and time and event. Cranial nerves II through XII intact. Clear speech. Muscle strength is +5/5 x 4 extremities. Sensation is intact to light touch bilaterally. Steady gait. Heme/Lymph - peripheral pulses normal x 4 extremities. No peripheral edema is noted. Skin - no rashes, erythema, ecchymosis, lacerations, abrasions, suspicious moles noted  Psychological -   normal behavior, dress and thought processes. Good insight. Good eye contact. Normal affect. Appropriate mood. Normal speech. ASSESSMENT and PLAN      ICD-10-CM ICD-9-CM    1. Chronic infection of right ear H66.91 381.3     resolved after antibiotic   2. Parotiditis K11.20 527.2     Right   3. Seasonal allergic rhinitis due to other allergic trigger J30.89 477.8     improving     Chart reviewed and updated. Continue current medications and care. Use saline rinse before using Astelin. Take Allegra daily while symptomatic. Get recent office visit notes from Dr. Jordin Welsh. Advised pt to sign release. Counseled patient on health concerns: Allergies and parotiditis. Relevant handouts given and discussed with patient. Immunizations noted. Offered empathy, support, legitimation, prayers, partnership to patient. Praised patient for progress. Follow-up Disposition:  Return if symptoms worsen or fail to improve. Patient was offered a choice/choices in the treatment plan today. Patient expresses understanding of the plan and agrees with recommendations. Written by tracy Kendrick, as dictated by Dr. Kaylene Sanchez DO. Documentation True and Accepted by Aleksander Morfin.  Chong Bhagat D.O.      Patient Instructions          Seasonal Allergies: Care Instructions  Your Care Instructions  Allergies occur when your body's defense system (immune system) overreacts to certain substances. The immune system treats a harmless substance as if it were a harmful germ or virus. Many things can cause this to happen. Examples include pollens, medicine, food, dust, animal dander, and mold. Your allergies are seasonal if you have symptoms just at certain times of the year. In that case, you are probably allergic to pollens from certain trees, grasses, or weeds. Allergies can be mild or severe. Over-the-counter allergy medicine may help with some symptoms. Read and follow all instructions on the label. Managing your allergies is an important part of staying healthy. Your doctor may suggest that you have tests to help find the cause of your allergies. When you know what things trigger your symptoms, you can avoid them. This can prevent allergy symptoms and other health problems. In some cases, immunotherapy might help. For this treatment, you get shots or use pills that have a small amount of certain allergens in them. Your body \"gets used to\" the allergen, so you react less to it over time. This kind of treatment may help prevent or reduce some allergy symptoms. Follow-up care is a key part of your treatment and safety. Be sure to make and go to all appointments, and call your doctor if you are having problems. It's also a good idea to know your test results and keep a list of the medicines you take. How can you care for yourself at home? · Be safe with medicines. Take your medicines exactly as prescribed. Call your doctor if you think you are having a problem with your medicine. · During your allergy season, keep windows closed. If you need to use air-conditioning, change or clean all filters every month. Take a shower and change your clothes after you have been outside.   · Stay inside when pollen counts are high. Vacuum once or twice a week. Use a vacuum  with a HEPA filter or a double-thickness filter. When should you call for help? Give an epinephrine shot if:  ? · You think you are having a severe allergic reaction. ? After giving an epinephrine shot, call 911, even if you feel better. ?Call 911 if:  ? · You have symptoms of a severe allergic reaction. These may include:  ¨ Sudden raised, red areas (hives) all over your body. ¨ Swelling of the throat, mouth, lips, or tongue. ¨ Trouble breathing. ¨ Passing out (losing consciousness). Or you may feel very lightheaded or suddenly feel weak, confused, or restless. ? · You have been given an epinephrine shot, even if you feel better. ?Call your doctor now or seek immediate medical care if:  ? · You have symptoms of an allergic reaction, such as:  ¨ A rash or hives (raised, red areas on the skin). ¨ Itching. ¨ Swelling. ¨ Belly pain, nausea, or vomiting. ? Watch closely for changes in your health, and be sure to contact your doctor if:  ? · You do not get better as expected. Where can you learn more? Go to http://alejandro-eddie.info/. Enter J912 in the search box to learn more about \"Seasonal Allergies: Care Instructions. \"  Current as of: September 29, 2016  Content Version: 11.4  © 3012-3809 [employer]. Care instructions adapted under license by [employer] (which disclaims liability or warranty for this information). If you have questions about a medical condition or this instruction, always ask your healthcare professional. [address] any warranty or liability for your use of this information. Advise patient to start taking Over The Counter allergy medication (Allegra, Zyrtec, Claritin, Xyzal or Alavert) daily. If you have itchy, watery eyes you can try OTC Zaditor or Zyrtec Allergy Eye drops.   If you have a stuffy nose, please try OTC Flonase, Flonase Sensimist, Rhinocort, or Nasacort AQ 2 squirts up each nostril once a day (adults) or 1 squirt up each nostril once a day (children). Use allergy free, dye free, fragrance free products on your body and hair. After being outdoors, brush hair vigorously, wash face and arms, rinse nostrils with a nasal saline spray and consider changing your clothing. Dust furniture frequently and wear a mask while doing it. Vacuum floors weekly. Remove stuffed animals or extra pillows from the bed. Clean bedding in hot water weekly. Sometimes allergies can be so severe that 2 nasal sprays and 2 pills may be needed to control symptoms. Parotitis: Care Instructions  Your Care Instructions    Parotitis is a painful swelling of your parotid glands, which are salivary glands located between the ear and jaw. The most common cause is a virus, such as mumps, herpes, or Calvin-Barr. Bacterial infections, diabetes, tumors or stones in the saliva glands, and tooth problems also may cause parotitis. Follow-up care is a key part of your treatment and safety. Be sure to make and go to all appointments, and call your doctor if you are having problems. It's also a good idea to know your test results and keep a list of the medicines you take. How can you care for yourself at home? · Use an over-the-counter pain medicine if needed, such as acetaminophen (Tylenol), ibuprofen (Advil, Motrin), or naproxen (Aleve). Be safe with medicines. Read and follow all instructions on the label. Do not give aspirin to anyone younger than 20. It has been linked to Reye syndrome, a serious illness. · Put an ice or heat pack (whichever feels better) on the swollen jaw for 10 to 20 minutes at a time. Put a thin cloth between the ice or heat pack and the skin. · Suck on ice chips or ice treats such as Popsicles. Eat soft foods that do not have to be chewed much. Do not eat sour foods or liquids.  If your salivary glands are very sore, eating these foods will usually cause them to hurt more. · If your doctor prescribed antibiotics, take them as directed. Do not stop taking them just because you feel better. You need to take the full course of antibiotics. To prevent tooth problems  · Brush and floss every day, and have regular dental checkups. · Eat a healthy diet, and avoid sugary foods and drinks. · Do not smoke or use spit tobacco. Tobacco use slows your ability to heal. It also increases your risk for gum disease and cancer of the mouth and throat. If you need help quitting, talk to your doctor about stop-smoking programs and medicines. These can increase your chances of quitting for good. When should you call for help? Call 911 anytime you think you may need emergency care. For example, call if:  ? · You have trouble breathing. ?Call your doctor now or seek immediate medical care if:  ? · You have new or worse symptoms of infection, such as:  ¨ Increased pain, swelling, warmth, or redness. ¨ Red streaks leading from the area. ¨ Pus draining from the area. ¨ A fever. ? · You have new pain, or the pain gets worse. ? Watch closely for changes in your health, and be sure to contact your doctor if:  ? · You do not feel better as expected. Where can you learn more? Go to http://alejandro-eddie.info/. Enter D592 in the search box to learn more about \"Parotitis: Care Instructions. \"  Current as of: May 12, 2017  Content Version: 11.4  © 4045-1488 [employer]. Care instructions adapted under license by [employer] (which disclaims liability or warranty for this information). If you have questions about a medical condition or this instruction, always ask your healthcare professional. [place] [address] any warranty or liability for your use of this information.

## 2018-04-16 NOTE — MR AVS SNAPSHOT
[address] [place] 
[address] [facility] [address] 
[phone] Patient: Cheyanne Vazquez MRN:  [id number] Visit Information Date & Time Provider Department Dept. Phone Encounter #  
 2018  4:00 PM Ildefonso Mejias DO [facility] [phone] [id number] Follow-up Instructions Return if symptoms worsen or fail to improve. Upcoming Health Maintenance Date Due  
 BREAST CANCER SCRN MAMMOGRAM 2018 PAP AKA CERVICAL CYTOLOGY 11/10/2019 DTaP/Tdap/Td series (2 - Td) 11/3/2025 Allergies as of 2018  Review Complete On: 2018 By: Ildefonso Mejias DO Severity Noted Reaction Type Reactions Latex  2014    Rash Other Medication High 2009    Hives, Rash SUN Pcn [Penicillins] High 2009    Hives Prednisone High 2009    Hives Sulfa (Sulfonamide Antibiotics) High 2009    Hives Garamycin [Gentamicin]  2009    Other (comments) Itchy eyes due to sulfate Metformin  2015    Diarrhea Current Immunizations  Reviewed on 2017 Name Date Influenza Vaccine 11/3/2017, 2016, 10/15/2013 Influenza Vaccine Candiss Brittni) 11/3/2015 Influenza Vaccine (Quad) PF 2016, 2014 Influenza Vaccine Split 2012, 2011 Influenza Vaccine Whole 10/1/2010 TD Vaccine 2003 Tdap 11/3/2015 Not reviewed this visit You Were Diagnosed With   
  
 Codes Comments Chronic infection of right ear    -  Primary ICD-10-CM: H66.91 
ICD-9-CM: 381.3 resolved after antibiotic Parotiditis     ICD-10-CM: K11.20 ICD-9-CM: 527.2 Right Seasonal allergic rhinitis due to other allergic trigger     ICD-10-CM: J30.89 ICD-9-CM: 477.8 improving Vitals BP Pulse Temp Resp Height(growth percentile) Weight(growth percentile)  117/71 (BP 1 Location: Left arm, BP Patient Position: Sitting) 80 98.3 °F (36.8 °C) (Oral) 18 5' 0.9\" (1.547 m) 326 lb 8 oz (148.1 kg) SpO2 BMI OB Status Smoking Status 99% 61.89 kg/m2 Having regular periods Never Smoker Vitals History BMI and BSA Data Body Mass Index Body Surface Area  
 61.89 kg/m 2 2.52 m 2 Preferred Pharmacy Pharmacy Name Phone [facility][id number] Katharina CRENSHAW [phone] Your Updated Medication List  
  
   
This list is accurate as of 4/16/18  5:18 PM.  Always use your most recent med list.  
  
  
  
  
 * albuterol 90 mcg/actuation inhaler Commonly known as:  PROVENTIL HFA, VENTOLIN HFA, PROAIR HFA Take 2 Puffs by inhalation every four (4) hours as needed for Wheezing. Indications: BRONCHOSPASM PREVENTION  
  
 * albuterol 2.5 mg /3 mL (0.083 %) nebulizer solution Commonly known as:  PROVENTIL VENTOLIN  
6 mL by Nebulization route every four (4) hours as needed for Wheezing or Shortness of Breath. Indications: Acute Asthma Attack ALLEGRA 180 mg tablet Generic drug:  fexofenadine Take  by mouth daily. ALPRAZolam 0.5 mg tablet Commonly known as:  Heidi [place] Take 1 Tab by mouth two (2) times daily as needed for Anxiety or Sleep. Max Daily Amount: 1 mg. Indications: anxiety  
  
 azelastine 137 mcg (0.1 %) nasal spray Commonly known as:  ASTELIN  
1 Spray by Both Nostrils route two (2) times a day. Indications: SEASONAL ALLERGIC RHINITIS  
  
 budesonide 1 mg/2 mL Nbsp Commonly known as:  PULMICORT  
2 mL by Nebulization route two (2) times a day. Indications: MAINTENANCE THERAPY FOR ASTHMA  
  
 butalbital-acetaminophen-caffeine -40 mg per tablet Commonly known as:  FIORICET, ESGIC  
TAKE 1 TAB BY MOUTH EVERY SIX (6) HOURS AS NEEDED FOR PAIN OR HEADACHE. chlorpheniramine-HYDROcodone 10-8 mg/5 mL suspension Commonly known as:  Duey Baptiste Take 5 mL by mouth every twelve (12) hours as needed for Cough.  Max Daily Amount: 10 mL. Indications: Cough  
  
 cyclobenzaprine 10 mg tablet Commonly known as:  FLEXERIL  
TAKE 1 TABLET BY MOUTH 3 TIMES A DAY AS NEEDED FOR MUSCLE SPASM  
  
 diclofenac EC 75 mg EC tablet Commonly known as:  VOLTAREN Take 75 mg by mouth two (2) times a day. doxycycline 100 mg capsule Commonly known as:  Marika Conquest Take 1 Cap by mouth two (2) times a day. EPICERAM Emul Generic drug:  Emollient Combination No.32  
by Apply Externally route. From Dr Ashlee Spears. FISH OIL 1,000 mg Cap Generic drug:  omega-3 fatty acids-vitamin e Take 1 Cap by mouth daily. hydrOXYzine HCl 25 mg tablet Commonly known as:  ATARAX TAKE 1 TABLET BY MOUTH EVERY 8 HOURS AS NEEDED FOR ITCHING  
  
 ipratropium 17 mcg/actuation inhaler Commonly known as:  ATROVENT HFA Take 2 Puffs by inhalation every four (4) hours as needed for Wheezing. Indications: MAINTENANCE THERAPY FOR ASTHMA  
  
 latanoprost 0.005 % ophthalmic solution Commonly known as:  Marshallette Bud Administer 1 Drop to both eyes nightly. levothyroxine 125 mcg tablet Commonly known as:  SYNTHROID Take 1 Tab by mouth Daily (before breakfast). Indications: hypothyroidism LOCOID 0.1 % Lotn Generic drug:  Hydrocortisone Butyrate  
  
 magnesium oxide 400 mg tablet Commonly known as:  MAG-OX Take 400 mg by mouth daily. METROGEL 1 % topical gel Generic drug:  metroNIDAZOLE Apply  to affected area daily. Use a thin layer to affected areas after washing from Dr Ashlee Spears   Indications: ACNE ROSACEA  
  
 mometasone-formoterol 200-5 mcg/actuation HFA inhaler Commonly known as:  [place] Life Take 2 Puffs by inhalation two (2) times a day. montelukast 10 mg tablet Commonly known as:  SINGULAIR  
TAKE 1 TABLET BY MOUTH EVERY DAY  
  
 MOTRIN 800 mg tablet Generic drug:  ibuprofen Take  by mouth every six (6) hours as needed for Pain.  
  
 multivitamin tablet Commonly known as:  ONE A DAY  
 Take 1 tablet by mouth daily. NAFTIN 2 % Gel Generic drug:  naftifine Apply 1 Dose to affected area two (2) times a day. Indications: TINEA PEDIS Nebulizer & Compressor machine 1 Each by Other route four (4) times daily as needed. norethindrone-ethinyl estradiol 1 mg-20 mcg (21)/75 mg (7) Tab Commonly known as:  Jewel Schlichter FE 1/20 Take  by mouth. omeprazole 20 mg capsule Commonly known as:  PRILOSEC  
TAKE ONE CAPSULE BY MOUTH EVERY DAY  
  
 OTHER(NON-FORMULARY) Osteo Biflex PROBIOTIC COMPLEX PO Take  by mouth daily. SYSTANE (PF) OP Apply  to eye four (4) times daily. * terconazole 80 mg vaginal suppository Commonly known as:  TERAZOL 3  
  
 * terconazole 0.4 % vaginal cream  
Commonly known as:  TERAZOL 7  
  
 valACYclovir 500 mg tablet Commonly known as:  VALTREX  
  
 VITAMIN D3 1,000 unit tablet Generic drug:  cholecalciferol Take 1,000 Units by mouth daily. * Notice: This list has 4 medication(s) that are the same as other medications prescribed for you. Read the directions carefully, and ask your doctor or other care provider to review them with you. Follow-up Instructions Return if symptoms worsen or fail to improve. Patient Instructions Seasonal Allergies: Care Instructions Your Care Instructions Allergies occur when your body's defense system (immune system) overreacts to certain substances. The immune system treats a harmless substance as if it were a harmful germ or virus. Many things can cause this to happen. Examples include pollens, medicine, food, dust, animal dander, and mold. Your allergies are seasonal if you have symptoms just at certain times of the year. In that case, you are probably allergic to pollens from certain trees, grasses, or weeds. Allergies can be mild or severe. Over-the-counter allergy medicine may help with some symptoms. Read and follow all instructions on the label. Managing your allergies is an important part of staying healthy. Your doctor may suggest that you have tests to help find the cause of your allergies. When you know what things trigger your symptoms, you can avoid them. This can prevent allergy symptoms and other health problems. In some cases, immunotherapy might help. For this treatment, you get shots or use pills that have a small amount of certain allergens in them. Your body \"gets used to\" the allergen, so you react less to it over time. This kind of treatment may help prevent or reduce some allergy symptoms. Follow-up care is a key part of your treatment and safety. Be sure to make and go to all appointments, and call your doctor if you are having problems. It's also a good idea to know your test results and keep a list of the medicines you take. How can you care for yourself at home? · Be safe with medicines. Take your medicines exactly as prescribed. Call your doctor if you think you are having a problem with your medicine. · During your allergy season, keep windows closed. If you need to use air-conditioning, change or clean all filters every month. Take a shower and change your clothes after you have been outside. · Stay inside when pollen counts are high. Vacuum once or twice a week. Use a vacuum  with a HEPA filter or a double-thickness filter. When should you call for help? Give an epinephrine shot if: 
? · You think you are having a severe allergic reaction. ? After giving an epinephrine shot, call 911, even if you feel better. ?Call 911 if: 
? · You have symptoms of a severe allergic reaction. These may include: 
¨ Sudden raised, red areas (hives) all over your body. ¨ Swelling of the throat, mouth, lips, or tongue. ¨ Trouble breathing. ¨ Passing out (losing consciousness). Or you may feel very lightheaded or suddenly feel weak, confused, or restless. ? · You have been given an epinephrine shot, even if you feel better. ?Call your doctor now or seek immediate medical care if: 
? · You have symptoms of an allergic reaction, such as: ¨ A rash or hives (raised, red areas on the skin). ¨ Itching. ¨ Swelling. ¨ Belly pain, nausea, or vomiting. ? Watch closely for changes in your health, and be sure to contact your doctor if: 
? · You do not get better as expected. Where can you learn more? Go to http://alejandro-eddie.info/. Enter J912 in the search box to learn more about \"Seasonal Allergies: Care Instructions. \" Current as of: September 29, 2016 Content Version: 11.4 © 1709-6658 [employer]. Care instructions adapted under license by [employer] (which disclaims liability or warranty for this information). If you have questions about a medical condition or this instruction, always ask your healthcare professional. [address] any warranty or liability for your use of this information. Advise patient to start taking Over The Counter allergy medication (Allegra, Zyrtec, Claritin, Xyzal or Alavert) daily. If you have itchy, watery eyes you can try OTC Zaditor or Zyrtec Allergy Eye drops. If you have a stuffy nose, please try OTC Flonase, Flonase Sensimist, Rhinocort, or Nasacort AQ 2 squirts up each nostril once a day (adults) or 1 squirt up each nostril once a day (children). Use allergy free, dye free, fragrance free products on your body and hair. After being outdoors, brush hair vigorously, wash face and arms, rinse nostrils with a nasal saline spray and consider changing your clothing. Dust furniture frequently and wear a mask while doing it. Vacuum floors weekly. Remove stuffed animals or extra pillows from the bed. Clean bedding in hot water weekly. Sometimes allergies can be so severe that 2 nasal sprays and 2 pills may be needed to control symptoms. Parotitis: Care Instructions Your Care Instructions Parotitis is a painful swelling of your parotid glands, which are salivary glands located between the ear and jaw. The most common cause is a virus, such as mumps, herpes, or Calvin-Barr. Bacterial infections, diabetes, tumors or stones in the saliva glands, and tooth problems also may cause parotitis. Follow-up care is a key part of your treatment and safety. Be sure to make and go to all appointments, and call your doctor if you are having problems. It's also a good idea to know your test results and keep a list of the medicines you take. How can you care for yourself at home? · Use an over-the-counter pain medicine if needed, such as acetaminophen (Tylenol), ibuprofen (Advil, Motrin), or naproxen (Aleve). Be safe with medicines. Read and follow all instructions on the label. Do not give aspirin to anyone younger than 20. It has been linked to Reye syndrome, a serious illness. · Put an ice or heat pack (whichever feels better) on the swollen jaw for 10 to 20 minutes at a time. Put a thin cloth between the ice or heat pack and the skin. · Suck on ice chips or ice treats such as Popsicles. Eat soft foods that do not have to be chewed much. Do not eat sour foods or liquids. If your salivary glands are very sore, eating these foods will usually cause them to hurt more. · If your doctor prescribed antibiotics, take them as directed. Do not stop taking them just because you feel better. You need to take the full course of antibiotics. To prevent tooth problems · Brush and floss every day, and have regular dental checkups. · Eat a healthy diet, and avoid sugary foods and drinks. · Do not smoke or use spit tobacco. Tobacco use slows your ability to heal. It also increases your risk for gum disease and cancer of the mouth and throat. If you need help quitting, talk to your doctor about stop-smoking programs and medicines. These can increase your chances of quitting for good. When should you call for help? Call 911 anytime you think you may need emergency care. For example, call if: 
? · You have trouble breathing. ?Call your doctor now or seek immediate medical care if: 
? · You have new or worse symptoms of infection, such as: 
¨ Increased pain, swelling, warmth, or redness. ¨ Red streaks leading from the area. ¨ Pus draining from the area. ¨ A fever. ? · You have new pain, or the pain gets worse. ? Watch closely for changes in your health, and be sure to contact your doctor if: 
? · You do not feel better as expected. Where can you learn more? Go to http://alejandro-eddie.info/. Enter M730 in the search box to learn more about \"Parotitis: Care Instructions. \" Current as of: May 12, 2017 Content Version: 11.4 © 0766-0729 [employer]. Care instructions adapted under license by [employer] (which disclaims liability or warranty for this information). If you have questions about a medical condition or this instruction, always ask your healthcare professional. [address] any warranty or liability for your use of this information. Introducing [facility]! Dear Agueda Corea: Thank you for requesting a VocalZoom account. Our records indicate that you already have an active VocalZoom account. You can access your account anytime at [email or web address] Did you know that you can access your hospital and ER discharge instructions at any time in VocalZoom? You can also review all of your test results from your hospital stay or ER visit. Additional Information If you have questions, please visit the Frequently Asked Questions section of the VocalZoom website at [email or web address] Remember, VocalZoom is NOT to be used for urgent needs. For medical emergencies, dial 911. Now available from your iPhone and Android! Please provide this summary of care documentation to your next provider. Your primary care clinician is listed as Kike Campbell. If you have any questions after today's visit, please call [phone].

## 2018-04-16 NOTE — PATIENT INSTRUCTIONS
Seasonal Allergies: Care Instructions  Your Care Instructions  Allergies occur when your body's defense system (immune system) overreacts to certain substances. The immune system treats a harmless substance as if it were a harmful germ or virus. Many things can cause this to happen. Examples include pollens, medicine, food, dust, animal dander, and mold. Your allergies are seasonal if you have symptoms just at certain times of the year. In that case, you are probably allergic to pollens from certain trees, grasses, or weeds. Allergies can be mild or severe. Over-the-counter allergy medicine may help with some symptoms. Read and follow all instructions on the label. Managing your allergies is an important part of staying healthy. Your doctor may suggest that you have tests to help find the cause of your allergies. When you know what things trigger your symptoms, you can avoid them. This can prevent allergy symptoms and other health problems. In some cases, immunotherapy might help. For this treatment, you get shots or use pills that have a small amount of certain allergens in them. Your body \"gets used to\" the allergen, so you react less to it over time. This kind of treatment may help prevent or reduce some allergy symptoms. Follow-up care is a key part of your treatment and safety. Be sure to make and go to all appointments, and call your doctor if you are having problems. It's also a good idea to know your test results and keep a list of the medicines you take. How can you care for yourself at home? · Be safe with medicines. Take your medicines exactly as prescribed. Call your doctor if you think you are having a problem with your medicine. · During your allergy season, keep windows closed. If you need to use air-conditioning, change or clean all filters every month. Take a shower and change your clothes after you have been outside. · Stay inside when pollen counts are high.  Vacuum once or twice a week. Use a vacuum  with a HEPA filter or a double-thickness filter. When should you call for help? Give an epinephrine shot if:  ? · You think you are having a severe allergic reaction. ? After giving an epinephrine shot, call 911, even if you feel better. ?Call 911 if:  ? · You have symptoms of a severe allergic reaction. These may include:  ¨ Sudden raised, red areas (hives) all over your body. ¨ Swelling of the throat, mouth, lips, or tongue. ¨ Trouble breathing. ¨ Passing out (losing consciousness). Or you may feel very lightheaded or suddenly feel weak, confused, or restless. ? · You have been given an epinephrine shot, even if you feel better. ?Call your doctor now or seek immediate medical care if:  ? · You have symptoms of an allergic reaction, such as:  ¨ A rash or hives (raised, red areas on the skin). ¨ Itching. ¨ Swelling. ¨ Belly pain, nausea, or vomiting. ? Watch closely for changes in your health, and be sure to contact your doctor if:  ? · You do not get better as expected. Where can you learn more? Go to http://alejandro-eddie.info/. Enter J912 in the search box to learn more about \"Seasonal Allergies: Care Instructions. \"  Current as of: September 29, 2016  Content Version: 11.4  © 2423-8631 [employer]. Care instructions adapted under license by [employer] (which disclaims liability or warranty for this information). If you have questions about a medical condition or this instruction, always ask your healthcare professional. [place] [address] any warranty or liability for your use of this information. Advise patient to start taking Over The Counter allergy medication (Allegra, Zyrtec, Claritin, Xyzal or Alavert) daily. If you have itchy, watery eyes you can try OTC Zaditor or Zyrtec Allergy Eye drops.   If you have a stuffy nose, please try OTC Flonase, Flonase Sensimist, Rhinocort, or Nasacort AQ 2 squirts up each nostril once a day (adults) or 1 squirt up each nostril once a day (children). Use allergy free, dye free, fragrance free products on your body and hair. After being outdoors, brush hair vigorously, wash face and arms, rinse nostrils with a nasal saline spray and consider changing your clothing. Dust furniture frequently and wear a mask while doing it. Vacuum floors weekly. Remove stuffed animals or extra pillows from the bed. Clean bedding in hot water weekly. Sometimes allergies can be so severe that 2 nasal sprays and 2 pills may be needed to control symptoms. Parotitis: Care Instructions  Your Care Instructions    Parotitis is a painful swelling of your parotid glands, which are salivary glands located between the ear and jaw. The most common cause is a virus, such as mumps, herpes, or Calvin-Barr. Bacterial infections, diabetes, tumors or stones in the saliva glands, and tooth problems also may cause parotitis. Follow-up care is a key part of your treatment and safety. Be sure to make and go to all appointments, and call your doctor if you are having problems. It's also a good idea to know your test results and keep a list of the medicines you take. How can you care for yourself at home? · Use an over-the-counter pain medicine if needed, such as acetaminophen (Tylenol), ibuprofen (Advil, Motrin), or naproxen (Aleve). Be safe with medicines. Read and follow all instructions on the label. Do not give aspirin to anyone younger than 20. It has been linked to Reye syndrome, a serious illness. · Put an ice or heat pack (whichever feels better) on the swollen jaw for 10 to 20 minutes at a time. Put a thin cloth between the ice or heat pack and the skin. · Suck on ice chips or ice treats such as Popsicles. Eat soft foods that do not have to be chewed much. Do not eat sour foods or liquids. If your salivary glands are very sore, eating these foods will usually cause them to hurt more.   · If your doctor prescribed antibiotics, take them as directed. Do not stop taking them just because you feel better. You need to take the full course of antibiotics. To prevent tooth problems  · Brush and floss every day, and have regular dental checkups. · Eat a healthy diet, and avoid sugary foods and drinks. · Do not smoke or use spit tobacco. Tobacco use slows your ability to heal. It also increases your risk for gum disease and cancer of the mouth and throat. If you need help quitting, talk to your doctor about stop-smoking programs and medicines. These can increase your chances of quitting for good. When should you call for help? Call 911 anytime you think you may need emergency care. For example, call if:  ? · You have trouble breathing. ?Call your doctor now or seek immediate medical care if:  ? · You have new or worse symptoms of infection, such as:  ¨ Increased pain, swelling, warmth, or redness. ¨ Red streaks leading from the area. ¨ Pus draining from the area. ¨ A fever. ? · You have new pain, or the pain gets worse. ? Watch closely for changes in your health, and be sure to contact your doctor if:  ? · You do not feel better as expected. Where can you learn more? Go to http://alejandro-eddie.info/. Enter O680 in the search box to learn more about \"Parotitis: Care Instructions. \"  Current as of: May 12, 2017  Content Version: 11.4  © 3157-4831 [employer]. Care instructions adapted under license by [employer] (which disclaims liability or warranty for this information). If you have questions about a medical condition or this instruction, always ask your healthcare professional. [address] any warranty or liability for your use of this information.

## 2018-04-18 ENCOUNTER — TELEPHONE (OUTPATIENT)
Dept: FAMILY MEDICINE CLINIC | Age: 48
End: 2018-04-18

## 2018-04-18 NOTE — TELEPHONE ENCOUNTER
----- Message from Daija Land sent at 4/18/2018 11:29 AM EDT -----  Regarding: Dr Nickie Kelley requesting call back. Has been calling trying to get clarification on allbuterol. [facility] number is [phone].

## 2018-04-20 ENCOUNTER — TELEPHONE (OUTPATIENT)
Dept: FAMILY MEDICINE CLINIC | Age: 48
End: 2018-04-20

## 2018-04-20 NOTE — TELEPHONE ENCOUNTER
----- Message from Daija Land sent at 4/20/2018 12:25 PM EDT -----  Regarding: Dr Nickie Kelley, requesting call back to [phone]. About Albuterol. 2 vials were prescribed. Just clarifying that.

## 2018-05-04 NOTE — TELEPHONE ENCOUNTER
Writer spoke with Dr. Jonah Aguiar regarding vial clarification. Per Dr. Mckenzie Sides should only be 1 vial instead of 1 vials. Writer verbalized understanding. Writer called pharmacy and notified of them vial clarification.

## 2018-05-04 NOTE — TELEPHONE ENCOUNTER
Writer spoke with Dr. Reina Delacruz regarding vial clarification. Per Dr. Huang Seeds should only be 1 vial instead of 1 vials. Writer verbalized understanding. Writer called pharmacy and notified of them vial clarification.

## 2018-05-29 DIAGNOSIS — B35.3 TINEA PEDIS OF BOTH FEET: ICD-10-CM

## 2018-05-29 RX ORDER — NAFTIFINE HYDROCHLORIDE 2 G/100G
GEL TOPICAL
Qty: 60 G | Refills: 2 | Status: SHIPPED | OUTPATIENT
Start: 2018-05-29 | End: 2019-03-02 | Stop reason: SDUPTHER

## 2018-07-13 ENCOUNTER — PATIENT MESSAGE (OUTPATIENT)
Dept: FAMILY MEDICINE CLINIC | Age: 48
End: 2018-07-13

## 2018-07-13 DIAGNOSIS — H10.13 ALLERGIC CONJUNCTIVITIS AND RHINITIS, BILATERAL: ICD-10-CM

## 2018-07-13 DIAGNOSIS — J30.9 ALLERGIC CONJUNCTIVITIS AND RHINITIS, BILATERAL: ICD-10-CM

## 2018-07-13 DIAGNOSIS — L29.9 PRURITIC DERMATITIS: ICD-10-CM

## 2018-07-13 RX ORDER — AZELASTINE 1 MG/ML
1 SPRAY, METERED NASAL 2 TIMES DAILY
Qty: 1 BOTTLE | Refills: 5 | Status: CANCELLED | OUTPATIENT
Start: 2018-07-13

## 2018-07-13 RX ORDER — HYDROXYZINE 25 MG/1
TABLET, FILM COATED ORAL
Qty: 90 TAB | Refills: 1 | Status: SHIPPED | OUTPATIENT
Start: 2018-07-13 | End: 2018-09-13 | Stop reason: SDUPTHER

## 2018-07-13 NOTE — TELEPHONE ENCOUNTER
From: Dung Callahan  To: Sonia Oh DO  Sent: 7/13/2018 9:07 AM EDT  Subject: Medication Renewal Request    Original authorizing provider: DO Heri Rowea Fern  [facility] would like a refill of the following medications:  azelastine (ASTELIN) 137 mcg (0.1 %) nasal spray Sonia Oh DO]    Preferred pharmacy: [facility]/PHARMACY #[id number] [facility] [address]  [facility] [address] [address]    Comment:  Need refills on the folllowing: Azelastine Hydrochloride Nasal Spray Hydroxyzine HCL 25 mg, Atarax - qty 90

## 2018-07-13 NOTE — TELEPHONE ENCOUNTER
From: Mikki Quesada  To: Adenike Taylor DO  Sent: 7/13/2018 9:04 AM EDT  Subject: Medication Renewal Request    Original authorizing provider: DO Tania Curry would like a refill of the following medications:  azelastine (ASTELIN) 137 mcg (0.1 %) nasal spray Adenike Taylor DO]    Preferred pharmacy: [facility]/PHARMACY #[id number] [address]:

## 2018-07-13 NOTE — TELEPHONE ENCOUNTER
From: Seema Cartagena  To: Queenie Bustamante DO  Sent: 7/13/2018 9:09 AM EDT  Subject: Prescription Question    Need refills on the folllowing:    Azelastine Hydrochloride Nasal Spray     Hydroxyzine HCL 25 mg, Atarax - qty 90    Thank you.   Edin Canada  [phone]  [phone]

## 2018-07-13 NOTE — TELEPHONE ENCOUNTER
PCP: Manny Baires DO    Last appt: 4/16/2018  Future Appointments  Date Time Provider Cindy Brown   11/12/2018 11:00 AM Manny Baires DO [facility]       Requested Prescriptions     Pending Prescriptions Disp Refills    hydrOXYzine HCl (ATARAX) 25 mg tablet 90 Tab 1     Sig: TAKE 1 TABLET BY MOUTH EVERY 8 HOURS AS NEEDED FOR ITCHING       Prior labs and Blood pressures:  BP Readings from Last 3 Encounters:   04/16/18 117/71   03/13/18 127/80   02/21/18 113/66     Lab Results   Component Value Date/Time    Sodium 139 08/23/2017 08:12 AM    Potassium 4.5 08/23/2017 08:12 AM    Chloride 103 08/23/2017 08:12 AM    CO2 22 08/23/2017 08:12 AM    Anion gap 10 09/24/2011 02:30 AM    Glucose 90 08/23/2017 08:12 AM    BUN 14 08/23/2017 08:12 AM    Creatinine 0.85 08/23/2017 08:12 AM    BUN/Creatinine ratio 16 08/23/2017 08:12 AM    GFR est AA 93 08/23/2017 08:12 AM    GFR est non-AA 81 08/23/2017 08:12 AM    Calcium 8.9 08/23/2017 08:12 AM     Lab Results   Component Value Date/Time    Hemoglobin A1c 5.8 (H) 08/23/2017 08:12 AM     Lab Results   Component Value Date/Time    Cholesterol, total 168 08/23/2017 08:12 AM    HDL Cholesterol 56 08/23/2017 08:12 AM    LDL, calculated 86 08/23/2017 08:12 AM    VLDL, calculated 26 08/23/2017 08:12 AM    Triglyceride 129 08/23/2017 08:12 AM    CHOL/HDL Ratio 2.5 05/19/2010 10:46 AM     Lab Results   Component Value Date/Time    Vitamin D 25-Hydroxy 32.2 08/11/2011 08:15 AM    VITAMIN D, 25-HYDROXY 36.3 08/23/2017 08:12 AM       Lab Results   Component Value Date/Time    TSH 1.900 08/23/2017 08:12 AM

## 2018-09-12 DIAGNOSIS — H10.13 ALLERGIC CONJUNCTIVITIS AND RHINITIS, BILATERAL: ICD-10-CM

## 2018-09-12 DIAGNOSIS — L29.9 PRURITIC DERMATITIS: ICD-10-CM

## 2018-09-12 DIAGNOSIS — J30.9 ALLERGIC CONJUNCTIVITIS AND RHINITIS, BILATERAL: ICD-10-CM

## 2018-09-12 RX ORDER — HYDROXYZINE 25 MG/1
TABLET, FILM COATED ORAL
Qty: 90 TAB | Refills: 1 | Status: CANCELLED | OUTPATIENT
Start: 2018-09-12

## 2018-09-12 NOTE — TELEPHONE ENCOUNTER
PCP: Alana Stein DO    Last appt: 4/16/2018  Future Appointments  Date Time Provider Cindy Brown   11/12/2018 11:00 AM Alana Stein DO [facility]       Requested Prescriptions     Pending Prescriptions Disp Refills    hydrOXYzine HCl (ATARAX) 25 mg tablet 90 Tab 1     Sig: TAKE 1 TABLET BY MOUTH EVERY 8 HOURS AS NEEDED FOR ITCHING  Indications: Pruritus of Skin       Prior labs and Blood pressures:  BP Readings from Last 3 Encounters:   04/16/18 117/71   03/13/18 127/80   02/21/18 113/66     Lab Results   Component Value Date/Time    Sodium 139 08/23/2017 08:12 AM    Potassium 4.5 08/23/2017 08:12 AM    Chloride 103 08/23/2017 08:12 AM    CO2 22 08/23/2017 08:12 AM    Anion gap 10 09/24/2011 02:30 AM    Glucose 90 08/23/2017 08:12 AM    BUN 14 08/23/2017 08:12 AM    Creatinine 0.85 08/23/2017 08:12 AM    BUN/Creatinine ratio 16 08/23/2017 08:12 AM    GFR est AA 93 08/23/2017 08:12 AM    GFR est non-AA 81 08/23/2017 08:12 AM    Calcium 8.9 08/23/2017 08:12 AM     Lab Results   Component Value Date/Time    Hemoglobin A1c 5.8 (H) 08/23/2017 08:12 AM     Lab Results   Component Value Date/Time    Cholesterol, total 168 08/23/2017 08:12 AM    HDL Cholesterol 56 08/23/2017 08:12 AM    LDL, calculated 86 08/23/2017 08:12 AM    VLDL, calculated 26 08/23/2017 08:12 AM    Triglyceride 129 08/23/2017 08:12 AM    CHOL/HDL Ratio 2.5 05/19/2010 10:46 AM     Lab Results   Component Value Date/Time    Vitamin D 25-Hydroxy 32.2 08/11/2011 08:15 AM    VITAMIN D, 25-HYDROXY 36.3 08/23/2017 08:12 AM       Lab Results   Component Value Date/Time    TSH 1.900 08/23/2017 08:12 AM

## 2018-09-12 NOTE — TELEPHONE ENCOUNTER
Requested Prescriptions     Pending Prescriptions Disp Refills    azelastine (ASTELIN) 137 mcg (0.1 %) nasal spray 1 Bottle 5     Sig: Use in each nostril as directed     [facility] sent a request for 90 days supply.

## 2018-09-12 NOTE — TELEPHONE ENCOUNTER
Requested Prescriptions     Pending Prescriptions Disp Refills    hydrOXYzine HCl (ATARAX) 25 mg tablet 90 Tab 1     Sig: TAKE 1 TABLET BY MOUTH EVERY 8 HOURS AS NEEDED FOR ITCHING  Indications: Pruritus of Skin     [facility] sent a request for 90 days supply.

## 2018-09-12 NOTE — TELEPHONE ENCOUNTER
PCP: Jennie Cornell DO    Last appt: 4/16/2018  Future Appointments  Date Time Provider Cindy Brown   11/12/2018 11:00 AM Jennie Cornell DO [facility]       Requested Prescriptions     Pending Prescriptions Disp Refills    azelastine (ASTELIN) 137 mcg (0.1 %) nasal spray 1 Bottle 5     Sig: Use in each nostril as directed       Prior labs and Blood pressures:  BP Readings from Last 3 Encounters:   04/16/18 117/71   03/13/18 127/80   02/21/18 113/66     Lab Results   Component Value Date/Time    Sodium 139 08/23/2017 08:12 AM    Potassium 4.5 08/23/2017 08:12 AM    Chloride 103 08/23/2017 08:12 AM    CO2 22 08/23/2017 08:12 AM    Anion gap 10 09/24/2011 02:30 AM    Glucose 90 08/23/2017 08:12 AM    BUN 14 08/23/2017 08:12 AM    Creatinine 0.85 08/23/2017 08:12 AM    BUN/Creatinine ratio 16 08/23/2017 08:12 AM    GFR est AA 93 08/23/2017 08:12 AM    GFR est non-AA 81 08/23/2017 08:12 AM    Calcium 8.9 08/23/2017 08:12 AM     Lab Results   Component Value Date/Time    Hemoglobin A1c 5.8 (H) 08/23/2017 08:12 AM     Lab Results   Component Value Date/Time    Cholesterol, total 168 08/23/2017 08:12 AM    HDL Cholesterol 56 08/23/2017 08:12 AM    LDL, calculated 86 08/23/2017 08:12 AM    VLDL, calculated 26 08/23/2017 08:12 AM    Triglyceride 129 08/23/2017 08:12 AM    CHOL/HDL Ratio 2.5 05/19/2010 10:46 AM     Lab Results   Component Value Date/Time    Vitamin D 25-Hydroxy 32.2 08/11/2011 08:15 AM    VITAMIN D, 25-HYDROXY 36.3 08/23/2017 08:12 AM       Lab Results   Component Value Date/Time    TSH 1.900 08/23/2017 08:12 AM

## 2018-09-13 DIAGNOSIS — L29.9 PRURITIC DERMATITIS: ICD-10-CM

## 2018-09-13 RX ORDER — HYDROXYZINE 25 MG/1
TABLET, FILM COATED ORAL
Qty: 90 TAB | Refills: 1 | Status: SHIPPED | OUTPATIENT
Start: 2018-09-13 | End: 2019-01-21 | Stop reason: SDUPTHER

## 2018-09-13 RX ORDER — AZELASTINE 1 MG/ML
2 SPRAY, METERED NASAL 2 TIMES DAILY
Qty: 1 BOTTLE | Refills: 5 | Status: SHIPPED | OUTPATIENT
Start: 2018-09-13 | End: 2019-11-15 | Stop reason: SDUPTHER

## 2018-09-24 ENCOUNTER — OFFICE VISIT (OUTPATIENT)
Dept: URGENT CARE | Age: 48
End: 2018-09-24

## 2018-09-24 VITALS
DIASTOLIC BLOOD PRESSURE: 86 MMHG | OXYGEN SATURATION: 99 % | WEIGHT: 293 LBS | RESPIRATION RATE: 18 BRPM | HEIGHT: 62 IN | SYSTOLIC BLOOD PRESSURE: 184 MMHG | BODY MASS INDEX: 53.92 KG/M2 | HEART RATE: 85 BPM | TEMPERATURE: 97.5 F

## 2018-09-24 DIAGNOSIS — J98.01 ACUTE BRONCHOSPASM: Primary | ICD-10-CM

## 2018-09-24 DIAGNOSIS — K21.9 GASTROESOPHAGEAL REFLUX DISEASE WITHOUT ESOPHAGITIS: ICD-10-CM

## 2018-09-24 RX ORDER — IPRATROPIUM BROMIDE AND ALBUTEROL SULFATE 2.5; .5 MG/3ML; MG/3ML
3 SOLUTION RESPIRATORY (INHALATION)
Status: COMPLETED | OUTPATIENT
Start: 2018-09-24 | End: 2018-09-24

## 2018-09-24 RX ORDER — IPRATROPIUM BROMIDE AND ALBUTEROL SULFATE 2.5; .5 MG/3ML; MG/3ML
3 SOLUTION RESPIRATORY (INHALATION)
Qty: 30 NEBULE | Refills: 0 | Status: SHIPPED | OUTPATIENT
Start: 2018-09-24 | End: 2018-11-12 | Stop reason: SDUPTHER

## 2018-09-24 RX ORDER — BENZONATATE 200 MG/1
200 CAPSULE ORAL
Qty: 21 CAP | Refills: 0 | Status: SHIPPED | OUTPATIENT
Start: 2018-09-24 | End: 2018-10-01

## 2018-09-24 RX ORDER — FLUTICASONE PROPIONATE 50 MCG
2 SPRAY, SUSPENSION (ML) NASAL DAILY
Qty: 1 BOTTLE | Refills: 0 | Status: SHIPPED | OUTPATIENT
Start: 2018-09-24 | End: 2018-11-21 | Stop reason: SDUPTHER

## 2018-09-24 RX ORDER — OMEPRAZOLE 40 MG/1
40 CAPSULE, DELAYED RELEASE ORAL DAILY
Qty: 30 CAP | Refills: 0 | Status: SHIPPED | OUTPATIENT
Start: 2018-09-24 | End: 2018-11-12 | Stop reason: SDUPTHER

## 2018-09-24 RX ADMIN — IPRATROPIUM BROMIDE AND ALBUTEROL SULFATE 3 ML: 2.5; .5 SOLUTION RESPIRATORY (INHALATION) at 20:06

## 2018-09-24 NOTE — MR AVS SNAPSHOT
[place] [address] [facility] [address] 
[phone] Patient: Hortencia Garrett MRN: [id number] :[id number] Visit Information Date & Time Provider Department Dept. Phone Encounter #  
 2018  7:30 PM [address] [facility] [phone] [id number] Your Appointments 2018 11:00 AM  
Complete Physical with Kevin Orozco, DO [employer] ([facility]) Appt Note: Complete physical 18 elza  
 [address] 
[address] Neftali Ovens [address] [facility] [address]  
[phone]  
  
   
 [address] Upcoming Health Maintenance Date Due Influenza Age 5 to Adult 2018 Shingrix Vaccine Age 50> (1 of 2) 2018 FOBT Q 1 YEAR AGE 50-75 2018 BREAST CANCER SCRN MAMMOGRAM 2018 PAP AKA CERVICAL CYTOLOGY 11/10/2019 DTaP/Tdap/Td series (2 - Td) 11/3/2025 Allergies as of 2018  Review Complete On: 2018 By: Chico Montes RN Severity Noted Reaction Type Reactions Latex  2014    Rash Other Medication High 2009    Hives, Rash SUN Pcn [Penicillins] High 2009    Hives Prednisone High 2009    Hives Sulfa (Sulfonamide Antibiotics) High 2009    Hives Garamycin [Gentamicin]  2009    Other (comments) Itchy eyes due to sulfate Metformin  2015    Diarrhea Current Immunizations  Reviewed on 2017 Name Date Influenza Vaccine 11/3/2017, 2016, 10/15/2013 Influenza Vaccine Bravo Jim) 11/3/2015 Influenza Vaccine (Quad) PF 2016, 2014 Influenza Vaccine Split 2012, 2011 Influenza Vaccine Whole 10/1/2010 TD Vaccine 2003 Tdap 11/3/2015 Not reviewed this visit You Were Diagnosed With   
  
 Codes Comments  Acute bronchospasm    -  Primary ICD-10-CM: J98.01 
ICD-9-CM: 519.11   
 Gastroesophageal reflux disease without esophagitis     ICD-10-CM: K21.9 ICD-9-CM: 530.81 Vitals BP Pulse Temp Resp Height(growth percentile) Weight(growth percentile) 184/86 85 97.5 °F (36.4 °C) 18 5' 2\" (1.575 m) 327 lb (148.3 kg) LMP SpO2 BMI OB Status Smoking Status 09/03/2018 99% 59.81 kg/m2 Having regular periods Never Smoker BMI and BSA Data Body Mass Index Body Surface Area  
 59.81 kg/m 2 2.55 m 2 Preferred [facility]acy [id number] [address] [phone] Your Updated Medication List  
  
   
This list is accurate as of 9/24/18  8:03 PM.  Always use your most recent med list.  
  
  
  
  
 * albuterol 90 mcg/actuation inhaler Commonly known as:  PROVENTIL HFA, VENTOLIN HFA, PROAIR HFA Take 2 Puffs by inhalation every four (4) hours as needed for Wheezing. Indications: BRONCHOSPASM PREVENTION  
  
 * albuterol 2.5 mg /3 mL (0.083 %) nebulizer solution Commonly known as:  PROVENTIL VENTOLIN  
6 mL by Nebulization route every four (4) hours as needed for Wheezing or Shortness of Breath. Indications: Acute Asthma Attack  
  
 albuterol-ipratropium 2.5 mg-0.5 mg/3 ml Nebu Commonly known as:  DUO-NEB  
3 mL by Nebulization route every six (6) hours as needed. ALLEGRA 180 mg tablet Generic drug:  fexofenadine Take  by mouth daily. ALPRAZolam 0.5 mg tablet Commonly known as:  Rebbeca Lawn Take 1 Tab by mouth two (2) times daily as needed for Anxiety or Sleep. Max Daily Amount: 1 mg. Indications: anxiety * azelastine 137 mcg (0.1 %) nasal spray Commonly known as:  ASTELIN  
1 Spray by Both Nostrils route two (2) times a day. Indications: SEASONAL ALLERGIC RHINITIS  
  
 * azelastine 137 mcg (0.1 %) nasal spray Commonly known as:  ASTELIN  
2 Sprays by Both Nostrils route two (2) times a day.  Use in each nostril as directed  Indications: SEASONAL ALLERGIC RHINITIS  
  
 benzonatate 200 mg capsule Commonly known as:  TESSALON Take 1 Cap by mouth three (3) times daily as needed for Cough for up to 7 days. budesonide 1 mg/2 mL Nbsp Commonly known as:  PULMICORT  
2 mL by Nebulization route two (2) times a day. Indications: MAINTENANCE THERAPY FOR ASTHMA  
  
 butalbital-acetaminophen-caffeine -40 mg per tablet Commonly known as:  FIORICET, ESGIC  
TAKE 1 TAB BY MOUTH EVERY SIX (6) HOURS AS NEEDED FOR PAIN OR HEADACHE. cyclobenzaprine 10 mg tablet Commonly known as:  FLEXERIL  
TAKE 1 TABLET BY MOUTH 3 TIMES A DAY AS NEEDED FOR MUSCLE SPASM  
  
 diclofenac EC 75 mg EC tablet Commonly known as:  VOLTAREN Take 75 mg by mouth two (2) times a day. doxycycline 100 mg capsule Commonly known as:  Thora Deem Take 1 Cap by mouth two (2) times a day. EPICERAM Emul Generic drug:  Emollient Combination No.32  
by Apply Externally route. From Dr Arely Low. FISH OIL 1,000 mg Cap Generic drug:  omega-3 fatty acids-vitamin e Take 1 Cap by mouth daily. fluticasone 50 mcg/actuation nasal spray Commonly known as:  Lennice Boss 2 Sprays by Both Nostrils route daily. hydrOXYzine HCl 25 mg tablet Commonly known as:  ATARAX TAKE 1 TABLET BY MOUTH EVERY 8 HOURS AS NEEDED FOR ITCHING INDICATIONS: PRURITUS OF SKIN  
  
 ipratropium 17 mcg/actuation inhaler Commonly known as:  ATROVENT HFA Take 2 Puffs by inhalation every four (4) hours as needed for Wheezing. Indications: MAINTENANCE THERAPY FOR ASTHMA  
  
 latanoprost 0.005 % ophthalmic solution Commonly known as:  Henreittmargy Valdovinos Administer 1 Drop to both eyes nightly. levothyroxine 125 mcg tablet Commonly known as:  SYNTHROID  
TAKE 1 TABLET BY MOUTH DAILY (BEFORE BREAKFAST). INDICATIONS: HYPOTHYROIDISM  
  
 LOCOID 0.1 % Lotn Generic drug:  Hydrocortisone Butyrate  
  
 magnesium oxide 400 mg tablet Commonly known as:  MAG-OX Take 400 mg by mouth daily. METROGEL 1 % topical gel Generic drug:  metroNIDAZOLE Apply  to affected area daily. Use a thin layer to affected areas after washing from Dr Rosamaria Jeter   Indications: ACNE ROSACEA  
  
 mometasone-formoterol 200-5 mcg/actuation HFA inhaler Commonly known as:  Gillvipul Herter Take 2 Puffs by inhalation two (2) times a day. montelukast 10 mg tablet Commonly known as:  SINGULAIR  
TAKE 1 TABLET BY MOUTH EVERY DAY  
  
 MOTRIN 800 mg tablet Generic drug:  ibuprofen Take  by mouth every six (6) hours as needed for Pain.  
  
 multivitamin tablet Commonly known as:  ONE A DAY Take 1 tablet by mouth daily. NAFTIN 2 % Gel Generic drug:  naftifine APPLY TO AFFECTED AREA TWO (2) TIMES A DAY. INDICATIONS: TINEA PEDIS Nebulizer & Compressor machine 1 Each by Other route four (4) times daily as needed. norethindrone-ethinyl estradiol 1 mg-20 mcg (21)/75 mg (7) Tab Commonly known as:  Chaidez Relic FE 1/20 Take  by mouth. * omeprazole 20 mg capsule Commonly known as:  PRILOSEC  
TAKE ONE CAPSULE BY MOUTH EVERY DAY  
  
 * omeprazole 40 mg capsule Commonly known as:  PRILOSEC Take 1 Cap by mouth daily. OTHER(NON-FORMULARY) Osteo Biflex PROBIOTIC COMPLEX PO Take  by mouth daily. SYSTANE (PF) OP Apply  to eye four (4) times daily. * terconazole 80 mg vaginal suppository Commonly known as:  TERAZOL 3  
  
 * terconazole 0.4 % vaginal cream  
Commonly known as:  TERAZOL 7  
  
 valACYclovir 500 mg tablet Commonly known as:  VALTREX  
  
 VITAMIN D3 1,000 unit tablet Generic drug:  cholecalciferol Take 1,000 Units by mouth daily. * Notice: This list has 8 medication(s) that are the same as other medications prescribed for you. Read the directions carefully, and ask your doctor or other care provider to review them with you. Prescriptions Sent to Pharmacy Refills  
 omeprazole (PRILOSEC) 40 mg capsule 0 Sig: Take 1 Cap by mouth daily. Class: Normal  
 [facility] Ph #: [phone] Route: Oral  
 albuterol-ipratropium (DUO-NEB) 2.5 mg-0.5 mg/3 ml nebu 0 Sig: 3 mL by Nebulization route every six (6) hours as needed. Class: Normal  
 [facility] Ph #: [phone] Route: Nebulization  
 benzonatate (TESSALON) 200 mg capsule 0 Sig: Take 1 Cap by mouth three (3) times daily as needed for Cough for up to 7 days. Class: Normal  
 [facility] Ph #: [phone] Route: Oral  
 fluticasone (FLONASE) 50 mcg/actuation nasal spray 0 Si Sprays by Both Nostrils route daily. Class: Normal  
 [facility] Ph #: [phone] Route: Both Nostrils We Performed the Following EKG, 12 LEAD, INITIAL [12569 CPT(R)] Patient Instructions Gastroesophageal Reflux Disease (GERD): Care Instructions Your Care Instructions Gastroesophageal reflux disease (GERD) is the backward flow of stomach acid into the esophagus. The esophagus is the tube that leads from your throat to your stomach. A one-way valve prevents the stomach acid from moving up into this tube. When you have GERD, this valve does not close tightly enough. If you have mild GERD symptoms including heartburn, you may be able to control the problem with antacids or over-the-counter medicine. Changing your diet, losing weight, and making other lifestyle changes can also help reduce symptoms. Follow-up care is a key part of your treatment and safety.  Be sure to make and go to all appointments, and call your doctor if you are having problems. It's also a good idea to know your test results and keep a list of the medicines you take. How can you care for yourself at home? · Take your medicines exactly as prescribed. Call your doctor if you think you are having a problem with your medicine. · Your doctor may recommend over-the-counter medicine. For mild or occasional indigestion, antacids, such as Tums, Gaviscon, Mylanta, or Maalox, may help. Your doctor also may recommend over-the-counter acid reducers, such as Pepcid AC, Tagamet HB, Zantac 75, or Prilosec. Read and follow all instructions on the label. If you use these medicines often, talk with your doctor. · Change your eating habits. ¨ It's best to eat several small meals instead of two or three large meals. ¨ After you eat, wait 2 to 3 hours before you lie down. ¨ Chocolate, mint, and alcohol can make GERD worse. ¨ Spicy foods, foods that have a lot of acid (like tomatoes and oranges), and coffee can make GERD symptoms worse in some people. If your symptoms are worse after you eat a certain food, you may want to stop eating that food to see if your symptoms get better. · Do not smoke or chew tobacco. Smoking can make GERD worse. If you need help quitting, talk to your doctor about stop-smoking programs and medicines. These can increase your chances of quitting for good. · If you have GERD symptoms at night, raise the head of your bed 6 to 8 inches by putting the frame on blocks or placing a foam wedge under the head of your mattress. (Adding extra pillows does not work.) · Do not wear tight clothing around your middle. · Lose weight if you need to. Losing just 5 to 10 pounds can help. When should you call for help? Call your doctor now or seek immediate medical care if: 
  · You have new or different belly pain.  
  · Your stools are black and tarlike or have streaks of blood.  Watch closely for changes in your health, and be sure to contact your doctor if: 
  · Your symptoms have not improved after 2 days.  
  · Food seems to catch in your throat or chest.  
Where can you learn more? Go to http://alejandro-eddie.info/. Enter H263 in the search box to learn more about \"Gastroesophageal Reflux Disease (GERD): Care Instructions. \" Current as of: May 12, 2017 Content Version: 11.7 © 9044-0084 Screen[employer]er. Care instructions adapted under license by [employer] (which disclaims liability or warranty for this information). If you have questions about a medical condition or this instruction, always ask your healthcare professional. [place] [address] any warranty or liability for your use of this information. Wheezing or Bronchoconstriction: Care Instructions Your Care Instructions Wheezing is a whistling noise made during breathing. It occurs when the small airways, or bronchial tubes, that lead to your lungs swell or contract (spasm) and become narrow. This narrowing is called bronchoconstriction. When your airways constrict, it is hard for air to pass through and this makes it hard for you to breathe. Wheezing and bronchoconstriction can be caused by many problems, including: · An infection such as the flu or a cold. · Allergies such as hay fever. · Diseases such as asthma or chronic obstructive pulmonary disease. · Smoking. Treatment for your wheezing depends on what is causing the problem. Your wheezing may get better without treatment. But you may need to pay attention to things that cause your wheezing and avoid them. Or you may need medicine to help treat the wheezing and to reduce the swelling or to relieve spasms in your lungs. Follow-up care is a key part of your treatment and safety.  Be sure to make and go to all appointments, and call your doctor if you are having problems. It is also a good idea to know your test results and keep a list of the medicines you take. How can you care for yourself at home? · Take your medicine exactly as prescribed. Call your doctor if you think you are having a problem with your medicine. You will get more details on the specific medicine your doctor prescribes. · If your doctor prescribed antibiotics, take them as directed. Do not stop taking them just because you feel better. You need to take the full course of antibiotics. · Breathe moist air from a humidifier, hot shower, or sink filled with hot water. This may help ease your symptoms and make it easier for you to breathe. · If you have congestion in your nose and throat, drinking plenty of fluids, especially hot fluids, may help relieve your symptoms. If you have kidney, heart, or liver disease and have to limit fluids, talk with your doctor before you increase the amount of fluids you drink. · If you have mucus in your airways, it may help to breathe deeply and cough. · Do not smoke or allow others to smoke around you. Smoking can make your wheezing worse. If you need help quitting, talk to your doctor about stop-smoking programs and medicines. These can increase your chances of quitting for good. · Avoid things that may cause your wheezing. These may include colds, smoke, air pollution, dust, pollen, pets, cockroaches, stress, and cold air. When should you call for help? Call 911 anytime you think you may need emergency care. For example, call if: 
  · You have severe trouble breathing.  
  · You passed out (lost consciousness).  
 Call your doctor now or seek immediate medical care if: 
  · You cough up yellow, dark brown, or bloody mucus (sputum).  
  · You have new or worse shortness of breath.  
  · Your wheezing is not getting better or it gets worse after you start taking your medicine.  
 Watch closely for changes in your health, and be sure to contact your doctor if:   · You do not get better as expected. Where can you learn more? Go to http://alejandro-eddie.info/. Enter 454 7302 in the search box to learn more about \"Wheezing or Bronchoconstriction: Care Instructions. \" Current as of: December 6, 2017 Content Version: 11.7 © 8588-2286 [employer]ix. Care instructions adapted under license by [employer] (which disclaims liability or warranty for this information). If you have questions about a medical condition or this instruction, always ask your healthcare professional. [address] any warranty or liability for your use of this information. Introducing [facility]! Dear Rigoberto Erickson: Thank you for requesting a [employer] account. Our records indicate that you already have an active [employer] account. You can access your account anytime at [email or web address] Did you know that you can access your hospital and ER discharge instructions at any time in [employer]? You can also review all of your test results from your hospital stay or ER visit. Additional Information If you have questions, please visit the Frequently Asked Questions section of the [employer] website at [email or web address] Remember, [employer] is NOT to be used for urgent needs. For medical emergencies, dial 911. Now available from your iPhone and Android! Please provide this summary of care documentation to your next provider. Your primary care clinician is listed as Diandra Roberson. If you have any questions after today's visit, please call [phone].

## 2018-09-25 DIAGNOSIS — J45.20 MILD INTERMITTENT ASTHMA WITHOUT COMPLICATION: ICD-10-CM

## 2018-09-25 NOTE — TELEPHONE ENCOUNTER
Requested Prescriptions     Pending Prescriptions Disp Refills    montelukast (SINGULAIR) 10 mg tablet 30 Tab 11     Request for 90 days supply.

## 2018-09-25 NOTE — TELEPHONE ENCOUNTER
PCP: Manny Baires DO    Last appt: 4/16/2018  Future Appointments  Date Time Provider Cindy Brown   11/12/2018 11:00 AM Manny Baires DO [facility]       Requested Prescriptions     Pending Prescriptions Disp Refills    montelukast (SINGULAIR) 10 mg tablet 30 Tab 11       Prior labs and Blood pressures:  BP Readings from Last 3 Encounters:   09/24/18 184/86   04/16/18 117/71   03/13/18 127/80     Lab Results   Component Value Date/Time    Sodium 139 08/23/2017 08:12 AM    Potassium 4.5 08/23/2017 08:12 AM    Chloride 103 08/23/2017 08:12 AM    CO2 22 08/23/2017 08:12 AM    Anion gap 10 09/24/2011 02:30 AM    Glucose 90 08/23/2017 08:12 AM    BUN 14 08/23/2017 08:12 AM    Creatinine 0.85 08/23/2017 08:12 AM    BUN/Creatinine ratio 16 08/23/2017 08:12 AM    GFR est AA 93 08/23/2017 08:12 AM    GFR est non-AA 81 08/23/2017 08:12 AM    Calcium 8.9 08/23/2017 08:12 AM     Lab Results   Component Value Date/Time    Hemoglobin A1c 5.8 (H) 08/23/2017 08:12 AM     Lab Results   Component Value Date/Time    Cholesterol, total 168 08/23/2017 08:12 AM    HDL Cholesterol 56 08/23/2017 08:12 AM    LDL, calculated 86 08/23/2017 08:12 AM    VLDL, calculated 26 08/23/2017 08:12 AM    Triglyceride 129 08/23/2017 08:12 AM    CHOL/HDL Ratio 2.5 05/19/2010 10:46 AM     Lab Results   Component Value Date/Time    Vitamin D 25-Hydroxy 32.2 08/11/2011 08:15 AM    VITAMIN D, 25-HYDROXY 36.3 08/23/2017 08:12 AM       Lab Results   Component Value Date/Time    TSH 1.900 08/23/2017 08:12 AM

## 2018-09-25 NOTE — PATIENT INSTRUCTIONS
Gastroesophageal Reflux Disease (GERD): Care Instructions  Your Care Instructions    Gastroesophageal reflux disease (GERD) is the backward flow of stomach acid into the esophagus. The esophagus is the tube that leads from your throat to your stomach. A one-way valve prevents the stomach acid from moving up into this tube. When you have GERD, this valve does not close tightly enough. If you have mild GERD symptoms including heartburn, you may be able to control the problem with antacids or over-the-counter medicine. Changing your diet, losing weight, and making other lifestyle changes can also help reduce symptoms. Follow-up care is a key part of your treatment and safety. Be sure to make and go to all appointments, and call your doctor if you are having problems. It's also a good idea to know your test results and keep a list of the medicines you take. How can you care for yourself at home? · Take your medicines exactly as prescribed. Call your doctor if you think you are having a problem with your medicine. · Your doctor may recommend over-the-counter medicine. For mild or occasional indigestion, antacids, such as Tums, Gaviscon, Mylanta, or Maalox, may help. Your doctor also may recommend over-the-counter acid reducers, such as Pepcid AC, Tagamet HB, Zantac 75, or Prilosec. Read and follow all instructions on the label. If you use these medicines often, talk with your doctor. · Change your eating habits. ¨ It's best to eat several small meals instead of two or three large meals. ¨ After you eat, wait 2 to 3 hours before you lie down. ¨ Chocolate, mint, and alcohol can make GERD worse. ¨ Spicy foods, foods that have a lot of acid (like tomatoes and oranges), and coffee can make GERD symptoms worse in some people. If your symptoms are worse after you eat a certain food, you may want to stop eating that food to see if your symptoms get better.   · Do not smoke or chew tobacco. Smoking can make GERD worse. If you need help quitting, talk to your doctor about stop-smoking programs and medicines. These can increase your chances of quitting for good. · If you have GERD symptoms at night, raise the head of your bed 6 to 8 inches by putting the frame on blocks or placing a foam wedge under the head of your mattress. (Adding extra pillows does not work.)  · Do not wear tight clothing around your middle. · Lose weight if you need to. Losing just 5 to 10 pounds can help. When should you call for help? Call your doctor now or seek immediate medical care if:    · You have new or different belly pain.     · Your stools are black and tarlike or have streaks of blood.    Watch closely for changes in your health, and be sure to contact your doctor if:    · Your symptoms have not improved after 2 days.     · Food seems to catch in your throat or chest.   Where can you learn more? Go to http://[employer]die.info/. Enter F304 in the search box to learn more about \"Gastroesophageal Reflux Disease (GERD): Care Instructions. \"  Current as of: May 12, 2017  Content Version: 11.7  © 7016-2064 [employer]. Care instructions adapted under license by [employer] (which disclaims liability or warranty for this information). If you have questions about a medical condition or this instruction, always ask your healthcare professional. [address] any warranty or liability for your use of this information. Wheezing or Bronchoconstriction: Care Instructions  Your Care Instructions  Wheezing is a whistling noise made during breathing. It occurs when the small airways, or bronchial tubes, that lead to your lungs swell or contract (spasm) and become narrow. This narrowing is called bronchoconstriction. When your airways constrict, it is hard for air to pass through and this makes it hard for you to breathe.   Wheezing and bronchoconstriction can be caused by many problems, including:  · An infection such as the flu or a cold. · Allergies such as hay fever. · Diseases such as asthma or chronic obstructive pulmonary disease. · Smoking. Treatment for your wheezing depends on what is causing the problem. Your wheezing may get better without treatment. But you may need to pay attention to things that cause your wheezing and avoid them. Or you may need medicine to help treat the wheezing and to reduce the swelling or to relieve spasms in your lungs. Follow-up care is a key part of your treatment and safety. Be sure to make and go to all appointments, and call your doctor if you are having problems. It is also a good idea to know your test results and keep a list of the medicines you take. How can you care for yourself at home? · Take your medicine exactly as prescribed. Call your doctor if you think you are having a problem with your medicine. You will get more details on the specific medicine your doctor prescribes. · If your doctor prescribed antibiotics, take them as directed. Do not stop taking them just because you feel better. You need to take the full course of antibiotics. · Breathe moist air from a humidifier, hot shower, or sink filled with hot water. This may help ease your symptoms and make it easier for you to breathe. · If you have congestion in your nose and throat, drinking plenty of fluids, especially hot fluids, may help relieve your symptoms. If you have kidney, heart, or liver disease and have to limit fluids, talk with your doctor before you increase the amount of fluids you drink. · If you have mucus in your airways, it may help to breathe deeply and cough. · Do not smoke or allow others to smoke around you. Smoking can make your wheezing worse. If you need help quitting, talk to your doctor about stop-smoking programs and medicines. These can increase your chances of quitting for good. · Avoid things that may cause your wheezing.  These may include colds, smoke, air pollution, dust, pollen, pets, cockroaches, stress, and cold air. When should you call for help? Call 911 anytime you think you may need emergency care. For example, call if:    · You have severe trouble breathing.     · You passed out (lost consciousness).    Call your doctor now or seek immediate medical care if:    · You cough up yellow, dark brown, or bloody mucus (sputum).     · You have new or worse shortness of breath.     · Your wheezing is not getting better or it gets worse after you start taking your medicine.    Watch closely for changes in your health, and be sure to contact your doctor if:    · You do not get better as expected. Where can you learn more? Go to http://alejandro-eddie.info/. Enter 454 3934 in the search box to learn more about \"Wheezing or Bronchoconstriction: Care Instructions. \"  Current as of: December 6, 2017  Content Version: 11.7  © 6086-8069 [employer]. Care instructions adapted under license by [employer] (which disclaims liability or warranty for this information). If you have questions about a medical condition or this instruction, always ask your healthcare professional. [address] any warranty or liability for your use of this information.

## 2018-09-26 RX ORDER — MONTELUKAST SODIUM 10 MG/1
10 TABLET ORAL DAILY
Qty: 30 TAB | Refills: 11 | Status: SHIPPED | OUTPATIENT
Start: 2018-09-26 | End: 2018-10-08 | Stop reason: SDUPTHER

## 2018-09-26 NOTE — PROGRESS NOTES
Patient is a 46 y.o. female presenting with shortness of breath. Shortness of Breath   The history is provided by the patient. This is a recurrent problem. The problem occurs intermittently. The current episode started more than 2 days ago. The problem has been gradually worsening. Associated symptoms include cough, wheezing, chest pain (lower chest/epigastric area) and abdominal pain (epigastric area). Pertinent negatives include no fever, no coryza, no sore throat, no swollen glands, no sputum production, no hemoptysis and no vomiting. Precipitated by: position - lying down- more cough/ wheezing at bed time. She has tried nothing for the symptoms. Associated medical issues do not include asthma or chronic lung disease. Associated medical issues comments: h/o GERD- not taking medication . Past Medical History:   Diagnosis Date    Acne rosacea     Dr. Erin Olmstead.  Allergy, unspecified not elsewhere classified childhood     Txd with immunotherapy. Dr. Kemi Mayfield Anemia NEC     borderline    Ankle sprain 2007    Right. Dr. Beny Harris    Ankle sprain 11/2012    Right. Dr. Rivas Hughes.  Asthma childhood    Chickenpox childhood    Chronic low back pain with right-sided sciatica     and SI joint dysfunction. Dr. Marilee Mcgovern.  Chronic otitis media     Dr. Kemi Mayfield Dry eye syndrome 2013    Dr. Alen Ma.  EBV infection 6/27/2011    Hearing loss     Mild high frequency sensorineuronal hearing loss. Dr. Damari Hays Organ murmur     Hernia of abdominal wall 09/2003, 43/1818    umbilical.  Dr. Keven Flowers. Dr. Tish Rodriguez    Hyperglycemia 2013    Hypothyroidism 06/2010    Dr. Lorrie Pedersenaser pressure increase 12/2011    Dr. Estefani Calhoun. Dr. Alen Ma. Dr. Carol Ann North.  Knee pain, left 04/2012    Left.   Dr. Angelo Kwok Measles childhood    Migraine     Mumps childhood    Plantar fasciitis, bilateral 2010    Dr. Derrick Perkins    Sciatica 2008    Right.  with OA. Dr. Margo Ball Tinnitus of right ear 2012    Dr. Tad Gutierrez        Past Surgical History:   Procedure Laterality Date   Estrada Low  2011    Dr. Ani Johnson.  HAND/FINGER SURGERY UNLISTED  09/2003    Right Index finger repair due to cut    HX HERNIA REPAIR  10/1216    Umbilical.  Dr. Earlene Arevalo.  HX HERNIA REPAIR  9/23/2011    recurrent umbilical.  Laparoscopy incisional.  Dr. Tiesha García.  HX TONSILLECTOMY  childhood    LAP,CHOLECYSTECTOMY  07/2001         Family History   Problem Relation Age of Onset    Hypertension Mother     Cancer Mother      small cell lung with bone mets to spine/MELANOMA    Cancer Father      melanoma    Arthritis-osteo Father     Colon Polyps Father     Diabetes Maternal Grandmother     Stroke Maternal Grandmother     Seizures Maternal Grandmother     Colon Polyps Maternal Grandmother     Breast Cancer Maternal Grandmother     Heart Disease Paternal Grandmother      heart failure    Heart Attack Paternal Grandmother     Asthma Paternal Grandmother         Social History     Social History    Marital status: SINGLE     Spouse name: N/A    Number of children: N/A    Years of education: N/A     Occupational History    Not on file. Social History Main Topics    Smoking status: Never Smoker    Smokeless tobacco: Never Used      Comment: lived with smoker dad and mom then step mom x 20 yrs    Alcohol use 0.0 oz/week      Comment: RARE    Drug use: No    Sexual activity: No     Other Topics Concern    Not on file     Social History Narrative                ALLERGIES: Latex; Other medication; Pcn [penicillins]; Prednisone; Sulfa (sulfonamide antibiotics); Garamycin [gentamicin]; and Metformin    Review of Systems   Constitutional: Negative for fever. HENT: Negative for sore throat. Respiratory: Positive for cough, shortness of breath and wheezing.  Negative for hemoptysis and sputum production. Cardiovascular: Positive for chest pain (lower chest/epigastric area). Gastrointestinal: Positive for abdominal pain (epigastric area). Negative for vomiting. H/o gerd-      All other systems reviewed and are negative. Vitals:    09/24/18 1935   BP: 184/86   Pulse: 85   Resp: 18   Temp: 97.5 °F (36.4 °C)   SpO2: 99%   Weight: 327 lb (148.3 kg)   Height: 5' 2\" (1.575 m)       Physical Exam   Constitutional: No distress. HENT:   Right Ear: Tympanic membrane and ear canal normal.   Left Ear: Tympanic membrane and ear canal normal.   Nose: Nose normal.   Mouth/Throat: No oropharyngeal exudate, posterior oropharyngeal edema or posterior oropharyngeal erythema. Eyes: Conjunctivae are normal. Right eye exhibits no discharge. Left eye exhibits no discharge. Neck: Neck supple. Pulmonary/Chest: Effort normal. No respiratory distress. She has decreased breath sounds. She has wheezes. She has rhonchi. She has no rales. Abdominal: Soft. There is no tenderness. There is no guarding. Lymphadenopathy:     She has no cervical adenopathy. Skin: No rash noted. Nursing note and vitals reviewed. MDM    Procedures    ICD-10-CM ICD-9-CM    1. Acute bronchospasm J98.01 519.11 EKG, 12 LEAD, INITIAL      albuterol-ipratropium (DUO-NEB) 2.5 MG-0.5 MG/3 ML   2. Gastroesophageal reflux disease without esophagitis K21.9 530.81      Medications Ordered Today   Medications    albuterol-ipratropium (DUO-NEB) 2.5 MG-0.5 MG/3 ML     Order Specific Question:   MODE OF DELIVERY     Answer:   Nebulizer    omeprazole (PRILOSEC) 40 mg capsule     Sig: Take 1 Cap by mouth daily. Dispense:  30 Cap     Refill:  0    albuterol-ipratropium (DUO-NEB) 2.5 mg-0.5 mg/3 ml nebu     Sig: 3 mL by Nebulization route every six (6) hours as needed.      Dispense:  30 Nebule     Refill:  0    benzonatate (TESSALON) 200 mg capsule     Sig: Take 1 Cap by mouth three (3) times daily as needed for Cough for up to 7 days.     Dispense:  21 Cap     Refill:  0    fluticasone (FLONASE) 50 mcg/actuation nasal spray     Si Sprays by Both Nostrils route daily. Dispense:  1 Bottle     Refill:  0     No results found for any visits on 18. The patients condition was discussed with the patient and they understand. The patient is to follow up with primary care doctor. If signs and symptoms become worse the pt is to go to the ER. The patient is to take medications as prescribed.

## 2018-10-06 NOTE — PROGRESS NOTES
HISTORY OF PRESENT ILLNESS  Himanshu Perkins is a 50 y.o. female presents with Asthma (2 week f/u); Ear Pain; and Cough (w/ yellow sputum )    Agree with nurse note. Pt with asthma and allergies. At her last visit on 02/21/18, she was given a breathing treatment and rx'd Z-theodore to cover sinuses and ears. If her ears did not improve then I wanted her to follow up with ENT. I also recommended she start Albuterol and Pulmicort neb treatments. I gave her a Dulera sample to use if Pulmicort was not covered. She took the Z-theodore and rested more. She used Dulera 2 puffs twice a day and was able to stop that last week since her breathing improved. She has not needed Albuterol recently. Her R ear still hurts and she has difficulty hearing out of it. She reports trying to get an appt with her ENT, Dr. Ty Tran but was told they do not have any openings. Her cough resolved but today she complains of a cough with yellow sputum, sneezing, and nasal congestion wit white-yellow discharge x few days. She takes Singulair and Astelin daily. To note, she is allergic to Sulfa, Penicillin, and Prednisone. Written by tracy Archuleta, as dictated by Dr. Maral English DO.    ROS    Review of Systems negative except as noted above in HPI. ALLERGIES:    Allergies   Allergen Reactions    Latex Rash    Other Medication Hives and Rash     SUN    Pcn [Penicillins] Hives    Prednisone Hives    Sulfa (Sulfonamide Antibiotics) Hives    Garamycin [Gentamicin] Other (comments)     Itchy eyes due to sulfate    Metformin Diarrhea       CURRENT MEDICATIONS:    Outpatient Prescriptions Marked as Taking for the 3/13/18 encounter (Office Visit) with Kota Lucas DO   Medication Sig Dispense Refill    azelastine (ASTELIN) 137 mcg (0.1 %) nasal spray 1 [place] by Both Nostrils route two (2) times a day.  Indications: SEASONAL ALLERGIC RHINITIS 1 Bottle 5    mometasone-formoterol (DULERA) 200-5 mcg/actuation HFA inhaler Take 2 Puffs by inhalation two (2) times a day. 1 Inhaler 0    ipratropium (ATROVENT HFA) 17 mcg/actuation inhaler Take 2 Puffs by inhalation every four (4) hours as needed for Wheezing. Indications: MAINTENANCE THERAPY FOR ASTHMA 1 Inhaler 5    chlorpheniramine-HYDROcodone (TUSSIONEX) 10-8 mg/5 mL suspension Take 5 mL by mouth every twelve (12) hours as needed for Cough. Max Daily Amount: 10 mL. Indications: Cough 200 mL 0    albuterol (PROVENTIL HFA, VENTOLIN HFA, PROAIR HFA) 90 mcg/actuation inhaler Take 2 Puffs by inhalation every four (4) hours as needed for Wheezing. Indications: BRONCHOSPASM PREVENTION 1 Inhaler 5    butalbital-acetaminophen-caffeine (FIORICET, ESGIC) -40 mg per tablet TAKE 1 TAB BY MOUTH EVERY SIX (6) HOURS AS NEEDED FOR PAIN OR HEADACHE. 40 Tab 0    cyclobenzaprine (FLEXERIL) 10 mg tablet TAKE 1 TABLET BY MOUTH 3 TIMES A DAY AS NEEDED FOR MUSCLE SPASM 90 Tab 0    hydrOXYzine HCl (ATARAX) 25 mg tablet TAKE 1 TABLET BY MOUTH EVERY 8 HOURS AS NEEDED FOR ITCHING 90 Tab 1    montelukast (SINGULAIR) 10 mg tablet TAKE 1 TABLET BY MOUTH EVERY DAY 30 Tab 11    levothyroxine (SYNTHROID) 125 mcg tablet Take 1 Tab by mouth Daily (before breakfast). Indications: hypothyroidism 90 Tab 3    NAFTIN 2 % gel Apply 1 Dose to affected area two (2) times a day. Indications: TINEA PEDIS 1 Bottle 2    ALPRAZolam (XANAX) 0.5 mg tablet Take 1 Tab by mouth two (2) times daily as needed for Anxiety or Sleep. Max Daily Amount: 1 mg. Indications: anxiety 60 Tab 2    norethindrone-ethinyl estradiol (JUNEL FE 1/20) 1 mg-20 mcg (21)/75 mg (7) tab Take  by mouth.  terconazole (TERAZOL 7) 0.4 % vaginal cream   6    LOCOID 0.1 % lotn   1    valACYclovir (VALTREX) 500 mg tablet   1    terconazole (TERAZOL 3) 80 mg vaginal suppository   4    multivitamin (ONE A DAY) tablet Take 1 tablet by mouth daily.       omeprazole (PRILOSEC) 20 mg capsule TAKE ONE CAPSULE BY MOUTH EVERY DAY 30 Cap 1    PROPYLENE GLYCOL//PF (SYSTANE, PF, OP) Apply  to eye four (4) times daily.  magnesium oxide (MAG-OX) 400 mg tablet Take 400 mg by mouth daily.  Emollient Combination No.32 (EPICERAM) Emul by Apply Externally route. From Dr Meet Cordoba.  metroNIDAZOLE (METROGEL) 1 % topical gel Apply  to affected area daily. Use a thin layer to affected areas after washing from Dr Meet Cordoba   Indications: ACNE ROSACEA      diclofenac EC (VOLTAREN) 75 mg EC tablet Take 75 mg by mouth two (2) times a day.  latanoprost (XALATAN) 0.005 % ophthalmic solution Administer 1 Drop to both eyes nightly.  OTHER,NON-FORMULARY, Osteo Biflex       omega-3 fatty acids-vitamin e (FISH OIL) 1,000 mg Cap Take 1 Cap by mouth daily.  fexofenadine (ALLEGRA) 180 mg tablet Take  by mouth daily.  LACTOBACILLUS/FOS/PECTIN (PROBIOTIC COMPLEX PO) Take  by mouth daily.  cholecalciferol, vitamin d3, (VITAMIN D) 1,000 unit tablet Take 1,000 Units by mouth daily.  ibuprofen (MOTRIN) 800 mg tablet Take  by mouth every six (6) hours as needed for Pain. PAST MEDICAL HISTORY:    Past Medical History:   Diagnosis Date    Acne rosacea     Dr. Meet Cordoba.  Allergy, unspecified not elsewhere classified childhood     Txd with immunotherapy. Dr. Joya Rodríguez Anemia NEC     borderline    Ankle sprain 2007    Right. Dr. Elisha Mosqueda    Ankle sprain 11/2012    Right. Dr. Tawny Peter.  Asthma childhood    Chickenpox childhood    Chronic low back pain with right-sided sciatica     and SI joint dysfunction. Dr. Roxie Williamson.  Chronic otitis media     Dr. Joya Rodríguez Dry eye syndrome 2013    Dr. Cha Mae.  EBV infection 6/27/2011    Hearing loss     Mild high frequency sensorineuronal hearing loss. Dr. Placido Lara murmur     Hernia of abdominal wall 09/2003, 10/7467    umbilical.  Dr. Gian Gutierrez.   Dr. Edelmira Wright    Hyperglycemia 2013  Hypothyroidism 06/2010    Dr. Dane Hadley Intraocular pressure increase 12/2011    Dr. Ana Montgomery. Dr. Cabrera. Dr. Cely Lynch.  Knee pain, left 04/2012    Left. Dr. Drake Kirkland Measles childhood    Migraine     Mumps childhood    Plantar fasciitis, bilateral 2010    Dr. Carson Chopra    Sciatica 2008    Right.  with OA. Dr. Maddy Spence Tinnitus of right ear 2012    Dr. Maharaj December:    Past Surgical History:   Procedure Laterality Date   Shadi Hui  2011    Dr. Salina Shah.  HAND/FINGER SURGERY UNLISTED  09/2003    Right Index finger repair due to cut    HX HERNIA REPAIR  48/9672    Umbilical.  Dr. Christopher Rivers.  HX HERNIA REPAIR  9/23/2011    recurrent umbilical.  Laparoscopy incisional.  Dr. Don Ramirez.     HX TONSILLECTOMY  childhood    LAP,CHOLECYSTECTOMY  07/2001       FAMILY HISTORY:    Family History   Problem Relation Age of Onset    Hypertension Mother     Cancer Mother      small cell lung with bone mets to spine/MELANOMA    Cancer Father      melanoma    Arthritis-osteo Father     Colon Polyps Father     Diabetes Maternal Grandmother     Stroke Maternal Grandmother     Seizures Maternal Grandmother     Colon Polyps Maternal Grandmother     Breast Cancer Maternal Grandmother     Heart Disease Paternal Grandmother      heart failure    Heart Attack Paternal Grandmother     Asthma Paternal Grandmother        SOCIAL HISTORY:    Social History     Social History    Marital status: SINGLE     Spouse name: N/A    Number of children: N/A    Years of education: N/A     Social History Main Topics    Smoking status: Never Smoker    Smokeless tobacco: Never Used      Comment: lived with smoker dad and mom then step mom x 20 yrs    Alcohol use 0.0 oz/week      Comment: RARE    Drug use: No    Sexual activity: No     Other Topics Concern    None     Social History Narrative       IMMUNIZATIONS:    Immunization History Administered Date(s) Administered    Influenza Vaccine 10/15/2013, 11/07/2016, 11/03/2017    Influenza Vaccine (Quad) 11/03/2015    Influenza Vaccine (Quad) PF 11/20/2014, 11/07/2016    Influenza Vaccine Split 11/18/2011, 11/07/2012    Influenza Vaccine Whole 10/01/2010    TD Vaccine 09/01/2003    Tdap 11/03/2015         PHYSICAL EXAMINATION    Vital Signs    Visit Vitals    /80 (BP 1 Location: Right arm, BP Patient Position: Sitting)    Pulse 71    Temp 98.1 °F (36.7 °C) (Oral)    Resp 12    Ht 5' 0.9\" (1.547 m)    Wt 326 lb 3.2 oz (148 kg)    LMP 02/14/2018 (Approximate)    SpO2 97%    BMI 61.84 kg/m2       Weight Metrics 3/13/2018 2/21/2018 2/8/2018 12/29/2017 12/22/2017 11/20/2017 11/9/2017   Weight 326 lb 3.2 oz 314 lb 14.4 oz 313 lb 318 lb 4.8 oz 321 lb 1.6 oz 312 lb 4.8 oz 312 lb 1.6 oz   BMI 61.84 kg/m2 59.7 kg/m2 59.34 kg/m2 60.34 kg/m2 60.87 kg/m2 59.05 kg/m2 59.01 kg/m2       General appearance - Well nourished. Well appearing. Well developed. No acute distress. Obese. Head - Normocephalic. Atraumatic. Non tender sinuses x 4. Non tender sinuses x 4. Eyes - pupils equal and reactive. Extraocular eye movements intact. Sclera anicteric. Mildly injected sclera. Ears - Hearing is grossly normal bilaterally. BL dullness and R EAC has moderate erythema. No pain with ear tug or otoscope insertion. Nose - normal and patent. No polyps noted. No erythema. No discharge. Mouth - mucous membranes with adequate moisture. Posterior pharynx normal with cobblestone appearance. No erythema, white exudate or obstruction. Neck - supple. Midline trachea. No carotid bruits noted bilaterally. No thyromegaly noted. Chest - clear to auscultation bilaterally anteriorly and posteriorly. No wheezes. No rales or rhonchi. Breath sounds are symmetrical bilaterally. Unlabored respirations. Heart - normal rate. Regular rhythm. Normal S1, S2. No murmur noted.   No rubs, clicks or gallops noted. Abdomen - soft and distended. No masses or organomegaly. No rebound, rigidity or guarding. Bowel sounds normal x 4 quadrants. No tenderness noted. Psychological -   normal behavior, dress and thought processes. Good insight. Good eye contact. Normal affect. Appropriate mood. Normal speech. ASSESSMENT and PLAN      ICD-10-CM ICD-9-CM    1. Mild intermittent asthma without complication Z70.02 193.45     stable without current inhaler use, taking Singulair   2. Acute seasonal allergic rhinitis due to other allergen J30.2 477.8    3. Other acute recurrent sinusitis J01.81 461.9     improved after Zpak   4. Adverse effect of anabolic steroid, subsequent encounter T38.7X5D V58.89    5. Allergy to sulfa drugs Z88.2 V14.2    6. Penicillin allergy Z88.0 V14.0    7. Other specified hearing loss of right ear, unspecified hearing status on contralateral side H91.8X1 389.8     due to congestion, improving after Zpak and Astelin       Discussed the patient's BMI with her. The BMI follow up plan is as follows: I have counseled this patient on diet and exercise regimens. Increase water intake. Avoid dairy and sugar to thin mucus. Gargle with warm saltwater and add tsp of vinegar. Get at least 7-8 hours of sleep nightly and extra hour of rest while feeling poorly. Chart reviewed and updated. Continue current medications and care. Take Doxycycline 100 mg BID x7 days; stop current Doxycylcine 50 mg for rosacea. Still follow up with ENT. Start OTC antihistamine such as Allegra-D; continue with Singulair and Astelin. If sxs continue, ok to start OTC nasal spray like Flonase, Rhinocort, etc.   Prescriptions sent to pharmacy today. Counseled patient on health concerns: Ear pain and allergies. Immunizations noted. Offered empathy, support, legitimation, prayers, partnership to patient. Praised patient for progress.   Follow-up Disposition:  Return in about 1 month (around 4/13/2018) for allergies, sinus, referral f/u. Patient was offered a choice/choices in the treatment plan today. Patient expresses understanding of the plan and agrees with recommendations. Patient declines any additional handouts. Patient is satisfied with previous handouts received from our office    Written by tracy Pereira, as dictated by Dr. Ani Mccord DO. Documentation True and Accepted by Chantelle Gonzalez.  Naresh Ramirez. 54.4

## 2018-10-08 DIAGNOSIS — J45.20 MILD INTERMITTENT ASTHMA WITHOUT COMPLICATION: ICD-10-CM

## 2018-10-08 NOTE — TELEPHONE ENCOUNTER
Requested Prescriptions     Pending Prescriptions Disp Refills    montelukast (SINGULAIR) 10 mg tablet 30 Tab 11     Sig: Take 1 Tab by mouth daily.      Pharmacy requesting 90 day supply

## 2018-10-08 NOTE — TELEPHONE ENCOUNTER
PCP: Sierra Padilla DO    Last appt: 4/16/2018  Future Appointments  Date Time Provider Cindy Stephanie   11/12/2018 11:00 AM Sierra Padilla DO [facility]       Requested Prescriptions     Pending Prescriptions Disp Refills    montelukast (SINGULAIR) 10 mg tablet 30 Tab 11     Sig: Take 1 Tab by mouth daily.        Prior labs and Blood pressures:  BP Readings from Last 3 Encounters:   09/24/18 184/86   04/16/18 117/71   03/13/18 127/80     Lab Results   Component Value Date/Time    Sodium 139 08/23/2017 08:12 AM    Potassium 4.5 08/23/2017 08:12 AM    Chloride 103 08/23/2017 08:12 AM    CO2 22 08/23/2017 08:12 AM    Anion gap 10 09/24/2011 02:30 AM    Glucose 90 08/23/2017 08:12 AM    BUN 14 08/23/2017 08:12 AM    Creatinine 0.85 08/23/2017 08:12 AM    BUN/Creatinine ratio 16 08/23/2017 08:12 AM    GFR est AA 93 08/23/2017 08:12 AM    GFR est non-AA 81 08/23/2017 08:12 AM    Calcium 8.9 08/23/2017 08:12 AM     Lab Results   Component Value Date/Time    Hemoglobin A1c 5.8 (H) 08/23/2017 08:12 AM     Lab Results   Component Value Date/Time    Cholesterol, total 168 08/23/2017 08:12 AM    HDL Cholesterol 56 08/23/2017 08:12 AM    LDL, calculated 86 08/23/2017 08:12 AM    VLDL, calculated 26 08/23/2017 08:12 AM    Triglyceride 129 08/23/2017 08:12 AM    CHOL/HDL Ratio 2.5 05/19/2010 10:46 AM     Lab Results   Component Value Date/Time    Vitamin D 25-Hydroxy 32.2 08/11/2011 08:15 AM    VITAMIN D, 25-HYDROXY 36.3 08/23/2017 08:12 AM       Lab Results   Component Value Date/Time    TSH 1.900 08/23/2017 08:12 AM

## 2018-10-12 RX ORDER — MONTELUKAST SODIUM 10 MG/1
10 TABLET ORAL DAILY
Qty: 30 TAB | Refills: 11 | Status: SHIPPED | OUTPATIENT
Start: 2018-10-12 | End: 2019-11-15 | Stop reason: SDUPTHER

## 2018-10-15 DIAGNOSIS — H10.13 ALLERGIC CONJUNCTIVITIS AND RHINITIS, BILATERAL: ICD-10-CM

## 2018-10-15 DIAGNOSIS — J30.9 ALLERGIC CONJUNCTIVITIS AND RHINITIS, BILATERAL: ICD-10-CM

## 2018-10-15 NOTE — TELEPHONE ENCOUNTER
Requested Prescriptions     Pending Prescriptions Disp Refills    azelastine (ASTELIN) 137 mcg (0.1 %) nasal spray 1 Bottle 5     [place] by Both Nostrils route two (2) times a day. Indications: SEASONAL ALLERGIC RHINITIS     Request for 90 days supply.

## 2018-10-24 DIAGNOSIS — E03.4 HYPOTHYROIDISM DUE TO ACQUIRED ATROPHY OF THYROID: ICD-10-CM

## 2018-10-24 RX ORDER — LEVOTHYROXINE SODIUM 125 UG/1
TABLET ORAL
Qty: 90 TAB | Refills: 0 | Status: CANCELLED | OUTPATIENT
Start: 2018-10-24

## 2018-10-24 NOTE — TELEPHONE ENCOUNTER
Requested Prescriptions     Pending Prescriptions Disp Refills    levothyroxine (SYNTHROID) 125 mcg tablet 90 Tab 0

## 2018-10-24 NOTE — TELEPHONE ENCOUNTER
PCP: Latasha Peoples DO    Last appt: 4/16/2018  Future Appointments   Date Time Provider Cindy Stephanie   11/12/2018 11:00 AM Latasha Peoples DO [facility]       Requested Prescriptions     Pending Prescriptions Disp Refills    levothyroxine (SYNTHROID) 125 mcg tablet 90 Tab 0       Prior labs and Blood pressures:  BP Readings from Last 3 Encounters:   09/24/18 184/86   04/16/18 117/71   03/13/18 127/80     Lab Results   Component Value Date/Time    Sodium 139 08/23/2017 08:12 AM    Potassium 4.5 08/23/2017 08:12 AM    Chloride 103 08/23/2017 08:12 AM    CO2 22 08/23/2017 08:12 AM    Anion gap 10 09/24/2011 02:30 AM    Glucose 90 08/23/2017 08:12 AM    BUN 14 08/23/2017 08:12 AM    Creatinine 0.85 08/23/2017 08:12 AM    BUN/Creatinine ratio 16 08/23/2017 08:12 AM    GFR est AA 93 08/23/2017 08:12 AM    GFR est non-AA 81 08/23/2017 08:12 AM    Calcium 8.9 08/23/2017 08:12 AM     Lab Results   Component Value Date/Time    Hemoglobin A1c 5.8 (H) 08/23/2017 08:12 AM     Lab Results   Component Value Date/Time    Cholesterol, total 168 08/23/2017 08:12 AM    HDL Cholesterol 56 08/23/2017 08:12 AM    LDL, calculated 86 08/23/2017 08:12 AM    VLDL, calculated 26 08/23/2017 08:12 AM    Triglyceride 129 08/23/2017 08:12 AM    CHOL/HDL Ratio 2.5 05/19/2010 10:46 AM     Lab Results   Component Value Date/Time    Vitamin D 25-Hydroxy 32.2 08/11/2011 08:15 AM    VITAMIN D, 25-HYDROXY 36.3 08/23/2017 08:12 AM       Lab Results   Component Value Date/Time    TSH 1.900 08/23/2017 08:12 AM

## 2018-10-29 RX ORDER — AZELASTINE 1 MG/ML
1 SPRAY, METERED NASAL 2 TIMES DAILY
Qty: 3 BOTTLE | Refills: 1 | Status: SHIPPED | OUTPATIENT
Start: 2018-10-29 | End: 2018-11-12 | Stop reason: SDUPTHER

## 2018-11-12 ENCOUNTER — OFFICE VISIT (OUTPATIENT)
Dept: FAMILY MEDICINE CLINIC | Age: 48
End: 2018-11-12

## 2018-11-12 VITALS
HEIGHT: 61 IN | TEMPERATURE: 97.4 F | HEART RATE: 73 BPM | WEIGHT: 293 LBS | RESPIRATION RATE: 18 BRPM | SYSTOLIC BLOOD PRESSURE: 110 MMHG | OXYGEN SATURATION: 99 % | DIASTOLIC BLOOD PRESSURE: 78 MMHG | BODY MASS INDEX: 55.32 KG/M2

## 2018-11-12 DIAGNOSIS — J45.20 MILD INTERMITTENT ASTHMA WITHOUT COMPLICATION: ICD-10-CM

## 2018-11-12 DIAGNOSIS — Z12.11 COLON CANCER SCREENING: ICD-10-CM

## 2018-11-12 DIAGNOSIS — Z80.1 FAMILY HISTORY OF LUNG CANCER: ICD-10-CM

## 2018-11-12 DIAGNOSIS — E78.5 DYSLIPIDEMIA: ICD-10-CM

## 2018-11-12 DIAGNOSIS — Z83.3 FAMILY HISTORY OF DIABETES MELLITUS (DM): ICD-10-CM

## 2018-11-12 DIAGNOSIS — R73.9 HYPERGLYCEMIA: ICD-10-CM

## 2018-11-12 DIAGNOSIS — E03.4 HYPOTHYROIDISM DUE TO ACQUIRED ATROPHY OF THYROID: ICD-10-CM

## 2018-11-12 DIAGNOSIS — G43.009 MIGRAINE WITHOUT AURA AND WITHOUT STATUS MIGRAINOSUS, NOT INTRACTABLE: ICD-10-CM

## 2018-11-12 DIAGNOSIS — Z80.8 FAMILY HISTORY OF SKIN CANCER: ICD-10-CM

## 2018-11-12 DIAGNOSIS — Z80.8 FAMILY HISTORY OF MELANOMA: ICD-10-CM

## 2018-11-12 DIAGNOSIS — Z23 ENCOUNTER FOR IMMUNIZATION: ICD-10-CM

## 2018-11-12 DIAGNOSIS — R12 HEARTBURN: ICD-10-CM

## 2018-11-12 DIAGNOSIS — E55.9 VITAMIN D DEFICIENCY: ICD-10-CM

## 2018-11-12 DIAGNOSIS — Z82.49 FAMILY HISTORY OF HEART ATTACK: ICD-10-CM

## 2018-11-12 DIAGNOSIS — Z00.00 WELL WOMAN EXAM (NO GYNECOLOGICAL EXAM): Primary | ICD-10-CM

## 2018-11-12 DIAGNOSIS — E66.01 MORBID OBESITY WITH BMI OF 60.0-69.9, ADULT (HCC): ICD-10-CM

## 2018-11-12 DIAGNOSIS — Z80.3 FAMILY HISTORY OF BREAST CANCER: ICD-10-CM

## 2018-11-12 DIAGNOSIS — Z82.49 FAMILY HISTORY OF HEART DISEASE: ICD-10-CM

## 2018-11-12 DIAGNOSIS — Z82.3 FAMILY HISTORY OF STROKE: ICD-10-CM

## 2018-11-12 RX ORDER — IPRATROPIUM BROMIDE AND ALBUTEROL SULFATE 2.5; .5 MG/3ML; MG/3ML
3 SOLUTION RESPIRATORY (INHALATION)
Qty: 30 NEBULE | Refills: 0 | Status: SHIPPED | OUTPATIENT
Start: 2018-11-12 | End: 2019-11-15 | Stop reason: SDUPTHER

## 2018-11-12 RX ORDER — ALBUTEROL SULFATE 90 UG/1
2 AEROSOL, METERED RESPIRATORY (INHALATION)
Qty: 1 INHALER | Refills: 5 | Status: SHIPPED | OUTPATIENT
Start: 2018-11-12 | End: 2018-11-21 | Stop reason: SDUPTHER

## 2018-11-12 RX ORDER — LEVOTHYROXINE SODIUM 125 UG/1
125 TABLET ORAL
Qty: 90 TAB | Refills: 3 | Status: SHIPPED | OUTPATIENT
Start: 2018-11-12 | End: 2019-11-15 | Stop reason: SDUPTHER

## 2018-11-12 RX ORDER — DOXYCYCLINE HYCLATE 50 MG/1
CAPSULE ORAL
Refills: 11 | COMMUNITY
Start: 2018-09-29 | End: 2019-07-02 | Stop reason: ALTCHOICE

## 2018-11-12 RX ORDER — BUTALBITAL, ACETAMINOPHEN AND CAFFEINE 50; 325; 40 MG/1; MG/1; MG/1
TABLET ORAL
Qty: 40 TAB | Refills: 0 | Status: SHIPPED | OUTPATIENT
Start: 2018-11-12 | End: 2020-11-19

## 2018-11-12 RX ORDER — OMEPRAZOLE 40 MG/1
40 CAPSULE, DELAYED RELEASE ORAL DAILY
Qty: 30 CAP | Refills: 2 | Status: SHIPPED | OUTPATIENT
Start: 2018-11-12 | End: 2019-02-04 | Stop reason: SDUPTHER

## 2018-11-12 NOTE — ACP (ADVANCE CARE PLANNING)
Re-discussed ACP with patient. Pt still has previous document. Declines another Right to Decide handbook. Declines referral to [facility] team at this time.

## 2018-11-12 NOTE — PROGRESS NOTES
Verbal Order Read Back Per Lyly Junior R, DO for INFLUENZA VIRUS VAC QUAD,SPLIT,PRESV FREE SYRINGE IM.  Ilona Kussmaul verified correct name and  with PCP

## 2018-11-12 NOTE — PROGRESS NOTES
HISTORY OF PRESENT ILLNESS  Maribel Arciniega is a 46 y.o. female here for Physical; Labs; and Medication Refill    Agree with nurse note. Pt with hypothyroidism, morbid obesity, hyperglycemia, vit d deficiency, dyslipidemia, and family hx of DM, heart disease, lung ca, stroke, and heart attack presents to the office with a BP of 110/78. For hypothyroidism, she uses Synthroid 124 mcg daily, tolerating well and she requests a refill today. Pt with family hx of colon polyps. She has yet had a coloscopy but plans to schedule one soon and requests a referral today. Pt has eye exam scheduled for 11/13/2018 with ophthalmologist, Dr. Davidson Castillo. Pt's has her annual GYN scheduled for 11/27/2018 with GYN, . Pt with family hx of breast ca. She has mammogram scheduled for on 12/12/2018 at [facility]. Pt with family hx of skin ca and melanoma. She saw her dermatologist, Dr. Aurea Minaya in 9/2018 and reports there were no problems. She has had her yearly exam with her ENT, Dr. Radha Valencia. Pt with migraines that occur about once every 2 months. She uses Fioricet as needed with relief and requests a refill today. Pt with mild intermittent asthma. She went to UC on 9/24/2018 for SOB, cough, wheezing, lower chest and epigastric pain, and abd pain. Dx'd acute bronchospasm and GERD. EKG normal. Tx'd with Neb tx. Rx'd Fluticasone propionate 50 mcg nasal spray, Duo-neb 2.5 mg, Prilosec 40 mg, Tessalon 200 mg. Per pt, she will f/u at a different visit if sxs recur. She requests a refill of her Proair hfa and Duo-Neb today. Written by tracy Maki, as dictated by Dr. Nacho Gomez DO.      IMER    Review of Systems is negative except as mentioned above in HPI.     ALLERGIES:    Allergies   Allergen Reactions    Latex Rash    Other Medication Hives and Rash     SUN    Pcn [Penicillins] Hives    Prednisone Hives    Sulfa (Sulfonamide Antibiotics) Hives    Garamycin [Gentamicin] Other (comments)     Itchy eyes due to sulfate    Metformin Diarrhea       CURRENT MEDICATIONS:    Outpatient Medications Marked as Taking for the 11/12/18 encounter (Office Visit) with Melida Dow,    Medication Sig Dispense Refill    doxycycline (VIBRAMYCIN) 50 mg capsule TAKE ONE CAPSULE BY MOUTH EVERY DAY ONCE A DAY  11    levothyroxine (SYNTHROID) 125 mcg tablet Take 1 Tab by mouth Daily (before breakfast). 90 Tab 3    omeprazole (PRILOSEC) 40 mg capsule Take 1 Cap by mouth daily. 30 Cap 2    albuterol-ipratropium (DUO-NEB) 2.5 mg-0.5 mg/3 ml nebu 3 mL by Nebulization route every six (6) hours as needed. 30 Nebule 0    albuterol (PROVENTIL HFA, VENTOLIN HFA, PROAIR HFA) 90 mcg/actuation inhaler Take 2 Puffs by inhalation every four (4) hours as needed for Wheezing. 1 Inhaler 5    butalbital-acetaminophen-caffeine (FIORICET, ESGIC) -40 mg per tablet TAKE 1 TAB BY MOUTH EVERY SIX (6) HOURS AS NEEDED FOR PAIN OR HEADACHE. 40 Tab 0    montelukast (SINGULAIR) 10 mg tablet Take 1 Tab by mouth daily. 30 Tab 11    azelastine (ASTELIN) 137 mcg (0.1 %) nasal spray 2 Sprays by Both Nostrils route two (2) times a day. Use in each nostril as directed  Indications: SEASONAL ALLERGIC RHINITIS 1 Bottle 5    hydrOXYzine HCl (ATARAX) 25 mg tablet TAKE 1 TABLET BY MOUTH EVERY 8 HOURS AS NEEDED FOR ITCHING INDICATIONS: PRURITUS OF SKIN 90 Tab 1    NAFTIN 2 % gel APPLY TO AFFECTED AREA TWO (2) TIMES A DAY. INDICATIONS: TINEA PEDIS 60 g 2    Nebulizer & Compressor machine 1 Each by Other route four (4) times daily as needed. 1 Each 0    albuterol (PROVENTIL VENTOLIN) 2.5 mg /3 mL (0.083 %) nebulizer solution 6 mL by Nebulization route every four (4) hours as needed for Wheezing or Shortness of Breath. Indications: Acute Asthma Attack 24 Each 1    budesonide (PULMICORT) 1 mg/2 mL nbsp 2 mL by Nebulization route two (2) times a day.  Indications: MAINTENANCE THERAPY FOR ASTHMA 1 Each 1    cyclobenzaprine (FLEXERIL) 10 mg tablet TAKE 1 TABLET BY MOUTH 3 TIMES A DAY AS NEEDED FOR MUSCLE SPASM 90 Tab 0    ALPRAZolam (XANAX) 0.5 mg tablet Take 1 Tab by mouth two (2) times daily as needed for Anxiety or Sleep. Max Daily Amount: 1 mg. Indications: anxiety 60 Tab 2    norethindrone-ethinyl estradiol (JUNEL FE 1/20) 1 mg-20 mcg (21)/75 mg (7) tab Take  by mouth.  terconazole (TERAZOL 7) 0.4 % vaginal cream   6    LOCOID 0.1 % lotn   1    valACYclovir (VALTREX) 500 mg tablet   1    terconazole (TERAZOL 3) 80 mg vaginal suppository   4    multivitamin (ONE A DAY) tablet Take 1 tablet by mouth daily.  PROPYLENE GLYCOL//PF (SYSTANE, PF, OP) Apply  to eye four (4) times daily.  magnesium oxide (MAG-OX) 400 mg tablet Take 400 mg by mouth daily.  Emollient Combination No.32 (EPICERAM) Emul by Apply Externally route. From Dr Evelio Courtney.  metroNIDAZOLE (METROGEL) 1 % topical gel Apply  to affected area daily. Use a thin layer to affected areas after washing from Dr Evelio Courtney   Indications: ACNE ROSACEA      diclofenac EC (VOLTAREN) 75 mg EC tablet Take 75 mg by mouth two (2) times a day.  latanoprost (XALATAN) 0.005 % ophthalmic solution Administer 1 Drop to both eyes nightly.  OTHER,NON-FORMULARY, Osteo Biflex       omega-3 fatty acids-vitamin e (FISH OIL) 1,000 mg Cap Take 1 Cap by mouth daily.  fexofenadine (ALLEGRA) 180 mg tablet Take  by mouth daily.  LACTOBACILLUS/FOS/PECTIN (PROBIOTIC COMPLEX PO) Take  by mouth daily.  cholecalciferol, vitamin d3, (VITAMIN D) 1,000 unit tablet Take 1,000 Units by mouth daily.  ibuprofen (MOTRIN) 800 mg tablet Take  by mouth every six (6) hours as needed for Pain. PAST MEDICAL HISTORY:    Past Medical History:   Diagnosis Date    Acne rosacea     Dr. Evelio Courtney.  Allergy, unspecified not elsewhere classified childhood     Txd with immunotherapy.   Dr. Adela Segundo Anemia NEC     borderline    Ankle sprain 2007    Right. Dr. Radha Coreas    Ankle sprain 11/2012    Right. Dr. Michelle Fountain.  Asthma childhood    Chickenpox childhood    Chronic low back pain with right-sided sciatica     and SI joint dysfunction. Dr. Halima Stahl.  Chronic otitis media     Dr. Luda Vick Dry eye syndrome 2013    Dr. Joie Kramer.  EBV infection 6/27/2011    Hearing loss     Mild high frequency sensorineuronal hearing loss. Dr. Mk Ramírez murmur     Hernia of abdominal wall 09/2003, 10/1989    umbilical.  Dr. Narcisa Parker. Dr. José Colindres    Hyperglycemia 2013    Hypothyroidism 06/2010    Dr. Latisha Garnett pressure increase 12/2011    Dr. Alexandrea Sousaost. Dr. Joie Kramer. Dr. Darcy Lemus.  Knee pain, left 04/2012    Left. Dr. Sonia Jordan Measles childhood    Migraine     Mumps childhood    Plantar fasciitis, bilateral 2010    Dr. Nelson Torres    Sciatica 2008    Right.  with OA. Dr. Familia Vogt Tinnitus of right ear 2012    Dr. Sharie Romberg:    Past Surgical History:   Procedure Laterality Date   Lissette Hall  2011    Dr. Lissy Hewitt.  HAND/FINGER SURGERY UNLISTED  09/2003    Right Index finger repair due to cut    HX HERNIA REPAIR  36/4572    Umbilical.  Dr. Narcisa Parker.  HX HERNIA REPAIR  9/23/2011    recurrent umbilical.  Laparoscopy incisional.  Dr. José Colindres.     HX TONSILLECTOMY  childhood    LAP,CHOLECYSTECTOMY  07/2001       FAMILY HISTORY:    Family History   Problem Relation Age of Onset    Hypertension Mother     Cancer Mother         small cell lung with bone mets to spine/MELANOMA    Cancer Father         melanoma    Arthritis-osteo Father     Colon Polyps Father     Diabetes Maternal Grandmother     Stroke Maternal Grandmother     Seizures Maternal Grandmother     Colon Polyps Maternal Grandmother     Breast Cancer Maternal Grandmother     Heart Disease Paternal Grandmother         heart failure    Heart Attack Paternal Grandmother     Asthma Paternal Grandmother        SOCIAL HISTORY:    Social History     Socioeconomic History    Marital status: SINGLE     Spouse name: Not on file    Number of children: Not on file    Years of education: Not on file    Highest education level: Not on file   Social Needs    Financial resource strain: Not on file    Food insecurity - worry: Not on file    Food insecurity - inability: Not on file   [employer] needs - medical: Not on file   [employer] needs - non-medical: Not on file   Occupational History    Not on file   Tobacco Use    Smoking status: Passive Smoke Exposure - Never Smoker    Smokeless tobacco: Never Used    Tobacco comment: lived with smoker dad and mom then step mom x 20 yrs   Substance and Sexual Activity    Alcohol use: Yes     Alcohol/week: 0.6 oz     Types: 1 Glasses of wine per week     Frequency: Monthly or less     Drinks per session: 1 or 2     Binge frequency: Never     Comment: RARE    Drug use: No    Sexual activity: Yes     Partners: Male     Birth control/protection: Abstinence, Pill   Other Topics Concern    Not on file   Social History Narrative    Not on file       IMMUNIZATIONS:    Immunization History   Administered Date(s) Administered    Influenza Vaccine 10/15/2013, 11/07/2016, 11/03/2017    Influenza Vaccine (Quad) 11/03/2015    Influenza Vaccine (Quad) PF 11/20/2014, 11/07/2016, 11/12/2018    Influenza Vaccine Split 11/18/2011, 11/07/2012    Influenza Vaccine Whole 10/01/2010    TD Vaccine 09/01/2003    Tdap 11/03/2015         PHYSICAL EXAMINATION    Vital signs     Visit Vitals  /78 (BP 1 Location: Left arm, BP Patient Position: Sitting)   Pulse 73   Temp 97.4 °F (36.3 °C) (Temporal)   Resp 18   Ht 5' 1\" (1.549 m)   Wt 326 lb 3.2 oz (148 kg)   SpO2 99%   BMI 61.63 kg/m²        General appearance - Well nourished.  Well appearing. Well developed. No acute distress. Obese. Head - Normocephalic. Atraumatic. Non tender sinuses x 4. Eyes - pupils equal and reactive, extraocular eye movements intact, sclera anicteric. Mildly injected sclera. Ears - Hearing is grossly normal bilaterally. Bilateral TM's intact. External ear canals normal without evidence of blood or swelling. Nose - normal and patent. No erythema or turbinate edema. No discharge. No polyps. Mouth - mucous membranes with decreased oral moisture. Posterior pharynx normal without lesions, white exudate, or obstruction. Neck - supple. Midline trachea. No carotid bruits are noted. No thyromegaly noted. Neck is supple without rigidity. Chest - clear to auscultation bilaterally anterriorly and posteriorly. No wheezes, rales or rhonchi. Breath sounds are symmetrical bilaterally. Unlabored respirations. Heart - normal rate. Regular rhythm. Normal S1, S2. No murmurs. No rubs, clicks or gallops noted. Abdomen - soft and distended. No masses or organomegaly. No rebound, rigidity or guarding. Bowel sounds normal x 4 quadrants. No tenderness noted. Back exam - normal range of motion. No pain on palpation of the spinous processes in the cervical, thoracic, lumbar, sacral regions. No CVA tenderness. Neurological - awake, alert and oriented to person, place, and time and event. Cranial nerves II through XII intact. No focal findings. Clear speech. Muscle strength is +5/5 x 4 extremities. Sensation is intact to light touch bilaterally. Steady gait. Musculoskeletal - Intact x 4 extremities. Full ROM x 4 extremities. No pain with movement. No pain on palpation of the bilateral shoulders, elbows, wrists, hands. No tenderness in the pelvis, pubic bone, bilateral hips, knees, ankles. No obvious deformity  Heme/Lymph - peripheral pulses normal x 4 extremities. No peripheral edema is noted. No cervical adenopathy noted.    Skin - no rashes, erythema, ecchymosis, lacerations, abrasions, suspicious moles  Psychological -   normal behavior, dress and thought processes. Good insight. Good eye contact. Normal affect. Appropriate mood. Normal speech. DATA REVIEWED    Lab Results   Component Value Date/Time    WBC 8.8 08/23/2017 08:12 AM    HGB 11.4 08/23/2017 08:12 AM    HCT 35.4 08/23/2017 08:12 AM    PLATELET 080 71/22/0156 08:12 AM    MCV 90 08/23/2017 08:12 AM     Lab Results   Component Value Date/Time    Sodium 139 08/23/2017 08:12 AM    Potassium 4.5 08/23/2017 08:12 AM    Chloride 103 08/23/2017 08:12 AM    CO2 22 08/23/2017 08:12 AM    Anion gap 10 09/24/2011 02:30 AM    Glucose 90 08/23/2017 08:12 AM    BUN 14 08/23/2017 08:12 AM    Creatinine 0.85 08/23/2017 08:12 AM    BUN/Creatinine ratio 16 08/23/2017 08:12 AM    GFR est AA 93 08/23/2017 08:12 AM    GFR est non-AA 81 08/23/2017 08:12 AM    Calcium 8.9 08/23/2017 08:12 AM    Bilirubin, total 0.2 08/23/2017 08:12 AM    AST (SGOT) 9 08/23/2017 08:12 AM    Alk. phosphatase 60 08/23/2017 08:12 AM    Protein, total 5.7 (L) 08/23/2017 08:12 AM    Albumin 3.8 08/23/2017 08:12 AM    Globulin 3.4 05/19/2010 10:46 AM    A-G Ratio 2.0 08/23/2017 08:12 AM    ALT (SGPT) 10 08/23/2017 08:12 AM     Lab Results   Component Value Date/Time    Cholesterol, total 168 08/23/2017 08:12 AM    HDL Cholesterol 56 08/23/2017 08:12 AM    LDL, calculated 86 08/23/2017 08:12 AM    VLDL, calculated 26 08/23/2017 08:12 AM    Triglyceride 129 08/23/2017 08:12 AM    CHOL/HDL Ratio 2.5 05/19/2010 10:46 AM     Lab Results   Component Value Date/Time    Vitamin D 25-Hydroxy 32.2 08/11/2011 08:15 AM    VITAMIN D, 25-HYDROXY 36.3 08/23/2017 08:12 AM       Lab Results   Component Value Date/Time    Hemoglobin A1c 5.8 (H) 08/23/2017 08:12 AM     Lab Results   Component Value Date/Time    TSH 1.900 08/23/2017 08:12 AM               ASSESSMENT and PLAN    ICD-10-CM ICD-9-CM    1.  Well woman exam (no gynecological exam) Z00.00 V70.0 CBC WITH AUTOMATED DIFF      METABOLIC PANEL, COMPREHENSIVE      LIPID PANEL      TSH 3RD GENERATION      T4, FREE      VITAMIN D, 25 HYDROXY      URINALYSIS W/ RFLX MICROSCOPIC      HEMOGLOBIN A1C WITH EAG   2. Hypothyroidism due to acquired atrophy of thyroid E03.4 244.8 levothyroxine (SYNTHROID) 125 mcg tablet     246.8 TSH 3RD GENERATION      T4, FREE   3. Mild intermittent asthma without complication X08.87 822.49 albuterol-ipratropium (DUO-NEB) 2.5 mg-0.5 mg/3 ml nebu      albuterol (PROVENTIL HFA, VENTOLIN HFA, PROAIR HFA) 90 mcg/actuation inhaler    stable   4. Migraine without aura and without status migrainosus, not intractable G43.009 346.10 butalbital-acetaminophen-caffeine (FIORICET, ESGIC) -40 mg per tablet      COMPLIANCE DRUG SCREEN/PRESCRIPTION MONITORING    1 q 2 months   5. Dyslipidemia L86.6 239.5 METABOLIC PANEL, COMPREHENSIVE      LIPID PANEL    stable   6. Heartburn R12 787.1 omeprazole (PRILOSEC) 40 mg capsule   7. Encounter for immunization Z23 V03.89 CO IMMUNIZ ADMIN,1 SINGLE/COMB VAC/TOXOID      INFLUENZA VIRUS VAC QUAD,SPLIT,PRESV FREE SYRINGE IM   8. Vitamin D deficiency E55.9 268.9    9. Colon cancer screening Z12.11 V76.51 REFERRAL TO GASTROENTEROLOGY   10. Hyperglycemia R73.9 790.29 HEMOGLOBIN A1C WITH EAG   11. Morbid obesity with BMI of 60.0-69.9, adult (HCC) E66.01 278.01     Z68.44 V85.44    12. Family history of diabetes mellitus (DM) Z83.3 V18.0    15. Family history of heart disease Z82.49 V17.49    14. Family history of lung cancer Z80.1 V16.1    15. Family history of skin cancer Z80.8 V16.8    16. Family history of stroke Z82.3 V17.1    17. Family history of heart attack Z82.49 V17.3    18. Family history of breast cancer Z80.3 V16.3    19. Family history of melanoma Z80.8 V16.8      See ophthalmologist every 2 to 3 years unless otherwise directed. See dentist every 6 months. See dermatologist for annual check.     Prescriptions written and sent to pharmacist or given to patient. Proair hfa 90 mcg, Duo-Neb 2.5 mg, Synthroid 125 mg. Fioricet. [place]  reviewed and pt is compliant. Controlled substance agreement reviewed and signed. Continue current medications. Chart reviewed and updated. Specialist notes reviewed and appreciated. Get ov notes from Dr. Cass Mg, Dr. Montse Pollard. Advised pt to sign medical release form at check out today. Referrals given. Keep appointments with specialists. Gastro. Discussed recent test results and goals with patient. Recheck pertinent labs. Encouraged pt to sign up for [employer] to view lab results electronically. Advised pt to contact me via [employer] if there are any concerns regarding their results. Recent colonoscopy, PAP and mammograms reviewed. Recheck as instructed by specialists. Immunizations are noted. Advise flu vaccine between the months September to December. Advised balanced diet and exercise. Proper rest.  Increase water and fiber. Avoid tobacco.  Decrease alcohol and caffeine. Decrease carbohydrates, increase green leafy vegetables and protein. Increase water intake. Eat 3-5 small meals daily. Increase physical activity. Relevant handouts provided and discussed with pt. Pt has copy of ACP booklet at home. Counselled pt on:  Patient health concerns. Asthma, thyroid, bronchitis, heartburn, cholesterol, vit d deficiency, and migraines. Discussed the patient's BMI with her. The BMI follow up plan is as follows: I have counseled this patient on diet and exercise regimens. Follow-up Disposition:  Return in about 1 year (around 11/12/2019), or thyroid, for results, lipids. Patient was offered a choice/choices in the treatment plan today. Patient expresses understanding of the plan and agrees with recommendations. Written by tracy Ortez, as dictated by Dr. Blake Monzon DO. Documentation True and Accepted by Calvin Donnelly. Emily Almaraz.     Patient Instructions        Well Visit, Women 48 to 72: Care Instructions  Your Care Instructions    Physical exams can help you stay healthy. Your doctor has checked your overall health and may have suggested ways to take good care of yourself. He or she also may have recommended tests. At home, you can help prevent illness with healthy eating, regular exercise, and other steps. Follow-up care is a key part of your treatment and safety. Be sure to make and go to all appointments, and call your doctor if you are having problems. It's also a good idea to know your test results and keep a list of the medicines you take. How can you care for yourself at home? · Reach and stay at a healthy weight. This will lower your risk for many problems, such as obesity, diabetes, heart disease, and high blood pressure. · Get at least 30 minutes of exercise on most days of the week. Walking is a good choice. You also may want to do other activities, such as running, swimming, cycling, or playing tennis or team sports. · Do not smoke. Smoking can make health problems worse. If you need help quitting, talk to your doctor about stop-smoking programs and medicines. These can increase your chances of quitting for good. · Protect your skin from too much sun. When you're outdoors from 10 a.m. to 4 p.m., stay in the shade or cover up with clothing and a hat with a wide brim. Wear sunglasses that block UV rays. Even when it's cloudy, put broad-spectrum sunscreen (SPF 30 or higher) on any exposed skin. · See a dentist one or two times a year for checkups and to have your teeth cleaned. · Wear a seat belt in the car. · Limit alcohol to 1 drink a day. Too much alcohol can cause health problems. Follow your doctor's advice about when to have certain tests. These tests can spot problems early. · Cholesterol.  Your doctor will tell you how often to have this done based on your age, family history, or other things that can increase your risk for heart attack and stroke. · Blood pressure. Have your blood pressure checked during a routine doctor visit. Your doctor will tell you how often to check your blood pressure based on your age, your blood pressure results, and other factors. · Mammogram. Ask your doctor how often you should have a mammogram, which is an X-ray of your breasts. A mammogram can spot breast cancer before it can be felt and when it is easiest to treat. · Pap test and pelvic exam. Ask your doctor how often you should have a Pap test. You may not need to have a Pap test as often as you used to. · Vision. Have your eyes checked every year or two or as often as your doctor suggests. Some experts recommend that you have yearly exams for glaucoma and other age-related eye problems starting at age 48. · Hearing. Tell your doctor if you notice any change in your hearing. You can have tests to find out how well you hear. · Diabetes. Ask your doctor whether you should have tests for diabetes. · Colon cancer. You should begin tests for colon cancer at age 48. You may have one of several tests. Your doctor will tell you how often to have tests based on your age and risk. Risks include whether you already had a precancerous polyp removed from your colon or whether your parents, sisters and brothers, or children have had colon cancer. · Thyroid disease. Talk to your doctor about whether to have your thyroid checked as part of a regular physical exam. Women have an increased chance of a thyroid problem. · Osteoporosis. You should begin tests for bone density at age 72. If you are younger than 72, ask your doctor whether you have factors that may increase your risk for this disease. You may want to have this test before age 72. · Heart attack and stroke risk. At least every 4 to 6 years, you should have your risk for heart attack and stroke assessed.  Your doctor uses factors such as your age, blood pressure, cholesterol, and whether you smoke or have diabetes to show what your risk for a heart attack or stroke is over the next 10 years. When should you call for help? Watch closely for changes in your health, and be sure to contact your doctor if you have any problems or symptoms that concern you. Where can you learn more? Go to http://alejandro-eddie.info/. Enter F860 in the search box to learn more about \"Well Visit, Women 50 to 72: Care Instructions. \"  Current as of: March 29, 2018  Content Version: 11.8  © 5959-9444 [employer]. Care instructions adapted under license by [employer] (which disclaims liability or warranty for this information). If you have questions about a medical condition or this instruction, always ask your healthcare professional. [address] any warranty or liability for your use of this information.

## 2018-11-12 NOTE — LETTER
Name:El Desouza :1970 MR #:[id number] Provider Name:FloresSugar astudillo BrooklynDO  
*[id number]* BSMG-491 () Page 1 of 5 Initial [employer] CONTROLLED SUBSTANCE AGREEMENT I may be prescribed medications that are controlled substances as part  of my treatment plan for management of my medical condition(s). The goal of my treatment plan is to maintain and/or improve my health and wellbeing. Because controlled substances have an increased risk of abuse or harm, continual re-evaluation is needed determine if the goals of my treatment plan are being met for my safety and the safety of others. Mars Fine  am entering into this Controlled Substance Agreement with my provider, Soumya Blount DO at [facility] . I understand that successful treatment requires mutual trust and honesty between me and my provider. I understand that there are state and federal laws and regulations which apply to the medications that my provider may prescribe that must be followed. I understand there are risks and benefits ts of taking the medicines that my provider may prescribe. I understand and agree that following this Agreement is necessary in continuing my provider-patient relationship and success of my treatment plan. As a part of my treatment plan, I agree to the following: COMMUNICATION: 
 
1. I will communicate fully with my provider about my medical condition(s), including the effect on my daily life and how well my medications are helping. I will tell my provider all of the medications that I take for any reason, including medications I receive from another health care provider, and will notify my provider about all issues, problems or concerns, including any side effects, which may be related to my medications. I understand that this information allows my provider to adjust my treatment plan to help manage my medical condition.  I understand that this information will become part of my permanent medical record. 2. I will notify my provider if I have a history of alcohol/drug misuse/addiction or if I have had treatment for alcohol/drug addiction in the past, or if I have a new problem with or concern about alcohol/drug use/addiction, because this increases the likelihood of high risk behaviors and may lead to serious medical conditions. 3. Females Only: I will notify my provider if I am or become pregnant, or if I intend to become pregnant, or if I intend to breastfeed. I understand that communication of these issues with my provider is important, due to possible effects my medication could have on an unborn fetus or breastfeeding child. Name:Isabelle Porter :1970 MR #:[id number] Provider Name:Boo Flores,   
*[id number]* BSMG-491 () Page 2 of 5 Initial [employer] MISUSE OF MEDICATIONS / DRUGS: 
 
1. I agree to take all controlled substances as prescribed, and will not misuse or abuse any controlled substances prescribed by my provider. For my safety, I will not increase the amount of medicine I take without first talking with and getting permission from my provider. 2. If I have a medical emergency, another health care provider may prescribe me medication. If I seek emergency treatment, I will notify my provider within seventy-two (72) hours. 3. I understand that my provider may discuss my use and/or possible misuse/abuse of controlled substances and alcohol, as appropriate, with any health care provider involved in my care, pharmacist or legal authority. ILLEGAL DRUGS: 
 
1. I will not use illegal drugs of any kind, including but not limited to marijuana, heroin, cocaine, or any prescription drug which is not prescribed to me. DRUG DIVERSION / PRESCRIPTION FRAUD: 
 
1. I will not share, sell, trade, give away, or otherwise misuse my prescriptions or medications. 2. I will not alter any prescriptions provided to me by my provider. SINGLE PROVIDER: 
 
1. I agree that all controlled substances that I take will be prescribed only by my provider (or his/her covering provider) under this Agreement. This agreement does not prevent me from seeking emergency medical treatment or receiving pain management related to a surgery. PROTECTING MEDICATIONS: 
 
1. I am responsible for keeping my prescriptions and medications in a safe and secure place including safeguarding them from loss or theft. I understand that lost, stolen or damaged/destroyed prescriptions or medications will not be replaced. Name:Isabelle Mckeon :1970 MR #:[id number] Provider Name:Sugar Flores,   
*[id number]* BSMG-491 () Page 3 of 5 Initial [employer] PRESCRIPTION RENEWALS/REFILLS: 
 
1. I will follow my controlled substance medication schedule as prescribed by my provider. 2. I understand and agree that I will make any requests for renewals or refills of my prescriptions only at the time of an office visit or during my providers regular office hours subject to the prescription refill requirements of the individual practice. 3. I understand that my provider may not call in prescriptions for controlled substances to my pharmacy. 4. I understand that my provider may adjust or discontinue these medications as deemed appropriate for my medical treatment plan. This Agreement does not guarantee the prescription of controlled medications. 5. I agree that if my medications are adjusted or discontinued, I will properly dispose of any remaining medications. I understand that I will be required to dispose of any remaining controlled medications prior to being provided with any prescriptions for other controlled medications.  
 
 
1. I authorize my provider and my pharmacy to cooperate fully with any local, state, or federal law enforcement agency in the investigation of any possible misuse, sale, or other diversion of my controlled substance prescriptions or medications. RISKS: 
 
 
1. I understand that if I do not adhere to this Agreement in any way, my provider may change my prescriptions, stop prescribing controlled substances or end our provider-patient relationship. 2. If my provider decides to stop prescribing medication, or decides to end our provider-patient relationship,my provider may require that I taper my medications slowly. If necessary, my provider may also provide a prescription for other medications to treat my withdrawal symptoms. UNDERSTANDING THIS AGREEMENT: 
 
I understand that my provider may adjust or stop my prescriptions for controlled substances based on my medical condition and my treatment plan. I understand that this Agreement does not guarantee that I will be prescribed medications or controlled substances. I understand that controlled substances may be just one part 
of my treatment plan. My initial on each page and my signature below shows that I have read each page of this Agreement, I have had an opportunity to ask questions, and all of my questions have been answered to my satisfaction by my provider. By signing below, I agree to comply with this Agreement, and I understand that if I do not follow the Agreements listed above, my provider may stop 
 
 
 
_________________________________________  Date/Time 11/12/2018 1:04 PM   
             (Patient Signature)

## 2018-11-12 NOTE — PROGRESS NOTES
Immunization/s administered on 11/12/2018 by Jourdan Ac LPN per Neno Gaston DO with patients consent signed. Patient tolerated procedure well. No reactions noted.

## 2018-11-12 NOTE — PATIENT INSTRUCTIONS
Well Visit, Women 48 to 72: Care Instructions  Your Care Instructions    Physical exams can help you stay healthy. Your doctor has checked your overall health and may have suggested ways to take good care of yourself. He or she also may have recommended tests. At home, you can help prevent illness with healthy eating, regular exercise, and other steps. Follow-up care is a key part of your treatment and safety. Be sure to make and go to all appointments, and call your doctor if you are having problems. It's also a good idea to know your test results and keep a list of the medicines you take. How can you care for yourself at home? · Reach and stay at a healthy weight. This will lower your risk for many problems, such as obesity, diabetes, heart disease, and high blood pressure. · Get at least 30 minutes of exercise on most days of the week. Walking is a good choice. You also may want to do other activities, such as running, swimming, cycling, or playing tennis or team sports. · Do not smoke. Smoking can make health problems worse. If you need help quitting, talk to your doctor about stop-smoking programs and medicines. These can increase your chances of quitting for good. · Protect your skin from too much sun. When you're outdoors from 10 a.m. to 4 p.m., stay in the shade or cover up with clothing and a hat with a wide brim. Wear sunglasses that block UV rays. Even when it's cloudy, put broad-spectrum sunscreen (SPF 30 or higher) on any exposed skin. · See a dentist one or two times a year for checkups and to have your teeth cleaned. · Wear a seat belt in the car. · Limit alcohol to 1 drink a day. Too much alcohol can cause health problems. Follow your doctor's advice about when to have certain tests. These tests can spot problems early. · Cholesterol.  Your doctor will tell you how often to have this done based on your age, family history, or other things that can increase your risk for heart attack and stroke. · Blood pressure. Have your blood pressure checked during a routine doctor visit. Your doctor will tell you how often to check your blood pressure based on your age, your blood pressure results, and other factors. · Mammogram. Ask your doctor how often you should have a mammogram, which is an X-ray of your breasts. A mammogram can spot breast cancer before it can be felt and when it is easiest to treat. · Pap test and pelvic exam. Ask your doctor how often you should have a Pap test. You may not need to have a Pap test as often as you used to. · Vision. Have your eyes checked every year or two or as often as your doctor suggests. Some experts recommend that you have yearly exams for glaucoma and other age-related eye problems starting at age 48. · Hearing. Tell your doctor if you notice any change in your hearing. You can have tests to find out how well you hear. · Diabetes. Ask your doctor whether you should have tests for diabetes. · Colon cancer. You should begin tests for colon cancer at age 48. You may have one of several tests. Your doctor will tell you how often to have tests based on your age and risk. Risks include whether you already had a precancerous polyp removed from your colon or whether your parents, sisters and brothers, or children have had colon cancer. · Thyroid disease. Talk to your doctor about whether to have your thyroid checked as part of a regular physical exam. Women have an increased chance of a thyroid problem. · Osteoporosis. You should begin tests for bone density at age 72. If you are younger than 72, ask your doctor whether you have factors that may increase your risk for this disease. You may want to have this test before age 72. · Heart attack and stroke risk. At least every 4 to 6 years, you should have your risk for heart attack and stroke assessed.  Your doctor uses factors such as your age, blood pressure, cholesterol, and whether you smoke or have diabetes to show what your risk for a heart attack or stroke is over the next 10 years. When should you call for help? Watch closely for changes in your health, and be sure to contact your doctor if you have any problems or symptoms that concern you. Where can you learn more? Go to http://alejandro-eddie.info/. Enter U949 in the search box to learn more about \"Well Visit, Women 50 to 72: Care Instructions. \"  Current as of: March 29, 2018  Content Version: 11.8  © 6839-4209 [employer]. Care instructions adapted under license by [employer] (which disclaims liability or warranty for this information). If you have questions about a medical condition or this instruction, always ask your healthcare professional. [address] any warranty or liability for your use of this information.

## 2018-11-16 LAB
APPEARANCE UR: CLEAR
BACTERIA #/AREA URNS HPF: ABNORMAL /[HPF]
BILIRUB UR QL STRIP: NEGATIVE
CASTS URNS QL MICRO: ABNORMAL /LPF
COLOR UR: YELLOW
DRUGS UR: NORMAL
EPI CELLS #/AREA URNS HPF: ABNORMAL /HPF
GLUCOSE UR QL: NEGATIVE
HGB UR QL STRIP: NEGATIVE
KETONES UR QL STRIP: NEGATIVE
LEUKOCYTE ESTERASE UR QL STRIP: ABNORMAL
MICRO URNS: ABNORMAL
MUCOUS THREADS URNS QL MICRO: PRESENT
NITRITE UR QL STRIP: NEGATIVE
PH UR STRIP: 5 [PH] (ref 5–7.5)
PROT UR QL STRIP: NEGATIVE
RBC #/AREA URNS HPF: ABNORMAL /HPF
SP GR UR: 1.02 (ref 1–1.03)
UROBILINOGEN UR STRIP-MCNC: 0.2 MG/DL (ref 0.2–1)
WBC #/AREA URNS HPF: ABNORMAL /HPF

## 2018-11-21 ENCOUNTER — OFFICE VISIT (OUTPATIENT)
Dept: URGENT CARE | Age: 48
End: 2018-11-21

## 2018-11-21 VITALS
HEIGHT: 62 IN | BODY MASS INDEX: 53.92 KG/M2 | OXYGEN SATURATION: 98 % | TEMPERATURE: 98.8 F | DIASTOLIC BLOOD PRESSURE: 75 MMHG | WEIGHT: 293 LBS | RESPIRATION RATE: 16 BRPM | HEART RATE: 69 BPM | SYSTOLIC BLOOD PRESSURE: 141 MMHG

## 2018-11-21 DIAGNOSIS — J45.20 MILD INTERMITTENT ASTHMA WITHOUT COMPLICATION: ICD-10-CM

## 2018-11-21 DIAGNOSIS — J06.9 VIRAL URI WITH COUGH: Primary | ICD-10-CM

## 2018-11-21 RX ORDER — ALBUTEROL SULFATE 90 UG/1
2 AEROSOL, METERED RESPIRATORY (INHALATION)
Qty: 1 INHALER | Refills: 5 | Status: SHIPPED | OUTPATIENT
Start: 2018-11-21 | End: 2019-11-15 | Stop reason: SDUPTHER

## 2018-11-21 RX ORDER — BENZONATATE 200 MG/1
200 CAPSULE ORAL
Qty: 21 CAP | Refills: 0 | Status: SHIPPED | OUTPATIENT
Start: 2018-11-21 | End: 2018-11-28

## 2018-11-21 RX ORDER — CODEINE PHOSPHATE AND GUAIFENESIN 10; 100 MG/5ML; MG/5ML
5 SOLUTION ORAL
Qty: 120 ML | Refills: 0 | Status: SHIPPED | OUTPATIENT
Start: 2018-11-21 | End: 2018-12-17 | Stop reason: ALTCHOICE

## 2018-11-21 RX ORDER — FLUTICASONE PROPIONATE 50 MCG
2 SPRAY, SUSPENSION (ML) NASAL DAILY
Qty: 1 BOTTLE | Refills: 0 | Status: SHIPPED | OUTPATIENT
Start: 2018-11-21 | End: 2019-01-24 | Stop reason: ALTCHOICE

## 2018-11-22 NOTE — PATIENT INSTRUCTIONS
Use Mucinex DM twice a day   Fluids/ gargles  Claritin/ allegra   Tylenol cold-sinus - max strength 1-2 tab 4 times/ day    with Advil as needed     Saline Nasal Washes: Care Instructions  Your Care Instructions  Saline nasal washes help keep the nasal passages open by washing out thick or dried mucus. This simple remedy can help relieve symptoms of allergies, sinusitis, and colds. It also can make the nose feel more comfortable by keeping the mucous membranes moist. You may notice a little burning sensation in your nose the first few times you use the solution, but this usually gets better in a few days. Follow-up care is a key part of your treatment and safety. Be sure to make and go to all appointments, and call your doctor if you are having problems. It's also a good idea to know your test results and keep a list of the medicines you take. How can you care for yourself at home? · You can buy premixed saline solution in a squeeze bottle or other sinus rinse products at a drugstore. Read and follow the instructions on the label. · You also can make your own saline solution by adding 1 teaspoon of salt and 1 teaspoon of baking soda to 2 cups of distilled water. · If you use a homemade solution, pour a small amount into a clean bowl. Using a rubber bulb syringe, squeeze the syringe and place the tip in the salt water. Pull a small amount of the salt water into the syringe by relaxing your hand. · Sit down with your head tilted slightly back. Do not lie down. Put the tip of the bulb syringe or the squeeze bottle a little way into one of your nostrils. Gently drip or squirt a few drops into the nostril. Repeat with the other nostril. Some sneezing and gagging are normal at first.  · Gently blow your nose. · Wipe the syringe or bottle tip clean after each use. · Repeat this 2 or 3 times a day. · Use nasal washes gently if you have nosebleeds often. When should you call for help?   Watch closely for changes in your health, and be sure to contact your doctor if:    · You often get nosebleeds.     · You have problems doing the nasal washes. Where can you learn more? Go to http://alejandro-eddie.info/. Enter [id number] [id number] [id number] in the search box to learn more about \"Saline Nasal Washes: Care Instructions. \"  Current as of: March 28, 2018  Content Version: 11.8  © 0196-5424 [email or web address]. Care instructions adapted under license by [employer] (which disclaims liability or warranty for this information). If you have questions about a medical condition or this instruction, always ask your healthcare professional. [address] any warranty or liability for your use of this information. Upper Respiratory Infection (Cold): Care Instructions  Your Care Instructions    An upper respiratory infection, or URI, is an infection of the nose, sinuses, or throat. URIs are spread by coughs, sneezes, and direct contact. The common cold is the most frequent kind of URI. The flu and sinus infections are other kinds of URIs. Almost all URIs are caused by viruses. Antibiotics won't cure them. But you can treat most infections with home care. This may include drinking lots of fluids and taking over-the-counter pain medicine. You will probably feel better in 4 to 10 days. The doctor has checked you carefully, but problems can develop later. If you notice any problems or new symptoms, get medical treatment right away. Follow-up care is a key part of your treatment and safety. Be sure to make and go to all appointments, and call your doctor if you are having problems. It's also a good idea to know your test results and keep a list of the medicines you take. How can you care for yourself at home? · To prevent dehydration, drink plenty of fluids, enough so that your urine is light yellow or clear like water. Choose water and other caffeine-free clear liquids until you feel better.  If you have kidney, heart, or liver disease and have to limit fluids, talk with your doctor before you increase the amount of fluids you drink. · Take an over-the-counter pain medicine, such as acetaminophen (Tylenol), ibuprofen (Advil, Motrin), or naproxen (Aleve). Read and follow all instructions on the label. · Before you use cough and cold medicines, check the label. These medicines may not be safe for young children or for people with certain health problems. · Be careful when taking over-the-counter cold or flu medicines and Tylenol at the same time. Many of these medicines have acetaminophen, which is Tylenol. Read the labels to make sure that you are not taking more than the recommended dose. Too much acetaminophen (Tylenol) can be harmful. · Get plenty of rest.  · Do not smoke or allow others to smoke around you. If you need help quitting, talk to your doctor about stop-smoking programs and medicines. These can increase your chances of quitting for good. When should you call for help? Call 911 anytime you think you may need emergency care. For example, call if:    · You have severe trouble breathing.    Call your doctor now or seek immediate medical care if:    · You seem to be getting much sicker.     · You have new or worse trouble breathing.     · You have a new or higher fever.     · You have a new rash.    Watch closely for changes in your health, and be sure to contact your doctor if:    · You have a new symptom, such as a sore throat, an earache, or sinus pain.     · You cough more deeply or more often, especially if you notice more mucus or a change in the color of your mucus.     · You do not get better as expected. Where can you learn more? Go to http://alejandro-eddie.info/. Enter P350 in the search box to learn more about \"Upper Respiratory Infection (Cold): Care Instructions. \"  Current as of: December 6, 2017  Content Version: 11.8  © 9145-0446 [employer], [facility].  Care instructions adapted under license by [employer] (which disclaims liability or warranty for this information). If you have questions about a medical condition or this instruction, always ask your healthcare professional. [address] any warranty or liability for your use of this information.

## 2018-11-24 NOTE — PROGRESS NOTES
Cold Symptoms   The history is provided by the patient. This is a new problem. The current episode started more than 2 days ago. The problem occurs every few minutes. The problem has not changed since onset. The cough is non-productive. There has been no fever. Associated symptoms include rhinorrhea, sore throat, shortness of breath and wheezing. Pertinent negatives include no chills. She has tried nothing for the symptoms. Her past medical history is significant for asthma. Past Medical History:   Diagnosis Date    Acne rosacea     Dr. David Guillen.  Allergy, unspecified not elsewhere classified childhood     Txd with immunotherapy. Dr. Marcel Trinidad Anemia NEC     borderline    Ankle sprain 2007    Right. Dr. Terese Pike    Ankle sprain 11/2012    Right. Dr. Felicity Owen.  Asthma childhood    Chickenpox childhood    Chronic low back pain with right-sided sciatica     and SI joint dysfunction. Dr. Darin Calvillo.  Chronic otitis media     Dr. Marcel Trinidad Dry eye syndrome 2013    Dr. Quan Mead.  EBV infection 6/27/2011    Hearing loss     Mild high frequency sensorineuronal hearing loss. Dr. Nikunj Maynard murmur     Hernia of abdominal wall 09/2003, 80/9919    umbilical.  Dr. Marco Hernandez. Dr. Dover Ohcristy    Hyperglycemia 2013    Hypothyroidism 06/2010    Dr. Neha Vuong pressure increase 12/2011    Dr. Essie Pineda. Dr. Quan Mead. Dr. Ana Rico.  Knee pain, left 04/2012    Left. Dr. Solorio Barefoot Measles childhood    Migraine     Mumps childhood    Plantar fasciitis, bilateral 2010    Dr. Ilia Fuentes    Sciatica 2008    Right.  with OA. Dr. Gale Newman Tinnitus of right ear 2012    Dr. Seth Bird        Past Surgical History:   Procedure Laterality Date   Ashley Lockwood  2011    Dr. Soco Ferris.     HAND/FINGER SURGERY UNLISTED  09/2003    Right Index finger repair due to cut    HX HERNIA REPAIR  66/6799    Umbilical.  Dr. Jeanne Rizvi.  HX HERNIA REPAIR  9/23/2011    recurrent umbilical.  Laparoscopy incisional.  Dr. Elizabeth Wright.  HX TONSILLECTOMY  childhood    LAP,CHOLECYSTECTOMY  07/2001         Family History   Problem Relation Age of Onset    Hypertension Mother     Cancer Mother         small cell lung with bone mets to spine/MELANOMA    Cancer Father         melanoma    Arthritis-osteo Father     Colon Polyps Father     Diabetes Maternal Grandmother     Stroke Maternal Grandmother     Seizures Maternal Grandmother     Colon Polyps Maternal Grandmother     Breast Cancer Maternal Grandmother     Heart Disease Paternal Grandmother         heart failure    Heart Attack Paternal Grandmother     Asthma Paternal Grandmother         Social History     Socioeconomic History    Marital status: SINGLE     Spouse name: Not on file    Number of children: Not on file    Years of education: Not on file    Highest education level: Not on file   Social Needs    Financial resource strain: Not on file    Food insecurity - worry: Not on file    Food insecurity - inability: Not on file   [employer] needs - medical: Not on file   [employer] needs - non-medical: Not on file   Occupational History    Not on file   Tobacco Use    Smoking status: Passive Smoke Exposure - Never Smoker    Smokeless tobacco: Never Used    Tobacco comment: lived with smoker dad and mom then step mom x 20 yrs   Substance and Sexual Activity    Alcohol use: Yes     Alcohol/week: 0.6 oz     Types: 1 Glasses of wine per week     Frequency: Monthly or less     Drinks per session: 1 or 2     Binge frequency: Never     Comment: RARE    Drug use: No    Sexual activity: Yes     Partners: Male     Birth control/protection: Abstinence, Pill   Other Topics Concern    Not on file   Social History Narrative    Not on file                ALLERGIES: Latex;  Other medication; Pcn [penicillins]; Prednisone; Sulfa (sulfonamide antibiotics); Garamycin [gentamicin]; and Metformin    Review of Systems   Constitutional: Negative for chills. HENT: Positive for rhinorrhea and sore throat. Respiratory: Positive for shortness of breath and wheezing. All other systems reviewed and are negative. Vitals:    11/21/18 2048   BP: 141/75   Pulse: 69   Resp: 16   Temp: 98.8 °F (37.1 °C)   SpO2: 98%   Weight: 334 lb (151.5 kg)   Height: 5' 2\" (1.575 m)       Physical Exam   Constitutional: No distress. HENT:   Right Ear: Tympanic membrane and ear canal normal.   Left Ear: Tympanic membrane and ear canal normal.   Nose: Nose normal.   Mouth/Throat: No oropharyngeal exudate, posterior oropharyngeal edema or posterior oropharyngeal erythema. Eyes: Conjunctivae are normal. Right eye exhibits no discharge. Left eye exhibits no discharge. Neck: Neck supple. Pulmonary/Chest: Effort normal. No respiratory distress. She has decreased breath sounds. She has no wheezes. She has rhonchi. She has no rales. Lymphadenopathy:     She has no cervical adenopathy. Skin: No rash noted. Nursing note and vitals reviewed. MDM    Procedures        ICD-10-CM ICD-9-CM    1. Viral URI with cough J06.9 465.9 guaiFENesin-codeine (ROBITUSSIN AC) 100-10 mg/5 mL solution    B97.89     2. Mild intermittent asthma without complication D08.17 186.02 albuterol (PROVENTIL HFA, VENTOLIN HFA, PROAIR HFA) 90 mcg/actuation inhaler    stable     Fluids/ gargles  Claritin/ allegra   Tylenol cold-sinus - max strength 1-2 tab 4 times/ day    with Advil as needed    If not better in 4-5 days - Use Mucinex DM twice a day       Medications Ordered Today   Medications    benzonatate (TESSALON) 200 mg capsule     Sig: Take 1 Cap by mouth three (3) times daily as needed for Cough for up to 7 days.      Dispense:  21 Cap     Refill:  0    guaiFENesin-codeine (ROBITUSSIN AC) 100-10 mg/5 mL solution     Sig: Take 5 mL by mouth three (3) times daily as needed for Cough. Max Daily Amount: 15 mL. Dispense:  120 mL     Refill:  0    fluticasone (FLONASE) 50 mcg/actuation nasal spray     Si Sprays by Both Nostrils route daily. Dispense:  1 Bottle     Refill:  0    albuterol (PROVENTIL HFA, VENTOLIN HFA, PROAIR HFA) 90 mcg/actuation inhaler     Sig: Take 2 Puffs by inhalation every four (4) hours as needed for Wheezing. Dispense:  1 Inhaler     Refill:  5     No results found for any visits on 18. The patients condition was discussed with the patient and they understand. The patient is to follow up with primary care doctor. If signs and symptoms become worse the pt is to go to the ER. The patient is to take medications as prescribed.

## 2018-12-05 LAB
25(OH)D3+25(OH)D2 SERPL-MCNC: 33.2 NG/ML (ref 30–100)
ALBUMIN SERPL-MCNC: 4.1 G/DL (ref 3.5–5.5)
ALBUMIN/GLOB SERPL: 1.8 {RATIO} (ref 1.2–2.2)
ALP SERPL-CCNC: 77 IU/L (ref 39–117)
ALT SERPL-CCNC: 14 IU/L (ref 0–32)
AST SERPL-CCNC: 13 IU/L (ref 0–40)
BASOPHILS # BLD AUTO: 0 X10E3/UL (ref 0–0.2)
BASOPHILS NFR BLD AUTO: 0 %
BILIRUB SERPL-MCNC: <0.2 MG/DL (ref 0–1.2)
BUN SERPL-MCNC: 13 MG/DL (ref 6–24)
BUN/CREAT SERPL: 16 (ref 9–23)
CALCIUM SERPL-MCNC: 9 MG/DL (ref 8.7–10.2)
CHLORIDE SERPL-SCNC: 105 MMOL/L (ref 96–106)
CHOLEST SERPL-MCNC: 182 MG/DL (ref 100–199)
CO2 SERPL-SCNC: 21 MMOL/L (ref 20–29)
CREAT SERPL-MCNC: 0.81 MG/DL (ref 0.57–1)
EOSINOPHIL # BLD AUTO: 0.2 X10E3/UL (ref 0–0.4)
EOSINOPHIL NFR BLD AUTO: 2 %
ERYTHROCYTE [DISTWIDTH] IN BLOOD BY AUTOMATED COUNT: 14 % (ref 12.3–15.4)
EST. AVERAGE GLUCOSE BLD GHB EST-MCNC: 131 MG/DL
GLOBULIN SER CALC-MCNC: 2.3 G/DL (ref 1.5–4.5)
GLUCOSE SERPL-MCNC: 124 MG/DL (ref 65–99)
HBA1C MFR BLD: 6.2 % (ref 4.8–5.6)
HCT VFR BLD AUTO: 37.7 % (ref 34–46.6)
HDLC SERPL-MCNC: 60 MG/DL
HGB BLD-MCNC: 12.2 G/DL (ref 11.1–15.9)
IMM GRANULOCYTES # BLD: 0 X10E3/UL (ref 0–0.1)
IMM GRANULOCYTES NFR BLD: 0 %
INTERPRETATION, 910389: NORMAL
LDLC SERPL CALC-MCNC: 100 MG/DL (ref 0–99)
LYMPHOCYTES # BLD AUTO: 2.6 X10E3/UL (ref 0.7–3.1)
LYMPHOCYTES NFR BLD AUTO: 23 %
MCH RBC QN AUTO: 28.9 PG (ref 26.6–33)
MCHC RBC AUTO-ENTMCNC: 32.4 G/DL (ref 31.5–35.7)
MCV RBC AUTO: 89 FL (ref 79–97)
MONOCYTES # BLD AUTO: 0.9 X10E3/UL (ref 0.1–0.9)
MONOCYTES NFR BLD AUTO: 8 %
NEUTROPHILS # BLD AUTO: 7.6 X10E3/UL (ref 1.4–7)
NEUTROPHILS NFR BLD AUTO: 67 %
PLATELET # BLD AUTO: 196 X10E3/UL (ref 150–379)
POTASSIUM SERPL-SCNC: 4.5 MMOL/L (ref 3.5–5.2)
PROT SERPL-MCNC: 6.4 G/DL (ref 6–8.5)
RBC # BLD AUTO: 4.22 X10E6/UL (ref 3.77–5.28)
SODIUM SERPL-SCNC: 143 MMOL/L (ref 134–144)
T4 FREE SERPL-MCNC: 1.52 NG/DL (ref 0.82–1.77)
TRIGL SERPL-MCNC: 111 MG/DL (ref 0–149)
TSH SERPL DL<=0.005 MIU/L-ACNC: 2.9 UIU/ML (ref 0.45–4.5)
VLDLC SERPL CALC-MCNC: 22 MG/DL (ref 5–40)
WBC # BLD AUTO: 11.3 X10E3/UL (ref 3.4–10.8)

## 2018-12-07 ENCOUNTER — TELEPHONE (OUTPATIENT)
Dept: FAMILY MEDICINE CLINIC | Age: 48
End: 2018-12-07

## 2018-12-07 NOTE — TELEPHONE ENCOUNTER
Patient requesting a return call to discuss her lab bill. Says she was billed for lab work done on 11/12/18 that was suppose to be a part of her physical. She is requesting a return call to discuss. Her contact # is [phone].

## 2018-12-11 NOTE — TELEPHONE ENCOUNTER
Writer returned call to patient with no answer. VM left to return call regarding bill related to lab.

## 2018-12-11 NOTE — TELEPHONE ENCOUNTER
----- Message from Darin Watts sent at 12/11/2018 11:39 AM EST -----  Regarding: /Telephone  Contact: [phone]  Pt is returning a call from the nurse .  Best contact [phone]

## 2018-12-17 ENCOUNTER — OFFICE VISIT (OUTPATIENT)
Dept: FAMILY MEDICINE CLINIC | Age: 48
End: 2018-12-17

## 2018-12-17 VITALS
HEART RATE: 82 BPM | HEIGHT: 62 IN | TEMPERATURE: 97.7 F | OXYGEN SATURATION: 98 % | SYSTOLIC BLOOD PRESSURE: 118 MMHG | DIASTOLIC BLOOD PRESSURE: 74 MMHG | BODY MASS INDEX: 53.92 KG/M2 | WEIGHT: 293 LBS | RESPIRATION RATE: 18 BRPM

## 2018-12-17 DIAGNOSIS — J00 ACUTE NASOPHARYNGITIS: ICD-10-CM

## 2018-12-17 DIAGNOSIS — J45.20 MILD INTERMITTENT ASTHMA WITHOUT COMPLICATION: Primary | ICD-10-CM

## 2018-12-17 DIAGNOSIS — M79.652 ACUTE THIGH PAIN, LEFT: ICD-10-CM

## 2018-12-17 DIAGNOSIS — I82.412 DEEP VENOUS THROMBOSIS OF LEFT PROFUNDA FEMORIS VEIN (HCC): ICD-10-CM

## 2018-12-17 DIAGNOSIS — M25.552 LEFT HIP PAIN: ICD-10-CM

## 2018-12-17 DIAGNOSIS — M79.89 SWELLING OF LEFT FOOT: ICD-10-CM

## 2018-12-17 PROBLEM — I82.419 DVT OF DEEP FEMORAL VEIN (HCC): Status: ACTIVE | Noted: 2018-12-07

## 2018-12-17 RX ORDER — RIVAROXABAN 15 MG/1
TABLET, FILM COATED ORAL
Refills: 0 | COMMUNITY
Start: 2018-12-08 | End: 2019-01-24 | Stop reason: DRUGHIGH

## 2018-12-17 NOTE — PROGRESS NOTES
HISTORY OF PRESENT ILLNESS  Joon Fitzgerald is a 46 y.o. female presents with Hospital Follow Up    Agree with nurse note. Pt with *** presents to the office with a BP of 118/74. Weight Metrics 11/21/2018 11/12/2018 9/24/2018 4/16/2018 3/13/2018 2/21/2018 2/8/2018   Weight 334 lb 326 lb 3.2 oz 327 lb 326 lb 8 oz 326 lb 3.2 oz 314 lb 14.4 oz 313 lb   BMI 61.09 kg/m2 61.63 kg/m2 59.81 kg/m2 61.89 kg/m2 61.84 kg/m2 59.7 kg/m2 59.34 kg/m2         Health Maintenance    Pt's most recent colonoscopy was on *** with ***. Pt had eye exam on *** and saw ***. Pt's most recent pap smear was on *** with ***. Pt's most recent mammogram was on *** with ***. Written by tracy Bartlett, as dictated by Dr. Brandi Grande DO.    {No Diagnosis Found}    ROS    Review of Systems negative except as noted above in HPI. ALLERGIES:    Allergies   Allergen Reactions    Latex Rash    Other Medication Hives and Rash     SUN    Pcn [Penicillins] Hives    Prednisone Hives    Sulfa (Sulfonamide Antibiotics) Hives    Garamycin [Gentamicin] Other (comments)     Itchy eyes due to sulfate    Metformin Diarrhea       CURRENT MEDICATIONS:    Outpatient Medications Marked as Taking for the 12/17/18 encounter (Office Visit) with Theresa Antunez DO   Medication Sig Dispense Refill    fluticasone (FLONASE) 50 mcg/actuation nasal spray 2 Sprays by Both Nostrils route daily. 1 Bottle 0    albuterol (PROVENTIL HFA, VENTOLIN HFA, PROAIR HFA) 90 mcg/actuation inhaler Take 2 Puffs by inhalation every four (4) hours as needed for Wheezing. 1 Inhaler 5    doxycycline (VIBRAMYCIN) 50 mg capsule TAKE ONE CAPSULE BY MOUTH EVERY DAY ONCE A DAY  11    levothyroxine (SYNTHROID) 125 mcg tablet Take 1 Tab by mouth Daily (before breakfast). 90 Tab 3    omeprazole (PRILOSEC) 40 mg capsule Take 1 Cap by mouth daily.  30 Cap 2    albuterol-ipratropium (DUO-NEB) 2.5 mg-0.5 mg/3 ml nebu 3 mL by Nebulization route every six (6) hours as needed. 30 Nebule 0    butalbital-acetaminophen-caffeine (FIORICET, ESGIC) -40 mg per tablet TAKE 1 TAB BY MOUTH EVERY SIX (6) HOURS AS NEEDED FOR PAIN OR HEADACHE. 40 Tab 0    montelukast (SINGULAIR) 10 mg tablet Take 1 Tab by mouth daily. 30 Tab 11    azelastine (ASTELIN) 137 mcg (0.1 %) nasal spray 2 Sprays by Both Nostrils route two (2) times a day. Use in each nostril as directed  Indications: SEASONAL ALLERGIC RHINITIS 1 Bottle 5    hydrOXYzine HCl (ATARAX) 25 mg tablet TAKE 1 TABLET BY MOUTH EVERY 8 HOURS AS NEEDED FOR ITCHING INDICATIONS: PRURITUS OF SKIN 90 Tab 1    NAFTIN 2 % gel APPLY TO AFFECTED AREA TWO (2) TIMES A DAY. INDICATIONS: TINEA PEDIS 60 g 2    Nebulizer & Compressor machine 1 Each by Other route four (4) times daily as needed. 1 Each 0    albuterol (PROVENTIL VENTOLIN) 2.5 mg /3 mL (0.083 %) nebulizer solution 6 mL by Nebulization route every four (4) hours as needed for Wheezing or Shortness of Breath. Indications: Acute Asthma Attack 24 Each 1    budesonide (PULMICORT) 1 mg/2 mL nbsp 2 mL by Nebulization route two (2) times a day. Indications: MAINTENANCE THERAPY FOR ASTHMA 1 Each 1    cyclobenzaprine (FLEXERIL) 10 mg tablet TAKE 1 TABLET BY MOUTH 3 TIMES A DAY AS NEEDED FOR MUSCLE SPASM 90 Tab 0    ALPRAZolam (XANAX) 0.5 mg tablet Take 1 Tab by mouth two (2) times daily as needed for Anxiety or Sleep. Max Daily Amount: 1 mg. Indications: anxiety 60 Tab 2    terconazole (TERAZOL 7) 0.4 % vaginal cream   6    LOCOID 0.1 % lotn   1    valACYclovir (VALTREX) 500 mg tablet   1    terconazole (TERAZOL 3) 80 mg vaginal suppository   4    multivitamin (ONE A DAY) tablet Take 1 tablet by mouth daily.  PROPYLENE GLYCOL//PF (SYSTANE, PF, OP) Apply  to eye four (4) times daily.  magnesium oxide (MAG-OX) 400 mg tablet Take 400 mg by mouth daily.  Emollient Combination No.32 (EPICERAM) Emul by Apply Externally route. From Dr Argelia Stephen.        metroNIDAZOLE (METROGEL) 1 % topical gel Apply  to affected area daily. Use a thin layer to affected areas after washing from Dr Josephine Luna   Indications: ACNE ROSACEA      diclofenac EC (VOLTAREN) 75 mg EC tablet Take 75 mg by mouth two (2) times a day.  latanoprost (XALATAN) 0.005 % ophthalmic solution Administer 1 Drop to both eyes nightly.  OTHER,NON-FORMULARY, Osteo Biflex       omega-3 fatty acids-vitamin e (FISH OIL) 1,000 mg Cap Take 1 Cap by mouth daily.  LACTOBACILLUS/FOS/PECTIN (PROBIOTIC COMPLEX PO) Take  by mouth daily.  cholecalciferol, vitamin d3, (VITAMIN D) 1,000 unit tablet Take 1,000 Units by mouth daily. PAST MEDICAL HISTORY:    Past Medical History:   Diagnosis Date    Acne rosacea     Dr. Josephine Luna.  Allergy, unspecified not elsewhere classified childhood     Txd with immunotherapy. Dr. Chelsea Prakash Anemia NEC     borderline    Ankle sprain 2007    Right. Dr. Yuen Cora    Ankle sprain 11/2012    Right. Dr. Gia Corbett.  Asthma childhood    Chickenpox childhood    Chronic low back pain with right-sided sciatica     and SI joint dysfunction. Dr. Emilia Jeff.  Chronic otitis media     Dr. Chelsea Prakash Dry eye syndrome 2013    Dr. José Marcos.  EBV infection 6/27/2011    Hearing loss     Mild high frequency sensorineuronal hearing loss. Dr. Aurea Glaser murmur     Hernia of abdominal wall 09/2003, 38/7726    umbilical.  Dr. Kolton Joseph. Dr. Lissette Hicks    Hyperglycemia 2013    Hypothyroidism 06/2010    Dr. Pineda Foots pressure increase 12/2011    Dr. Maribel Ivan. Dr. José Marcos. Dr. Hakeem Chan.  Knee pain, left 04/2012    Left. Dr. Tami Rawls Measles childhood    Migraine     Mumps childhood    Plantar fasciitis, bilateral 2010    Dr. Dora Luevano    Sciatica 2008    Right.  with OA.   Dr. Misti Park Tinnitus of right ear 2012     [facility]       PAST SURGICAL HISTORY:    Past Surgical History:   Procedure Laterality Date   Alia Rodriguez  2011    Dr. Clarissa Becerra.  HAND/FINGER SURGERY UNLISTED  09/2003    Right Index finger repair due to cut    HX HERNIA REPAIR  41/0508    Umbilical.  Dr. Jonnathan Emery.  HX HERNIA REPAIR  9/23/2011    recurrent umbilical.  Laparoscopy incisional.  Dr. Dimas Gonzalez.  HX TONSILLECTOMY  childhood    LAP,CHOLECYSTECTOMY  07/2001       FAMILY HISTORY:    Family History   Problem Relation Age of Onset    Hypertension Mother     Cancer Mother         small cell lung with bone mets to spine/MELANOMA    Cancer Father         melanoma    Arthritis-osteo Father     Colon Polyps Father     Diabetes Maternal Grandmother     Stroke Maternal Grandmother     Seizures Maternal Grandmother     Colon Polyps Maternal Grandmother     Breast Cancer Maternal Grandmother     Heart Disease Paternal Grandmother         heart failure    Heart Attack Paternal Grandmother     Asthma Paternal Grandmother        SOCIAL HISTORY:    Social History     Socioeconomic History    Marital status: SINGLE     Spouse name: Not on file    Number of children: Not on file    Years of education: Not on file    Highest education level: Not on file   Tobacco Use    Smoking status: Passive Smoke Exposure - Never Smoker    Smokeless tobacco: Never Used    Tobacco comment: lived with smoker dad and mom then step mom x 20 yrs   Substance and Sexual Activity    Alcohol use:  Yes     Alcohol/week: 0.6 oz     Types: 1 Glasses of wine per week     Frequency: Monthly or less     Drinks per session: 1 or 2     Binge frequency: Never     Comment: RARE    Drug use: No    Sexual activity: Yes     Partners: Male     Birth control/protection: Abstinence, Pill       IMMUNIZATIONS:    Immunization History   Administered Date(s) Administered    Influenza Vaccine 10/15/2013, 11/07/2016, 11/03/2017    Influenza Vaccine (Quad) 11/03/2015  Influenza Vaccine (Quad) PF 11/20/2014, 11/07/2016, 11/12/2018    Influenza Vaccine Split 11/18/2011, 11/07/2012    Influenza Vaccine Whole 10/01/2010    TD Vaccine 09/01/2003    Tdap 11/03/2015         PHYSICAL EXAMINATION    Vital Signs  There were no vitals taken for this visit. Weight Metrics 11/21/2018 11/12/2018 9/24/2018 4/16/2018 3/13/2018 2/21/2018 2/8/2018   Weight 334 lb 326 lb 3.2 oz 327 lb 326 lb 8 oz 326 lb 3.2 oz 314 lb 14.4 oz 313 lb   BMI 61.09 kg/m2 61.63 kg/m2 59.81 kg/m2 61.89 kg/m2 61.84 kg/m2 59.7 kg/m2 59.34 kg/m2       General appearance - Well nourished. Well appearing. Well developed. No acute distress. Obese. Head - Normocephalic. Atraumatic. Eyes - pupils equal and reactive. Extraocular eye movements intact. Sclera anicteric. Mildly injected sclera. Ears - Hearing is grossly normal bilaterally. Nose - normal and patent. No polyps noted. No erythema. No discharge. Mouth - mucous membranes with adequate moisture. Posterior pharynx normal with cobblestone appearance. No erythema, white exudate or obstruction. Neck - supple. Midline trachea. No carotid bruits noted bilaterally. No thyromegaly noted. Chest - clear to auscultation bilaterally anteriorly and posteriorly. No wheezes. No rales or rhonchi. Breath sounds are symmetrical bilaterally. Unlabored respirations. Heart - normal rate. Regular rhythm. Normal S1, S2. No murmur noted. No rubs, clicks or gallops noted. Abdomen - soft and distended. No masses or organomegaly. No rebound, rigidity or guarding. Bowel sounds normal x 4 quadrants. No tenderness noted. Neurological - awake, alert and oriented to person, place, and time and event. Cranial nerves II through XII intact. Clear speech. Muscle strength is +5/5 x 4 extremities. Sensation is intact to light touch bilaterally. Steady gait. Heme/Lymph - peripheral pulses normal x 4 extremities. No peripheral edema is noted. Musculoskeletal - Intact x 4 extremities. Full ROM x 4 extremities. No pain with movement. Back exam - normal range of motion. No pain on palpation of the spinous processes in the cervical, thoracic, lumbar, sacral regions. No CVA tenderness. Skin - no rashes, erythema, ecchymosis, lacerations, abrasions, suspicious moles noted  Psychological -   normal behavior, dress and thought processes. Good insight. Good eye contact. Normal affect. Appropriate mood. Normal speech. DATA REVIEWED    Lab Results   Component Value Date/Time    WBC 11.3 (H) 12/04/2018 08:13 AM    HGB 12.2 12/04/2018 08:13 AM    HCT 37.7 12/04/2018 08:13 AM    PLATELET 354 63/78/3681 08:13 AM    MCV 89 12/04/2018 08:13 AM     Lab Results   Component Value Date/Time    Sodium 143 12/04/2018 08:13 AM    Potassium 4.5 12/04/2018 08:13 AM    Chloride 105 12/04/2018 08:13 AM    CO2 21 12/04/2018 08:13 AM    Anion gap 10 09/24/2011 02:30 AM    Glucose 124 (H) 12/04/2018 08:13 AM    BUN 13 12/04/2018 08:13 AM    Creatinine 0.81 12/04/2018 08:13 AM    BUN/Creatinine ratio 16 12/04/2018 08:13 AM    GFR est AA 98 12/04/2018 08:13 AM    GFR est non-AA 85 12/04/2018 08:13 AM    Calcium 9.0 12/04/2018 08:13 AM    Bilirubin, total <0.2 12/04/2018 08:13 AM    AST (SGOT) 13 12/04/2018 08:13 AM    Alk.  phosphatase 77 12/04/2018 08:13 AM    Protein, total 6.4 12/04/2018 08:13 AM    Albumin 4.1 12/04/2018 08:13 AM    Globulin 3.4 05/19/2010 10:46 AM    A-G Ratio 1.8 12/04/2018 08:13 AM    ALT (SGPT) 14 12/04/2018 08:13 AM     Lab Results   Component Value Date/Time    Cholesterol, total 182 12/04/2018 08:13 AM    HDL Cholesterol 60 12/04/2018 08:13 AM    LDL, calculated 100 (H) 12/04/2018 08:13 AM    VLDL, calculated 22 12/04/2018 08:13 AM    Triglyceride 111 12/04/2018 08:13 AM    CHOL/HDL Ratio 2.5 05/19/2010 10:46 AM     Lab Results   Component Value Date/Time    Vitamin D 25-Hydroxy 32.2 08/11/2011 08:15 AM    VITAMIN D, 25-HYDROXY 33.2 12/04/2018 08:13 AM       Lab Results   Component Value Date/Time    Hemoglobin A1c 6.2 (H) 12/04/2018 08:13 AM     Lab Results   Component Value Date/Time    TSH 2.900 12/04/2018 08:13 AM             ASSESSMENT and PLAN    {No Diagnosis Found}    Discussed the patient's BMI with her. The BMI follow up plan is as follows: {Document your plan here:40369}. Decrease carbohydrates (white foods, sweet foods, sweet drinks and alcohol), increase green leafy vegetables and protein (lean meats and beans) with each meal.  Avoid fried foods. Eat 3-5 small meals daily. Do not skip meals. Increase water intake. Increase physical activity to 30 minutes daily for health benefit or 60 minutes daily to prevent weight regain, as tolerated. Get 7-8 hours uninterrupted sleep nightly. Chart reviewed and updated. Continue current medications and care. Prescriptions written and sent to pharmacy; medication side effects discussed. Prescription given to patient during office visit today. *** Cautioned pt about addictive potential. Controlled Substance Agreement reviewed and signed. [facility]  reviewed and pt is compliant. Most recent tests reviewed from ***. Recheck pertinent labs today. Recent office visit notes from *** reviewed. Get recent office visit notes from ***. Advised pt to sign release. Referrals given; patient urged to keep appointments with specialists. Counseled patient on health concerns:  ***  Relevant handouts given and discussed with patient. Immunizations noted; ***  Offered empathy, support, legitimation, prayers, partnership to patient. Praised patient for progress. ACP handout given today. *** honoring choices referral.   Follow-up Disposition: Not on File    Patient was offered a choice/choices in the treatment plan today. Patient expresses understanding of the plan and agrees with recommendations.     More than *** mins spent face to face with patient and more than 50% of this time spent in counseling and coordinating care. Written by tracy Cuellar, as dictated by Dr. Alyssa Bergeron DO. Documentation True and Accepted by Hernan Solorzano. Red Kirkland. There are no Patient Instructions on file for this visit.

## 2018-12-17 NOTE — PATIENT INSTRUCTIONS
Deep Vein Thrombosis: After Your Visit to the Emergency Room  Your Care Instructions  A deep vein thrombosis (DVT) is a blood clot in certain veins of the legs, pelvis, or arms. The main goal of treatment is to prevent the blood clot from growing or moving to the lungs. A blood clot in a lung can be life-threatening. The doctor may have given you a blood thinner (anticoagulant). A blood thinner can stop the blood clot from growing larger and prevent new clots from forming. You will need to take a blood thinner for 3 to 6 months or longer. Even though you have been released from the emergency room, you still need to watch for any problems. The doctor carefully checked you. But sometimes problems can develop later. If you have new symptoms, or if your symptoms do not get better, return to the emergency room or call your doctor right away. If you have sudden chest pain and shortness of breath, or you cough up blood, call 911 or seek other emergency help right away. A visit to the emergency room is only one step in your treatment. Even if you feel better, you still need to do what your doctor recommends, such as going to all suggested follow-up appointments and taking medicines exactly as directed. This will help you recover and help prevent future problems. How can you care for yourself at home? · Take your medicines exactly as prescribed. Call your doctor if you think you are having a problem with your medicine. · Follow your doctor's instructions about having blood tests. You may need blood tests--maybe every day at first--to see how well the blood thinner is working. · Wear compression stockings if your doctor prescribes them. These stockings are tighter at the feet than on the legs. They can reduce pain and swelling and may keep blood from pooling in your legs. You can get them with a prescription at a medical supply store or at some drugstores.   · When you sit, use a pillow to raise the arm or leg that has the blood clot. Try to keep it above the level of your heart. · Use a heating pad over the area for 20 to 30 minutes, 3 or 4 times a day, to reduce pain and swelling. · Wear medical alert jewelry that shows that you are on a blood-thinning medicine. You can buy this at most drugstores. When should you call for help? Call 911 if:  · You passed out (lost consciousness). · You have sudden chest pain and shortness of breath, or you cough up blood. · You vomit blood or what looks like coffee grounds. · You pass maroon or very bloody stools. Return to the emergency room now if:  · Your leg or thigh pain is getting worse. · You have signs of another blood clot, such as:  ¨ New pain in your calf, back of the knee, thigh, or groin. ¨ New redness and swelling in your leg or groin. Call your doctor today if:  · You have new bruises or blood spots under your skin. · You have a nosebleed. · Your stools are black and tarlike or have streaks of blood. · You have blood in your urine. · Your gums bleed when you brush your teeth. Where can you learn more? Go to [email or web address]  Enter D899 in the search box to learn more about \"Deep Vein Thrombosis: After Your Visit to the Emergency Room. \"   © 4742-1045 Healthwise, [employer]. Care instructions adapted under license by [employer] (which disclaims liability or warranty for this information). This care instruction is for use with your licensed healthcare professional. If you have questions about a medical condition or this instruction, always ask your healthcare professional. [place] [address] any warranty or liability for your use of this information. Content Version: 9.4.23268; Last Revised: September 30, 2010               Deep Vein Thrombosis: Care Instructions  Your Care Instructions    A deep vein thrombosis (DVT) is a blood clot in certain veins of the legs, pelvis, or arms.  Blood clots in these veins need to be treated because they can get bigger, break loose, and travel through the bloodstream to the lungs. A blood clot in a lung can be life-threatening. The doctor may have given you a blood thinner (anticoagulant). A blood thinner can stop the blood clot from growing larger and prevent new clots from forming. You will need to take a blood thinner for 3 to 6 months or longer. The doctor has checked you carefully, but problems can develop later. If you notice any problems or new symptoms, get medical treatment right away. Follow-up care is a key part of your treatment and safety. Be sure to make and go to all appointments, and call your doctor if you are having problems. It's also a good idea to know your test results and keep a list of the medicines you take. How can you care for yourself at home? · Take your medicines exactly as prescribed. Call your doctor if you think you are having a problem with your medicine. · If you are taking a blood thinner, be sure you get instructions about how to take your medicine safely. Blood thinners can cause serious bleeding problems. · Wear compression stockings if your doctor recommends them. These stockings are tighter at the feet than on the legs. They may reduce pain and swelling in your legs. But there are different types of stockings, and they need to fit right. So your doctor will recommend what you need. · When you sit, use a pillow to raise the arm or leg that has the blood clot. Try to keep it above the level of your heart. When should you call for help? Call 911 anytime you think you may need emergency care. For example, call if:    · You passed out (lost consciousness).     · You have symptoms of a blood clot in your lung (called a pulmonary embolism). These include:  ? Sudden chest pain. ? Trouble breathing. ?  Coughing up blood.    Call your doctor now or seek immediate medical care if:    · You have new or worse trouble breathing.     · You are dizzy or lightheaded, or you feel like you may faint.     · You have symptoms of a blood clot in your arm or leg. These may include:  ? Pain in the arm, calf, back of the knee, thigh, or groin. ? Redness and swelling in the arm, leg, or groin.    Watch closely for changes in your health, and be sure to contact your doctor if:    · You do not get better as expected. Where can you learn more? Go to http://alejandro-eddie.info/. Enter B540 in the search box to learn more about \"Deep Vein Thrombosis: Care Instructions. \"  Current as of: November 21, 2017  Content Version: 11.8  © 7239-6589 [employer]. Care instructions adapted under license by [employer] (which disclaims liability or warranty for this information). If you have questions about a medical condition or this instruction, always ask your healthcare professional. [address] any warranty or liability for your use of this information. Asthma: Your Action Plan  Sample Action Plan    Controller medicine action plan  Fill in the blank spaces and boxes that apply for all sections. · Name of your controller medicine:  ? ____________________________________________  · How much of this medicine do you take? ? ____________________________________________  · How often do you take this medicine? ? ____________________________________________  · Other instructions? ? ____________________________________________  Quick-relief medicine action plan  · Name of your quick-relief medicine:  ? ____________________________________________  · How much of this medicine do you take? ? ____________________________________________  · How often do you take this medicine? ? ____________________________________________  Asthma Zones  GREEN ZONE: This is where you want to be! Green zone symptoms  · You have no shortness of breath or chest tightness. You are not coughing or wheezing.   · You can do all of your usual activities. · You sleep well at night. Green zone peak flow (if you use a peak flow meter)  · ______ or more (80% or more of your personal best)  Green zone actions (Check the boxes and fill in the blank spaces that apply.)  [ ] You take your controller medicine(s) every day. [ ] [place] are staying away from your asthma triggers. [ ] You take quick-relief medicine (called _____________________) ______ minutes before exercise. YELLOW ZONE: Your asthma is getting worse. Yellow zone symptoms  · You are short of breath or have chest tightness. You are coughing or wheezing. · You have symptoms that keep you up at night. · You can do some, but not all, of your usual activities. Yellow zone peak flow (if you use a peak flow meter)  · ______ to ______ (50% to 79% of your personal best)  Yellow zone actions (Check the boxes and fill in the blank spaces that apply.)  [ ] Take _____ puff(s) of quick-relief medicine called ______________________. Repeat _____ times. [ ] If your symptoms don't get better or your peak flow has not returned to the green zone in 1 hour, then:  · [ ] Take _____ puff(s) of medicine called ______________________. Take it ____ times a day. · [ ] Begin or increase treatment with corticosteroid pills. Take ______ mg of medicine called ____________________________ every __________. · [ ] Call your doctor at this number: ____________________. RED ZONE: Danger! Red zone symptoms  · You are very short of breath. · You can't do your usual activities. · Quick-relief medicine doesn't help. Or your symptoms don't get better after 24 hours in the yellow zone. Red zone peak flow (if you use a peak flow meter)  · Less than _______ (less than 50% of your personal best)  Red zone actions (Check the boxes and fill in the blank spaces that apply.)  [ ] Take _____ puff(s) of quick-relief medicine called ____________________________. Repeat ______ times.   [ ] Begin or increase treatment with corticosteroid pills. Take ________ mg now. [ ] Call your doctor at this number: _________________. If you can't contact your doctor, go to the emergency department. Call 911 or ___________________. [ ] Other numbers you might call are: ___________________________________. When should you call for help? Call 911 anytime you think you may need emergency care. For example, call if:  · You have severe trouble breathing. Call your doctor now or seek immediate medical care if:  · You are in the red zone of your asthma action plan. · You've used your quick-relief medicine but are still having trouble breathing. · You cough up blood. · You have new or worse trouble breathing. · You cough up dark brown or bloody mucus (sputum). Watch closely for changes in your health, and be sure to contact your doctor if:  · You need to use quick-relief medicine more than 2 days each week (unless it's just for exercise). · Your coughing and wheezing get worse. Follow-up care is a key part of your treatment and safety. Be sure to make and go to all appointments, and call your doctor if you are having problems. It's also a good idea to know your test results and keep a list of the medicines you take. Where can you learn more? Go to http://alejandro-eddie.info/. Enter [id number] in the search box to learn more about \"Asthma: Your Action Plan. \"  Current as of: December 6, 2017  Content Version: 11.8  © 4337-8958 Healthwise, Incorporated. Care instructions adapted under license by [employer] (which disclaims liability or warranty for this information). If you have questions about a medical condition or this instruction, always ask your healthcare professional. [facility] [address] any warranty or liability for your use of this information.

## 2018-12-17 NOTE — PROGRESS NOTES
HISTORY OF PRESENT ILLNESS  Judith Villalba is a 46 y.o. female presents with Hospital Follow Up    Agree with nurse note. Pt presents to the office with a BP of 118/74 and brought her discharge papers from hospitial admittance at [facility]. Pt reports she had L hip pain and stabbing thigh pain and thought she had pulled a muscle. She used a heating pad and took aleve and flexeril. She thought the pain eased up and she was able to move around. The next morning, her L foot was swelling, she had pain in the back of her leg, and her leg felt tight and heavy. Denied redness, trauma to the area. She drove herself to ER at [facility] on 12/7/2018 and was admitted for overnight observation. US showed acute occlusive DVT involving L deep femoral vein. Acute, partially ccclusive DVT involving common femoral vein and L proximal femoral vein. O2 97%. Dx'd L leg DVT. Tx'd with Levonox injection. She was discharged on 12/8/2018. Rx'd Xarelto 15 mg BID x21 days, then switch to Xarelto 20 mg qpm. Stop oral contraceptive. She has been keeping her leg elevated while at home and has been trying to walk around regularly. Her job is sedentary, but she has been trying to move her feet while sitting. She still has pain and uses Tylenol and a heating pad with relief. Today, she denies chest pain or SOB. Pt with mild intermittent asthma. Pt recalls, she went to  on 9/24/2018 for SOB, cough, wheezing, lower chest and epigastric pain, and abd pain. Dx'd acute bronchospasm and GERD. EKG normal. Tx'd with Neb tx. Rx'd Fluticasone propionate 50 mcg nasal spray, Duo-neb 2.5 mg, Prilosec 40 mg, Tessalon 200 mg. She wonders if she can use Prilosec while on Xarelto. She went to  on 11/21/2018 for non productive cough, runny nose, sore throat, SOB, and wheezing. Dx'd viral UTI with cough and asthma. Rx'd Tessalon 200 mg and robitussin. She has continued dry, nagging cough. Denies fever, chills, or other associated sxs.      Health Maintenance    Pt reports she had a recent pap smear with her GYN, Dr. Deepti Bell. She was supposed to decrease to a lower dose of Junel but since the DVT, she has stopped using it completely. She had been on birth control for 30 years and her menstrual cycle started on 12/13/2018 after stopping it. Pt's most recent mammogram was on 12/13/2018 with Salena Kramer. Normal findings. F/U 1 year. Written by tracy Carvalho, as dictated by Dr. Lesly Jovel DO.    ROS    Review of Systems negative except as noted above in HPI. ALLERGIES:    Allergies   Allergen Reactions    Latex Rash    Other Medication Hives and Rash     SUN    Pcn [Penicillins] Hives    Prednisone Hives    Sulfa (Sulfonamide Antibiotics) Hives    Garamycin [Gentamicin] Other (comments)     Itchy eyes due to sulfate    Metformin Diarrhea       CURRENT MEDICATIONS:    Outpatient Medications Marked as Taking for the 12/17/18 encounter (Office Visit) with Mell Heading, DO   Medication Sig Dispense Refill    fluticasone (FLONASE) 50 mcg/actuation nasal spray 2 Sprays by Both Nostrils route daily. 1 Bottle 0    albuterol (PROVENTIL HFA, VENTOLIN HFA, PROAIR HFA) 90 mcg/actuation inhaler Take 2 Puffs by inhalation every four (4) hours as needed for Wheezing. 1 Inhaler 5    doxycycline (VIBRAMYCIN) 50 mg capsule TAKE ONE CAPSULE BY MOUTH EVERY DAY ONCE A DAY  11    levothyroxine (SYNTHROID) 125 mcg tablet Take 1 Tab by mouth Daily (before breakfast). 90 Tab 3    omeprazole (PRILOSEC) 40 mg capsule Take 1 Cap by mouth daily. 30 Cap 2    albuterol-ipratropium (DUO-NEB) 2.5 mg-0.5 mg/3 ml nebu 3 mL by Nebulization route every six (6) hours as needed. 30 Nebule 0    butalbital-acetaminophen-caffeine (FIORICET, ESGIC) -40 mg per tablet TAKE 1 TAB BY MOUTH EVERY SIX (6) HOURS AS NEEDED FOR PAIN OR HEADACHE. 40 Tab 0    montelukast (SINGULAIR) 10 mg tablet Take 1 Tab by mouth daily.  30 Tab 11    azelastine (ASTELIN) 137 mcg (0.1 %) nasal spray 2 Sprays by Both Nostrils route two (2) times a day. Use in each nostril as directed  Indications: SEASONAL ALLERGIC RHINITIS 1 Bottle 5    hydrOXYzine HCl (ATARAX) 25 mg tablet TAKE 1 TABLET BY MOUTH EVERY 8 HOURS AS NEEDED FOR ITCHING INDICATIONS: PRURITUS OF SKIN 90 Tab 1    NAFTIN 2 % gel APPLY TO AFFECTED AREA TWO (2) TIMES A DAY. INDICATIONS: TINEA PEDIS 60 g 2    Nebulizer & Compressor machine 1 Each by Other route four (4) times daily as needed. 1 Each 0    albuterol (PROVENTIL VENTOLIN) 2.5 mg /3 mL (0.083 %) nebulizer solution 6 mL by Nebulization route every four (4) hours as needed for Wheezing or Shortness of Breath. Indications: Acute Asthma Attack 24 Each 1    budesonide (PULMICORT) 1 mg/2 mL nbsp 2 mL by Nebulization route two (2) times a day. Indications: MAINTENANCE THERAPY FOR ASTHMA 1 Each 1    cyclobenzaprine (FLEXERIL) 10 mg tablet TAKE 1 TABLET BY MOUTH 3 TIMES A DAY AS NEEDED FOR MUSCLE SPASM 90 Tab 0    ALPRAZolam (XANAX) 0.5 mg tablet Take 1 Tab by mouth two (2) times daily as needed for Anxiety or Sleep. Max Daily Amount: 1 mg. Indications: anxiety 60 Tab 2    terconazole (TERAZOL 7) 0.4 % vaginal cream   6    LOCOID 0.1 % lotn   1    valACYclovir (VALTREX) 500 mg tablet   1    terconazole (TERAZOL 3) 80 mg vaginal suppository   4    multivitamin (ONE A DAY) tablet Take 1 tablet by mouth daily.  PROPYLENE GLYCOL//PF (SYSTANE, PF, OP) Apply  to eye four (4) times daily.  magnesium oxide (MAG-OX) 400 mg tablet Take 400 mg by mouth daily.  Emollient Combination No.32 (EPICERAM) Emul by Apply Externally route. From Dr Argelia Stephen.  metroNIDAZOLE (METROGEL) 1 % topical gel Apply  to affected area daily. Use a thin layer to affected areas after washing from Dr Argelia Stephen   Indications: ACNE ROSACEA      diclofenac EC (VOLTAREN) 75 mg EC tablet Take 75 mg by mouth two (2) times a day.       latanoprost (XALATAN) 0.005 % ophthalmic solution Administer 1 Drop to both eyes nightly.  OTHER,NON-FORMULARY, Osteo Biflex       omega-3 fatty acids-vitamin e (FISH OIL) 1,000 mg Cap Take 1 Cap by mouth daily.  LACTOBACILLUS/FOS/PECTIN (PROBIOTIC COMPLEX PO) Take  by mouth daily.  cholecalciferol, vitamin d3, (VITAMIN D) 1,000 unit tablet Take 1,000 Units by mouth daily. PAST MEDICAL HISTORY:    Past Medical History:   Diagnosis Date    Acne rosacea     Dr. Jakob Desai.  Allergy, unspecified not elsewhere classified childhood     Txd with immunotherapy. Dr. Jake Holland Anemia NEC     borderline    Ankle sprain 2007    Right. Dr. Grey Pal    Ankle sprain 11/2012    Right. Dr. Arley Gradner.  Asthma childhood    Chickenpox childhood    Chronic low back pain with right-sided sciatica     and SI joint dysfunction. Dr. Deyanira Brooke.  Chronic otitis media     Dr. Jake Holland Dry eye syndrome 2013    Dr. West Gutierrez.  EBV infection 6/27/2011    Hearing loss     Mild high frequency sensorineuronal hearing loss. Dr. Janes Ferreira murmur     Hernia of abdominal wall 09/2003, 45/8314    umbilical.  Dr. John Bruno. Dr. Joyner Gentle    Hyperglycemia 2013    Hypothyroidism 06/2010    Dr. Fern Ceja pressure increase 12/2011    Dr. Earl Dunne. Dr. West Gutierrez. Dr. Rosina Mclaughlin.  Knee pain, left 04/2012    Left. Dr. Yaritza Riggs Measles childhood    Migraine     Mumps childhood    Plantar fasciitis, bilateral 2010    Dr. Mildred Antunez    Sciatica 2008    Right.  with OA. Dr. Jung Rodgers Tinnitus of right ear 2012    Dr. Dionicio Montes:    Past Surgical History:   Procedure Laterality Date   [place] Banuelos  2011    Dr. Roma Christianson.  HAND/FINGER SURGERY UNLISTED  09/2003    Right Index finger repair due to cut    HX HERNIA REPAIR  86/6583    Umbilical.  Dr. John Bruno.  HX HERNIA REPAIR  9/23/2011    recurrent umbilical.  Laparoscopy incisional.  Dr. Beny Berumen.  HX TONSILLECTOMY  childhood    LAP,CHOLECYSTECTOMY  07/2001       FAMILY HISTORY:    Family History   Problem Relation Age of Onset    Hypertension Mother     Cancer Mother         small cell lung with bone mets to spine/MELANOMA    Cancer Father         melanoma    Arthritis-osteo Father     Colon Polyps Father     Diabetes Maternal Grandmother     Stroke Maternal Grandmother     Seizures Maternal Grandmother     Colon Polyps Maternal Grandmother     Breast Cancer Maternal Grandmother     Heart Disease Paternal Grandmother         heart failure    Heart Attack Paternal Grandmother     Asthma Paternal Grandmother        SOCIAL HISTORY:    Social History     Socioeconomic History    Marital status: SINGLE     Spouse name: Not on file    Number of children: Not on file    Years of education: Not on file    Highest education level: Not on file   Tobacco Use    Smoking status: Passive Smoke Exposure - Never Smoker    Smokeless tobacco: Never Used    Tobacco comment: lived with smoker dad and mom then step mom x 20 yrs   Substance and Sexual Activity    Alcohol use:  Yes     Alcohol/week: 0.6 oz     Types: 1 Glasses of wine per week     Frequency: Monthly or less     Drinks per session: 1 or 2     Binge frequency: Never     Comment: RARE    Drug use: No    Sexual activity: Yes     Partners: Male     Birth control/protection: Abstinence, Pill       IMMUNIZATIONS:    Immunization History   Administered Date(s) Administered    Influenza Vaccine 10/15/2013, 11/07/2016, 11/03/2017    Influenza Vaccine (Quad) 11/03/2015    Influenza Vaccine (Quad) PF 11/20/2014, 11/07/2016, 11/12/2018    Influenza Vaccine Split 11/18/2011, 11/07/2012    Influenza Vaccine Whole 10/01/2010    TD Vaccine 09/01/2003    Tdap 11/03/2015         PHYSICAL EXAMINATION    Vital Signs    Visit Vitals  /74 (BP 1 Location: Left arm, BP Patient Position: Sitting)   Pulse 82   Temp 97.7 °F (36.5 °C) (Temporal)   Resp 18   Ht 5' 2\" (1.575 m)   Wt 323 lb 14.4 oz (146.9 kg)   SpO2 98%   BMI 59.24 kg/m²       Weight Metrics 12/17/2018 11/21/2018 11/12/2018 9/24/2018 4/16/2018 3/13/2018 2/21/2018   Weight 323 lb 14.4 oz 334 lb 326 lb 3.2 oz 327 lb 326 lb 8 oz 326 lb 3.2 oz 314 lb 14.4 oz   BMI 59.24 kg/m2 61.09 kg/m2 61.63 kg/m2 59.81 kg/m2 61.89 kg/m2 61.84 kg/m2 59.7 kg/m2       General appearance - Well nourished. Well appearing. Well developed. No acute distress. Obese. Head - Normocephalic. Atraumatic. Eyes - pupils equal and reactive. Extraocular eye movements intact. Sclera anicteric. Mildly injected sclera. Ears - Hearing is grossly normal bilaterally. Nose - normal and patent. No polyps noted. No erythema. No discharge. Mouth - mucous membranes with adequate moisture. Posterior pharynx normal with cobblestone appearance. No erythema, white exudate or obstruction. Neck - supple. Midline trachea. No carotid bruits noted bilaterally. No thyromegaly noted. Chest - clear to auscultation bilaterally anteriorly and posteriorly. No wheezes. No rales or rhonchi. Breath sounds are symmetrical bilaterally. Unlabored respirations. Heart - normal rate. Regular rhythm. Normal S1, S2. No murmur noted. No rubs, clicks or gallops noted. Abdomen - soft and distended. No masses or organomegaly. No rebound, rigidity or guarding. Bowel sounds normal x 4 quadrants. No tenderness noted. Neurological - awake, alert and oriented to person, place, and time and event. Cranial nerves II through XII intact. Clear speech. Muscle strength is +5/5 x 4 extremities. Sensation is intact to light touch bilaterally. Steady gait. Heme/Lymph - peripheral pulses normal x 4 extremities. No peripheral edema is noted. L leg slightly larger than R. Musculoskeletal - Intact x 4 extremities.   Full ROM x 4 extremities. No pain with movement. Back exam - normal range of motion. No pain on palpation of the spinous processes in the cervical, thoracic, lumbar, sacral regions. No CVA tenderness. Skin - no rashes, erythema, ecchymosis, lacerations, abrasions, suspicious moles noted  Psychological -   normal behavior, dress and thought processes. Good insight. Good eye contact. Normal affect. Appropriate mood. Normal speech. DATA REVIEWED    Lab Results   Component Value Date/Time    WBC 11.3 (H) 12/04/2018 08:13 AM    HGB 12.2 12/04/2018 08:13 AM    HCT 37.7 12/04/2018 08:13 AM    PLATELET 534 15/67/2720 08:13 AM    MCV 89 12/04/2018 08:13 AM     Lab Results   Component Value Date/Time    Sodium 143 12/04/2018 08:13 AM    Potassium 4.5 12/04/2018 08:13 AM    Chloride 105 12/04/2018 08:13 AM    CO2 21 12/04/2018 08:13 AM    Anion gap 10 09/24/2011 02:30 AM    Glucose 124 (H) 12/04/2018 08:13 AM    BUN 13 12/04/2018 08:13 AM    Creatinine 0.81 12/04/2018 08:13 AM    BUN/Creatinine ratio 16 12/04/2018 08:13 AM    GFR est AA 98 12/04/2018 08:13 AM    GFR est non-AA 85 12/04/2018 08:13 AM    Calcium 9.0 12/04/2018 08:13 AM    Bilirubin, total <0.2 12/04/2018 08:13 AM    AST (SGOT) 13 12/04/2018 08:13 AM    Alk.  phosphatase 77 12/04/2018 08:13 AM    Protein, total 6.4 12/04/2018 08:13 AM    Albumin 4.1 12/04/2018 08:13 AM    Globulin 3.4 05/19/2010 10:46 AM    A-G Ratio 1.8 12/04/2018 08:13 AM    ALT (SGPT) 14 12/04/2018 08:13 AM     Lab Results   Component Value Date/Time    Cholesterol, total 182 12/04/2018 08:13 AM    HDL Cholesterol 60 12/04/2018 08:13 AM    LDL, calculated 100 (H) 12/04/2018 08:13 AM    VLDL, calculated 22 12/04/2018 08:13 AM    Triglyceride 111 12/04/2018 08:13 AM    CHOL/HDL Ratio 2.5 05/19/2010 10:46 AM     Lab Results   Component Value Date/Time    Vitamin D 25-Hydroxy 32.2 08/11/2011 08:15 AM    VITAMIN D, 25-HYDROXY 33.2 12/04/2018 08:13 AM       Lab Results   Component Value Date/Time    Hemoglobin A1c 6.2 (H) 12/04/2018 08:13 AM     Lab Results   Component Value Date/Time    TSH 2.900 12/04/2018 08:13 AM             ASSESSMENT and PLAN      ICD-10-CM ICD-9-CM    1. Mild intermittent asthma without complication R58.44 090.61     stable   2. Deep venous thrombosis of left profunda femoris vein (HCC) I82.412 453.41     Acute partially occulsive DVT of the L Common Fem vein and L Prox Fem Vein and Acute occlusive DVT of the L deep femoral vein, due to BCP use vs other   3. Left hip pain M25.552 719.45     due to L DVT, resolved   4. Acute thigh pain, left M79.652 729.5     due to DVT vs other   5. Swelling of left foot M79.89 729.81     due to L DVT vs other, improving   6. Acute nasopharyngitis J00 460     improving       Discussed the patient's BMI with her. The BMI follow up plan is as follows: I have counseled this patient on diet and exercise regimens. Decrease carbohydrates (white foods, sweet foods, sweet drinks and alcohol), increase green leafy vegetables and protein (lean meats and beans) with each meal.  Avoid fried foods. Eat 3-5 small meals daily. Do not skip meals. Increase water intake. Increase physical activity to 30 minutes daily for health benefit or 60 minutes daily to prevent weight regain, as tolerated. Get 7-8 hours uninterrupted sleep nightly. Chart reviewed and updated. Continue current medications and care. Prescriptions written and sent to pharmacy; medication side effects discussed. Xarelto 20 mg. Advised pt to discuss all medication interactions with pharmacist.   Use Duo-Neb tx for dry cough BID prn. Notify me if not helping. Recheck pertinent labs today. [facility] discharge papers, mammogram reviewed. Get recent office visit notes from [facility] EM[facility]. Advised pt to sign release. Counseled patient on health concerns:  Hip and thigh pain, DVT, asthma plan, cough, swelling. Relevant handouts given and discussed with patient.   Immunizations noted.  Offered empathy, support, legitimation, prayers, partnership to patient. Praised patient for progress. Follow-up Disposition:  Return in about 3 months (around 3/17/2019) for Referral F/U, dvt. Patient was offered a choice/choices in the treatment plan today. Patient expresses understanding of the plan and agrees with recommendations. More than 30 spent face to face with patient and more than 50% of this time spent in counseling and coordinating care. Written by tracy Macias, as dictated by Dr. Alyssa Drew DO. Documentation True and Accepted by Desiree Cohen. Eric Kamara. Patient Instructions       Deep Vein Thrombosis: After Your Visit to the Emergency Room  Your Care Instructions  A deep vein thrombosis (DVT) is a blood clot in certain veins of the legs, pelvis, or arms. The main goal of treatment is to prevent the blood clot from growing or moving to the lungs. A blood clot in a lung can be life-threatening. The doctor may have given you a blood thinner (anticoagulant). A blood thinner can stop the blood clot from growing larger and prevent new clots from forming. You will need to take a blood thinner for 3 to 6 months or longer. Even though you have been released from the emergency room, you still need to watch for any problems. The doctor carefully checked you. But sometimes problems can develop later. If you have new symptoms, or if your symptoms do not get better, return to the emergency room or call your doctor right away. If you have sudden chest pain and shortness of breath, or you cough up blood, call 911 or seek other emergency help right away. A visit to the emergency room is only one step in your treatment. Even if you feel better, you still need to do what your doctor recommends, such as going to all suggested follow-up appointments and taking medicines exactly as directed. This will help you recover and help prevent future problems.   How can you care for yourself at home? · Take your medicines exactly as prescribed. Call your doctor if you think you are having a problem with your medicine. · Follow your doctor's instructions about having blood tests. You may need blood tests--maybe every day at first--to see how well the blood thinner is working. · Wear compression stockings if your doctor prescribes them. These stockings are tighter at the feet than on the legs. They can reduce pain and swelling and may keep blood from pooling in your legs. You can get them with a prescription at a medical supply store or at some drugstores. · When you sit, use a pillow to raise the arm or leg that has the blood clot. Try to keep it above the level of your heart. · Use a heating pad over the area for 20 to 30 minutes, 3 or 4 times a day, to reduce pain and swelling. · Wear medical alert jewelry that shows that you are on a blood-thinning medicine. You can buy this at most drugstores. When should you call for help? Call 911 if:  · You passed out (lost consciousness). · You have sudden chest pain and shortness of breath, or you cough up blood. · You vomit blood or what looks like coffee grounds. · You pass maroon or very bloody stools. Return to the emergency room now if:  · Your leg or thigh pain is getting worse. · You have signs of another blood clot, such as:  ¨ New pain in your calf, back of the knee, thigh, or groin. ¨ New redness and swelling in your leg or groin. Call your doctor today if:  · You have new bruises or blood spots under your skin. · You have a nosebleed. · Your stools are black and tarlike or have streaks of blood. · You have blood in your urine. · Your gums bleed when you brush your teeth. Where can you learn more? Go to Diwanee.be  Enter B650 in the search box to learn more about \"Deep Vein Thrombosis: After Your Visit to the Emergency Room. \"   © 0009-0163 [employer].  Care instructions adapted under license by Laura Gillette (which disclaims liability or warranty for this information). This care instruction is for use with your licensed healthcare professional. If you have questions about a medical condition or this instruction, always ask your healthcare professional. [address] any warranty or liability for your use of this information. Content Version: 9.4.98342; Last Revised: September 30, 2010               Deep Vein Thrombosis: Care Instructions  Your Care Instructions    A deep vein thrombosis (DVT) is a blood clot in certain veins of the legs, pelvis, or arms. Blood clots in these veins need to be treated because they can get bigger, break loose, and travel through the bloodstream to the lungs. A blood clot in a lung can be life-threatening. The doctor may have given you a blood thinner (anticoagulant). A blood thinner can stop the blood clot from growing larger and prevent new clots from forming. You will need to take a blood thinner for 3 to 6 months or longer. The doctor has checked you carefully, but problems can develop later. If you notice any problems or new symptoms, get medical treatment right away. Follow-up care is a key part of your treatment and safety. Be sure to make and go to all appointments, and call your doctor if you are having problems. It's also a good idea to know your test results and keep a list of the medicines you take. How can you care for yourself at home? · Take your medicines exactly as prescribed. Call your doctor if you think you are having a problem with your medicine. · If you are taking a blood thinner, be sure you get instructions about how to take your medicine safely. Blood thinners can cause serious bleeding problems. · Wear compression stockings if your doctor recommends them. These stockings are tighter at the feet than on the legs. They may reduce pain and swelling in your legs.  But there are different types of stockings, and they need to fit right. So your doctor will recommend what you need. · When you sit, use a pillow to raise the arm or leg that has the blood clot. Try to keep it above the level of your heart. When should you call for help? Call 911 anytime you think you may need emergency care. For example, call if:    · You passed out (lost consciousness).     · You have symptoms of a blood clot in your lung (called a pulmonary embolism). These include:  ? Sudden chest pain. ? Trouble breathing. ? Coughing up blood.    Call your doctor now or seek immediate medical care if:    · You have new or worse trouble breathing.     · You are dizzy or lightheaded, or you feel like you may faint.     · You have symptoms of a blood clot in your arm or leg. These may include:  ? Pain in the arm, calf, back of the knee, thigh, or groin. ? Redness and swelling in the arm, leg, or groin.    Watch closely for changes in your health, and be sure to contact your doctor if:    · You do not get better as expected. Where can you learn more? Go to http://alejandro-dedie.info/. Enter B562 in the search box to learn more about \"Deep Vein Thrombosis: Care Instructions. \"  Current as of: November 21, 2017  Content Version: 11.8  © 9697-4292 [employer]. Care instructions adapted under license by [employer] (which disclaims liability or warranty for this information). If you have questions about a medical condition or this instruction, always ask your healthcare professional. [address] any warranty or liability for your use of this information. Asthma: Your Action Plan  Sample Action Plan    Controller medicine action plan  Fill in the blank spaces and boxes that apply for all sections. · Name of your controller medicine:  ? ____________________________________________  · How much of this medicine do you take?   ? ____________________________________________  · How often do you take this medicine? ? ____________________________________________  · Other instructions? ? ____________________________________________  Quick-relief medicine action plan  · Name of your quick-relief medicine:  ? ____________________________________________  · How much of this medicine do you take? ? ____________________________________________  · How often do you take this medicine? ? ____________________________________________  Asthma Zones  GREEN ZONE: This is where you want to be! Green zone symptoms  · You have no shortness of breath or chest tightness. You are not coughing or wheezing. · You can do all of your usual activities. · You sleep well at night. Green zone peak flow (if you use a peak flow meter)  · ______ or more (80% or more of your personal best)  Green zone actions (Check the boxes and fill in the blank spaces that apply.)  [ ] You take your controller medicine(s) every day. [ ] Alissa Swan Lake are staying away from your asthma triggers. [ ] You take quick-relief medicine (called _____________________) ______ minutes before exercise. YELLOW ZONE: Your asthma is getting worse. Yellow zone symptoms  · You are short of breath or have chest tightness. You are coughing or wheezing. · You have symptoms that keep you up at night. · You can do some, but not all, of your usual activities. Yellow zone peak flow (if you use a peak flow meter)  · ______ to ______ (50% to 79% of your personal best)  Yellow zone actions (Check the boxes and fill in the blank spaces that apply.)  [ ] Take _____ puff(s) of quick-relief medicine called ______________________. Repeat _____ times. [ ] If your symptoms don't get better or your peak flow has not returned to the green zone in 1 hour, then:  · [ ] Take _____ puff(s) of medicine called ______________________. Take it ____ times a day. · [ ] Begin or increase treatment with corticosteroid pills.  Take ______ mg of medicine called ____________________________ every __________. · [ ] Call your doctor at this number: ____________________. RED ZONE: Danger! Red zone symptoms  · You are very short of breath. · You can't do your usual activities. · Quick-relief medicine doesn't help. Or your symptoms don't get better after 24 hours in the yellow zone. Red zone peak flow (if you use a peak flow meter)  · Less than _______ (less than 50% of your personal best)  Red zone actions (Check the boxes and fill in the blank spaces that apply.)  [ ] Take _____ puff(s) of quick-relief medicine called ____________________________. Repeat ______ times. [ ] Begin or increase treatment with corticosteroid pills. Take ________ mg now. [ ] Call your doctor at this number: _________________. If you can't contact your doctor, go to the emergency department. Call 911 or ___________________. [ ] Other numbers you might call are: ___________________________________. When should you call for help? Call 911 anytime you think you may need emergency care. For example, call if:  · You have severe trouble breathing. Call your doctor now or seek immediate medical care if:  · You are in the red zone of your asthma action plan. · You've used your quick-relief medicine but are still having trouble breathing. · You cough up blood. · You have new or worse trouble breathing. · You cough up dark brown or bloody mucus (sputum). Watch closely for changes in your health, and be sure to contact your doctor if:  · You need to use quick-relief medicine more than 2 days each week (unless it's just for exercise). · Your coughing and wheezing get worse. Follow-up care is a key part of your treatment and safety. Be sure to make and go to all appointments, and call your doctor if you are having problems. It's also a good idea to know your test results and keep a list of the medicines you take. Where can you learn more? Go to http://pretty.info/.   Enter [phone] in the search box to learn more about \"Asthma: Your Action Plan. \"  Current as of: December 6, 2017  Content Version: 11.8  © 4598-0283 [employer]. Care instructions adapted under license by [employer] (which disclaims liability or warranty for this information). If you have questions about a medical condition or this instruction, always ask your healthcare professional. [address] any warranty or liability for your use of this information.

## 2018-12-17 NOTE — PROGRESS NOTES
Amarilis Leija  Identified pt with two pt identifiers(name and ). Chief Complaint   Patient presents with   [facility] Follow Up          1. Have you been to the ER, urgent care clinic since your last visit? Yes  Hospitalized since your last visit? Yes 2018    2. Have you seen or consulted any other health care providers outside of the [address] [place] [address] since your last visit? Include any pap smears or colon screening. Yes 2018 mammogram      Advance Care Planning    In the event something were to happen to you and you were unable to speak on your behalf, do you have an Advance Directive/ Living Will in place stating your wishes? NO    If yes, do we have a copy on file NO    If no, would you like information NO    My Chart     My chart gives you direct online access to portions of the electronic medical record (EMR) where your doctor stores your health information (ie, lab results, appointment information, medications, immunizations, and more. It is free. Would you like to set up your my chart? YES    [unfilled]    Weight Metrics 2018 2018 2018 2018 2018 3/13/2018 2018   Weight 323 lb 14.4 oz 334 lb 326 lb 3.2 oz 327 lb 326 lb 8 oz 326 lb 3.2 oz 314 lb 14.4 oz   BMI 59.24 kg/m2 61.09 kg/m2 61.63 kg/m2 59.81 kg/m2 61.89 kg/m2 61.84 kg/m2 59.7 kg/m2           Medication reconciliation up to date and corrected with patient at this time. Today's provider has been notified of reason for visit, vitals and flowsheets obtained on patients. Reviewed record in preparation for visit, huddled with provider and have obtained necessary documentation. There are no preventive care reminders to display for this patient.     Wt Readings from Last 3 Encounters:   18 323 lb 14.4 oz (146.9 kg)   18 334 lb (151.5 kg)   18 326 lb 3.2 oz (148 kg)     Temp Readings from Last 3 Encounters:   18 97.7 °F (36.5 °C) (Temporal)   18 98.8 °F (37.1 °C)   11/12/18 97.4 °F (36.3 °C) (Temporal)     BP Readings from Last 3 Encounters:   12/17/18 118/74   11/21/18 141/75   11/12/18 110/78     Pulse Readings from Last 3 Encounters:   12/17/18 82   11/21/18 69   11/12/18 73     Vitals:    12/17/18 1108   BP: 118/74   Pulse: 82   Resp: 18   Temp: 97.7 °F (36.5 °C)   TempSrc: Temporal   SpO2: 98%   Weight: 323 lb 14.4 oz (146.9 kg)   Height: 5' 2\" (1.575 m)   PainSc:   6   PainLoc: Leg         Learning Assessment:  :     Learning Assessment 4/18/2017 11/20/2014   PRIMARY LEARNER Patient Patient   HIGHEST LEVEL OF EDUCATION - PRIMARY LEARNER  - TR[employer]   BARRIERS PRIMARY LEARNER - NONE     - NONE   PRIMARY LANGUAGE ENGLISH ENGLISH   LEARNER PREFERENCE PRIMARY PICTURES PICTURES     READING READING     VIDEOS VIDEOS   ANSWERED BY patient patient   RELATIONSHIP SELF SELF       Depression Screening:  :     PHQ over the last two weeks 2/8/2018   Little interest or pleasure in doing things Not at all   Feeling down, depressed, irritable, or hopeless Not at all   Total Score PHQ 2 0       Fall Risk Assessment:  :     Fall Risk Assessment, last 12 mths 2/8/2018   Able to walk? Yes   Fall in past 12 months? No       Abuse Screening:  :     Abuse Screening Questionnaire 2/8/2018 6/23/2015   Do you ever feel afraid of your partner? N N   Are you in a relationship with someone who physically or mentally threatens you? N N   Is it safe for you to go home? Y Y       ADL Screening:  :     No flowsheet data found.

## 2019-01-21 DIAGNOSIS — L29.9 PRURITIC DERMATITIS: ICD-10-CM

## 2019-01-21 NOTE — TELEPHONE ENCOUNTER
Requested Prescriptions     Pending Prescriptions Disp Refills    hydrOXYzine HCl (ATARAX) 25 mg tablet 90 Tab 1      Request for 90 days supply.

## 2019-01-22 NOTE — TELEPHONE ENCOUNTER
PCP: Edenilson Quiroz DO    Last appt: 12/17/2018  Future Appointments   Date Time Provider Cindy Brown   3/18/2019  1:30 PM Edenilson Quiroz DO [facility]       Requested Prescriptions     Pending Prescriptions Disp Refills    hydrOXYzine HCl (ATARAX) 25 mg tablet 90 Tab 1       Prior labs and Blood pressures:  BP Readings from Last 3 Encounters:   12/17/18 118/74   11/21/18 141/75   11/12/18 110/78     Lab Results   Component Value Date/Time    Sodium 143 12/04/2018 08:13 AM    Potassium 4.5 12/04/2018 08:13 AM    Chloride 105 12/04/2018 08:13 AM    CO2 21 12/04/2018 08:13 AM    Anion gap 10 09/24/2011 02:30 AM    Glucose 124 (H) 12/04/2018 08:13 AM    BUN 13 12/04/2018 08:13 AM    Creatinine 0.81 12/04/2018 08:13 AM    BUN/Creatinine ratio 16 12/04/2018 08:13 AM    GFR est AA 98 12/04/2018 08:13 AM    GFR est non-AA 85 12/04/2018 08:13 AM    Calcium 9.0 12/04/2018 08:13 AM     Lab Results   Component Value Date/Time    Hemoglobin A1c 6.2 (H) 12/04/2018 08:13 AM     Lab Results   Component Value Date/Time    Cholesterol, total 182 12/04/2018 08:13 AM    HDL Cholesterol 60 12/04/2018 08:13 AM    LDL, calculated 100 (H) 12/04/2018 08:13 AM    VLDL, calculated 22 12/04/2018 08:13 AM    Triglyceride 111 12/04/2018 08:13 AM    CHOL/HDL Ratio 2.5 05/19/2010 10:46 AM     Lab Results   Component Value Date/Time    Vitamin D 25-Hydroxy 32.2 08/11/2011 08:15 AM    VITAMIN D, 25-HYDROXY 33.2 12/04/2018 08:13 AM       Lab Results   Component Value Date/Time    TSH 2.900 12/04/2018 08:13 AM

## 2019-01-23 ENCOUNTER — TELEPHONE (OUTPATIENT)
Dept: FAMILY MEDICINE CLINIC | Age: 49
End: 2019-01-23

## 2019-01-23 NOTE — TELEPHONE ENCOUNTER
----- Message from Buddy Mcelroy sent at 1/22/2019  5:24 PM EST -----  Regarding: Dr. Katelyn Wetzel   Pt is requesting to speak with Yina Dunham at the practice. Best contact number is [phone].

## 2019-01-24 ENCOUNTER — OFFICE VISIT (OUTPATIENT)
Dept: FAMILY MEDICINE CLINIC | Age: 49
End: 2019-01-24

## 2019-01-24 VITALS
RESPIRATION RATE: 18 BRPM | WEIGHT: 293 LBS | TEMPERATURE: 98.2 F | HEART RATE: 80 BPM | SYSTOLIC BLOOD PRESSURE: 137 MMHG | BODY MASS INDEX: 53.92 KG/M2 | DIASTOLIC BLOOD PRESSURE: 80 MMHG | HEIGHT: 62 IN | OXYGEN SATURATION: 98 %

## 2019-01-24 DIAGNOSIS — I82.412 DEEP VENOUS THROMBOSIS OF LEFT PROFUNDA FEMORIS VEIN (HCC): ICD-10-CM

## 2019-01-24 DIAGNOSIS — E66.01 MORBID OBESITY WITH BMI OF 60.0-69.9, ADULT (HCC): ICD-10-CM

## 2019-01-24 NOTE — PROGRESS NOTES
Left leg feel different. Having spasms back of leg. Notes with prolonged sitting. Pain medial knee. Patient denies chest pain, dyspnea, unexpected weight change, unexpected pain, mood or memory changes. Visit Vitals  /80 (BP 1 Location: Right arm, BP Patient Position: Sitting)   Pulse 80   Temp 98.2 °F (36.8 °C) (Oral)   Resp 18   Ht 5' 1.81\" (1.57 m)   Wt 328 lb 14.4 oz (149.2 kg)   LMP 12/10/2018 (Exact Date)   SpO2 98%   BMI 60.53 kg/m²     Patient alert and cooperative. Left leg - no overlying skin changes, no specific palpation tenderness along the medial aspect of the knee. Assessment:  1. DVT multiple sites left leg. Plan:  1. Repeat doppler. 2. Continue Xarelto current dose. 3. Follow otherwise here prn.

## 2019-01-24 NOTE — PROGRESS NOTES
Kylah Rich  Identified pt with two pt identifiers(name and ). Chief Complaint   Patient presents with    Blood Clot     left leg; f/u    Leg Pain     left    Spasms     left leg       1. Have you been to the ER, urgent care clinic since your last visit? Hospitalized since your last visit? No    2. Have you seen or consulted any other health care providers outside of the [address] [place] [address] since your last visit? Include any pap smears or colon screening. No    In the event something were to happen to you and you were unable to speak on your behalf, do you have an Advance Directive/ Living Will in place stating your wishes? NO    If yes, do we have a copy on file NO    If no, would you like information:            Would you like to sign up for MyChart today, if you have not already done so? Patient has a mychart  If not, would you like information on MyChart, and how to sign up at a later time? No      Medication reconciliation up to date and corrected with patient at this time. Today's provider has been notified of reason for visit, vitals and flowsheets obtained on patients. Reviewed record in preparation for visit, huddled with provider and have obtained necessary documentation. There are no preventive care reminders to display for this patient.     Wt Readings from Last 3 Encounters:   19 328 lb 14.4 oz (149.2 kg)   18 323 lb 14.4 oz (146.9 kg)   18 334 lb (151.5 kg)     Temp Readings from Last 3 Encounters:   19 98.2 °F (36.8 °C) (Oral)   18 97.7 °F (36.5 °C) (Temporal)   18 98.8 °F (37.1 °C)     BP Readings from Last 3 Encounters:   19 137/80   18 118/74   18 141/75     Pulse Readings from Last 3 Encounters:   19 80   18 82   18 69     Vitals:    19 1616   BP: 137/80   Pulse: 80   Resp: 18   Temp: 98.2 °F (36.8 °C)   TempSrc: Oral   SpO2: 98%   Weight: 328 lb 14.4 oz (149.2 kg)   Height: 5' 1.81\" (1.57 m)   PainSc:   6   PainLoc: Leg   LMP: 12/10/2018         Learning Assessment:  :     Learning Assessment 4/18/2017 11/20/2014   PRIMARY LEARNER Patient Patient   HIGHEST LEVEL OF EDUCATION - PRIMARY LEARNER  - TR[employer]CHOOL   BARRIERS PRIMARY LEARNER - NONE     - NONE   PRIMARY LANGUAGE ENGLISH ENGLISH   LEARNER PREFERENCE PRIMARY PICTURES PICTURES     READING READING     VIDEOS VIDEOS   ANSWERED BY patient patient   RELATIONSHIP SELF SELF       Depression Screening:  :     PHQ over the last two weeks 1/24/2019   Little interest or pleasure in doing things Not at all   Feeling down, depressed, irritable, or hopeless Not at all   Total Score PHQ 2 0       Fall Risk Assessment:  :     Fall Risk Assessment, last 12 mths 1/24/2019   Able to walk? Yes   Fall in past 12 months? No       Abuse Screening:  :     Abuse Screening Questionnaire 1/24/2019 2/8/2018 6/23/2015   Do you ever feel afraid of your partner? N N N   Are you in a relationship with someone who physically or mentally threatens you? N N N   Is it safe for you to go home?  Y Y Y       ADL Screening:  :     ADL Assessment 1/24/2019   Feeding yourself No Help Needed   Getting from bed to chair No Help Needed   Getting dressed No Help Needed   Bathing or showering No Help Needed   Walk across the room (includes cane/walker) No Help Needed   Using the telphone No Help Needed   Taking your medications No Help Needed   Preparing meals No Help Needed   Managing money (expenses/bills) No Help Needed   Moderately strenuous housework (laundry) No Help Needed   Shopping for personal items (toiletries/medicines) No Help Needed   Shopping for groceries No Help Needed   Driving No Help Needed   Climbing a flight of stairs No Help Needed   Getting to places beyond walking distances No Help Needed

## 2019-01-25 ENCOUNTER — HOSPITAL ENCOUNTER (OUTPATIENT)
Dept: ULTRASOUND IMAGING | Age: 49
Discharge: HOME | End: 2019-01-25
Payer: COMMERCIAL

## 2019-01-25 DIAGNOSIS — I82.412 DEEP VENOUS THROMBOSIS OF LEFT PROFUNDA FEMORIS VEIN (HCC): ICD-10-CM

## 2019-01-25 DIAGNOSIS — E66.01 MORBID OBESITY WITH BMI OF 60.0-69.9, ADULT (HCC): ICD-10-CM

## 2019-01-25 PROCEDURE — 93970 EXTREMITY STUDY: CPT

## 2019-01-30 RX ORDER — HYDROXYZINE 25 MG/1
TABLET, FILM COATED ORAL
Qty: 90 TAB | Refills: 1 | Status: SHIPPED | OUTPATIENT
Start: 2019-01-30 | End: 2019-04-01 | Stop reason: SDUPTHER

## 2019-02-04 DIAGNOSIS — R12 HEARTBURN: ICD-10-CM

## 2019-02-04 NOTE — TELEPHONE ENCOUNTER
Requested Prescriptions     Pending Prescriptions Disp Refills    omeprazole (PRILOSEC) 40 mg capsule 30 Cap 2     Sig: Take 1 Cap by mouth daily.      Qty: 80    [facility]

## 2019-02-05 NOTE — TELEPHONE ENCOUNTER
PCP: Nash Martins DO    Last appt: 1/24/2019  Future Appointments   Date Time Provider Cindy Brown   3/18/2019  1:30 PM Nash Martins DO [facility]       Requested Prescriptions     Pending Prescriptions Disp Refills    omeprazole (PRILOSEC) 40 mg capsule 30 Cap 2     Sig: Take 1 Cap by mouth daily.        Prior labs and Blood pressures:  BP Readings from Last 3 Encounters:   01/24/19 137/80   12/17/18 118/74   11/21/18 141/75     Lab Results   Component Value Date/Time    Sodium 143 12/04/2018 08:13 AM    Potassium 4.5 12/04/2018 08:13 AM    Chloride 105 12/04/2018 08:13 AM    CO2 21 12/04/2018 08:13 AM    Anion gap 10 09/24/2011 02:30 AM    Glucose 124 (H) 12/04/2018 08:13 AM    BUN 13 12/04/2018 08:13 AM    Creatinine 0.81 12/04/2018 08:13 AM    BUN/Creatinine ratio 16 12/04/2018 08:13 AM    GFR est AA 98 12/04/2018 08:13 AM    GFR est non-AA 85 12/04/2018 08:13 AM    Calcium 9.0 12/04/2018 08:13 AM     Lab Results   Component Value Date/Time    Hemoglobin A1c 6.2 (H) 12/04/2018 08:13 AM     Lab Results   Component Value Date/Time    Cholesterol, total 182 12/04/2018 08:13 AM    HDL Cholesterol 60 12/04/2018 08:13 AM    LDL, calculated 100 (H) 12/04/2018 08:13 AM    VLDL, calculated 22 12/04/2018 08:13 AM    Triglyceride 111 12/04/2018 08:13 AM    CHOL/HDL Ratio 2.5 05/19/2010 10:46 AM     Lab Results   Component Value Date/Time    Vitamin D 25-Hydroxy 32.2 08/11/2011 08:15 AM    VITAMIN D, 25-HYDROXY 33.2 12/04/2018 08:13 AM       Lab Results   Component Value Date/Time    TSH 2.900 12/04/2018 08:13 AM

## 2019-02-11 RX ORDER — OMEPRAZOLE 40 MG/1
40 CAPSULE, DELAYED RELEASE ORAL DAILY
Qty: 30 CAP | Refills: 2 | Status: SHIPPED | OUTPATIENT
Start: 2019-02-11 | End: 2019-08-02 | Stop reason: SDUPTHER

## 2019-02-14 NOTE — TELEPHONE ENCOUNTER
Requested Prescriptions     Pending Prescriptions Disp Refills    rivaroxaban (XARELTO) 20 mg tab tablet 30 Tab 1     Sig: Take 1 Tab by mouth daily.      Qty: [address]

## 2019-02-18 NOTE — TELEPHONE ENCOUNTER
PCP: Edenilson Quiroz DO    Last appt: 1/24/2019  Future Appointments   Date Time Provider Cindy Stephanie   3/18/2019  1:30 PM Edenilson Quiroz DO [facility]       Requested Prescriptions     Pending Prescriptions Disp Refills    rivaroxaban (XARELTO) 20 mg tab tablet 30 Tab 1     Sig: Take 1 Tab by mouth daily.        Prior labs and Blood pressures:  BP Readings from Last 3 Encounters:   01/24/19 137/80   12/17/18 118/74   11/21/18 141/75     Lab Results   Component Value Date/Time    Sodium 143 12/04/2018 08:13 AM    Potassium 4.5 12/04/2018 08:13 AM    Chloride 105 12/04/2018 08:13 AM    CO2 21 12/04/2018 08:13 AM    Anion gap 10 09/24/2011 02:30 AM    Glucose 124 (H) 12/04/2018 08:13 AM    BUN 13 12/04/2018 08:13 AM    Creatinine 0.81 12/04/2018 08:13 AM    BUN/Creatinine ratio 16 12/04/2018 08:13 AM    GFR est AA 98 12/04/2018 08:13 AM    GFR est non-AA 85 12/04/2018 08:13 AM    Calcium 9.0 12/04/2018 08:13 AM     Lab Results   Component Value Date/Time    Hemoglobin A1c 6.2 (H) 12/04/2018 08:13 AM     Lab Results   Component Value Date/Time    Cholesterol, total 182 12/04/2018 08:13 AM    HDL Cholesterol 60 12/04/2018 08:13 AM    LDL, calculated 100 (H) 12/04/2018 08:13 AM    VLDL, calculated 22 12/04/2018 08:13 AM    Triglyceride 111 12/04/2018 08:13 AM    CHOL/HDL Ratio 2.5 05/19/2010 10:46 AM     Lab Results   Component Value Date/Time    Vitamin D 25-Hydroxy 32.2 08/11/2011 08:15 AM    VITAMIN D, 25-HYDROXY 33.2 12/04/2018 08:13 AM       Lab Results   Component Value Date/Time    TSH 2.900 12/04/2018 08:13 AM

## 2019-02-27 NOTE — TELEPHONE ENCOUNTER
Duration for 1st episode provoked with transient risk factor (her this patient OCP that is now discontinued) is 3 months.  6 months can be considered in the presence of a continued in patient at high risk recurrence. (Routing comment)     Ok to refill for now until pt has a follow up appt for this. Will have psrs assist with scheduling it.

## 2019-03-02 DIAGNOSIS — B35.3 TINEA PEDIS OF BOTH FEET: ICD-10-CM

## 2019-03-08 RX ORDER — NAFTIFINE HYDROCHLORIDE 2 G/100G
GEL TOPICAL
Qty: 60 G | Refills: 2 | Status: SHIPPED | OUTPATIENT
Start: 2019-03-08 | End: 2019-09-09 | Stop reason: SDUPTHER

## 2019-03-18 ENCOUNTER — OFFICE VISIT (OUTPATIENT)
Dept: FAMILY MEDICINE CLINIC | Age: 49
End: 2019-03-18

## 2019-03-18 VITALS
HEIGHT: 61 IN | TEMPERATURE: 98.2 F | DIASTOLIC BLOOD PRESSURE: 81 MMHG | HEART RATE: 79 BPM | WEIGHT: 293 LBS | BODY MASS INDEX: 55.32 KG/M2 | SYSTOLIC BLOOD PRESSURE: 127 MMHG | OXYGEN SATURATION: 97 % | RESPIRATION RATE: 17 BRPM

## 2019-03-18 DIAGNOSIS — E03.4 HYPOTHYROIDISM DUE TO ACQUIRED ATROPHY OF THYROID: ICD-10-CM

## 2019-03-18 DIAGNOSIS — E78.5 DYSLIPIDEMIA: ICD-10-CM

## 2019-03-18 DIAGNOSIS — R79.89 ELEVATED D-DIMER: ICD-10-CM

## 2019-03-18 DIAGNOSIS — D50.8 IRON DEFICIENCY ANEMIA SECONDARY TO INADEQUATE DIETARY IRON INTAKE: ICD-10-CM

## 2019-03-18 DIAGNOSIS — D72.828 OTHER ELEVATED WHITE BLOOD CELL (WBC) COUNT: ICD-10-CM

## 2019-03-18 DIAGNOSIS — E55.9 VITAMIN D DEFICIENCY: ICD-10-CM

## 2019-03-18 DIAGNOSIS — H66.004 RECURRENT ACUTE SUPPURATIVE OTITIS MEDIA OF RIGHT EAR WITHOUT SPONTANEOUS RUPTURE OF TYMPANIC MEMBRANE: ICD-10-CM

## 2019-03-18 DIAGNOSIS — Z82.3 FAMILY HISTORY OF STROKE: ICD-10-CM

## 2019-03-18 DIAGNOSIS — Z82.49 FAMILY HISTORY OF HEART ATTACK: ICD-10-CM

## 2019-03-18 DIAGNOSIS — I82.412 DEEP VENOUS THROMBOSIS OF LEFT PROFUNDA FEMORIS VEIN (HCC): Primary | ICD-10-CM

## 2019-03-18 DIAGNOSIS — R73.9 HYPERGLYCEMIA: ICD-10-CM

## 2019-03-18 DIAGNOSIS — R63.4 WEIGHT LOSS: ICD-10-CM

## 2019-03-18 RX ORDER — DOXYCYCLINE 100 MG/1
100 TABLET ORAL 2 TIMES DAILY
Qty: 14 TAB | Refills: 0 | Status: SHIPPED | OUTPATIENT
Start: 2019-03-18 | End: 2019-03-25

## 2019-03-18 NOTE — PROGRESS NOTES
HISTORY OF PRESENT ILLNESS  Aren Dave is a 46 y.o. female presents with Results (Rm 14 would like ears to checked for possible ear infection); Blood Clot; Ear Pain; and Nasal Congestion    Agree with nurse note. To recall, pt had DVT on 12/7/2018 and was admitted to [address] [place] [address]. She had been on oral contraceptives for 32 years. Doppler on 12/7/2018 at [address] [place] [address] showed acute occlusive DVT involving L deep femoral vein. Acute, partially occlusive DVT involving common femoral vein and L proximal femoral vein. Labs on 12/7/2018 showed D dimer 6,057. WBC 8.56, Hgb 8. 56. She used Xarelto 15 mg BID x21 days (started on 12/8/2018) and has been using Xarelto 20 mg daily since. Recommended continuing at 20 mg dosage for 3-6 months. Her leg is  to touch and is weak. She makes sure to pump her legs while sitting at work and gets up to walk around. She also has a stand up desk and she has been using it more. Denies SOB, chest tightness, or other associated sxs. She is agreeable with a referral to a hematologist today due to family hx of stroke and heart attack. She saw Dr. Kandi De Los Santos on 1/24/2019 for L leg spasms and pain after her DVT. Ordered Doppler, which was showed no blood clots. Pt complains of R ear pain and has a hx of frequent adult onset ear infections. She has previously seen ENT, Dr. Nilay Raymond. She has colored nasal congestion x2 weeks and cough with colored sputum x 1week. She has some facial pain behind her R eye. She has been using Tylenol with some relief but the pain is more consistent now. She uses Singulair 10 mg qhs, Astelin nasal spray qhs, and Allegra prn. Pt with hyperglycemia, dyslipidemia, hypothyroidism, and elevated WBC  presents to the office with a BP of 127/81. Pt requested to review results from 12/4/2018. Hgb A1c 6.2, Vit D 33.2, TSH 2.9, FT4 1.52, DCQ201, HDL 60, Trig 111, WBC 11.3. Weight is 318 lbs, down 10 lbs since 1/2019.  She has reduced portions and the number of times she eats in the day. She has not had as much of an appetite since starting Xarelto. She eats omelettes and bell peppers. Written by tracy Calle, as dictated by Dr. Peng Santos DO.      IMER    Review of Systems negative except as noted above in HPI. ALLERGIES:    Allergies   Allergen Reactions    Latex Rash    Junel 1.5/30 (21) [Norethindrone Ac-Eth Estradiol] Swelling     L FOOT DUE TO NEW DVT    Other Medication Hives and Rash     SUN    Pcn [Penicillins] Hives    Prednisone Hives    Sulfa (Sulfonamide Antibiotics) Hives    Garamycin [Gentamicin] Other (comments)     Itchy eyes due to sulfate    Metformin Diarrhea       CURRENT MEDICATIONS:    Outpatient Medications Marked as Taking for the 3/18/19 encounter (Office Visit) with Bre Mart DO   Medication Sig Dispense Refill    doxycycline (ADOXA) 100 mg tablet Take 1 Tab by mouth two (2) times a day for 7 days. 14 Tab 0    rivaroxaban (XARELTO) 20 mg tab tablet Take 1 Tab by mouth daily. 30 Tab 2    omeprazole (PRILOSEC) 40 mg capsule Take 1 Cap by mouth daily. 30 Cap 2    hydrOXYzine HCl (ATARAX) 25 mg tablet 2 tabs every night PO and as needed during day for itching 90 Tab 1    albuterol (PROVENTIL HFA, VENTOLIN HFA, PROAIR HFA) 90 mcg/actuation inhaler Take 2 Puffs by inhalation every four (4) hours as needed for Wheezing. 1 Inhaler 5    doxycycline (VIBRAMYCIN) 50 mg capsule TAKE ONE CAPSULE BY MOUTH EVERY DAY ONCE A DAY  11    levothyroxine (SYNTHROID) 125 mcg tablet Take 1 Tab by mouth Daily (before breakfast). 90 Tab 3    montelukast (SINGULAIR) 10 mg tablet Take 1 Tab by mouth daily. 30 Tab 11    azelastine (ASTELIN) 137 mcg (0.1 %) nasal spray 2 Sprays by Both Nostrils route two (2) times a day. Use in each nostril as directed  Indications: SEASONAL ALLERGIC RHINITIS 1 Bottle 5    budesonide (PULMICORT) 1 mg/2 mL nbsp 2 mL by Nebulization route two (2) times a day.  Indications: MAINTENANCE THERAPY FOR ASTHMA (Patient taking differently: 1,000 mcg by Nebulization route two (2) times daily as needed.) 1 Each 1    multivitamin (ONE A DAY) tablet Take 1 tablet by mouth daily.  PROPYLENE GLYCOL//PF (SYSTANE, PF, OP) Apply  to eye four (4) times daily.  magnesium oxide (MAG-OX) 400 mg tablet Take 400 mg by mouth daily.  Emollient Combination No.32 (EPICERAM) Emul by Apply Externally route. From Dr Jacob Woods.  metroNIDAZOLE (METROGEL) 1 % topical gel Apply  to affected area daily. Use a thin layer to affected areas after washing from Dr Jacob Woods   Indications: ACNE ROSACEA      latanoprost (XALATAN) 0.005 % ophthalmic solution Administer 1 Drop to both eyes nightly.  LACTOBACILLUS/FOS/PECTIN (PROBIOTIC COMPLEX PO) Take  by mouth daily. PAST MEDICAL HISTORY:    Past Medical History:   Diagnosis Date    Acne rosacea     Dr. Jacob Woods.  Allergy, unspecified not elsewhere classified childhood     Txd with immunotherapy. Dr. Alia Lewis Anemia NEC     borderline    Ankle sprain 2007    Right. Dr. Laney Villasenor    Ankle sprain 11/2012    Right. Dr. Fernando Coombs.  Asthma childhood    Chickenpox childhood    Chronic low back pain with right-sided sciatica     and SI joint dysfunction. Dr. Arik Hoskins.  Chronic otitis media     Dr. Rojo Infgeorge Dry eye syndrome 2013    Dr. Arias Joseph.  DVT of deep femoral vein ([address] 12/07/2018    Acute occlusive DVT of L deep femoral vein and Acute partially occlusive DVT of L Common Fem Vein and L Prox Fem Vein. Txd with Xarelto x 3 months. due to BCP.  EBV infection 6/27/2011    Hearing loss     Mild high frequency sensorineuronal hearing loss. Dr. Mildred Dee murmur     Hernia of abdominal wall 09/2003, 43/9588    umbilical.  Dr. David Sandoval.   Dr. Cook Ramp    Hyperglycemia 2013    Hypothyroidism 06/2010    Dr. Erlin Fairchild Intraocular pressure increase 12/2011    Dr. Kp Brown. Dr. Shara Castro. Dr. Herberth Gaspar.  Knee pain, left 04/2012    Left. Dr. Leon Spina Measles childhood    Migraine     Mumps childhood    Plantar fasciitis, bilateral 2010    Dr. Phoenix Navas    Sciatica 2008    Right.  with OA. Dr. Tyson Gregg Tinnitus of right ear 2012    Dr. Sharan Severs:    Past Surgical History:   Procedure Laterality Date   Skye Sprague  2011    Dr. Carlin Hines.  HAND/FINGER SURGERY UNLISTED  09/2003    Right Index finger repair due to cut    HX HERNIA REPAIR  03/6473    Umbilical.  Dr. Jocelyn Stone.  HX HERNIA REPAIR  9/23/2011    recurrent umbilical.  Laparoscopy incisional.  Dr. Michelle Whitehead.  HX TONSILLECTOMY  childhood    LAP,CHOLECYSTECTOMY  07/2001       FAMILY HISTORY:    Family History   Problem Relation Age of Onset    Hypertension Mother     Cancer Mother         small cell lung with bone mets to spine/MELANOMA    Cancer Father         melanoma    Arthritis-osteo Father     Colon Polyps Father     Diabetes Maternal Grandmother     Stroke Maternal Grandmother     Seizures Maternal Grandmother     Colon Polyps Maternal Grandmother     Breast Cancer Maternal Grandmother     Heart Disease Paternal Grandmother         heart failure    Heart Attack Paternal Grandmother     Asthma Paternal Grandmother        SOCIAL HISTORY:    Social History     Socioeconomic History    Marital status: SINGLE     Spouse name: Not on file    Number of children: Not on file    Years of education: Not on file    Highest education level: Not on file   Tobacco Use    Smoking status: Passive Smoke Exposure - Never Smoker    Smokeless tobacco: Never Used    Tobacco comment: lived with smoker dad and mom then step mom x 20 yrs   Substance and Sexual Activity    Alcohol use:  Yes     Alcohol/week: 0.6 oz     Types: 1 Glasses of wine per week     Frequency: Monthly or less     Drinks per session: 1 or 2     Binge frequency: Never     Comment: RARE    Drug use: No    Sexual activity: Yes     Partners: Male     Birth control/protection: Abstinence, Pill       IMMUNIZATIONS:    Immunization History   Administered Date(s) Administered    Influenza Vaccine 10/15/2013, 11/07/2016, 11/03/2017    Influenza Vaccine (Quad) 11/03/2015    Influenza Vaccine (Quad) PF 11/20/2014, 11/07/2016, 11/12/2018    Influenza Vaccine Split 11/18/2011, 11/07/2012    Influenza Vaccine Whole 10/01/2010    TD Vaccine 09/01/2003    Tdap 11/03/2015         PHYSICAL EXAMINATION    Vital Signs    Visit Vitals  /81   Pulse 79   Temp 98.2 °F (36.8 °C) (Oral)   Resp 17   Ht 5' 1\" (1.549 m)   Wt 318 lb 9.6 oz (144.5 kg)   LMP 12/18/2018 (Within Days)   SpO2 97%   BMI 60.20 kg/m²       Weight Metrics 3/18/2019 1/24/2019 12/17/2018 11/21/2018 11/12/2018 9/24/2018 4/16/2018   Weight 318 lb 9.6 oz 328 lb 14.4 oz 323 lb 14.4 oz 334 lb 326 lb 3.2 oz 327 lb 326 lb 8 oz   BMI 60.2 kg/m2 60.53 kg/m2 59.24 kg/m2 61.09 kg/m2 61.63 kg/m2 59.81 kg/m2 61.89 kg/m2       General appearance - Well nourished. Well appearing. Well developed. No acute distress. Obese. Head - Normocephalic. Atraumatic. Eyes - pupils equal and reactive. Extraocular eye movements intact. Sclera anicteric. Mildly injected sclera. Ears - Hearing is grossly normal bilaterally. L TM cloudy and R TM cloudy with poor anatomy and mild erythema. Nose - normal and patent. No polyps noted. No erythema. No discharge. Mouth - mucous membranes with adequate moisture. Posterior pharynx normal with cobblestone appearance. No erythema, white exudate or obstruction. Neck - supple. Midline trachea. No carotid bruits noted bilaterally. No thyromegaly noted. Chest - clear to auscultation bilaterally anteriorly and posteriorly. No wheezes. No rales or rhonchi. Breath sounds are symmetrical bilaterally. Unlabored respirations. Heart - normal rate. Regular rhythm. Normal S1, S2. No murmur noted. No rubs, clicks or gallops noted. Abdomen - soft and distended. No masses or organomegaly. No rebound, rigidity or guarding. Bowel sounds normal x 4 quadrants. No tenderness noted. Neurological - awake, alert and oriented to person, place, and time and event. Cranial nerves II through XII intact. Clear speech. Muscle strength is +5/5 x 4 extremities. Sensation is intact to light touch bilaterally. Steady gait. Heme/Lymph - peripheral pulses normal x 4 extremities. No peripheral edema is noted. Musculoskeletal - Intact x 4 extremities. Full ROM x 4 extremities. No pain with movement. No tenderness in the pelvis, pubic bone, bilateral hips, knees, ankles. Negative Chiquita's sign BL. Back exam - normal range of motion. No pain on palpation of the spinous processes in the cervical, thoracic, lumbar, sacral regions. No CVA tenderness. Skin - no rashes, erythema, ecchymosis, lacerations, abrasions, suspicious moles noted  Psychological -   normal behavior, dress and thought processes. Good insight. Good eye contact. Normal affect. Appropriate mood. Normal speech.       DATA REVIEWED    Lab Results   Component Value Date/Time    WBC 11.3 (H) 12/04/2018 08:13 AM    HGB 12.2 12/04/2018 08:13 AM    HCT 37.7 12/04/2018 08:13 AM    PLATELET 168 94/27/6870 08:13 AM    MCV 89 12/04/2018 08:13 AM     Lab Results   Component Value Date/Time    Sodium 143 12/04/2018 08:13 AM    Potassium 4.5 12/04/2018 08:13 AM    Chloride 105 12/04/2018 08:13 AM    CO2 21 12/04/2018 08:13 AM    Anion gap 10 09/24/2011 02:30 AM    Glucose 124 (H) 12/04/2018 08:13 AM    BUN 13 12/04/2018 08:13 AM    Creatinine 0.81 12/04/2018 08:13 AM    BUN/Creatinine ratio 16 12/04/2018 08:13 AM    GFR est AA 98 12/04/2018 08:13 AM    GFR est non-AA 85 12/04/2018 08:13 AM    Calcium 9.0 12/04/2018 08:13 AM    Bilirubin, total <0.2 12/04/2018 08:13 AM    AST (SGOT) 13 12/04/2018 08:13 AM    Alk. phosphatase 77 12/04/2018 08:13 AM    Protein, total 6.4 12/04/2018 08:13 AM    Albumin 4.1 12/04/2018 08:13 AM    Globulin 3.4 05/19/2010 10:46 AM    A-G Ratio 1.8 12/04/2018 08:13 AM    ALT (SGPT) 14 12/04/2018 08:13 AM     Lab Results   Component Value Date/Time    Cholesterol, total 182 12/04/2018 08:13 AM    HDL Cholesterol 60 12/04/2018 08:13 AM    LDL, calculated 100 (H) 12/04/2018 08:13 AM    VLDL, calculated 22 12/04/2018 08:13 AM    Triglyceride 111 12/04/2018 08:13 AM    CHOL/HDL Ratio 2.5 05/19/2010 10:46 AM     Lab Results   Component Value Date/Time    Vitamin D 25-Hydroxy 32.2 08/11/2011 08:15 AM    VITAMIN D, 25-HYDROXY 33.2 12/04/2018 08:13 AM       Lab Results   Component Value Date/Time    Hemoglobin A1c 6.2 (H) 12/04/2018 08:13 AM     Lab Results   Component Value Date/Time    TSH 2.900 12/04/2018 08:13 AM         ASSESSMENT and PLAN      ICD-10-CM ICD-9-CM    1. Deep venous thrombosis of left profunda femoris vein (HCC) I82.412 453.41 REFERRAL TO HEMATOLOGY ONCOLOGY      D DIMER      rivaroxaban (XARELTO) 20 mg tab tablet    and partially occluded L proximal and Lcommon femoral veins since 12/07/18 due to sedentary lifestyle and BCP use x 32 yrs, improving with Xarelto since 12/2018   2. Hypothyroidism due to acquired atrophy of thyroid E03.4 244.8      246.8    3. Dyslipidemia E78.5 272.4    4. Hyperglycemia R73.9 790.29    5. Vitamin D deficiency E55.9 268.9    6. Iron deficiency anemia secondary to inadequate dietary iron intake D50.8 280.1 REFERRAL TO HEMATOLOGY ONCOLOGY      CBC WITH AUTOMATED DIFF      IRON   7. Family history of stroke Z82.3 V17.1 REFERRAL TO HEMATOLOGY ONCOLOGY   8. Family history of heart attack Z82.49 V17.3    9. Weight loss R63.4 783.21     10# since starting Xarelto and having reduced appetite and reduce food intake/breads   10. Elevated d-dimer R79.89 790.92 D DIMER   11.  Other elevated white blood cell (WBC) count D72.828 288.69 CBC WITH AUTOMATED DIFF 12. Recurrent acute suppurative otitis media of right ear without spontaneous rupture of tympanic membrane H66.004 382.00 doxycycline (ADOXA) 100 mg tablet       Discussed the patient's BMI with her. The BMI follow up plan is as follows: I have counseled this patient on diet and exercise regimens. Decrease carbohydrates (white foods, sweet foods, sweet drinks and alcohol), increase green leafy vegetables and protein (lean meats and beans) with each meal.  Avoid fried foods. Eat 3-5 small meals daily. Do not skip meals. Increase water intake. Increase physical activity to 30 minutes daily for health benefit or 60 minutes daily to prevent weight regain, as tolerated. Get 7-8 hours uninterrupted sleep nightly. Chart reviewed and updated. Continue current medications and care. Start Vit D 5,000IU daily due to low normal. Stop Doxycycline 50 mg and start Doxycycline 100 mg BID x7 days for otitis media. Prescriptions written and sent to pharmacy; medication side effects discussed. Doxycycline 100 mg. Most recent tests reviewed from 12/4/2018. Recheck pertinent labs today. Recent office visit notes from [facility], Dr. Altaf Pfeiffer reviewed. Referrals given; patient urged to keep appointments with specialists. Heme. Will consider referral to vascular surgeon if hematologist does not find reason for DVT and/or continued leg pain. Counseled patient on health concerns:  Blood clot, cholesterol, hyperglycemia, elevated wbc, d-dimer, recurrent R ear infections, weight, anemia, hypothyroidism, and family hx. Immunizations noted. Offered empathy, support, legitimation, prayers, partnership to patient. Praised patient for progress. Follow-up Disposition:  Return in about 1 month (around 4/18/2019) for referral follow up, results. Patient was offered a choice/choices in the treatment plan today. Patient expresses understanding of the plan and agrees with recommendations.     Patient declines any additional handouts. Patient is satisfied with previous handouts received from our office    Written by tracy Milton, as dictated by Dr. Altaf Stubbs DO. Documentation True and Accepted by Gustavo Maguire.  Teresa Baron.

## 2019-03-18 NOTE — PROGRESS NOTES
Trupti Parish  Identified pt with two pt identifiers(name and ). Chief Complaint   Patient presents with    Results     Rm 14 would like ears to checked for possible ear infection         1. Have you been to the ER, urgent care clinic since your last visit? Hospitalized since your last visit? NO    2. Have you seen or consulted any other health care providers outside of the [facility] since your last visit? Include any pap smears or colon screening. NO      Advance Care Planning    In the event something were to happen to you and you were unable to speak on your behalf, do you have an Advance Directive/ Living Will in place stating your wishes? NO    If yes, do we have a copy on file NO    If no, would you like information YES    My Chart     My chart gives you direct online access to portions of the electronic medical record (EMR) where your doctor stores your health information (ie, lab results, appointment information, medications, immunizations, and more. It is free. Would you like to set up your my chart? YES    [unfilled]    Weight Metrics 2019 2018 2018 2018 2018 2018 3/13/2018   Weight 328 lb 14.4 oz 323 lb 14.4 oz 334 lb 326 lb 3.2 oz 327 lb 326 lb 8 oz 326 lb 3.2 oz   BMI 60.53 kg/m2 59.24 kg/m2 61.09 kg/m2 61.63 kg/m2 59.81 kg/m2 61.89 kg/m2 61.84 kg/m2           Medication reconciliation up to date and corrected with patient at this time. Today's provider has been notified of reason for visit, vitals and flowsheets obtained on patients. Reviewed record in preparation for visit, huddled with provider and have obtained necessary documentation.       Health Maintenance Due   Topic    FOBT Q 1 YEAR AGE 50-75        Wt Readings from Last 3 Encounters:   19 328 lb 14.4 oz (149.2 kg)   18 323 lb 14.4 oz (146.9 kg)   18 334 lb (151.5 kg)     Temp Readings from Last 3 Encounters:   19 98.2 °F (36.8 °C) (Oral)   18 97.7 °F (36.5 °C) (Temporal)   11/21/18 98.8 °F (37.1 °C)     BP Readings from Last 3 Encounters:   01/24/19 137/80   12/17/18 118/74   11/21/18 141/75     Pulse Readings from Last 3 Encounters:   01/24/19 80   12/17/18 82   11/21/18 69     There were no vitals filed for this visit. Learning Assessment:  :     Learning Assessment 4/18/2017 11/20/2014   PRIMARY LEARNER Patient Patient   HIGHEST LEVEL OF EDUCATION - PRIMARY LEARNER  - [employer]   BARRIERS PRIMARY LEARNER - NONE     - NONE   PRIMARY LANGUAGE ENGLISH ENGLISH   LEARNER PREFERENCE PRIMARY PICTURES PICTURES     READING READING     VIDEOS VIDEOS   ANSWERED BY patient patient   RELATIONSHIP SELF SELF       Depression Screening:  :     3 most recent PHQ Screens 1/24/2019   Little interest or pleasure in doing things Not at all   Feeling down, depressed, irritable, or hopeless Not at all   Total Score PHQ 2 0       Fall Risk Assessment:  :     Fall Risk Assessment, last 12 mths 1/24/2019   Able to walk? Yes   Fall in past 12 months? No       Abuse Screening:  :     Abuse Screening Questionnaire 1/24/2019 2/8/2018 6/23/2015   Do you ever feel afraid of your partner? N N N   Are you in a relationship with someone who physically or mentally threatens you? N N N   Is it safe for you to go home?  Y Y Y       ADL Screening:  :     ADL Assessment 1/24/2019   Feeding yourself No Help Needed   Getting from bed to chair No Help Needed   Getting dressed No Help Needed   Bathing or showering No Help Needed   Walk across the room (includes cane/walker) No Help Needed   Using the telphone No Help Needed   Taking your medications No Help Needed   Preparing meals No Help Needed   Managing money (expenses/bills) No Help Needed   Moderately strenuous housework (laundry) No Help Needed   Shopping for personal items (toiletries/medicines) No Help Needed   Shopping for groceries No Help Needed   Driving No Help Needed   Climbing a flight of stairs No Help Needed   Getting to places beyond walking distances No Help Needed

## 2019-03-19 LAB
BASOPHILS # BLD AUTO: 0 X10E3/UL (ref 0–0.2)
BASOPHILS NFR BLD AUTO: 0 %
D DIMER PPP FEU-MCNC: 0.28 MG/L FEU (ref 0–0.49)
EOSINOPHIL # BLD AUTO: 0.2 X10E3/UL (ref 0–0.4)
EOSINOPHIL NFR BLD AUTO: 2 %
ERYTHROCYTE [DISTWIDTH] IN BLOOD BY AUTOMATED COUNT: 14.2 % (ref 12.3–15.4)
HCT VFR BLD AUTO: 38.7 % (ref 34–46.6)
HGB BLD-MCNC: 12.4 G/DL (ref 11.1–15.9)
IMM GRANULOCYTES # BLD AUTO: 0 X10E3/UL (ref 0–0.1)
IMM GRANULOCYTES NFR BLD AUTO: 0 %
IRON SERPL-MCNC: 40 UG/DL (ref 27–159)
LYMPHOCYTES # BLD AUTO: 2.8 X10E3/UL (ref 0.7–3.1)
LYMPHOCYTES NFR BLD AUTO: 34 %
MCH RBC QN AUTO: 28.1 PG (ref 26.6–33)
MCHC RBC AUTO-ENTMCNC: 32 G/DL (ref 31.5–35.7)
MCV RBC AUTO: 88 FL (ref 79–97)
MONOCYTES # BLD AUTO: 0.6 X10E3/UL (ref 0.1–0.9)
MONOCYTES NFR BLD AUTO: 8 %
NEUTROPHILS # BLD AUTO: 4.7 X10E3/UL (ref 1.4–7)
NEUTROPHILS NFR BLD AUTO: 56 %
PLATELET # BLD AUTO: 296 X10E3/UL (ref 150–379)
RBC # BLD AUTO: 4.42 X10E6/UL (ref 3.77–5.28)
WBC # BLD AUTO: 8.3 X10E3/UL (ref 3.4–10.8)

## 2019-04-01 DIAGNOSIS — L29.9 PRURITIC DERMATITIS: ICD-10-CM

## 2019-04-02 RX ORDER — HYDROXYZINE 25 MG/1
TABLET, FILM COATED ORAL
Qty: 90 TAB | Refills: 1 | Status: SHIPPED | OUTPATIENT
Start: 2019-04-02 | End: 2019-04-18 | Stop reason: SDUPTHER

## 2019-04-18 ENCOUNTER — OFFICE VISIT (OUTPATIENT)
Dept: FAMILY MEDICINE CLINIC | Age: 49
End: 2019-04-18

## 2019-04-18 VITALS
BODY MASS INDEX: 55.32 KG/M2 | SYSTOLIC BLOOD PRESSURE: 128 MMHG | HEART RATE: 78 BPM | OXYGEN SATURATION: 97 % | TEMPERATURE: 98.6 F | DIASTOLIC BLOOD PRESSURE: 82 MMHG | HEIGHT: 61 IN | WEIGHT: 293 LBS | RESPIRATION RATE: 18 BRPM

## 2019-04-18 DIAGNOSIS — Z82.49 FAMILY HISTORY OF HEART ATTACK: ICD-10-CM

## 2019-04-18 DIAGNOSIS — H92.03 OTALGIA OF BOTH EARS: ICD-10-CM

## 2019-04-18 DIAGNOSIS — I82.412 DEEP VENOUS THROMBOSIS OF LEFT PROFUNDA FEMORIS VEIN (HCC): Primary | ICD-10-CM

## 2019-04-18 DIAGNOSIS — Z72.821 INADEQUATE SLEEP HYGIENE: ICD-10-CM

## 2019-04-18 DIAGNOSIS — L29.9 PRURITIC DERMATITIS: ICD-10-CM

## 2019-04-18 DIAGNOSIS — Z82.3 FAMILY HISTORY OF STROKE: ICD-10-CM

## 2019-04-18 DIAGNOSIS — R79.89 ELEVATED D-DIMER: ICD-10-CM

## 2019-04-18 DIAGNOSIS — R73.9 HYPERGLYCEMIA: ICD-10-CM

## 2019-04-18 DIAGNOSIS — D72.828 OTHER ELEVATED WHITE BLOOD CELL (WBC) COUNT: ICD-10-CM

## 2019-04-18 DIAGNOSIS — D50.8 IRON DEFICIENCY ANEMIA SECONDARY TO INADEQUATE DIETARY IRON INTAKE: ICD-10-CM

## 2019-04-18 DIAGNOSIS — E78.5 DYSLIPIDEMIA: ICD-10-CM

## 2019-04-18 DIAGNOSIS — E55.9 VITAMIN D DEFICIENCY: ICD-10-CM

## 2019-04-18 DIAGNOSIS — E03.4 HYPOTHYROIDISM DUE TO ACQUIRED ATROPHY OF THYROID: ICD-10-CM

## 2019-04-18 RX ORDER — HYDROXYZINE 25 MG/1
TABLET, FILM COATED ORAL
Qty: 180 TAB | Refills: 1 | Status: SHIPPED | OUTPATIENT
Start: 2019-04-18 | End: 2019-09-08 | Stop reason: SDUPTHER

## 2019-04-18 NOTE — PROGRESS NOTES
Suad Doty  Identified pt with two pt identifiers(name and ). Chief Complaint   Patient presents with    Results     Rm 14         1. Have you been to the ER, urgent care clinic since your last visit? Hospitalized since your last visit? NO    2. Have you seen or consulted any other health care providers outside of the [address] [place] [address] since your last visit? Include any pap smears or colon screening. NO      Advance Care Planning    In the event something were to happen to you and you were unable to speak on your behalf, do you have an Advance Directive/ Living Will in place stating your wishes? NO    If yes, do we have a copy on file NO    If no, would you like information YES    My Chart     My chart gives you direct online access to portions of the electronic medical record (EMR) where your doctor stores your health information (ie, lab results, appointment information, medications, immunizations, and more. It is free. Would you like to set up your my chart? YES    [unfilled]    Weight Metrics 2019 3/18/2019 2019 2018 2018 2018 2018   Weight 314 lb 11.2 oz 318 lb 9.6 oz 328 lb 14.4 oz 323 lb 14.4 oz 334 lb 326 lb 3.2 oz 327 lb   BMI 59.46 kg/m2 60.2 kg/m2 60.53 kg/m2 59.24 kg/m2 61.09 kg/m2 61.63 kg/m2 59.81 kg/m2           Medication reconciliation up to date and corrected with patient at this time. Today's provider has been notified of reason for visit, vitals and flowsheets obtained on patients. Reviewed record in preparation for visit, huddled with provider and have obtained necessary documentation.       Health Maintenance Due   Topic    FOBT Q 1 YEAR AGE 50-75        Wt Readings from Last 3 Encounters:   19 314 lb 11.2 oz (142.7 kg)   19 318 lb 9.6 oz (144.5 kg)   19 328 lb 14.4 oz (149.2 kg)     Temp Readings from Last 3 Encounters:   19 98.6 °F (37 °C) (Oral)   19 98.2 °F (36.8 °C) (Oral)   19 98.2 °F (36.8 °C) (Oral)     BP Readings from Last 3 Encounters:   04/18/19 128/82   03/18/19 127/81   01/24/19 137/80     Pulse Readings from Last 3 Encounters:   04/18/19 78   03/18/19 79   01/24/19 80     Vitals:    04/18/19 0855   BP: 128/82   Pulse: 78   Resp: 18   Temp: 98.6 °F (37 °C)   TempSrc: Oral   SpO2: 97%   Weight: 314 lb 11.2 oz (142.7 kg)   Height: 5' 1\" (1.549 m)   PainSc:   0 - No pain   LMP: 12/18/2018         Learning Assessment:  :     Learning Assessment 4/18/2017 11/20/2014   PRIMARY LEARNER Patient Patient   HIGHEST LEVEL OF EDUCATION - PRIMARY LEARNER  - [employer]   BARRIERS PRIMARY LEARNER - NONE     - NONE   PRIMARY LANGUAGE ENGLISH ENGLISH   LEARNER PREFERENCE PRIMARY PICTURES PICTURES     READING READING     VIDEOS VIDEOS   ANSWERED BY patient patient   RELATIONSHIP SELF SELF       Depression Screening:  :     3 most recent PHQ Screens 1/24/2019   Little interest or pleasure in doing things Not at all   Feeling down, depressed, irritable, or hopeless Not at all   Total Score PHQ 2 0       Fall Risk Assessment:  :     Fall Risk Assessment, last 12 mths 1/24/2019   Able to walk? Yes   Fall in past 12 months? No       Abuse Screening:  :     Abuse Screening Questionnaire 1/24/2019 2/8/2018 6/23/2015   Do you ever feel afraid of your partner? N N N   Are you in a relationship with someone who physically or mentally threatens you? N N N   Is it safe for you to go home?  Y Y Y       ADL Screening:  :     ADL Assessment 1/24/2019   Feeding yourself No Help Needed   Getting from bed to chair No Help Needed   Getting dressed No Help Needed   Bathing or showering No Help Needed   Walk across the room (includes cane/walker) No Help Needed   Using the telphone No Help Needed   Taking your medications No Help Needed   Preparing meals No Help Needed   Managing money (expenses/bills) No Help Needed   Moderately strenuous housework (laundry) No Help Needed   Shopping for personal items (toiletries/medicines) No Help Needed   Shopping for groceries No Help Needed   Driving No Help Needed   Climbing a flight of stairs No Help Needed   Getting to places beyond walking distances No Help Needed

## 2019-04-18 NOTE — PROGRESS NOTES
HISTORY OF PRESENT ILLNESS  Elda Rodriguez is a 46 y.o. female presents with Results (Rm 14); Medication Refill; Leg Pain; and Stress    Agree with nurse note. Pt with hypothyroidism, dyslipidemia, hyperglycemia, vit d deficiency, iron deficiency anemia, and family hx of stroke and heart attack presents to the office with a BP of 128/82. Patient denies vision changes, headaches, dizziness, chest pain, SOB, or swelling. She has been very stressed at work and working 12 hour days. She uses Xanax prn with relief but wonders if there is something else she can take. Weight is 314 lbs, down 4 lbs since 3/2019. She did have a stomach bug 3 weeks ago and was vomiting. To recall, pt had DVT in L deep femoral vein and acute, partially occlusive DVT involving common femoral vein and L proximal femoral vein on 12/7/2018. She is currently on Xarelto 20 mg daily and she follows up with the hematologist next week. Duplex on 1/25/2019 was normal. Her L leg pain is improving but it is still painful. Denies SOB and chest pain. Pt requested to review results from 3/18/2019. D Dimer 0.28, Iron 40, and CBC wnl. To recall, she had R otitis media at last ov and completed doxycyline 100 mg BID x10 days. She complains of cough with yellow sputum x1.5 weeks. No fever, chills, headache or other associated symptoms. Pt with pruritic dermatitis and difficulty sleeping. She uses Atarax 50 mg 2 tabs qhs with relief and requests a refill today. Written by tracy Howard, as dictated by Dr. Rg Heller DO.    ROS    Review of Systems negative except as noted above in HPI.     ALLERGIES:    Allergies   Allergen Reactions    Latex Rash    Junel 1.5/30 (21) [Norethindrone Ac-Eth Estradiol] Swelling     L FOOT DUE TO NEW DVT    Other Medication Hives and Rash     SUN    Pcn [Penicillins] Hives    Prednisone Hives    Sulfa (Sulfonamide Antibiotics) Hives    Garamycin [Gentamicin] Other (comments)     Itchy eyes due to sulfate    Metformin Diarrhea       CURRENT MEDICATIONS:    Outpatient Medications Marked as Taking for the 4/18/19 encounter (Office Visit) with Jade Yeh, DO   Medication Sig Dispense Refill    hydrOXYzine HCl (ATARAX) 25 mg tablet TAKE 2 TABLETS BY MOUTH EVERY NIGHT& AS NEEDED DURING DAY FOR ITCHING  Indications: anxious 180 Tab 1    rivaroxaban (XARELTO) 20 mg tab tablet Take 1 Tab by mouth daily. 30 Tab 2    omeprazole (PRILOSEC) 40 mg capsule Take 1 Cap by mouth daily. 30 Cap 2    doxycycline (VIBRAMYCIN) 50 mg capsule TAKE ONE CAPSULE BY MOUTH EVERY DAY ONCE A DAY  11    levothyroxine (SYNTHROID) 125 mcg tablet Take 1 Tab by mouth Daily (before breakfast). 90 Tab 3    montelukast (SINGULAIR) 10 mg tablet Take 1 Tab by mouth daily. 30 Tab 11    azelastine (ASTELIN) 137 mcg (0.1 %) nasal spray 2 Sprays by Both Nostrils route two (2) times a day. Use in each nostril as directed  Indications: SEASONAL ALLERGIC RHINITIS 1 Bottle 5    multivitamin (ONE A DAY) tablet Take 1 tablet by mouth daily.  PROPYLENE GLYCOL//PF (SYSTANE, PF, OP) Apply  to eye four (4) times daily.  magnesium oxide (MAG-OX) 400 mg tablet Take 400 mg by mouth daily.  Emollient Combination No.32 (EPICERAM) Emul by Apply Externally route. From Dr Nisha Solorzano.  metroNIDAZOLE (METROGEL) 1 % topical gel Apply  to affected area daily. Use a thin layer to affected areas after washing from Dr Nisha Solorzano   Indications: ACNE ROSACEA      latanoprost (XALATAN) 0.005 % ophthalmic solution Administer 1 Drop to both eyes nightly.  LACTOBACILLUS/FOS/PECTIN (PROBIOTIC COMPLEX PO) Take  by mouth daily.  cholecalciferol, vitamin d3, (VITAMIN D) 1,000 unit tablet Take 1,000 Units by mouth daily. PAST MEDICAL HISTORY:    Past Medical History:   Diagnosis Date    Acne rosacea     Dr. Nisha Solorzano.       Allergy, unspecified not elsewhere classified childhood     Txd with immunotherapy. Dr. Brittney Lei Anemia NEC     borderline    Ankle sprain 2007    Right. Dr. Clark Mccall    Ankle sprain 11/2012    Right. Dr. June Cotto.  Asthma childhood    Chickenpox childhood    Chronic low back pain with right-sided sciatica     and SI joint dysfunction. Dr. Tiana Ponce.  Chronic otitis media     Dr. Brittney Lei Dry eye syndrome 2013    Dr. Krystina Ng.  DVT of deep femoral vein ([address] 12/07/2018    Acute occlusive DVT of L deep femoral vein and Acute partially occlusive DVT of L Common Fem Vein and L Prox Fem Vein. Txd with Xarelto x 3 months. due to BCP.  EBV infection 6/27/2011    Hearing loss     Mild high frequency sensorineuronal hearing loss. Dr. Barry Rico murmur     Hernia of abdominal wall 09/2003, 89/1384    umbilical.  Dr. Meggan Ac. Dr. Ian Gutierrez    Hyperglycemia 2013    Hypothyroidism 06/2010    Dr. Steven Mitchell pressure increase 12/2011    Dr. Yue Carrillo. Dr. Krystina Ng. Dr. Jovan Cadena.  Knee pain, left 04/2012    Left. Dr. Desmond Xiong Measles childhood    Migraine     Mumps childhood    Plantar fasciitis, bilateral 2010    Dr. Darin Camargo    Sciatica 2008    Right.  with OA. Dr. Mely Hammond Tinnitus of right ear 2012    Dr. Conrad Chaudhary:    Past Surgical History:   Procedure Laterality Date   Arvel Noss  2011    Dr. Suze Pierre.  HAND/FINGER SURGERY UNLISTED  09/2003    Right Index finger repair due to cut    HX HERNIA REPAIR  93/3338    Umbilical.  Dr. Meggan Ac.  HX HERNIA REPAIR  9/23/2011    recurrent umbilical.  Laparoscopy incisional.  Dr. Ian Gutierrez.     HX TONSILLECTOMY  childhood    LAP,CHOLECYSTECTOMY  07/2001       FAMILY HISTORY:    Family History   Problem Relation Age of Onset    Hypertension Mother     Cancer Mother         small cell lung with bone mets to spine/MELANOMA    Cancer Father melanoma    Arthritis-osteo Father     Colon Polyps Father     Diabetes Maternal Grandmother     Stroke Maternal Grandmother     Seizures Maternal Grandmother     Colon Polyps Maternal Grandmother     Breast Cancer Maternal Grandmother     Heart Disease Paternal Grandmother         heart failure    Heart Attack Paternal Grandmother     Asthma Paternal Grandmother        SOCIAL HISTORY:    Social History     Socioeconomic History    Marital status: SINGLE     Spouse name: Not on file    Number of children: Not on file    Years of education: Not on file    Highest education level: Not on file   Tobacco Use    Smoking status: Passive Smoke Exposure - Never Smoker    Smokeless tobacco: Never Used    Tobacco comment: lived with smoker dad and mom then step mom x 20 yrs   Substance and Sexual Activity    Alcohol use: Yes     Alcohol/week: 0.6 oz     Types: 1 Glasses of wine per week     Frequency: Monthly or less     Drinks per session: 1 or 2     Binge frequency: Never     Comment: RARE    Drug use: No    Sexual activity: Yes     Partners: Male     Birth control/protection: Abstinence, Pill   Lifestyle    Physical activity:     Days per week: 4 days     Minutes per session: 30 min    Stress:  Only a little       IMMUNIZATIONS:    Immunization History   Administered Date(s) Administered    Influenza Vaccine 10/15/2013, 11/07/2016, 11/03/2017    Influenza Vaccine (Quad) 11/03/2015    Influenza Vaccine (Quad) PF 11/20/2014, 11/07/2016, 11/12/2018    Influenza Vaccine Split 11/18/2011, 11/07/2012    Influenza Vaccine Whole 10/01/2010    TD Vaccine 09/01/2003    Tdap 11/03/2015         PHYSICAL EXAMINATION    Vital Signs    Visit Vitals  /82   Pulse 78   Temp 98.6 °F (37 °C) (Oral)   Resp 18   Ht 5' 1\" (1.549 m)   Wt 314 lb 11.2 oz (142.7 kg)   LMP 12/18/2018 (Within Weeks)   SpO2 97%   BMI 59.46 kg/m²       Weight Metrics 4/18/2019 3/18/2019 1/24/2019 12/17/2018 11/21/2018 11/12/2018 9/24/2018   Weight 314 lb 11.2 oz 318 lb 9.6 oz 328 lb 14.4 oz 323 lb 14.4 oz 334 lb 326 lb 3.2 oz 327 lb   BMI 59.46 kg/m2 60.2 kg/m2 60.53 kg/m2 59.24 kg/m2 61.09 kg/m2 61.63 kg/m2 59.81 kg/m2       General appearance - Well nourished. Well appearing. Well developed. No acute distress. Obese. Head - Normocephalic. Atraumatic. Eyes - pupils equal and reactive. Extraocular eye movements intact. Sclera anicteric. Mildly injected sclera. Ears - Hearing is grossly normal bilaterally. Nose - normal and patent. No polyps noted. No erythema. No discharge. Mouth - mucous membranes with adequate moisture. Posterior pharynx normal with cobblestone appearance. No erythema, white exudate or obstruction. Neck - supple. Midline trachea. No carotid bruits noted bilaterally. No thyromegaly noted. Chest - clear to auscultation bilaterally anteriorly and posteriorly. No wheezes. No rales or rhonchi. Breath sounds are symmetrical bilaterally. Unlabored respirations. Heart - normal rate. Regular rhythm. Normal S1, S2. No murmur noted. No rubs, clicks or gallops noted. Abdomen - soft and distended. No masses or organomegaly. No rebound, rigidity or guarding. Bowel sounds normal x 4 quadrants. No tenderness noted. Neurological - awake, alert and oriented to person, place, and time and event. Cranial nerves II through XII intact. Clear speech. Muscle strength is +5/5 x 4 extremities. Sensation is intact to light touch bilaterally. Steady gait. Heme/Lymph - peripheral pulses normal x 4 extremities. No peripheral edema is noted. Musculoskeletal - Intact x 4 extremities. Full ROM x 4 extremities. No pain with movement. Back exam - normal range of motion. No pain on palpation of the spinous processes in the cervical, thoracic, lumbar, sacral regions. No CVA tenderness.     Skin - no rashes, erythema, ecchymosis, lacerations, abrasions, suspicious moles noted  Psychological - normal behavior, dress and thought processes. Good insight. Good eye contact. Normal affect. Appropriate mood. Normal speech. DATA REVIEWED    Lab Results   Component Value Date/Time    WBC 8.3 03/18/2019 03:13 PM    HGB 12.4 03/18/2019 03:13 PM    HCT 38.7 03/18/2019 03:13 PM    PLATELET 985 22/34/3317 03:13 PM    MCV 88 03/18/2019 03:13 PM     Lab Results   Component Value Date/Time    Sodium 143 12/04/2018 08:13 AM    Potassium 4.5 12/04/2018 08:13 AM    Chloride 105 12/04/2018 08:13 AM    CO2 21 12/04/2018 08:13 AM    Anion gap 10 09/24/2011 02:30 AM    Glucose 124 (H) 12/04/2018 08:13 AM    BUN 13 12/04/2018 08:13 AM    Creatinine 0.81 12/04/2018 08:13 AM    BUN/Creatinine ratio 16 12/04/2018 08:13 AM    GFR est AA 98 12/04/2018 08:13 AM    GFR est non-AA 85 12/04/2018 08:13 AM    Calcium 9.0 12/04/2018 08:13 AM    Bilirubin, total <0.2 12/04/2018 08:13 AM    AST (SGOT) 13 12/04/2018 08:13 AM    Alk. phosphatase 77 12/04/2018 08:13 AM    Protein, total 6.4 12/04/2018 08:13 AM    Albumin 4.1 12/04/2018 08:13 AM    Globulin 3.4 05/19/2010 10:46 AM    A-G Ratio 1.8 12/04/2018 08:13 AM    ALT (SGPT) 14 12/04/2018 08:13 AM     Lab Results   Component Value Date/Time    Cholesterol, total 182 12/04/2018 08:13 AM    HDL Cholesterol 60 12/04/2018 08:13 AM    LDL, calculated 100 (H) 12/04/2018 08:13 AM    VLDL, calculated 22 12/04/2018 08:13 AM    Triglyceride 111 12/04/2018 08:13 AM    CHOL/HDL Ratio 2.5 05/19/2010 10:46 AM     Lab Results   Component Value Date/Time    Vitamin D 25-Hydroxy 32.2 08/11/2011 08:15 AM    VITAMIN D, 25-HYDROXY 33.2 12/04/2018 08:13 AM       Lab Results   Component Value Date/Time    Hemoglobin A1c 6.2 (H) 12/04/2018 08:13 AM     Lab Results   Component Value Date/Time    TSH 2.900 12/04/2018 08:13 AM         ASSESSMENT and PLAN      ICD-10-CM ICD-9-CM    1. Deep venous thrombosis of left profunda femoris vein (HCC) I82.412 453.41    2.  Pruritic dermatitis L29.9 698.9 hydrOXYzine HCl (ATARAX) 25 mg tablet   3. Hypothyroidism due to acquired atrophy of thyroid E03.4 244.8      246.8    4. Dyslipidemia E78.5 272.4    5. Hyperglycemia R73.9 790.29    6. Vitamin D deficiency E55.9 268.9    7. Iron deficiency anemia secondary to inadequate dietary iron intake D50.8 280.1    8. Family history of stroke Z82.3 V17.1    9. Family history of heart attack Z82.49 V17.3    10. Elevated d-dimer R79.89 790.92    11. Other elevated white blood cell (WBC) count D72.828 288.69    12. Inadequate sleep hygiene Z72.821 307.49 hydrOXYzine HCl (ATARAX) 25 mg tablet   13. Otalgia of both ears H92.03 388.70     chronic       Discussed the patient's BMI with her. The BMI follow up plan is as follows: I have counseled this patient on diet and exercise regimens. Decrease carbohydrates (white foods, sweet foods, sweet drinks and alcohol), increase green leafy vegetables and protein (lean meats and beans) with each meal.  Avoid fried foods. Eat 3-5 small meals daily. Do not skip meals. Increase water intake. Increase physical activity to 30 minutes daily for health benefit or 60 minutes daily to prevent weight regain, as tolerated. Get 7-8 hours uninterrupted sleep nightly. Chart reviewed and updated. Continue current medications and care. Advised pt to use Atarax 25 mg 1 tab qam for anxiety. Advised pt to take a walk for when feeling stressed. Prescriptions written and sent to pharmacy; medication side effects discussed. Atarax 25 mg. Most recent tests reviewed from 3/18/2019. Counseled patient on health concerns:  DVT, leg pain, dermatitis, stress, weight, sleep hygiene. Advised pt to see ENT if she develops R ear pain due to hx of recurrent ear infections. Immunizations noted. Offered empathy, support, legitimation, prayers, partnership to patient. Praised patient for progress.   Follow-up and Dispositions    · Return in about 3 months (around 7/18/2019) for blood pressure, results, Referral F/U.         Patient was offered a choice/choices in the treatment plan today. Patient expresses understanding of the plan and agrees with recommendations. Patient declines any additional handouts. Patient is satisfied with previous handouts received from our office. More than 30 mins spent face to face with patient and more than 50% of this time spent in counseling and coordinating care. Written by tracy Pruitt, as dictated by Dr. Terell Aguero DO. Documentation True and Accepted by Minna Coley.  Juan Carlos Garcia.

## 2019-04-25 ENCOUNTER — DOCUMENTATION ONLY (OUTPATIENT)
Dept: ONCOLOGY | Age: 49
End: 2019-04-25

## 2019-04-26 ENCOUNTER — OFFICE VISIT (OUTPATIENT)
Dept: ONCOLOGY | Age: 49
End: 2019-04-26

## 2019-04-26 VITALS
HEIGHT: 61 IN | WEIGHT: 293 LBS | HEART RATE: 97 BPM | RESPIRATION RATE: 16 BRPM | OXYGEN SATURATION: 98 % | TEMPERATURE: 98.8 F | DIASTOLIC BLOOD PRESSURE: 66 MMHG | SYSTOLIC BLOOD PRESSURE: 97 MMHG | BODY MASS INDEX: 55.32 KG/M2

## 2019-04-26 DIAGNOSIS — I82.412 DEEP VENOUS THROMBOSIS OF LEFT PROFUNDA FEMORIS VEIN (HCC): Primary | ICD-10-CM

## 2019-04-26 DIAGNOSIS — E66.01 OBESITY, MORBID (HCC): ICD-10-CM

## 2019-04-26 NOTE — PROGRESS NOTES
Terrell Alcantara is a 46 y.o. female    Chief Complaint   Patient presents with    New Patient     Anemia     1. Have you been to the ER, urgent care clinic since your last visit? Hospitalized since your last visit? No    2. Have you seen or consulted any other health care providers outside of the [address] [place] [address] since your last visit? Include any pap smears or colon screening.  No     Visit Vitals  BP 97/66 (BP 1 Location: Left arm, BP Patient Position: Sitting)   Pulse 97   Temp 98.8 °F (37.1 °C) (Oral)   Resp 16   Ht 5' 1\" (1.549 m)   Wt 312 lb (141.5 kg)   SpO2 98%   BMI 58.95 kg/m²        Georgia Patterson LPN

## 2019-04-26 NOTE — PROGRESS NOTES
[facility] 59  [address]  W: [phone]  F: [phone]    Reason for Visit:   Rannie Cogan is a 46 y.o. female who is seen in consultation at the request of Dr. Piyush Redd for evaluation of DVT    Treatment History:   · none    History of Present Illness:   Patient is a 46 y.o. with past medical history as reviewed below who is seen for evaluation of DVT. She was apparently admitted to [facility] in December 2018 with left leg and thigh pain for a few days. She had no preceding trauma or surgeries or illnesses. Aleve did not help her. At which point she was diagnosed with deep venous thrombosis. Doppler showed extensive DVT on the left involving the left common femoral vein, left proximal femoral vein and deep femoral vein. She has since been on Xarelto. She had reported ongoing lower extremity cramps and had a repeat Doppler study on 1/25/2019 that had showed resolution of thrombosis. She was on OCPs for 30+ years preceding the clot. She has no personal h/o clots. She has had a cholecystectomy and had no clots. No FH of blood clots. She is uptodate with age appropriate Ca screening. Never had a colonoscopy. She has had no changes in BM and has had no bleeding. She has had regular PAP smears. She has no weight changes. She has had no fevers, chills. She gets some cramps in her legs if she sits too long. Gets better with motion. No pain. She has stopped OCPs. She has never smoked       Past Medical History:   Diagnosis Date    Acne rosacea     Dr. Bhagat Score.  Allergy, unspecified not elsewhere classified childhood     Txd with immunotherapy. Dr. Avina Brooke Anemia NEC     borderline    Ankle sprain 2007    Right. Dr. John Bui    Ankle sprain 11/2012    Right. Dr. Cristel Tapia.     Asthma childhood    Chickenpox childhood    Chronic low back pain with right-sided sciatica     and SI joint dysfunction. Dr. Kip Renner.  Chronic otitis media     Dr. Angelica Lane Dry eye syndrome 2013    Dr. Shant Burks.  DVT of deep femoral vein ([address] 12/07/2018    Acute occlusive DVT of L deep femoral vein and Acute partially occlusive DVT of L Common Fem Vein and L Prox Fem Vein. Txd with Xarelto x 3 months. due to BCP.  EBV infection 6/27/2011    Hearing loss     Mild high frequency sensorineuronal hearing loss. Dr. Susan Cortez murmur     Hernia of abdominal wall 09/2003, 31/9003    umbilical.  Dr. Earlene Arevalo. Dr. Tiesha García    Hyperglycemia 2013    Hypothyroidism 06/2010    Dr. Juli Huertas pressure increase 12/2011    Dr. Rupert Fritz. Dr. Shant Burks. Dr. Carlin Roberts.  Knee pain, left 04/2012    Left. Dr. Noe Liao Measles childhood    Migraine     Mumps childhood    Plantar fasciitis, bilateral 2010    Dr. Derrick Perkins    Sciatica 2008    Right.  with OA. Dr. Margo Ball Tinnitus of right ear 2012    Dr. Tad Gutierrez      Past Surgical History:   Procedure Laterality Date   Estrada Low  2011    Dr. Ani Johnson.  HAND/FINGER SURGERY UNLISTED  09/2003    Right Index finger repair due to cut    HX HERNIA REPAIR  51/7814    Umbilical.  Dr. Earlene Arevalo.  HX HERNIA REPAIR  9/23/2011    recurrent umbilical.  Laparoscopy incisional.  Dr. Tiesha García.  HX TONSILLECTOMY  childhood    LAP,CHOLECYSTECTOMY  07/2001      Social History     Tobacco Use    Smoking status: Passive Smoke Exposure - Never Smoker    Smokeless tobacco: Never Used    Tobacco comment: lived with smoker dad and mom then step mom x 20 yrs   Substance Use Topics    Alcohol use:  Yes     Alcohol/week: 0.6 oz     Types: 1 Glasses of wine per week     Frequency: Monthly or less     Drinks per session: 1 or 2     Binge frequency: Never     Comment: RARE      Family History   Problem Relation Age of Onset    Hypertension Mother  Cancer Mother         small cell lung with bone mets to spine/MELANOMA    Cancer Father         melanoma    Arthritis-osteo Father     Colon Polyps Father     Diabetes Maternal Grandmother     Stroke Maternal Grandmother     Seizures Maternal Grandmother     Colon Polyps Maternal Grandmother     Breast Cancer Maternal Grandmother     Heart Disease Paternal Grandmother         heart failure    Heart Attack Paternal Grandmother     Asthma Paternal Grandmother      Current Outpatient Medications   Medication Sig    hydrOXYzine HCl (ATARAX) 25 mg tablet TAKE 2 TABLETS BY MOUTH EVERY NIGHT& AS NEEDED DURING DAY FOR ITCHING  Indications: anxious    rivaroxaban (XARELTO) 20 mg tab tablet Take 1 Tab by mouth daily.  NAFTIN 2 % gel APPLY TO AFFECTED AREA TWO (2) TIMES A DAY. INDICATIONS: TINEA PEDIS    omeprazole (PRILOSEC) 40 mg capsule Take 1 Cap by mouth daily.  albuterol (PROVENTIL HFA, VENTOLIN HFA, PROAIR HFA) 90 mcg/actuation inhaler Take 2 Puffs by inhalation every four (4) hours as needed for Wheezing.  doxycycline (VIBRAMYCIN) 50 mg capsule TAKE ONE CAPSULE BY MOUTH EVERY DAY ONCE A DAY    levothyroxine (SYNTHROID) 125 mcg tablet Take 1 Tab by mouth Daily (before breakfast).  albuterol-ipratropium (DUO-NEB) 2.5 mg-0.5 mg/3 ml nebu 3 mL by Nebulization route every six (6) hours as needed.  butalbital-acetaminophen-caffeine (FIORICET, ESGIC) -40 mg per tablet TAKE 1 TAB BY MOUTH EVERY SIX (6) HOURS AS NEEDED FOR PAIN OR HEADACHE.  montelukast (SINGULAIR) 10 mg tablet Take 1 Tab by mouth daily.  azelastine (ASTELIN) 137 mcg (0.1 %) nasal spray 2 Sprays by Both Nostrils route two (2) times a day. Use in each nostril as directed  Indications: SEASONAL ALLERGIC RHINITIS    Nebulizer & Compressor machine 1 Each by Other route four (4) times daily as needed.     albuterol (PROVENTIL VENTOLIN) 2.5 mg /3 mL (0.083 %) nebulizer solution 6 mL by Nebulization route every four (4) hours as needed for Wheezing or Shortness of Breath. Indications: Acute Asthma Attack    budesonide (PULMICORT) 1 mg/2 mL nbsp 2 mL by Nebulization route two (2) times a day. Indications: MAINTENANCE THERAPY FOR ASTHMA (Patient taking differently: 1,000 mcg by Nebulization route two (2) times daily as needed.)    cyclobenzaprine (FLEXERIL) 10 mg tablet TAKE 1 TABLET BY MOUTH 3 TIMES A DAY AS NEEDED FOR MUSCLE SPASM    ALPRAZolam (XANAX) 0.5 mg tablet Take 1 Tab by mouth two (2) times daily as needed for Anxiety or Sleep. Max Daily Amount: 1 mg. Indications: anxiety    terconazole (TERAZOL 7) 0.4 % vaginal cream     LOCOID 0.1 % lotn     valACYclovir (VALTREX) 500 mg tablet     terconazole (TERAZOL 3) 80 mg vaginal suppository     multivitamin (ONE A DAY) tablet Take 1 tablet by mouth daily.  PROPYLENE GLYCOL//PF (SYSTANE, PF, OP) Apply  to eye four (4) times daily.  magnesium oxide (MAG-OX) 400 mg tablet Take 400 mg by mouth daily.  Emollient Combination No.32 (EPICERAM) Emul by Apply Externally route. From Dr Erin Olmstead.  metroNIDAZOLE (METROGEL) 1 % topical gel Apply  to affected area daily. Use a thin layer to affected areas after washing from Dr Erin Olmstead   Indications: ACNE ROSACEA    latanoprost (XALATAN) 0.005 % ophthalmic solution Administer 1 Drop to both eyes nightly.  fexofenadine (ALLEGRA) 180 mg tablet Take 180 mg by mouth daily as needed.  LACTOBACILLUS/FOS/PECTIN (PROBIOTIC COMPLEX PO) Take  by mouth daily.  cholecalciferol, vitamin d3, (VITAMIN D) 1,000 unit tablet Take 1,000 Units by mouth daily. No current facility-administered medications for this visit.        Allergies   Allergen Reactions    Latex Rash    Junel 1.5/30 (21) [Norethindrone Ac-Eth Estradiol] Swelling     L FOOT DUE TO NEW DVT    Other Medication Hives and Rash     SUN    Pcn [Penicillins] Hives    Prednisone Hives    Sulfa (Sulfonamide Antibiotics) Hives    Garamycin [Gentamicin] Other (comments)     Itchy eyes due to sulfate    Metformin Diarrhea        Review of Systems: A complete review of systems was obtained, negative except as described above. Physical Exam:     Visit Vitals  BP 97/66 (BP 1 Location: Left arm, BP Patient Position: Sitting)   Pulse 97   Temp 98.8 °F (37.1 °C) (Oral)   Resp 16   Ht 5' 1\" (1.549 m)   Wt 312 lb (141.5 kg)   SpO2 98%   BMI 58.95 kg/m²     ECOG PS: 1  General: No distress, obese  Eyes: PERRLA, anicteric sclerae  HENT: Atraumatic, OP clear  Neck: Supple  Lymphatic: No cervical, supraclavicular, or inguinal adenopathy  Respiratory: CTAB, normal respiratory effort  CV: Normal rate, regular rhythm, no murmurs, no peripheral edema  GI: Soft, nontender, nondistended, no masses, no hepatomegaly, no splenomegaly  MS: Normal gait and station. Digits without clubbing or cyanosis. Skin: No rashes, ecchymoses, or petechiae. Normal temperature, turgor, and texture. Psych: Alert, oriented, appropriate affect, normal judgment/insight    Results:     Lab Results   Component Value Date/Time    WBC 8.3 03/18/2019 03:13 PM    HGB 12.4 03/18/2019 03:13 PM    HCT 38.7 03/18/2019 03:13 PM    PLATELET 962 90/02/8389 03:13 PM    MCV 88 03/18/2019 03:13 PM    ABS. NEUTROPHILS 4.7 03/18/2019 03:13 PM     Lab Results   Component Value Date/Time    Sodium 143 12/04/2018 08:13 AM    Potassium 4.5 12/04/2018 08:13 AM    Chloride 105 12/04/2018 08:13 AM    CO2 21 12/04/2018 08:13 AM    Glucose 124 (H) 12/04/2018 08:13 AM    BUN 13 12/04/2018 08:13 AM    Creatinine 0.81 12/04/2018 08:13 AM    GFR est AA 98 12/04/2018 08:13 AM    GFR est non-AA 85 12/04/2018 08:13 AM    Calcium 9.0 12/04/2018 08:13 AM    Glucose (POC) 97 09/24/2011 12:06 PM     Lab Results   Component Value Date/Time    Bilirubin, total <0.2 12/04/2018 08:13 AM    ALT (SGPT) 14 12/04/2018 08:13 AM    AST (SGOT) 13 12/04/2018 08:13 AM    Alk.  phosphatase 77 12/04/2018 08:13 AM    Protein, total 6.4 12/04/2018 08:13 AM    Albumin 4.1 12/04/2018 08:13 AM    Globulin 3.4 05/19/2010 10:46 AM         Records reviewed and summarized above. Pathology report(s) reviewed above. Radiology report(s) reviewed above. Duplex reviewed      Assessment:   1) Provoked DVT    12/18- Symptomatic and extensive  Provoked by OCPs and possibly compounded by BMI    Estimated risk of recurrence after the first VTE provoked by non-surgical factor - 5 percent for the first year; 2.5 percent/year thereafter    Per ACCP guidelines 3 months of uninterrupted anticoagulation is recommended which she has completed    She may stop Xarelto  Her personal and family history is not suggestive of an inherited or acquired thrombophilia    She has some leg cramping but does not have significant post phlebitic syndrome      2) H/O Anemia  Resolved    3) Morbid obesity  Counseled on a healthy life style    4) HM  Counseled on the importance of a screening colonoscopy  She will get this scheduled      Plan:     · Stop Xarelto  · She will schedule a screening colonoscopy  · She will see me in 1 year    I appreciate the opportunity to participate in [address] [facility].     Signed By: Saritha Cunningham MD

## 2019-06-03 DIAGNOSIS — L29.9 PRURITIC DERMATITIS: ICD-10-CM

## 2019-06-05 NOTE — TELEPHONE ENCOUNTER
PCP: Evin Moore DO    Last appt: 4/18/2019  Future Appointments   Date Time Provider Cindy Roblesi   7/30/2019  1:30 PM DO DARCY Bonilla   4/28/2020  9:00 AM Melany Whitten MD [address]       Requested Prescriptions     Pending Prescriptions Disp Refills    hydrOXYzine HCl (ATARAX) 25 mg tablet [Pharmacy Med Name: HYDROXYZINE HCL 25 MG TABLET] 90 Tab 1     Sig: TAKE 2 TABLETS BY MOUTH EVERY NIGHT& AS NEEDED DURING DAY FOR ITCHING       Prior labs and Blood pressures:  BP Readings from Last 3 Encounters:   04/26/19 97/66   04/18/19 128/82   03/18/19 127/81     Lab Results   Component Value Date/Time    Sodium 143 12/04/2018 08:13 AM    Potassium 4.5 12/04/2018 08:13 AM    Chloride 105 12/04/2018 08:13 AM    CO2 21 12/04/2018 08:13 AM    Anion gap 10 09/24/2011 02:30 AM    Glucose 124 (H) 12/04/2018 08:13 AM    BUN 13 12/04/2018 08:13 AM    Creatinine 0.81 12/04/2018 08:13 AM    BUN/Creatinine ratio 16 12/04/2018 08:13 AM    GFR est AA 98 12/04/2018 08:13 AM    GFR est non-AA 85 12/04/2018 08:13 AM    Calcium 9.0 12/04/2018 08:13 AM     Lab Results   Component Value Date/Time    Hemoglobin A1c 6.2 (H) 12/04/2018 08:13 AM     Lab Results   Component Value Date/Time    Cholesterol, total 182 12/04/2018 08:13 AM    HDL Cholesterol 60 12/04/2018 08:13 AM    LDL, calculated 100 (H) 12/04/2018 08:13 AM    VLDL, calculated 22 12/04/2018 08:13 AM    Triglyceride 111 12/04/2018 08:13 AM    CHOL/HDL Ratio 2.5 05/19/2010 10:46 AM     Lab Results   Component Value Date/Time    Vitamin D 25-Hydroxy 32.2 08/11/2011 08:15 AM    VITAMIN D, 25-HYDROXY 33.2 12/04/2018 08:13 AM       Lab Results   Component Value Date/Time    TSH 2.900 12/04/2018 08:13 AM

## 2019-06-07 RX ORDER — HYDROXYZINE 25 MG/1
TABLET, FILM COATED ORAL
Qty: 90 TAB | Refills: 1 | Status: SHIPPED | OUTPATIENT
Start: 2019-06-07 | End: 2019-07-30 | Stop reason: SDUPTHER

## 2019-06-15 DIAGNOSIS — I82.412 DEEP VENOUS THROMBOSIS OF LEFT PROFUNDA FEMORIS VEIN (HCC): ICD-10-CM

## 2019-06-17 NOTE — TELEPHONE ENCOUNTER
PCP: Dejuan Sylvester DO    Last appt: 4/18/2019  Future Appointments   Date Time Provider Cindy Stephanie   7/30/2019  1:30 PM Dejuan Sylvester DO [facility] SCHED   11/8/2019  8:20 AM LAB [facility] SCHED   11/15/2019 11:30 AM Kvng NAVA DO [facility] SCHED   4/28/2020  9:00 AM Estevan Whitten MD [facility] [address]       Requested Prescriptions     Pending Prescriptions Disp Refills    XARELTO 20 mg tab tablet [Pharmacy Med Name: Merchant Surendra 20 MG TABLET] 30 Tab 2     Sig: TAKE 1 TABLET BY MOUTH EVERY DAY       Prior labs and Blood pressures:  BP Readings from Last 3 Encounters:   04/26/19 97/66   04/18/19 128/82   03/18/19 127/81     Lab Results   Component Value Date/Time    Sodium 143 12/04/2018 08:13 AM    Potassium 4.5 12/04/2018 08:13 AM    Chloride 105 12/04/2018 08:13 AM    CO2 21 12/04/2018 08:13 AM    Anion gap 10 09/24/2011 02:30 AM    Glucose 124 (H) 12/04/2018 08:13 AM    BUN 13 12/04/2018 08:13 AM    Creatinine 0.81 12/04/2018 08:13 AM    BUN/Creatinine ratio 16 12/04/2018 08:13 AM    GFR est AA 98 12/04/2018 08:13 AM    GFR est non-AA 85 12/04/2018 08:13 AM    Calcium 9.0 12/04/2018 08:13 AM     Lab Results   Component Value Date/Time    Hemoglobin A1c 6.2 (H) 12/04/2018 08:13 AM     Lab Results   Component Value Date/Time    Cholesterol, total 182 12/04/2018 08:13 AM    HDL Cholesterol 60 12/04/2018 08:13 AM    LDL, calculated 100 (H) 12/04/2018 08:13 AM    VLDL, calculated 22 12/04/2018 08:13 AM    Triglyceride 111 12/04/2018 08:13 AM    CHOL/HDL Ratio 2.5 05/19/2010 10:46 AM     Lab Results   Component Value Date/Time    Vitamin D 25-Hydroxy 32.2 08/11/2011 08:15 AM    VITAMIN D, 25-HYDROXY 33.2 12/04/2018 08:13 AM       Lab Results   Component Value Date/Time    TSH 2.900 12/04/2018 08:13 AM

## 2019-06-20 RX ORDER — RIVAROXABAN 20 MG/1
TABLET, FILM COATED ORAL
Qty: 30 TAB | Refills: 2 | Status: SHIPPED | OUTPATIENT
Start: 2019-06-20 | End: 2019-07-08

## 2019-07-02 ENCOUNTER — PATIENT MESSAGE (OUTPATIENT)
Dept: FAMILY MEDICINE CLINIC | Age: 49
End: 2019-07-02

## 2019-07-02 ENCOUNTER — OFFICE VISIT (OUTPATIENT)
Dept: FAMILY MEDICINE CLINIC | Age: 49
End: 2019-07-02

## 2019-07-02 VITALS
WEIGHT: 293 LBS | DIASTOLIC BLOOD PRESSURE: 73 MMHG | BODY MASS INDEX: 55.32 KG/M2 | RESPIRATION RATE: 18 BRPM | TEMPERATURE: 97.9 F | HEART RATE: 60 BPM | OXYGEN SATURATION: 96 % | SYSTOLIC BLOOD PRESSURE: 122 MMHG | HEIGHT: 61 IN

## 2019-07-02 DIAGNOSIS — I82.412 DEEP VENOUS THROMBOSIS OF LEFT PROFUNDA FEMORIS VEIN (HCC): ICD-10-CM

## 2019-07-02 DIAGNOSIS — R63.4 WEIGHT LOSS: ICD-10-CM

## 2019-07-02 DIAGNOSIS — Z82.3 FAMILY HISTORY OF STROKE: ICD-10-CM

## 2019-07-02 DIAGNOSIS — Z91.02 SULFITE ALLERGY: ICD-10-CM

## 2019-07-02 DIAGNOSIS — E66.01 OBESITY, CLASS III, BMI 40-49.9 (MORBID OBESITY) (HCC): ICD-10-CM

## 2019-07-02 DIAGNOSIS — Z88.0 PENICILLIN ALLERGY: ICD-10-CM

## 2019-07-02 DIAGNOSIS — J01.81 OTHER ACUTE RECURRENT SINUSITIS: Primary | ICD-10-CM

## 2019-07-02 DIAGNOSIS — Z82.49 FAMILY HISTORY OF HEART ATTACK: ICD-10-CM

## 2019-07-02 DIAGNOSIS — M54.2 NECK PAIN ON RIGHT SIDE: ICD-10-CM

## 2019-07-02 DIAGNOSIS — R73.9 HYPERGLYCEMIA: ICD-10-CM

## 2019-07-02 DIAGNOSIS — H53.8 BLURRY VISION, RIGHT EYE: ICD-10-CM

## 2019-07-02 DIAGNOSIS — E03.4 HYPOTHYROIDISM DUE TO ACQUIRED ATROPHY OF THYROID: ICD-10-CM

## 2019-07-02 DIAGNOSIS — M25.561 ACUTE PAIN OF RIGHT KNEE: ICD-10-CM

## 2019-07-02 DIAGNOSIS — Z78.9 MEDICATION INTOLERANCE: ICD-10-CM

## 2019-07-02 DIAGNOSIS — G43.009 MIGRAINE WITHOUT AURA AND WITHOUT STATUS MIGRAINOSUS, NOT INTRACTABLE: ICD-10-CM

## 2019-07-02 RX ORDER — DOXYCYCLINE 100 MG/1
100 TABLET ORAL 2 TIMES DAILY
Qty: 14 TAB | Refills: 0 | Status: SHIPPED | OUTPATIENT
Start: 2019-07-02 | End: 2019-07-09

## 2019-07-02 NOTE — PATIENT INSTRUCTIONS
Neck: Exercises  Your Care Instructions  Here are some examples of typical rehabilitation exercises for your condition. Start each exercise slowly. Ease off the exercise if you start to have pain. Your doctor or physical therapist will tell you when you can start these exercises and which ones will work best for you. How to do the exercises  Neck stretch    1. This stretch works best if you keep your shoulder down as you lean away from it. To help you remember to do this, start by relaxing your shoulders and lightly holding on to your thighs or your chair. 2. Tilt your head toward your shoulder and hold for 15 to 30 seconds. Let the weight of your head stretch your muscles. 3. If you would like a little added stretch, use your hand to gently and steadily pull your head toward your shoulder. For example, keeping your right shoulder down, lean your head to the left. 4. Repeat 2 to 4 times toward each shoulder. Diagonal neck stretch    1. Turn your head slightly toward the direction you will be stretching, and tilt your head diagonally toward your chest and hold for 15 to 30 seconds. 2. If you would like a little added stretch, use your hand to gently and steadily pull your head forward on the diagonal.  3. Repeat 2 to 4 times toward each side. Dorsal glide stretch    1. Sit or stand tall and look straight ahead. 2. Slowly tuck your chin as you glide your head backward over your body  3. Hold for a count of 6, and then relax for up to 10 seconds. 4. Repeat 8 to 12 times. Chest and shoulder stretch    1. Sit or stand tall and glide your head backward as in the dorsal glide stretch. 2. Raise both arms so that your hands are next to your ears. 3. Take a deep breath, and as you breathe out, lower your elbows down and behind your back. You will feel your shoulder blades slide down and together, and at the same time you will feel a stretch across your chest and the front of your shoulders.   4. Hold for about 6 seconds, and then relax for up to 10 seconds. 5. Repeat 8 to 12 times. Strengthening: Hands on head    1. Move your head backward, forward, and side to side against gentle pressure from your hands, holding each position for about 6 seconds. 2. Repeat 8 to 12 times. Follow-up care is a key part of your treatment and safety. Be sure to make and go to all appointments, and call your doctor if you are having problems. It's also a good idea to know your test results and keep a list of the medicines you take. Where can you learn more? Go to http://alejandro-eddie.info/. Enter P975 in the search box to learn more about \"Neck: Exercises. \"  Current as of: September 20, 2018  Content Version: 11.9  © 0730-1748 [employer]. Care instructions adapted under license by [employer] (which disclaims liability or warranty for this information). If you have questions about a medical condition or this instruction, always ask your healthcare professional. [address] any warranty or liability for your use of this information. Healthy Upper Back: Exercises  Your Care Instructions  Here are some examples of exercises for your upper back. Start each exercise slowly. Ease off the exercise if you start to have pain. Your doctor or physical therapist will tell you when you can start these exercises and which ones will work best for you. How to do the exercises  Lower neck and upper back stretch    5. Stretch your arms out in front of your body. Clasp one hand on top of your other hand. 6. Gently reach out so that you feel your shoulder blades stretching away from each other. 7. Gently bend your head forward. 8. Hold for 15 to 30 seconds. 9. Repeat 2 to 4 times. Midback stretch    4. Kneel on the floor, and sit back on your ankles. 5. Lean forward, place your hands on the floor, and stretch your arms out in front of you.  Rest your head between your arms. 6. Gently push your chest toward the floor, reaching as far in front of you as possible. 7. Hold for 15 to 30 seconds. 8. Repeat 2 to 4 times. Shoulder rolls    5. Sit comfortably with your feet shoulder-width apart. You can also do this exercise while standing. 6. Roll your shoulders up, then back, and then down in a smooth, circular motion. 7. Repeat 2 to 4 times. Wall push-up    6. Stand against a wall with your feet about 12 to 24 inches back from the wall. If you feel any pain when you do this exercise, stand closer to the wall. 7. Place your hands on the wall slightly wider apart than your shoulders, and lean forward. 8. Gently lean your body toward the wall. Then push back to your starting position. Keep the motion smooth and controlled. 9. Repeat 8 to 12 times. Resisted shoulder blade squeeze    3. Sit or stand, holding the band in both hands in front of you. Keep your elbows close to your sides, bent at a 90-degree angle. Your palms should face up. 4. Squeeze your shoulder blades together, and move your arms to the outside, stretching the band. Be sure to keep your elbows at your sides while you do this. 5. Relax. 6. Repeat 8 to 12 times. Resisted rows    1. Put the band around a solid object, such as a bedpost, at about waist level. Hold one end of the band in each hand. 2. With your elbows at your sides and bent to 90 degrees, pull the band back to move your shoulder blades toward each other. Return to the starting position. 3. Repeat 8 to 12 times. Follow-up care is a key part of your treatment and safety. Be sure to make and go to all appointments, and call your doctor if you are having problems. It's also a good idea to know your test results and keep a list of the medicines you take. Where can you learn more? Go to http://alejandro-eddie.info/. Enter S342 in the search box to learn more about \"Healthy Upper Back: Exercises. \"  Current as of: September 20, 2018  Content Version: 11.9  © 2155-7789 [employer]. Care instructions adapted under license by [employer] (which disclaims liability or warranty for this information). If you have questions about a medical condition or this instruction, always ask your healthcare professional. [address] any warranty or liability for your use of this information.

## 2019-07-02 NOTE — TELEPHONE ENCOUNTER
From: Luci Kay  To: Jany Velazquez, DO  Sent: 7/2/2019 8:54 AM EDT  Subject: Referral Request    Hello,    I attempted to make an appointment today 7/2/19 but was unsuccessful. I have been having migraines more severe recently and a pain in my neck the last few weeks. I would like a referral for an ultrasound of my carotid artery to confirm I do not have any issues or any type of blockage due to my recent history of clots. There is a [employer] imaging located at [facility]. Please contact me at [phone]. I am just very concerned and want to rule out any issues. Thank you.

## 2019-07-02 NOTE — PROGRESS NOTES
HISTORY OF PRESENT ILLNESS  Marisa Rucker is a 46 y.o. female presents with Headache (Rm 13 Acute care); Knee Pain; Referral Follow Up; and Blood Clot    Agree with nurse note. Pt with acute pain of R knee, obesity, hyperglycemia, hypothyroidism, penicllin allergy, steroid medication intolerance, sulfite allergy, acute recurrent sinusitis, and family hx of stroke and heart attack presents to the office with a BP of 122/73. Pt with a hx of migraine without aura and without status migrainosus has had a migraine for the last week that is worse than her other migraines in the past. Her other migraines have been on the R side as well but they were located more in her temple. She reports that the pain is behind the R eye and some R ear pain x1 week. Her headache worsened yesterday. She took a Fioricet with minimal relief. She has blurry vision in her R eye. When she bends her head forward she felt pressure on the back of her eye. She has nose congestion on the R side in the mornings and yellow nasal drainage throughout the day. Patient denies fever, chills, dizziness, post nasal drainage, sore throat, itchy or watery eyes, chest pain or tightness, SOB, wheezing, cough, GI symptoms, bladder symptoms and body aches. On 4/18/2017, pt saw Dr. Cher Bates for headaches x10 years. L blurry vision and L facial numbness. She wanted her to stop birth control (Ceralto) due to risk of TIA. Pt stopped in 12/2017. She recommended Fioricet or Excedrin for the pain. To recall, pt saw Dr. Shana Jaimes for follow up of DVT of the L profunda femoris vein provoked by birth control and weight. Per ACCP guidelines, she was on 3 months of anticoagulants. She is off of the blood thinner after hematology cleared her. Pt has had R neck pain since a few weeks. Her neck started hurting when she was getting out of the car. She could feel her neck \"pulling\".  The pain resolved but came back 2 weeks later and has been present intermittently. She had neck muscle spasms this morning. The neck pain worsens when she turns her head to the L. She is worried she has another blood clot because of the neck pain. She got a massage on 6/29/2019 that helped with her R knee pain. She did not have her neck done. Pt has lost 15 lbs since 3/18/2019. She has been walking during the week and watching what she eats. Written by tracy Portillo, as dictated by Dr. Rhoda Felton DO.    ROS    Review of Systems negative except as noted above in HPI. ALLERGIES:    Allergies   Allergen Reactions    Latex Rash    Junel 1.5/30 (21) [Norethindrone Ac-Eth Estradiol] Swelling     L FOOT DUE TO NEW DVT    Other Medication Hives and Rash     SUN    Pcn [Penicillins] Hives    Prednisone Hives    Sulfa (Sulfonamide Antibiotics) Hives    Garamycin [Gentamicin] Other (comments)     Itchy eyes due to sulfate    Metformin Diarrhea       CURRENT MEDICATIONS:    Outpatient Medications Marked as Taking for the 7/2/19 encounter (Office Visit) with Cr Corbett DO   Medication Sig Dispense Refill    doxycycline (ADOXA) 100 mg tablet Take 1 Tab by mouth two (2) times a day for 7 days. Indications: Sinus Irritation and Congestion 14 Tab 0    hydrOXYzine HCl (ATARAX) 25 mg tablet TAKE 2 TABLETS BY MOUTH EVERY NIGHT& AS NEEDED DURING DAY FOR ITCHING 90 Tab 1    hydrOXYzine HCl (ATARAX) 25 mg tablet TAKE 2 TABLETS BY MOUTH EVERY NIGHT& AS NEEDED DURING DAY FOR ITCHING  Indications: anxious 180 Tab 1    omeprazole (PRILOSEC) 40 mg capsule Take 1 Cap by mouth daily. 30 Cap 2    albuterol (PROVENTIL HFA, VENTOLIN HFA, PROAIR HFA) 90 mcg/actuation inhaler Take 2 Puffs by inhalation every four (4) hours as needed for Wheezing. 1 Inhaler 5    levothyroxine (SYNTHROID) 125 mcg tablet Take 1 Tab by mouth Daily (before breakfast).  90 Tab 3    butalbital-acetaminophen-caffeine (FIORICET, ESGIC) -40 mg per tablet TAKE 1 TAB BY MOUTH EVERY SIX (6) HOURS AS NEEDED FOR PAIN OR HEADACHE. 40 Tab 0    montelukast (SINGULAIR) 10 mg tablet Take 1 Tab by mouth daily. 30 Tab 11    azelastine (ASTELIN) 137 mcg (0.1 %) nasal spray 2 Sprays by Both Nostrils route two (2) times a day. Use in each nostril as directed  Indications: SEASONAL ALLERGIC RHINITIS 1 Bottle 5    Nebulizer & Compressor machine 1 Each by Other route four (4) times daily as needed. 1 Each 0    multivitamin (ONE A DAY) tablet Take 1 tablet by mouth daily.  PROPYLENE GLYCOL//PF (SYSTANE, PF, OP) Apply  to eye four (4) times daily.  magnesium oxide (MAG-OX) 400 mg tablet Take 400 mg by mouth daily.  Emollient Combination No.32 (EPICERAM) Emul by Apply Externally route. From Dr Zunilda Lozada.  metroNIDAZOLE (METROGEL) 1 % topical gel Apply  to affected area daily. Use a thin layer to affected areas after washing from Dr Zunilda Lozada   Indications: ACNE ROSACEA      latanoprost (XALATAN) 0.005 % ophthalmic solution Administer 1 Drop to both eyes nightly.  LACTOBACILLUS/FOS/PECTIN (PROBIOTIC COMPLEX PO) Take  by mouth daily.  cholecalciferol, vitamin d3, (VITAMIN D) 1,000 unit tablet Take 1,000 Units by mouth daily. PAST MEDICAL HISTORY:    Past Medical History:   Diagnosis Date    Acne rosacea     Dr. Zunilda Lozada.  Allergy, unspecified not elsewhere classified childhood     Txd with immunotherapy. Dr. Shlomo Novoa Anemia NEC     borderline    Ankle sprain 2007    Right. Dr. Esthela Harrison    Ankle sprain 11/2012    Right. Dr. James Rodriguez.  Asthma childhood    Chickenpox childhood    Chronic low back pain with right-sided sciatica     and SI joint dysfunction. Dr. Lillie De Jesus.  Chronic otitis media     Dr. Shlomo Novoa Dry eye syndrome 2013    Dr. Rekha Mcallister.     [facility]2018    Acute occlusive DVT of L deep femoral vein and Acute partially occlusive DVT of L Common Fem Vein and L Prox Fem Vein. Txd with Xarelto x 3 months. due to BCP. Dr. Tomasa Talamantes EBV infection 6/27/2011    Hearing loss     Mild high frequency sensorineuronal hearing loss. Dr. Merle Cordero murmur     Hernia of abdominal wall 09/2003, 50/2296    umbilical.  Dr. Chary Cho. Dr. Davey Judge    Hyperglycemia 2013    Hypothyroidism 06/2010    Dr. Edmond Pierce pressure increase 12/2011    Dr. Emily uLis. Dr. Donal Collado. Dr. Catalina Sanchez.  Knee pain, left 04/2012    Left. Dr. Kam Miss Measles childhood    Migraine 2017    Dr. Danisha Bermeo Mumps childhood    Plantar fasciitis, bilateral 2010    Dr. Tsai [facility] Sciatica 2008    Right.  with OA. Dr. Fabio Anders Tinnitus of right ear 2012    Dr. Mignon Elam:    Past Surgical History:   Procedure Laterality Date   Lesly [place]  2011    Dr. Kassie Null.  HAND/FINGER SURGERY UNLISTED  09/2003    Right Index finger repair due to cut    HX HERNIA REPAIR  63/6338    Umbilical.  Dr. Chary Cho.  HX HERNIA REPAIR  9/23/2011    recurrent umbilical.  Laparoscopy incisional.  Dr. Davey Judge.     HX TONSILLECTOMY  childhood    LAP,CHOLECYSTECTOMY  07/2001       FAMILY HISTORY:    Family History   Problem Relation Age of Onset    Hypertension Mother     Cancer Mother         small cell lung with bone mets to spine/MELANOMA    Cancer Father         melanoma    Arthritis-osteo Father     Colon Polyps Father     Diabetes Maternal Grandmother     Stroke Maternal Grandmother     Seizures Maternal Grandmother     Colon Polyps Maternal Grandmother     Breast Cancer Maternal Grandmother     Heart Disease Paternal Grandmother         heart failure    Heart Attack Paternal Grandmother     Asthma Paternal Grandmother        SOCIAL HISTORY:    Social History     Socioeconomic History    Marital status: SINGLE     Spouse name: Not on file    Number of children: Not on file    Years of education: Not on file    Highest education level: Not on file   Tobacco Use    Smoking status: Passive Smoke Exposure - Never Smoker    Smokeless tobacco: Never Used    Tobacco comment: lived with smoker dad and mom then step mom x 20 yrs   Substance and Sexual Activity    Alcohol use: Yes     Alcohol/week: 0.6 oz     Types: 1 Glasses of wine per week     Frequency: Monthly or less     Drinks per session: 1 or 2     Binge frequency: Never     Comment: RARE    Drug use: No    Sexual activity: Yes     Partners: Male     Birth control/protection: Abstinence, Pill   Lifestyle    Physical activity:     Days per week: 4 days     Minutes per session: 30 min    Stress: Only a little       IMMUNIZATIONS:    Immunization History   Administered Date(s) Administered    Influenza Vaccine 10/15/2013, 11/07/2016, 11/03/2017    Influenza Vaccine (Quad) 11/03/2015    Influenza Vaccine (Quad) PF 11/20/2014, 11/07/2016, 11/12/2018    Influenza Vaccine Split 11/18/2011, 11/07/2012    Influenza Vaccine Whole 10/01/2010    TD Vaccine 09/01/2003    Tdap 11/03/2015         PHYSICAL EXAMINATION    Vital Signs    Visit Vitals  /73   Pulse 60   Temp 97.9 °F (36.6 °C)   Resp 18   Ht 5' 1\" (1.549 m)   Wt 303 lb 4.8 oz (137.6 kg)   SpO2 96%   BMI 57.31 kg/m²       Weight Metrics 7/2/2019 4/26/2019 4/18/2019 3/18/2019 1/24/2019 12/17/2018 11/21/2018   Weight 303 lb 4.8 oz 312 lb 314 lb 11.2 oz 318 lb 9.6 oz 328 lb 14.4 oz 323 lb 14.4 oz 334 lb   BMI 57.31 kg/m2 58.95 kg/m2 59.46 kg/m2 60.2 kg/m2 60.53 kg/m2 59.24 kg/m2 61.09 kg/m2       General appearance - Well nourished. Well appearing. Well developed. No acute distress. Obese. Head - Normocephalic. Atraumatic. Eyes - pupils equal and reactive. Extraocular eye movements intact. Sclera anicteric. Mildly injected sclera. Ears - Hearing is grossly normal bilaterally. Nose - normal and patent. No polyps noted.   No erythema. No discharge. Mouth - mucous membranes with adequate moisture. Posterior pharynx normal with cobblestone appearance. No erythema, white exudate or obstruction. Neck - supple. Midline trachea. No carotid bruits noted bilaterally. No thyromegaly noted. Increased muscle tension in L upper trapezius muscle. Chest - clear to auscultation bilaterally anteriorly and posteriorly. No wheezes. No rales or rhonchi. Breath sounds are symmetrical bilaterally. Unlabored respirations. Heart - normal rate. Regular rhythm. Normal S1, S2. No murmur noted. No rubs, clicks or gallops noted. Abdomen - soft and distended. No masses or organomegaly. No rebound, rigidity or guarding. Bowel sounds normal x 4 quadrants. No tenderness noted. Neurological - awake, alert and oriented to person, place, and time and event. Cranial nerves II through XII intact. Clear speech. Muscle strength is +5/5 x 4 extremities. Sensation is intact to light touch bilaterally. Heme/Lymph - peripheral pulses normal x 4 extremities. No peripheral edema is noted. Musculoskeletal - Intact x 4 extremities. Full ROM x 4 extremities. No pain with movement. Noted stiffness with standing and walking after being seated. Back exam - normal range of motion. No pain on palpation of the spinous processes in the cervical, thoracic, lumbar, sacral regions. No CVA tenderness. Skin - no rashes, erythema, ecchymosis, lacerations, abrasions, suspicious moles noted  Psychological -   normal behavior, dress and thought processes. Good insight. Good eye contact. Normal affect. Appropriate mood. Normal speech.       DATA REVIEWED    Lab Results   Component Value Date/Time    WBC 8.3 03/18/2019 03:13 PM    HGB 12.4 03/18/2019 03:13 PM    HCT 38.7 03/18/2019 03:13 PM    PLATELET 059 83/33/3418 03:13 PM    MCV 88 03/18/2019 03:13 PM     Lab Results   Component Value Date/Time    Sodium 143 12/04/2018 08:13 AM    Potassium 4.5 12/04/2018 08:13 AM    Chloride 105 12/04/2018 08:13 AM    CO2 21 12/04/2018 08:13 AM    Anion gap 10 09/24/2011 02:30 AM    Glucose 124 (H) 12/04/2018 08:13 AM    BUN 13 12/04/2018 08:13 AM    Creatinine 0.81 12/04/2018 08:13 AM    BUN/Creatinine ratio 16 12/04/2018 08:13 AM    GFR est AA 98 12/04/2018 08:13 AM    GFR est non-AA 85 12/04/2018 08:13 AM    Calcium 9.0 12/04/2018 08:13 AM    Bilirubin, total <0.2 12/04/2018 08:13 AM    AST (SGOT) 13 12/04/2018 08:13 AM    Alk. phosphatase 77 12/04/2018 08:13 AM    Protein, total 6.4 12/04/2018 08:13 AM    Albumin 4.1 12/04/2018 08:13 AM    Globulin 3.4 05/19/2010 10:46 AM    A-G Ratio 1.8 12/04/2018 08:13 AM    ALT (SGPT) 14 12/04/2018 08:13 AM     Lab Results   Component Value Date/Time    Cholesterol, total 182 12/04/2018 08:13 AM    HDL Cholesterol 60 12/04/2018 08:13 AM    LDL, calculated 100 (H) 12/04/2018 08:13 AM    VLDL, calculated 22 12/04/2018 08:13 AM    Triglyceride 111 12/04/2018 08:13 AM    CHOL/HDL Ratio 2.5 05/19/2010 10:46 AM     Lab Results   Component Value Date/Time    Vitamin D 25-Hydroxy 32.2 08/11/2011 08:15 AM    VITAMIN D, 25-HYDROXY 33.2 12/04/2018 08:13 AM       Lab Results   Component Value Date/Time    Hemoglobin A1c 6.2 (H) 12/04/2018 08:13 AM     Lab Results   Component Value Date/Time    TSH 2.900 12/04/2018 08:13 AM        ASSESSMENT and PLAN      ICD-10-CM ICD-9-CM    1. Other acute recurrent sinusitis J01.81 461.9 doxycycline (ADOXA) 100 mg tablet   2. Migraine without aura and without status migrainosus, not intractable G43.009 346.10     recurrent   3. Deep venous thrombosis of left profunda femoris vein (HCC) I82.412 453.41     due to BCP and BMI, resolved since tx Xarelto x 3 months   4. Acute pain of right knee M25.561 719.46     improved after massage   5. Neck pain on right side M54.2 723.1     due to somatic dysfunction vs other   6. Blurry vision, right eye H53.8 368.8     x 1 week due to dry eyes vs other   7.  Family history of stroke Z82.3 V17.1    8. Family history of heart attack Z82.49 V17.3    9. Hypothyroidism due to acquired atrophy of thyroid E03.4 244.8      246.8     stable on meds   10. Weight loss R63.4 783.21     15# since 03/2019 due to diet changes vs other   11. Obesity, Class III, BMI 40-49.9 (morbid obesity) ([facility]) E66.01 278.01    12. Hyperglycemia R73.9 790.29     stable   13. Penicillin allergy Z88.0 V14.0    14. Sulfite allergy Z91.02 V14.8    15. Medication intolerance Z78.9 995.27     oral steroid       Discussed the patient's BMI with her. The BMI follow up plan is as follows: I have counseled this patient on diet and exercise regimens  Decrease carbohydrates (white foods, sweet foods, sweet drinks and alcohol), increase green leafy vegetables and protein (lean meats and beans) with each meal.  Avoid fried foods. Eat 3-5 small meals daily. Do not skip meals. Increase water intake. Increase physical activity to 30 minutes daily for health benefit or 60 minutes daily to prevent weight regain, as tolerated. Get 7-8 hours uninterrupted sleep nightly. Chart reviewed and updated. Continue current medications and care. Stop prophylactic doxycycline and increased dosage Doxycycline 100 mg BID x 7 days for sinusitis. Use Flexeril 1 pill up to TID for the neck spasm. Perform the home neck and back exercises. Alternate heat and ice compresses on the neck. Recommend pt see a chiropractor for neck realignment. Follow up with neurologist, Dr. Luis A Rivas to evaluate HA, eye pain. Also see ophthalmologist for eye pain. Prescriptions written and side-effects discussed. Doxycycline 100 mg  Recent office visit notes from Dr. Facundo Castro reviewed. Counseled patient on health concerns:  Migraine, congestion, neck pain, DVT, ADRs of Doxycycline. Relevant handouts given and discussed with patient. Immunizations noted. Offered empathy, support, legitimation, prayers, partnership to patient.   Praised patient for progress. Follow-up and Dispositions    · Return if symptoms worsen or fail to improve. Patient was offered a choice/choices in the treatment plan today. Patient expresses understanding of the plan and agrees with recommendations. Written by tracy Li, as dictated by Dr. Joaquin Delaney DO. Documentation True and Accepted by Jose Luis Strickland. Aniya Storey. Patient Instructions        Neck: Exercises  Your Care Instructions  Here are some examples of typical rehabilitation exercises for your condition. Start each exercise slowly. Ease off the exercise if you start to have pain. Your doctor or physical therapist will tell you when you can start these exercises and which ones will work best for you. How to do the exercises  Neck stretch    1. This stretch works best if you keep your shoulder down as you lean away from it. To help you remember to do this, start by relaxing your shoulders and lightly holding on to your thighs or your chair. 2. Tilt your head toward your shoulder and hold for 15 to 30 seconds. Let the weight of your head stretch your muscles. 3. If you would like a little added stretch, use your hand to gently and steadily pull your head toward your shoulder. For example, keeping your right shoulder down, lean your head to the left. 4. Repeat 2 to 4 times toward each shoulder. Diagonal neck stretch    1. Turn your head slightly toward the direction you will be stretching, and tilt your head diagonally toward your chest and hold for 15 to 30 seconds. 2. If you would like a little added stretch, use your hand to gently and steadily pull your head forward on the diagonal.  3. Repeat 2 to 4 times toward each side. Dorsal glide stretch    1. Sit or stand tall and look straight ahead. 2. Slowly tuck your chin as you glide your head backward over your body  3. Hold for a count of 6, and then relax for up to 10 seconds.   4. Repeat 8 to 12 times.    Chest and shoulder stretch    1. Sit or stand tall and glide your head backward as in the dorsal glide stretch. 2. Raise both arms so that your hands are next to your ears. 3. Take a deep breath, and as you breathe out, lower your elbows down and behind your back. You will feel your shoulder blades slide down and together, and at the same time you will feel a stretch across your chest and the front of your shoulders. 4. Hold for about 6 seconds, and then relax for up to 10 seconds. 5. Repeat 8 to 12 times. Strengthening: Hands on head    1. Move your head backward, forward, and side to side against gentle pressure from your hands, holding each position for about 6 seconds. 2. Repeat 8 to 12 times. Follow-up care is a key part of your treatment and safety. Be sure to make and go to all appointments, and call your doctor if you are having problems. It's also a good idea to know your test results and keep a list of the medicines you take. Where can you learn more? Go to http://alejandro-eddie.info/. Enter P975 in the search box to learn more about \"Neck: Exercises. \"  Current as of: September 20, 2018  Content Version: 11.9  © 5657-5948 [employer]. Care instructions adapted under license by [employer] (which disclaims liability or warranty for this information). If you have questions about a medical condition or this instruction, always ask your healthcare professional. [place] [address] any warranty or liability for your use of this information. Healthy Upper Back: Exercises  Your Care Instructions  Here are some examples of exercises for your upper back. Start each exercise slowly. Ease off the exercise if you start to have pain. Your doctor or physical therapist will tell you when you can start these exercises and which ones will work best for you. How to do the exercises  Lower neck and upper back stretch    5.  Stretch your arms out in front of your body. Clasp one hand on top of your other hand. 6. Gently reach out so that you feel your shoulder blades stretching away from each other. 7. Gently bend your head forward. 8. Hold for 15 to 30 seconds. 9. Repeat 2 to 4 times. Midback stretch    4. Kneel on the floor, and sit back on your ankles. 5. Lean forward, place your hands on the floor, and stretch your arms out in front of you. Rest your head between your arms. 6. Gently push your chest toward the floor, reaching as far in front of you as possible. 7. Hold for 15 to 30 seconds. 8. Repeat 2 to 4 times. Shoulder rolls    5. Sit comfortably with your feet shoulder-width apart. You can also do this exercise while standing. 6. Roll your shoulders up, then back, and then down in a smooth, circular motion. 7. Repeat 2 to 4 times. Wall push-up    6. Stand against a wall with your feet about 12 to 24 inches back from the wall. If you feel any pain when you do this exercise, stand closer to the wall. 7. Place your hands on the wall slightly wider apart than your shoulders, and lean forward. 8. Gently lean your body toward the wall. Then push back to your starting position. Keep the motion smooth and controlled. 9. Repeat 8 to 12 times. Resisted shoulder blade squeeze    3. Sit or stand, holding the band in both hands in front of you. Keep your elbows close to your sides, bent at a 90-degree angle. Your palms should face up. 4. Squeeze your shoulder blades together, and move your arms to the outside, stretching the band. Be sure to keep your elbows at your sides while you do this. 5. Relax. 6. Repeat 8 to 12 times. Resisted rows    1. Put the band around a solid object, such as a bedpost, at about waist level. Hold one end of the band in each hand. 2. With your elbows at your sides and bent to 90 degrees, pull the band back to move your shoulder blades toward each other.  Return to the starting position. 3. Repeat 8 to 12 times. Follow-up care is a key part of your treatment and safety. Be sure to make and go to all appointments, and call your doctor if you are having problems. It's also a good idea to know your test results and keep a list of the medicines you take. Where can you learn more? Go to http://alejandro-eddie.info/. Enter W906 in the search box to learn more about \"Healthy Upper Back: Exercises. \"  Current as of: September 20, 2018  Content Version: 11.9  © 5366-7392 [employer]. Care instructions adapted under license by [employer] (which disclaims liability or warranty for this information). If you have questions about a medical condition or this instruction, always ask your healthcare professional. [address] any warranty or liability for your use of this information.

## 2019-07-02 NOTE — PROGRESS NOTES
Army Wade  Identified pt with two pt identifiers(name and ). Chief Complaint   Patient presents with    Malaise     Rm 13 Acute care         1. Have you been to the ER, urgent care clinic since your last visit? Hospitalized since your last visit? NO    2. Have you seen or consulted any other health care providers outside of the [address] [place] [address] since your last visit? Include any pap smears or colon screening. NO      My Chart     My chart gives you direct online access to portions of the electronic medical record (EMR) where your doctor stores your health information (ie, lab results, appointment information, medications, immunizations, and more. It is free. Would you like to set up your my chart? YES    [unfilled]    Weight Metrics 2019 2019 2019 3/18/2019 2019 2018 2018   Weight 303 lb 4.8 oz 312 lb 314 lb 11.2 oz 318 lb 9.6 oz 328 lb 14.4 oz 323 lb 14.4 oz 334 lb   BMI 57.31 kg/m2 58.95 kg/m2 59.46 kg/m2 60.2 kg/m2 60.53 kg/m2 59.24 kg/m2 61.09 kg/m2       **    Medication reconciliation up to date and corrected with patient at this time. Advance Care Planning    In the event something were to happen to you and you were unable to speak on your behalf, do you have an Advance Directive/ Living Will in place stating your wishes? NO    If yes, do we have a copy on file NO    If no, would you like information YES      ====Aura Wen Invitation====    Patient was invited to [facility] on this date and given the information folder for review. Recommended appointment with Aura Wen facilitator for ACP conversation regarding advance directives. [x] Yes  [] No  Referral sent to [facility] Choices team member or Coordinator for follow-up    [] Yes  [x] No  Patient scheduled an appointment. Site of Referral:       Today's provider has been notified of reason for visit, vitals and flowsheets obtained on patients.      Reviewed record in preparation for visit, huddled with provider and have obtained necessary documentation. Health Maintenance Due   Topic    FOBT Q 1 YEAR AGE 50-75        Wt Readings from Last 3 Encounters:   07/02/19 303 lb 4.8 oz (137.6 kg)   04/26/19 312 lb (141.5 kg)   04/18/19 314 lb 11.2 oz (142.7 kg)     Temp Readings from Last 3 Encounters:   07/02/19 97.9 °F (36.6 °C)   04/26/19 98.8 °F (37.1 °C) (Oral)   04/18/19 98.6 °F (37 °C) (Oral)     BP Readings from Last 3 Encounters:   07/02/19 122/73   04/26/19 97/66   04/18/19 128/82     Pulse Readings from Last 3 Encounters:   07/02/19 60   04/26/19 97   04/18/19 78     Vitals:    07/02/19 1054   BP: 122/73   Pulse: 60   Resp: 18   Temp: 97.9 °F (36.6 °C)   SpO2: 96%   Weight: 303 lb 4.8 oz (137.6 kg)   Height: 5' 1\" (1.549 m)   PainSc:   5   PainLoc: Head         Learning Assessment:  :     Learning Assessment 4/18/2017 11/20/2014   PRIMARY LEARNER Patient Patient   HIGHEST LEVEL OF EDUCATION - PRIMARY LEARNER  - TRADE SCHOOL   BARRIERS PRIMARY LEARNER - NONE     - NONE   PRIMARY LANGUAGE ENGLISH ENGLISH   LEARNER PREFERENCE PRIMARY PICTURES PICTURES     READING READING     VIDEOS VIDEOS   ANSWERED BY patient patient   RELATIONSHIP SELF SELF       Depression Screening:  :     3 most recent PHQ Screens 1/24/2019   Little interest or pleasure in doing things Not at all   Feeling down, depressed, irritable, or hopeless Not at all   Total Score PHQ 2 0       Fall Risk Assessment:  :     Fall Risk Assessment, last 12 mths 1/24/2019   Able to walk? Yes   Fall in past 12 months? No       Abuse Screening:  :     Abuse Screening Questionnaire 1/24/2019 2/8/2018 6/23/2015   Do you ever feel afraid of your partner? N N N   Are you in a relationship with someone who physically or mentally threatens you? N N N   Is it safe for you to go home?  Y Y Y       ADL Screening:  :     ADL Assessment 1/24/2019   Feeding yourself No Help Needed   Getting from bed to chair No Help Needed   Getting dressed No Help Needed   Bathing or showering No Help Needed   Walk across the room (includes cane/walker) No Help Needed   Using the telphone No Help Needed   Taking your medications No Help Needed   Preparing meals No Help Needed   Managing money (expenses/bills) No Help Needed   Moderately strenuous housework (laundry) No Help Needed   Shopping for personal items (toiletries/medicines) No Help Needed   Shopping for groceries No Help Needed   Driving No Help Needed   Climbing a flight of stairs No Help Needed   Getting to places beyond walking distances No Help Needed

## 2019-07-08 ENCOUNTER — OFFICE VISIT (OUTPATIENT)
Dept: NEUROLOGY | Age: 49
End: 2019-07-08

## 2019-07-08 VITALS
SYSTOLIC BLOOD PRESSURE: 122 MMHG | HEART RATE: 64 BPM | OXYGEN SATURATION: 98 % | DIASTOLIC BLOOD PRESSURE: 76 MMHG | RESPIRATION RATE: 18 BRPM | WEIGHT: 293 LBS | BODY MASS INDEX: 56.87 KG/M2

## 2019-07-08 DIAGNOSIS — G43.109 MIGRAINE WITH AURA AND WITHOUT STATUS MIGRAINOSUS, NOT INTRACTABLE: Primary | ICD-10-CM

## 2019-07-08 NOTE — PROGRESS NOTES
Neurology Clinic Follow up Note    Patient ID:  Avtar Black  [id number]  25 y.o.  1970      Ms. Torito Arguello is here for follow up today of headaches. Last Appointment With Me:  4/2017      Interval History:   Pt returns for f/u after >2 years due to headaches. She reports her migraines have been doing well until this past week. Her last migraine lasted approximately 7 days, located R retro-orbital, worse with bending forward without N/V, photophobia. Noted R eye blurred vision. She also noted some sinus drainage/mucus around the same time. HA was not entirely responsive to Fioricet. She was started on an abx due to sinus infection. She states her migraine is much improved this week. Congestion is also improved. PMHx/ PSHx/ FHx/ SHx:  Reviewed and unchanged previous visit. Past Medical History:   Diagnosis Date    Acne rosacea     Dr. Chelsea Escobedo.  Allergy, unspecified not elsewhere classified childhood     Txd with immunotherapy. Dr. Abril Hall Anemia NEC     borderline    Ankle sprain 2007    Right. Dr. Eve Carrillo    Ankle sprain 11/2012    Right. Dr. Delio Maxwell.  Asthma childhood    Chickenpox childhood    Chronic low back pain with right-sided sciatica     and SI joint dysfunction. Dr. Jordan Mukherjee.  Chronic otitis media     Dr. Abril Hall Dry eye syndrome 2013    Dr. Erika White.  DVT of deep femoral vein ([address] 12/07/2018    Acute occlusive DVT of L deep femoral vein and Acute partially occlusive DVT of L Common Fem Vein and L Prox Fem Vein. Txd with Xarelto x 3 months. due to BCP. Dr. Ovidio Armas EBV infection 6/27/2011    Hearing loss     Mild high frequency sensorineuronal hearing loss. Dr. Claudia Renee murmur     Hernia of abdominal wall 09/2003, 42/7502    umbilical.  Dr. Elli Galarza.   Dr. Hung Mart    Hyperglycemia 2013    Hypothyroidism 06/2010    Dr. Felix Mason pressure increase 12/2011    Dr. Penny Hui. Dr. Shannen Hayes. Dr. Salena Pulido.  Knee pain, left 04/2012    Left. Dr. Christi Campbell Measles childhood    Migraine 2017    Dr. Aniceto Kumar Mumps childhood    Plantar fasciitis, bilateral 2010    Dr. Brandt Spikes   [facility] 2008    Right.  with OA. Dr. Vaishali Carmichael Tinnitus of right ear 2012    Dr. Juan Miguel Bell         ROS:  Comprehensive review of systems negative except for as noted above. Objective:       Meds:  Current Outpatient Medications   Medication Sig Dispense Refill    doxycycline (ADOXA) 100 mg tablet Take 1 Tab by mouth two (2) times a day for 7 days. Indications: Sinus Irritation and Congestion 14 Tab 0    hydrOXYzine HCl (ATARAX) 25 mg tablet TAKE 2 TABLETS BY MOUTH EVERY NIGHT& AS NEEDED DURING DAY FOR ITCHING  Indications: anxious 180 Tab 1    NAFTIN 2 % gel APPLY TO AFFECTED AREA TWO (2) TIMES A DAY. INDICATIONS: TINEA PEDIS 60 g 2    omeprazole (PRILOSEC) 40 mg capsule Take 1 Cap by mouth daily. 30 Cap 2    albuterol (PROVENTIL HFA, VENTOLIN HFA, PROAIR HFA) 90 mcg/actuation inhaler Take 2 Puffs by inhalation every four (4) hours as needed for Wheezing. 1 Inhaler 5    levothyroxine (SYNTHROID) 125 mcg tablet Take 1 Tab by mouth Daily (before breakfast). 90 Tab 3    albuterol-ipratropium (DUO-NEB) 2.5 mg-0.5 mg/3 ml nebu 3 mL by Nebulization route every six (6) hours as needed. 30 Nebule 0    butalbital-acetaminophen-caffeine (FIORICET, ESGIC) -40 mg per tablet TAKE 1 TAB BY MOUTH EVERY SIX (6) HOURS AS NEEDED FOR PAIN OR HEADACHE. 40 Tab 0    montelukast (SINGULAIR) 10 mg tablet Take 1 Tab by mouth daily. 30 Tab 11    azelastine (ASTELIN) 137 mcg (0.1 %) nasal spray 2 Sprays by Both Nostrils route two (2) times a day. Use in each nostril as directed  Indications: SEASONAL ALLERGIC RHINITIS 1 Bottle 5    Nebulizer & Compressor machine 1 Each by Other route four (4) times daily as needed.  1 Each 0    albuterol (PROVENTIL VENTOLIN) 2.5 mg /3 mL (0.083 %) nebulizer solution 6 mL by Nebulization route every four (4) hours as needed for Wheezing or Shortness of Breath. Indications: Acute Asthma Attack 24 Each 1    budesonide (PULMICORT) 1 mg/2 mL nbsp 2 mL by Nebulization route two (2) times a day. Indications: MAINTENANCE THERAPY FOR ASTHMA (Patient taking differently: 1,000 mcg by Nebulization route two (2) times daily as needed.) 1 Each 1    cyclobenzaprine (FLEXERIL) 10 mg tablet TAKE 1 TABLET BY MOUTH 3 TIMES A DAY AS NEEDED FOR MUSCLE SPASM 90 Tab 0    ALPRAZolam (XANAX) 0.5 mg tablet Take 1 Tab by mouth two (2) times daily as needed for Anxiety or Sleep. Max Daily Amount: 1 mg. Indications: anxiety 60 Tab 2    terconazole (TERAZOL 7) 0.4 % vaginal cream   6    LOCOID 0.1 % lotn   1    valACYclovir (VALTREX) 500 mg tablet   1    terconazole (TERAZOL 3) 80 mg vaginal suppository   4    multivitamin (ONE A DAY) tablet Take 1 tablet by mouth daily.  PROPYLENE GLYCOL//PF (SYSTANE, PF, OP) Apply  to eye four (4) times daily.  magnesium oxide (MAG-OX) 400 mg tablet Take 400 mg by mouth daily.  Emollient Combination No.32 (EPICERAM) Emul by Apply Externally route. From Dr Jeremy Johnson.  metroNIDAZOLE (METROGEL) 1 % topical gel Apply  to affected area daily. Use a thin layer to affected areas after washing from Dr Jeremy Johnson   Indications: ACNE ROSACEA      latanoprost (XALATAN) 0.005 % ophthalmic solution Administer 1 Drop to both eyes nightly.  fexofenadine (ALLEGRA) 180 mg tablet Take 180 mg by mouth daily as needed.  LACTOBACILLUS/FOS/PECTIN (PROBIOTIC COMPLEX PO) Take  by mouth daily.  cholecalciferol, vitamin d3, (VITAMIN D) 1,000 unit tablet Take 1,000 Units by mouth daily.       hydrOXYzine HCl (ATARAX) 25 mg tablet TAKE 2 TABLETS BY MOUTH EVERY NIGHT& AS NEEDED DURING DAY FOR ITCHING 90 Tab 1       Exam:  Visit Vitals  /76   Pulse 64   Resp 18   Wt 136.5 kg (301 lb)   SpO2 98%   BMI 56.87 kg/m²     NEUROLOGICAL EXAM:  General: Awake, alert, speech fluent  CN: PERRL, EOMI without nystagmus, VFF to confrontation, facial sensation and strength are normal and symmetric, hearing is intact to finger rub bilaterally, palate and tongue movements are intact and symmetric. Motor: Normal tone, bulk and strength bilaterally. Reflexes: 2/4 and symmetric, plantar stimulation is flexor. Coordination: FNF, SORIN, HTS intact. Sensation: LT intact throughout. Gait: Normal-based and steady. LABS  Results for orders placed or performed in visit on 03/18/19   CBC WITH AUTOMATED DIFF   Result Value Ref Range    WBC 8.3 3.4 - 10.8 x10E3/uL    RBC 4.42 3.77 - 5.28 x10E6/uL    HGB 12.4 11.1 - 15.9 g/dL    HCT 38.7 34.0 - 46.6 %    MCV 88 79 - 97 fL    MCH 28.1 26.6 - 33.0 pg    MCHC 32.0 31.5 - 35.7 g/dL    RDW 14.2 12.3 - 15.4 %    PLATELET 415 666 - 470 x10E3/uL    NEUTROPHILS 56 Not Estab. %    Lymphocytes 34 Not Estab. %    MONOCYTES 8 Not Estab. %    EOSINOPHILS 2 Not Estab. %    BASOPHILS 0 Not Estab. %    ABS. NEUTROPHILS 4.7 1.4 - 7.0 x10E3/uL    Abs Lymphocytes 2.8 0.7 - 3.1 x10E3/uL    ABS. MONOCYTES 0.6 0.1 - 0.9 x10E3/uL    ABS. EOSINOPHILS 0.2 0.0 - 0.4 x10E3/uL    ABS. BASOPHILS 0.0 0.0 - 0.2 x10E3/uL    IMMATURE GRANULOCYTES 0 Not Estab. %    ABS. IMM. GRANS. 0.0 0.0 - 0.1 x10E3/uL   IRON   Result Value Ref Range    Iron 40 27 - 159 ug/dL   D DIMER   Result Value Ref Range    D-Dimer 0.28 0.00 - 0.49 mg/L FEU       IMAGING:  MRI Results (most recent):  No results found for this or any previous visit. Assessment:     Encounter Diagnoses     ICD-10-CM ICD-9-CM   1. Migraine with aura and without status migrainosus, not intractable G43.109 29.00     46year old pleasant female here for f/u regarding intermittent migraine w/aura, associated focal paresthesias and blurred vision.   She has done well over the past 2 years until this past week when she noted a migraine of extended duration with associated sinus congestion. This appears to be resolving after initiation of abx for sinusitis. Headache is overall improved. Reassurance was provided that her neurological examination is non-focal and essentially normal.    If headaches should worsen or fail to respond to current therapy, she should return for repeat clinical assessment. Headache education  Discussed pathophysiology of headache. Discussed use of headache diary. Discussed treatment options, both abortive and preventive medications. Instructed patient about medications and potential side effects. Plan:   Cont. Fioricet and/or Excedrin for abortive headache therapy    Follow-up and Dispositions    · Return if symptoms worsen or fail to improve.          Signed:  Carrie Riggs,   7/8/2019

## 2019-07-30 ENCOUNTER — OFFICE VISIT (OUTPATIENT)
Dept: FAMILY MEDICINE CLINIC | Age: 49
End: 2019-07-30

## 2019-07-30 VITALS
SYSTOLIC BLOOD PRESSURE: 133 MMHG | HEIGHT: 61 IN | TEMPERATURE: 97.6 F | DIASTOLIC BLOOD PRESSURE: 60 MMHG | OXYGEN SATURATION: 99 % | RESPIRATION RATE: 18 BRPM | WEIGHT: 293 LBS | HEART RATE: 82 BPM | BODY MASS INDEX: 55.32 KG/M2

## 2019-07-30 DIAGNOSIS — R20.2 FACIAL PARESTHESIA: ICD-10-CM

## 2019-07-30 DIAGNOSIS — H53.8 BLURRY VISION, RIGHT EYE: ICD-10-CM

## 2019-07-30 DIAGNOSIS — J01.81 OTHER ACUTE RECURRENT SINUSITIS: ICD-10-CM

## 2019-07-30 DIAGNOSIS — M79.605 PAIN OF LEFT LOWER EXTREMITY: ICD-10-CM

## 2019-07-30 DIAGNOSIS — R79.89 ELEVATED D-DIMER: ICD-10-CM

## 2019-07-30 DIAGNOSIS — E03.4 HYPOTHYROIDISM DUE TO ACQUIRED ATROPHY OF THYROID: ICD-10-CM

## 2019-07-30 DIAGNOSIS — R63.4 WEIGHT LOSS: ICD-10-CM

## 2019-07-30 DIAGNOSIS — Z82.3 FAMILY HISTORY OF STROKE: ICD-10-CM

## 2019-07-30 DIAGNOSIS — Z82.49 FAMILY HISTORY OF HEART ATTACK: ICD-10-CM

## 2019-07-30 DIAGNOSIS — I82.412 DEEP VENOUS THROMBOSIS OF LEFT PROFUNDA FEMORIS VEIN (HCC): Primary | ICD-10-CM

## 2019-07-30 DIAGNOSIS — R73.9 HYPERGLYCEMIA: ICD-10-CM

## 2019-07-30 NOTE — PATIENT INSTRUCTIONS
Try over the counter Magnesium 400 mg daily for leg cramps. Nighttime Leg Cramps: Care Instructions  Your Care Instructions    Nighttime leg cramps happen when a leg muscle tightens up suddenly. This most often happens in the calf. But cramps in the thigh or foot are also common. Cramps often occur just as you fall asleep or wake up. Leg cramps can be painful. They can last a few seconds to a few minutes. Though they are common, experts don't know exactly what causes them. To treat muscle cramps, you can stretch and massage the muscle. If cramps keep coming back, your doctor may prescribe medicine that relaxes your muscles. Follow-up care is a key part of your treatment and safety. Be sure to make and go to all appointments, and call your doctor if you are having problems. It's also a good idea to know your test results and keep a list of the medicines you take. How can you care for yourself at home? · To stop a leg cramp, sit down and straighten your leg as you bend your foot up toward your knee. It may help to place a rolled towel under the ball of your foot and, while you hold the towel at both ends, gently pull the towel toward you while you keep your knee straight. This stretches the calf muscles. The cramp usually goes away after a few minutes. · Take a warm shower or bath to relax the muscle. Some people find that a heating pad placed on the muscle can also help. Others get relief by rubbing the calf with an ice pack. · Stretch your muscles every day, especially before and after exercise and at bedtime. Regular stretching can relax your muscles and may prevent cramps. · Do not suddenly increase the amount of exercise you get. Increase your exercise a little each week. · If your doctor prescribes medicine, take it exactly as prescribed. Call your doctor if you think you are having a problem with your medicine.   · Ask your doctor if you can take an over-the-counter pain medicine, such as acetaminophen (Tylenol), ibuprofen (Advil, Motrin), or naproxen (Aleve). Be safe with medicines. Read and follow all instructions on the label. · Drink plenty of fluids. If you have kidney, heart, or liver disease and have to limit fluids, talk with your doctor before you increase the amount of fluids you drink. When should you call for help? Watch closely for changes in your health, and be sure to contact your doctor if:    · You often have muscle cramps that do not go away after home treatment.     · Your muscle cramps often wake you up at night.     · You do not get better as expected. Where can you learn more? Go to http://alejandro-eddie.info/. Enter S910 in the search box to learn more about \"Nighttime Leg Cramps: Care Instructions. \"  Current as of: March 28, 2019  Content Version: 12.1  © 5518-7162 [employer]. Care instructions adapted under license by [employer] (which disclaims liability or warranty for this information). If you have questions about a medical condition or this instruction, always ask your healthcare professional. [place] [address] any warranty or liability for your use of this information. Muscle Cramps: Care Instructions  Your Care Instructions    A muscle cramp occurs when a muscle tightens up suddenly. A cramp often happens in the legs. A muscle cramp is also called a muscle spasm or a charley horse. Muscle cramps usually last less than a minute. However, the pain may last for several minutes. Leg cramps that occur at night may wake you up. Heavy exercise, dehydration, and being overweight can increase your risk of getting cramps. An imbalance of certain chemicals in your blood, called electrolytes, can also lead to muscle cramps. Pregnant women sometimes get muscle cramps during sleep. Muscle cramps can be treated by stretching and massaging the muscle.  If cramps keep coming back, your doctor may prescribe medicine that relaxes your muscles. Follow-up care is a key part of your treatment and safety. Be sure to make and go to all appointments, and call your doctor if you are having problems. It's also a good idea to know your test results and keep a list of the medicines you take. How can you care for yourself at home? · Drink plenty of fluids to prevent dehydration. Choose water and other caffeine-free clear liquids until you feel better. If you have kidney, heart, or liver disease and have to limit fluids, talk with your doctor before you increase the amount of fluids you drink. · Stretch your muscles every day, especially before and after exercise and at bedtime. Regular stretching can relax your muscles and may prevent cramps. · Do not suddenly increase the amount of exercise you get. Increase your exercise a little each week. · When you get a cramp, stretch and massage the muscle. You can also take a warm shower or bath to relax the muscle. A heating pad placed on the muscle can also help. · Take a daily multivitamin supplement. · Ask your doctor if you can take an over-the-counter pain medicine, such as acetaminophen (Tylenol), ibuprofen (Advil, Motrin), or naproxen (Aleve). Be safe with medicines. Read and follow all instructions on the label. When should you call for help? Watch closely for changes in your health, and be sure to contact your doctor if:    · You get muscle cramps often that do not go away after home treatment.     · Your muscle cramps often wake you up at night.     · You do not get better as expected. Where can you learn more? Go to http://alejandro-eddie.info/. Enter F003 in the search box to learn more about \"Muscle Cramps: Care Instructions. \"  Current as of: September 20, 2018  Content Version: 12.1  © 4678-1397 [employer], [employer].  Care instructions adapted under license by [employer] (which disclaims liability or warranty for this information). If you have questions about a medical condition or this instruction, always ask your healthcare professional. [place] [address] any warranty or liability for your use of this information.

## 2019-07-30 NOTE — PROGRESS NOTES
Mamadou Hansen  Identified pt with two pt identifiers(name and ). Chief Complaint   Patient presents with    Follow-up     FASTING// follow up on DVT     Blood Pressure Check       1. Have you been to the ER, urgent care clinic since your last visit? Hospitalized since your last visit? No    2. Have you seen or consulted any other health care providers outside of the [address] [place] [address] since your last visit? Include any pap smears or colon screening. No    Dr Jacky Elizabeth earlier this month        Medication reconciliation up to date and corrected with patient at this time. Today's provider has been notified of reason for visit, vitals and flowsheets obtained on patients. Reviewed record in preparation for visit, huddled with provider and have obtained necessary documentation.       Health Maintenance Due   Topic    FOBT Q 1 YEAR AGE 50-75        Wt Readings from Last 3 Encounters:   19 300 lb (136.1 kg)   19 301 lb (136.5 kg)   19 303 lb 4.8 oz (137.6 kg)     Temp Readings from Last 3 Encounters:   19 97.6 °F (36.4 °C) (Oral)   19 97.9 °F (36.6 °C)   19 98.8 °F (37.1 °C) (Oral)     BP Readings from Last 3 Encounters:   19 133/60   19 122/76   19 122/73     Pulse Readings from Last 3 Encounters:   19 82   19 64   19 60     Vitals:    19 1339   BP: 133/60   Pulse: 82   Resp: 18   Temp: 97.6 °F (36.4 °C)   TempSrc: Oral   SpO2: 99%   Weight: 300 lb (136.1 kg)   Height: 5' 1\" (1.549 m)   PainSc:   0 - No pain         Learning Assessment:  :     Learning Assessment 2014   PRIMARY LEARNER Patient Patient   HIGHEST LEVEL OF EDUCATION - PRIMARY LEARNER  - TRADE SCHOOL   BARRIERS PRIMARY LEARNER - NONE     - NONE   PRIMARY LANGUAGE ENGLISH ENGLISH   LEARNER PREFERENCE PRIMARY PICTURES PICTURES     READING READING     VIDEOS VIDEOS   ANSWERED BY patient patient   RELATIONSHIP SELF SELF       Depression Screening:  :     3 most recent PHQ Screens 7/30/2019   Little interest or pleasure in doing things Not at all   Feeling down, depressed, irritable, or hopeless Not at all   Total Score PHQ 2 0           Fall Risk Assessment:  :     Fall Risk Assessment, last 12 mths 1/24/2019   Able to walk? Yes   Fall in past 12 months? No       Abuse Screening:  :     Abuse Screening Questionnaire 1/24/2019 2/8/2018 6/23/2015   Do you ever feel afraid of your partner? N N N   Are you in a relationship with someone who physically or mentally threatens you? N N N   Is it safe for you to go home?  Y Y Y       ADL Screening:  :     ADL Assessment 1/24/2019   Feeding yourself No Help Needed   Getting from bed to chair No Help Needed   Getting dressed No Help Needed   Bathing or showering No Help Needed   Walk across the room (includes cane/walker) No Help Needed   Using the telphone No Help Needed   Taking your medications No Help Needed   Preparing meals No Help Needed   Managing money (expenses/bills) No Help Needed   Moderately strenuous housework (laundry) No Help Needed   Shopping for personal items (toiletries/medicines) No Help Needed   Shopping for groceries No Help Needed   Driving No Help Needed   Climbing a flight of stairs No Help Needed   Getting to places beyond walking distances No Help Needed           BMI:  Weight Metrics 7/30/2019 7/8/2019 7/2/2019 4/26/2019 4/18/2019 3/18/2019 1/24/2019   Weight 300 lb 301 lb 303 lb 4.8 oz 312 lb 314 lb 11.2 oz 318 lb 9.6 oz 328 lb 14.4 oz   BMI 56.68 kg/m2 56.87 kg/m2 57.31 kg/m2 58.95 kg/m2 59.46 kg/m2 60.2 kg/m2 60.53 kg/m2

## 2019-07-30 NOTE — PROGRESS NOTES
HISTORY OF PRESENT ILLNESS  Lamont Lancaster is a 46 y.o. female presents with Referral Follow Up (FASTING// follow up on DVT ); Blood Pressure Check; Headache; and Blood Clot    Agree with nurse note. Pt with acute pain of R knee, obesity, hyperglycemia, hypothyroidism, penicllin allergy, steroid medication intolerance, sulfite allergy, acute recurrent sinusitis, and family hx of stroke and heart attack  presents to the office with a BP of 133/60. To recall, pt with a hx of migraine without aura and without status migrainosus was experiencing severe R sided migraines x1 week at appointment on 7/2/2019. She had blurry vision, pressure behind her R eye, tingling on R side of face, and nasal congestion. I rx'd prophylactic doxycycline 100 mg, which offered relief. I recommended she f/u with neurologist Dr. Fortunato Mariee who the pt had seen in 2017 for migraines. Pt saw Dr. Ritchie Avila on 7/8/2019. She said that the pt does have migraines with aura and numbness and tingling in the face. The pt's migraine was improved by the time of the visit so Dr. Ritchie Avila told her to continue Fioricet and/or Excedrin and return if the headaches worsened. To recall, pt had an occlusive DVT of the L profunda femoris and partially occlusive DVTs of L common femoral vein and L proximal femoral vein provoked by birth control and weight. Per ACCP guidelines, she was on 3 months of anticoagulants. She is off of the blood thinner Xorelto after hematologist Dr. Crystal Cespedes cleared her in 4/2019. It has been three months since then and the pt still has spasms in L upper leg so pt would like to have blood work to look at the d-dimer. In 3/2019, d-dimer was 0.28. Per chart review, we received a script request for Katy Lorena in 6/15/2019. This was filled but it is still at the pharmacist because the pt does not take it anymore. Pt is currently 300 lbs, down 34 lbs since 11/2018. Pt attributes this to watching what she eats. Pt would like to review labs from 3/18/2019. Iron 40, CBC wnl. Health Maintenance    Pt plans to get her colonoscopy scheduled. She already has a referral.     Written by tracy Olguin, as dictated by Dr. Maty Garvey DO.    IMER    Review of Systems negative except as noted above in HPI. ALLERGIES:    Allergies   Allergen Reactions    Latex Rash    Junel 1.5/30 (21) [Norethindrone Ac-Eth Estradiol] Swelling     L FOOT DUE TO NEW DVT    Other Medication Hives and Rash     SUN    Pcn [Penicillins] Hives    Prednisone Hives    Sulfa (Sulfonamide Antibiotics) Hives    Garamycin [Gentamicin] Other (comments)     Itchy eyes due to sulfate    Metformin Diarrhea       CURRENT MEDICATIONS:    Outpatient Medications Marked as Taking for the 7/30/19 encounter (Office Visit) with Meche Ta DO   Medication Sig Dispense Refill    hydrOXYzine HCl (ATARAX) 25 mg tablet TAKE 2 TABLETS BY MOUTH EVERY NIGHT& AS NEEDED DURING DAY FOR ITCHING  Indications: anxious 180 Tab 1    NAFTIN 2 % gel APPLY TO AFFECTED AREA TWO (2) TIMES A DAY. INDICATIONS: TINEA PEDIS 60 g 2    omeprazole (PRILOSEC) 40 mg capsule Take 1 Cap by mouth daily. 30 Cap 2    albuterol (PROVENTIL HFA, VENTOLIN HFA, PROAIR HFA) 90 mcg/actuation inhaler Take 2 Puffs by inhalation every four (4) hours as needed for Wheezing. 1 Inhaler 5    levothyroxine (SYNTHROID) 125 mcg tablet Take 1 Tab by mouth Daily (before breakfast). 90 Tab 3    albuterol-ipratropium (DUO-NEB) 2.5 mg-0.5 mg/3 ml nebu 3 mL by Nebulization route every six (6) hours as needed. 30 Nebule 0    butalbital-acetaminophen-caffeine (FIORICET, ESGIC) -40 mg per tablet TAKE 1 TAB BY MOUTH EVERY SIX (6) HOURS AS NEEDED FOR PAIN OR HEADACHE. 40 Tab 0    montelukast (SINGULAIR) 10 mg tablet Take 1 Tab by mouth daily. 30 Tab 11    azelastine (ASTELIN) 137 mcg (0.1 %) nasal spray 2 Sprays by Both Nostrils route two (2) times a day.  Use in each nostril as directed  Indications: SEASONAL ALLERGIC RHINITIS 1 Bottle 5    Nebulizer & Compressor machine 1 Each by Other route four (4) times daily as needed. 1 Each 0    albuterol (PROVENTIL VENTOLIN) 2.5 mg /3 mL (0.083 %) nebulizer solution 6 mL by Nebulization route every four (4) hours as needed for Wheezing or Shortness of Breath. Indications: Acute Asthma Attack 24 Each 1    budesonide (PULMICORT) 1 mg/2 mL nbsp 2 mL by Nebulization route two (2) times a day. Indications: MAINTENANCE THERAPY FOR ASTHMA (Patient taking differently: 1,000 mcg by Nebulization route two (2) times daily as needed.) 1 Each 1    cyclobenzaprine (FLEXERIL) 10 mg tablet TAKE 1 TABLET BY MOUTH 3 TIMES A DAY AS NEEDED FOR MUSCLE SPASM 90 Tab 0    ALPRAZolam (XANAX) 0.5 mg tablet Take 1 Tab by mouth two (2) times daily as needed for Anxiety or Sleep. Max Daily Amount: 1 mg. Indications: anxiety 60 Tab 2    LOCOID 0.1 % lotn   1    terconazole (TERAZOL 3) 80 mg vaginal suppository   4    multivitamin (ONE A DAY) tablet Take 1 tablet by mouth daily.  PROPYLENE GLYCOL//PF (SYSTANE, PF, OP) Apply  to eye four (4) times daily.  magnesium oxide (MAG-OX) 400 mg tablet Take 400 mg by mouth daily.  Emollient Combination No.32 (EPICERAM) Emul by Apply Externally route. From Dr Nestor Nolen.  metroNIDAZOLE (METROGEL) 1 % topical gel Apply  to affected area daily. Use a thin layer to affected areas after washing from Dr Nestor Nolen   Indications: ACNE ROSACEA      latanoprost (XALATAN) 0.005 % ophthalmic solution Administer 1 Drop to both eyes nightly.  fexofenadine (ALLEGRA) 180 mg tablet Take 180 mg by mouth daily as needed.  LACTOBACILLUS/FOS/PECTIN (PROBIOTIC COMPLEX PO) Take  by mouth daily.  cholecalciferol, vitamin d3, (VITAMIN D) 1,000 unit tablet Take 1,000 Units by mouth daily. PAST MEDICAL HISTORY:    Past Medical History:   Diagnosis Date    Acne rosacea     Dr. Nestor Nolen.       Allergy, unspecified not elsewhere classified childhood     Txd with immunotherapy. Dr. Teressa Echols Anemia NEC     borderline    Ankle sprain 2007    Right. Dr. Peace Montgomery    Ankle sprain 11/2012    Right. Dr. Estrada Penaloza.  Asthma childhood    Chickenpox childhood    Chronic low back pain with right-sided sciatica     and SI joint dysfunction. Dr. Klever Singh.  Chronic otitis media     Dr. Teressa Echols Dry eye syndrome 2013    Dr. Ressie Dancer.  DVT of deep femoral vein ([address] 12/07/2018    Acute occlusive DVT of L deep femoral vein and Acute partially occlusive DVT of L Common Fem Vein and L Prox Fem Vein. Txd with Xarelto x 3 months. due to BCP. Dr. Karina Nash EBV infection 6/27/2011    Hearing loss     Mild high frequency sensorineuronal hearing loss. Dr. Salina Acuña Mt murmur     Hernia of abdominal wall 09/2003, 79/7564    umbilical.  Dr. Chadwick Garcias. Dr. Gaviota Betancur    Hyperglycemia 2013    Hypothyroidism 06/2010    Dr. Risa Rodriguez pressure increase 12/2011    Dr. Gricel Morgan. Dr. Ressie Dancer. Dr. Monserrat Healy.  Knee pain, left 04/2012    Left. Dr. Katya Vallecillo Measles childhood    Migraine 2017, 07/2019    w aura and facial paresthesia. Dr. Berman Sides    Mumps childhood    Plantar fasciitis, bilateral 2010    Dr. Kylie Carrasco   [address] [place] [address] Miguel Sciatica 2008    Right.  with OA. Dr. Michael Abad Tinnitus of right ear 2012    Dr. Yennifer Fowler:    Past Surgical History:   Procedure Laterality Date   Dwight Hart  2011    Dr. Brijesh Don.  HAND/FINGER SURGERY UNLISTED  09/2003    Right Index finger repair due to cut    HX HERNIA REPAIR  72/9707    Umbilical.  Dr. Chadwick Garcias.  HX HERNIA REPAIR  9/23/2011    recurrent umbilical.  Laparoscopy incisional.  Dr. Gaviota Betancur.     HX TONSILLECTOMY  childhood    LAP,CHOLECYSTECTOMY  07/2001       FAMILY HISTORY:    Family History   Problem Relation Age of Onset    Hypertension Mother     Cancer Mother         small cell lung with bone mets to spine/MELANOMA    Cancer Father         melanoma    Arthritis-osteo Father     Colon Polyps Father     Diabetes Maternal Grandmother     Stroke Maternal Grandmother     Seizures Maternal Grandmother     Colon Polyps Maternal Grandmother     Breast Cancer Maternal Grandmother     Heart Disease Paternal Grandmother         heart failure    Heart Attack Paternal Grandmother     Asthma Paternal Grandmother        SOCIAL HISTORY:    Social History     Socioeconomic History    Marital status: SINGLE     Spouse name: Not on file    Number of children: Not on file    Years of education: Not on file    Highest education level: Not on file   Tobacco Use    Smoking status: Passive Smoke Exposure - Never Smoker    Smokeless tobacco: Never Used    Tobacco comment: lived with smoker dad and mom then step mom x 20 yrs   Substance and Sexual Activity    Alcohol use: Yes     Alcohol/week: 1.0 standard drinks     Types: 1 Glasses of wine per week     Frequency: Monthly or less     Drinks per session: 1 or 2     Binge frequency: Never     Comment: RARE    Drug use: No    Sexual activity: Yes     Partners: Male     Birth control/protection: Abstinence, Pill   Lifestyle    Physical activity:     Days per week: 4 days     Minutes per session: 30 min    Stress:  Only a little       IMMUNIZATIONS:    Immunization History   Administered Date(s) Administered    Influenza Vaccine 10/15/2013, 11/07/2016, 11/03/2017    Influenza Vaccine (Quad) 11/03/2015    Influenza Vaccine (Quad) PF 11/20/2014, 11/07/2016, 11/12/2018    Influenza Vaccine Split 11/18/2011, 11/07/2012    Influenza Vaccine Whole 10/01/2010    TD Vaccine 09/01/2003    Tdap 11/03/2015         PHYSICAL EXAMINATION    Vital Signs    Visit Vitals  /60 (BP 1 Location: Right arm, BP Patient Position: Sitting)   Pulse 82   Temp 97.6 °F (36.4 °C) (Oral)   Resp 18   Ht 5' 1\" (1.549 m)   Wt 300 lb (136.1 kg)   SpO2 99%   BMI 56.68 kg/m²       Weight Metrics 7/30/2019 7/8/2019 7/2/2019 4/26/2019 4/18/2019 3/18/2019 1/24/2019   Weight 300 lb 301 lb 303 lb 4.8 oz 312 lb 314 lb 11.2 oz 318 lb 9.6 oz 328 lb 14.4 oz   BMI 56.68 kg/m2 56.87 kg/m2 57.31 kg/m2 58.95 kg/m2 59.46 kg/m2 60.2 kg/m2 60.53 kg/m2       General appearance - Well nourished. Well appearing. Well developed. No acute distress. Obese. Head - Normocephalic. Atraumatic. Eyes - pupils equal and reactive. Extraocular eye movements intact. Sclera anicteric. Mildly injected sclera. Ears - Hearing is grossly normal bilaterally. Nose - normal and patent. No polyps noted. No erythema. No discharge. Mouth - mucous membranes with adequate moisture. Posterior pharynx normal with cobblestone appearance. No erythema, white exudate or obstruction. Neck - supple. Midline trachea. No carotid bruits noted bilaterally. No thyromegaly noted. Chest - clear to auscultation bilaterally anteriorly and posteriorly. No wheezes. No rales or rhonchi. Breath sounds are symmetrical bilaterally. Unlabored respirations. Heart - normal rate. Regular rhythm. Normal S1, S2. No murmur noted. No rubs, clicks or gallops noted. Abdomen - soft and distended. No masses or organomegaly. No rebound, rigidity or guarding. Bowel sounds normal x 4 quadrants. No tenderness noted. Neurological - awake, alert and oriented to person, place, and time and event. Cranial nerves II through XII intact. Clear speech. Muscle strength is +5/5 x 4 extremities. Sensation is intact to light touch bilaterally. Steady gait. negative Chiquita's sign bilaterally. Heme/Lymph - peripheral pulses normal x 4 extremities. No peripheral edema is noted. Musculoskeletal - Intact x 4 extremities. Full ROM x 4 extremities. No pain with movement. Back exam - normal range of motion.   No pain on palpation of the spinous processes in the cervical, thoracic, lumbar, sacral regions. No CVA tenderness. Skin - no rashes, erythema, ecchymosis, lacerations, abrasions, suspicious moles noted  Psychological -   normal behavior, dress and thought processes. Good insight. Good eye contact. Normal affect. Appropriate mood. Normal speech. DATA REVIEWED    Lab Results   Component Value Date/Time    WBC 8.3 03/18/2019 03:13 PM    HGB 12.4 03/18/2019 03:13 PM    HCT 38.7 03/18/2019 03:13 PM    PLATELET 952 69/09/2606 03:13 PM    MCV 88 03/18/2019 03:13 PM     Lab Results   Component Value Date/Time    Sodium 143 12/04/2018 08:13 AM    Potassium 4.5 12/04/2018 08:13 AM    Chloride 105 12/04/2018 08:13 AM    CO2 21 12/04/2018 08:13 AM    Anion gap 10 09/24/2011 02:30 AM    Glucose 124 (H) 12/04/2018 08:13 AM    BUN 13 12/04/2018 08:13 AM    Creatinine 0.81 12/04/2018 08:13 AM    BUN/Creatinine ratio 16 12/04/2018 08:13 AM    GFR est AA 98 12/04/2018 08:13 AM    GFR est non-AA 85 12/04/2018 08:13 AM    Calcium 9.0 12/04/2018 08:13 AM    Bilirubin, total <0.2 12/04/2018 08:13 AM    AST (SGOT) 13 12/04/2018 08:13 AM    Alk.  phosphatase 77 12/04/2018 08:13 AM    Protein, total 6.4 12/04/2018 08:13 AM    Albumin 4.1 12/04/2018 08:13 AM    Globulin 3.4 05/19/2010 10:46 AM    A-G Ratio 1.8 12/04/2018 08:13 AM    ALT (SGPT) 14 12/04/2018 08:13 AM     Lab Results   Component Value Date/Time    Cholesterol, total 182 12/04/2018 08:13 AM    HDL Cholesterol 60 12/04/2018 08:13 AM    LDL, calculated 100 (H) 12/04/2018 08:13 AM    VLDL, calculated 22 12/04/2018 08:13 AM    Triglyceride 111 12/04/2018 08:13 AM    CHOL/HDL Ratio 2.5 05/19/2010 10:46 AM     Lab Results   Component Value Date/Time    Vitamin D 25-Hydroxy 32.2 08/11/2011 08:15 AM    VITAMIN D, 25-HYDROXY 33.2 12/04/2018 08:13 AM       Lab Results   Component Value Date/Time    Hemoglobin A1c 6.2 (H) 12/04/2018 08:13 AM     Lab Results   Component Value Date/Time TSH 2.900 12/04/2018 08:13 AM         ASSESSMENT and PLAN      ICD-10-CM ICD-9-CM    1. Deep venous thrombosis of left profunda femoris vein (HCC) I82.412 453.41 D DIMER    occlusive and partially occlusive L common femoral vein, L proximal fem vein 12/2018, due to BCP vs BMI vs other, resolved after Xarelto x 4 mos (12/18-04/19)   2. Other acute recurrent sinusitis J01.81 461.9     resolved after Augmentin   3. Pain of left lower extremity M79.605 729.5 D DIMER    posterior thigh spasms, intermittently   4. Hypothyroidism due to acquired atrophy of thyroid E03.4 244.8      246.8     stable   5. Hyperglycemia R73.9 790.29    6. Elevated d-dimer R79.89 790.92 D DIMER    due to LLE DVT   7. Blurry vision, right eye H53.8 368.8     due to Migraine vs other, improved   8. BMI 50.0-59.9, adult ([facility]) Z68.43 V85.43     improving   9. Weight loss R63.4 783.21     3# since 07/02/19, 34# since 11/2018 due to diet changes and effort    10. Family history of stroke Z82.3 V17.1    11. Family history of heart attack Z82.49 V17.3    12. Facial paresthesia R20.9 782.0     Right, due to migraine, resolved after Fioricet at home, Excedrin Migraine at work       Discussed the patient's BMI with her. The BMI follow up plan is as follows: I have counseled this patient on diet and exercise regimens. Decrease carbohydrates (white foods, sweet foods, sweet drinks and alcohol), increase green leafy vegetables and protein (lean meats and beans) with each meal.  Avoid fried foods. Eat 3-5 small meals daily. Do not skip meals. Increase water intake. Increase physical activity to 30 minutes daily for health benefit or 60 minutes daily to prevent weight regain, as tolerated. Get 7-8 hours uninterrupted sleep nightly. Chart reviewed and updated. Continue current medications and care. Most recent tests reviewed from 3/18/2019. Recheck pertinent labs today.   Recent office visit notes from Dr. Ami Rodriguez, Dr. Marine Israel reviewed. Counseled patient on health concerns:  DVT, migraines, blood pressure, hyperglycemia, leg cramps, thyroid, weight loss. Relevant handouts given and discussed with patient. Immunizations noted. Offered empathy, support, legitimation, prayers, partnership to patient. Praised patient for progress. Follow-up and Dispositions    · Return in about 3 months (around 10/30/2019) for yearly physical exam.         Patient was offered a choice/choices in the treatment plan today. Patient expresses understanding of the plan and agrees with recommendations. More than 30 mins spent face to face with patient and more than 50% of this time spent in counseling and coordinating care. Written by Trudie Lennox, scribe, as dictated by Dr. Nilson Fajardo DO. Documentation True and Accepted by Jailyn Bryant. Patient Instructions     Try over the counter Magnesium 400 mg daily for leg cramps. Nighttime Leg Cramps: Care Instructions  Your Care Instructions    Nighttime leg cramps happen when a leg muscle tightens up suddenly. This most often happens in the calf. But cramps in the thigh or foot are also common. Cramps often occur just as you fall asleep or wake up. Leg cramps can be painful. They can last a few seconds to a few minutes. Though they are common, experts don't know exactly what causes them. To treat muscle cramps, you can stretch and massage the muscle. If cramps keep coming back, your doctor may prescribe medicine that relaxes your muscles. Follow-up care is a key part of your treatment and safety. Be sure to make and go to all appointments, and call your doctor if you are having problems. It's also a good idea to know your test results and keep a list of the medicines you take. How can you care for yourself at home? · To stop a leg cramp, sit down and straighten your leg as you bend your foot up toward your knee.  It may help to place a rolled towel under the ball of your foot and, while you hold the towel at both ends, gently pull the towel toward you while you keep your knee straight. This stretches the calf muscles. The cramp usually goes away after a few minutes. · Take a warm shower or bath to relax the muscle. Some people find that a heating pad placed on the muscle can also help. Others get relief by rubbing the calf with an ice pack. · Stretch your muscles every day, especially before and after exercise and at bedtime. Regular stretching can relax your muscles and may prevent cramps. · Do not suddenly increase the amount of exercise you get. Increase your exercise a little each week. · If your doctor prescribes medicine, take it exactly as prescribed. Call your doctor if you think you are having a problem with your medicine. · Ask your doctor if you can take an over-the-counter pain medicine, such as acetaminophen (Tylenol), ibuprofen (Advil, Motrin), or naproxen (Aleve). Be safe with medicines. Read and follow all instructions on the label. · Drink plenty of fluids. If you have kidney, heart, or liver disease and have to limit fluids, talk with your doctor before you increase the amount of fluids you drink. When should you call for help? Watch closely for changes in your health, and be sure to contact your doctor if:    · You often have muscle cramps that do not go away after home treatment.     · Your muscle cramps often wake you up at night.     · You do not get better as expected. Where can you learn more? Go to http://alejandro-eddie.info/. Enter S910 in the search box to learn more about \"Nighttime Leg Cramps: Care Instructions. \"  Current as of: March 28, 2019  Content Version: 12.1  © 9296-7897 [employer]. Care instructions adapted under license by [employer] (which disclaims liability or warranty for this information).  If you have questions about a medical condition or this instruction, always ask your healthcare professional. [address] any warranty or liability for your use of this information. Muscle Cramps: Care Instructions  Your Care Instructions    A muscle cramp occurs when a muscle tightens up suddenly. A cramp often happens in the legs. A muscle cramp is also called a muscle spasm or a charley horse. Muscle cramps usually last less than a minute. However, the pain may last for several minutes. Leg cramps that occur at night may wake you up. Heavy exercise, dehydration, and being overweight can increase your risk of getting cramps. An imbalance of certain chemicals in your blood, called electrolytes, can also lead to muscle cramps. Pregnant women sometimes get muscle cramps during sleep. Muscle cramps can be treated by stretching and massaging the muscle. If cramps keep coming back, your doctor may prescribe medicine that relaxes your muscles. Follow-up care is a key part of your treatment and safety. Be sure to make and go to all appointments, and call your doctor if you are having problems. It's also a good idea to know your test results and keep a list of the medicines you take. How can you care for yourself at home? · Drink plenty of fluids to prevent dehydration. Choose water and other caffeine-free clear liquids until you feel better. If you have kidney, heart, or liver disease and have to limit fluids, talk with your doctor before you increase the amount of fluids you drink. · Stretch your muscles every day, especially before and after exercise and at bedtime. Regular stretching can relax your muscles and may prevent cramps. · Do not suddenly increase the amount of exercise you get. Increase your exercise a little each week. · When you get a cramp, stretch and massage the muscle. You can also take a warm shower or bath to relax the muscle. A heating pad placed on the muscle can also help. · Take a daily multivitamin supplement.   · Ask your doctor if you can take an over-the-counter pain medicine, such as acetaminophen (Tylenol), ibuprofen (Advil, Motrin), or naproxen (Aleve). Be safe with medicines. Read and follow all instructions on the label. When should you call for help? Watch closely for changes in your health, and be sure to contact your doctor if:    · You get muscle cramps often that do not go away after home treatment.     · Your muscle cramps often wake you up at night.     · You do not get better as expected. Where can you learn more? Go to http://alejandro-eddie.info/. Enter R428 in the search box to learn more about \"Muscle Cramps: Care Instructions. \"  Current as of: September 20, 2018  Content Version: 12.1  © 9827-2954 [employer]. Care instructions adapted under license by [employer] (which disclaims liability or warranty for this information). If you have questions about a medical condition or this instruction, always ask your healthcare professional. [address] any warranty or liability for your use of this information.

## 2019-07-31 LAB — D DIMER PPP FEU-MCNC: 0.4 MG/L FEU (ref 0–0.49)

## 2019-08-02 DIAGNOSIS — R12 HEARTBURN: ICD-10-CM

## 2019-08-02 NOTE — TELEPHONE ENCOUNTER
PCP: Chet Mcdermott DO    Last appt: 7/30/2019  Future Appointments   Date Time Provider Cindy Roblesi   11/8/2019  8:20 AM LAB [facility] BRCORINA LIZ SCHED   11/15/2019 11:30 AM DO DARCY Mckeon SCHED   4/28/2020  9:00 AM Fatuma Whitten MD [id number]       Requested Prescriptions     Pending Prescriptions Disp Refills    omeprazole (PRILOSEC) 40 mg capsule [Pharmacy Med Name: OMEPRAZOLE DR 40 MG CAPSULE] 90 Cap 0     Sig: TAKE 1 CAPSULE BY MOUTH EVERY DAY       Prior labs and Blood pressures:  BP Readings from Last 3 Encounters:   07/30/19 133/60   07/08/19 122/76   07/02/19 122/73     Lab Results   Component Value Date/Time    Sodium 143 12/04/2018 08:13 AM    Potassium 4.5 12/04/2018 08:13 AM    Chloride 105 12/04/2018 08:13 AM    CO2 21 12/04/2018 08:13 AM    Anion gap 10 09/24/2011 02:30 AM    Glucose 124 (H) 12/04/2018 08:13 AM    BUN 13 12/04/2018 08:13 AM    Creatinine 0.81 12/04/2018 08:13 AM    BUN/Creatinine ratio 16 12/04/2018 08:13 AM    GFR est AA 98 12/04/2018 08:13 AM    GFR est non-AA 85 12/04/2018 08:13 AM    Calcium 9.0 12/04/2018 08:13 AM     Lab Results   Component Value Date/Time    Hemoglobin A1c 6.2 (H) 12/04/2018 08:13 AM     Lab Results   Component Value Date/Time    Cholesterol, total 182 12/04/2018 08:13 AM    HDL Cholesterol 60 12/04/2018 08:13 AM    LDL, calculated 100 (H) 12/04/2018 08:13 AM    VLDL, calculated 22 12/04/2018 08:13 AM    Triglyceride 111 12/04/2018 08:13 AM    CHOL/HDL Ratio 2.5 05/19/2010 10:46 AM     Lab Results   Component Value Date/Time    Vitamin D 25-Hydroxy 32.2 08/11/2011 08:15 AM    VITAMIN D, 25-HYDROXY 33.2 12/04/2018 08:13 AM       Lab Results   Component Value Date/Time    TSH 2.900 12/04/2018 08:13 AM

## 2019-08-07 RX ORDER — OMEPRAZOLE 40 MG/1
CAPSULE, DELAYED RELEASE ORAL
Qty: 90 CAP | Refills: 0 | Status: SHIPPED | OUTPATIENT
Start: 2019-08-07 | End: 2019-11-02 | Stop reason: SDUPTHER

## 2019-08-20 NOTE — TELEPHONE ENCOUNTER
PCP: Charlies Litten, DO    Last appt: 2018  Future Appointments   Date Time Provider Cindy Brown   2018 11:00 AM Charlies Litten, DO [facility]       Requested Prescriptions     Pending Prescriptions Disp Refills    azelastine (ASTELIN) 137 mcg (0.1 %) nasal spray 1 Bottle 5     Si Circle by Both Nostrils route two (2) times a day.        Prior labs and Blood pressures:  BP Readings from Last 3 Encounters:   18 184/86   18 117/71   18 127/80     Lab Results   Component Value Date/Time    Sodium 139 2017 08:12 AM    Potassium 4.5 2017 08:12 AM    Chloride 103 2017 08:12 AM    CO2 22 2017 08:12 AM    Anion gap 10 2011 02:30 AM    Glucose 90 2017 08:12 AM    BUN 14 2017 08:12 AM    Creatinine 0.85 2017 08:12 AM    BUN/Creatinine ratio 16 2017 08:12 AM    GFR est AA 93 2017 08:12 AM    GFR est non-AA 81 2017 08:12 AM    Calcium 8.9 2017 08:12 AM     Lab Results   Component Value Date/Time    Hemoglobin A1c 5.8 (H) 2017 08:12 AM     Lab Results   Component Value Date/Time    Cholesterol, total 168 2017 08:12 AM    HDL Cholesterol 56 2017 08:12 AM    LDL, calculated 86 2017 08:12 AM    VLDL, calculated 26 2017 08:12 AM    Triglyceride 129 2017 08:12 AM    CHOL/HDL Ratio 2.5 2010 10:46 AM     Lab Results   Component Value Date/Time    Vitamin D 25-Hydroxy 32.2 2011 08:15 AM    VITAMIN D, 25-HYDROXY 36.3 2017 08:12 AM       Lab Results   Component Value Date/Time    TSH 1.900 2017 08:12 AM Benefits, risks, and possible complications of procedure explained to patient/caregiver who verbalized understanding and gave verbal consent.

## 2019-09-08 DIAGNOSIS — Z72.821 INADEQUATE SLEEP HYGIENE: ICD-10-CM

## 2019-09-08 DIAGNOSIS — L29.9 PRURITIC DERMATITIS: ICD-10-CM

## 2019-09-09 NOTE — TELEPHONE ENCOUNTER
PCP: Cecil Pearson DO    Last appt: 7/30/2019  Future Appointments   Date Time Provider Cindy Stephanie   11/8/2019  8:20 AM LAB [facility]   11/15/2019 11:30 AM DO DARCY Tripathi [facility]   4/28/2020  9:00 AM Pedro Whitten MD [id number]       Requested Prescriptions     Pending Prescriptions Disp Refills    hydrOXYzine HCl (ATARAX) 25 mg tablet [Pharmacy Med Name: HYDROXYZINE HCL 25 MG TABLET] 120 Tab 2     Sig: TAKE 2 TABLETS BY MOUTH EVERY NIGHT& AS NEEDED DURING DAY FOR ITCHING       Prior labs and Blood pressures:  BP Readings from Last 3 Encounters:   07/30/19 133/60   07/08/19 122/76   07/02/19 122/73     Lab Results   Component Value Date/Time    Sodium 143 12/04/2018 08:13 AM    Potassium 4.5 12/04/2018 08:13 AM    Chloride 105 12/04/2018 08:13 AM    CO2 21 12/04/2018 08:13 AM    Anion gap 10 09/24/2011 02:30 AM    Glucose 124 (H) 12/04/2018 08:13 AM    BUN 13 12/04/2018 08:13 AM    Creatinine 0.81 12/04/2018 08:13 AM    BUN/Creatinine ratio 16 12/04/2018 08:13 AM    GFR est AA 98 12/04/2018 08:13 AM    GFR est non-AA 85 12/04/2018 08:13 AM    Calcium 9.0 12/04/2018 08:13 AM     Lab Results   Component Value Date/Time    Hemoglobin A1c 6.2 (H) 12/04/2018 08:13 AM     Lab Results   Component Value Date/Time    Cholesterol, total 182 12/04/2018 08:13 AM    HDL Cholesterol 60 12/04/2018 08:13 AM    LDL, calculated 100 (H) 12/04/2018 08:13 AM    VLDL, calculated 22 12/04/2018 08:13 AM    Triglyceride 111 12/04/2018 08:13 AM    CHOL/HDL Ratio 2.5 05/19/2010 10:46 AM     Lab Results   Component Value Date/Time    Vitamin D 25-Hydroxy 32.2 08/11/2011 08:15 AM    VITAMIN D, 25-HYDROXY 33.2 12/04/2018 08:13 AM       Lab Results   Component Value Date/Time    TSH 2.900 12/04/2018 08:13 AM

## 2019-09-12 RX ORDER — HYDROXYZINE 25 MG/1
TABLET, FILM COATED ORAL
Qty: 120 TAB | Refills: 2 | Status: SHIPPED | OUTPATIENT
Start: 2019-09-12 | End: 2019-12-12 | Stop reason: SDUPTHER

## 2019-11-08 DIAGNOSIS — R73.9 HYPERGLYCEMIA: Primary | ICD-10-CM

## 2019-11-08 DIAGNOSIS — Z00.00 PHYSICAL EXAM: Primary | ICD-10-CM

## 2019-11-09 LAB
25(OH)D3+25(OH)D2 SERPL-MCNC: 27.5 NG/ML (ref 30–100)
ALBUMIN SERPL-MCNC: 4.2 G/DL (ref 3.5–5.5)
ALBUMIN/GLOB SERPL: 2 {RATIO} (ref 1.2–2.2)
ALP SERPL-CCNC: 108 IU/L (ref 39–117)
ALT SERPL-CCNC: 23 IU/L (ref 0–32)
APPEARANCE UR: CLEAR
AST SERPL-CCNC: 15 IU/L (ref 0–40)
BACTERIA #/AREA URNS HPF: NORMAL /[HPF]
BILIRUB SERPL-MCNC: 0.3 MG/DL (ref 0–1.2)
BILIRUB UR QL STRIP: NEGATIVE
BUN SERPL-MCNC: 13 MG/DL (ref 6–24)
BUN/CREAT SERPL: 18 (ref 9–23)
CALCIUM SERPL-MCNC: 8.8 MG/DL (ref 8.7–10.2)
CASTS URNS QL MICRO: NORMAL /LPF
CHLORIDE SERPL-SCNC: 102 MMOL/L (ref 96–106)
CHOLEST SERPL-MCNC: 179 MG/DL (ref 100–199)
CO2 SERPL-SCNC: 22 MMOL/L (ref 20–29)
COLOR UR: YELLOW
CREAT SERPL-MCNC: 0.71 MG/DL (ref 0.57–1)
EPI CELLS #/AREA URNS HPF: NORMAL /HPF (ref 0–10)
ERYTHROCYTE [DISTWIDTH] IN BLOOD BY AUTOMATED COUNT: 13.3 % (ref 12.3–15.4)
EST. AVERAGE GLUCOSE BLD GHB EST-MCNC: 120 MG/DL
GLOBULIN SER CALC-MCNC: 2.1 G/DL (ref 1.5–4.5)
GLUCOSE SERPL-MCNC: 89 MG/DL (ref 65–99)
GLUCOSE UR QL: NEGATIVE
HBA1C MFR BLD: 5.8 % (ref 4.8–5.6)
HCT VFR BLD AUTO: 37.5 % (ref 34–46.6)
HDLC SERPL-MCNC: 66 MG/DL
HGB BLD-MCNC: 12.1 G/DL (ref 11.1–15.9)
HGB UR QL STRIP: ABNORMAL
INTERPRETATION, 910389: NORMAL
KETONES UR QL STRIP: NEGATIVE
LDLC SERPL CALC-MCNC: 102 MG/DL (ref 0–99)
LEUKOCYTE ESTERASE UR QL STRIP: NEGATIVE
MCH RBC QN AUTO: 28.6 PG (ref 26.6–33)
MCHC RBC AUTO-ENTMCNC: 32.3 G/DL (ref 31.5–35.7)
MCV RBC AUTO: 89 FL (ref 79–97)
MICRO URNS: ABNORMAL
NITRITE UR QL STRIP: NEGATIVE
PH UR STRIP: 6.5 [PH] (ref 5–7.5)
PLATELET # BLD AUTO: 264 X10E3/UL (ref 150–450)
POTASSIUM SERPL-SCNC: 4.7 MMOL/L (ref 3.5–5.2)
PROT SERPL-MCNC: 6.3 G/DL (ref 6–8.5)
PROT UR QL STRIP: NEGATIVE
RBC # BLD AUTO: 4.23 X10E6/UL (ref 3.77–5.28)
RBC #/AREA URNS HPF: NORMAL /HPF (ref 0–2)
SODIUM SERPL-SCNC: 138 MMOL/L (ref 134–144)
SP GR UR: 1 (ref 1–1.03)
T4 FREE SERPL-MCNC: 1.55 NG/DL (ref 0.82–1.77)
TRIGL SERPL-MCNC: 54 MG/DL (ref 0–149)
TSH SERPL DL<=0.005 MIU/L-ACNC: 1.86 UIU/ML (ref 0.45–4.5)
UROBILINOGEN UR STRIP-MCNC: 0.2 MG/DL (ref 0.2–1)
VLDLC SERPL CALC-MCNC: 11 MG/DL (ref 5–40)
WBC # BLD AUTO: 7.7 X10E3/UL (ref 3.4–10.8)
WBC #/AREA URNS HPF: NORMAL /HPF (ref 0–5)

## 2019-11-15 ENCOUNTER — OFFICE VISIT (OUTPATIENT)
Dept: FAMILY MEDICINE CLINIC | Age: 49
End: 2019-11-15

## 2019-11-15 VITALS
BODY MASS INDEX: 55.32 KG/M2 | HEART RATE: 60 BPM | TEMPERATURE: 97.9 F | RESPIRATION RATE: 16 BRPM | DIASTOLIC BLOOD PRESSURE: 83 MMHG | WEIGHT: 293 LBS | OXYGEN SATURATION: 100 % | SYSTOLIC BLOOD PRESSURE: 123 MMHG | HEIGHT: 61 IN

## 2019-11-15 DIAGNOSIS — R73.9 HYPERGLYCEMIA: ICD-10-CM

## 2019-11-15 DIAGNOSIS — Z80.3 FAMILY HISTORY OF BREAST CANCER: ICD-10-CM

## 2019-11-15 DIAGNOSIS — R63.4 WEIGHT LOSS: ICD-10-CM

## 2019-11-15 DIAGNOSIS — E03.4 HYPOTHYROIDISM DUE TO ACQUIRED ATROPHY OF THYROID: ICD-10-CM

## 2019-11-15 DIAGNOSIS — J30.9 ALLERGIC CONJUNCTIVITIS AND RHINITIS, BILATERAL: ICD-10-CM

## 2019-11-15 DIAGNOSIS — E78.5 DYSLIPIDEMIA: ICD-10-CM

## 2019-11-15 DIAGNOSIS — J45.20 MILD INTERMITTENT ASTHMA WITHOUT COMPLICATION: ICD-10-CM

## 2019-11-15 DIAGNOSIS — E55.9 VITAMIN D DEFICIENCY: ICD-10-CM

## 2019-11-15 DIAGNOSIS — Z84.89 FAMILY HISTORY OF SEIZURES: ICD-10-CM

## 2019-11-15 DIAGNOSIS — Z00.00 WELL WOMAN EXAM (NO GYNECOLOGICAL EXAM): Primary | ICD-10-CM

## 2019-11-15 DIAGNOSIS — Z86.718 HISTORY OF DEEP VENOUS THROMBOSIS (DVT) OF DISTAL VEIN OF LEFT LOWER EXTREMITY: ICD-10-CM

## 2019-11-15 DIAGNOSIS — H10.13 ALLERGIC CONJUNCTIVITIS AND RHINITIS, BILATERAL: ICD-10-CM

## 2019-11-15 DIAGNOSIS — Z72.821 INADEQUATE SLEEP HYGIENE: ICD-10-CM

## 2019-11-15 DIAGNOSIS — Z82.3 FAMILY HISTORY OF STROKE: ICD-10-CM

## 2019-11-15 DIAGNOSIS — G43.009 MIGRAINE WITHOUT AURA AND WITHOUT STATUS MIGRAINOSUS, NOT INTRACTABLE: ICD-10-CM

## 2019-11-15 DIAGNOSIS — Z82.49 FAMILY HISTORY OF HEART ATTACK: ICD-10-CM

## 2019-11-15 DIAGNOSIS — D50.8 IRON DEFICIENCY ANEMIA SECONDARY TO INADEQUATE DIETARY IRON INTAKE: ICD-10-CM

## 2019-11-15 DIAGNOSIS — Z83.3 FAMILY HISTORY OF DIABETES MELLITUS (DM): ICD-10-CM

## 2019-11-15 DIAGNOSIS — E66.01 OBESITY, MORBID, BMI 50 OR HIGHER (HCC): ICD-10-CM

## 2019-11-15 DIAGNOSIS — Z82.49 FAMILY HISTORY OF HYPERTENSION: ICD-10-CM

## 2019-11-15 RX ORDER — LEVOTHYROXINE SODIUM 125 UG/1
125 TABLET ORAL
Qty: 90 TAB | Refills: 3 | Status: SHIPPED | OUTPATIENT
Start: 2019-11-15 | End: 2020-11-23

## 2019-11-15 RX ORDER — AZELASTINE 1 MG/ML
2 SPRAY, METERED NASAL 2 TIMES DAILY
Qty: 3 BOTTLE | Refills: 1 | Status: SHIPPED | OUTPATIENT
Start: 2019-11-15 | End: 2022-06-13 | Stop reason: SDUPTHER

## 2019-11-15 RX ORDER — IPRATROPIUM BROMIDE AND ALBUTEROL SULFATE 2.5; .5 MG/3ML; MG/3ML
3 SOLUTION RESPIRATORY (INHALATION)
Qty: 30 NEBULE | Refills: 5 | Status: SHIPPED | OUTPATIENT
Start: 2019-11-15 | End: 2020-11-20

## 2019-11-15 RX ORDER — ALBUTEROL SULFATE 90 UG/1
2 AEROSOL, METERED RESPIRATORY (INHALATION)
Qty: 3 INHALER | Refills: 1 | Status: SHIPPED | OUTPATIENT
Start: 2019-11-15 | End: 2020-11-23

## 2019-11-15 RX ORDER — MONTELUKAST SODIUM 10 MG/1
10 TABLET ORAL DAILY
Qty: 90 TAB | Refills: 3 | Status: SHIPPED | OUTPATIENT
Start: 2019-11-15 | End: 2020-12-17

## 2019-11-15 NOTE — PROGRESS NOTES
HISTORY OF PRESENT ILLNESS  Uri Alcala is a 44 y.o. female here for an annual physical exam    Agree with nurse note. Pt's last physical was 11/12/2018. Pt with dyslipidemia, hypothyroidism, hyperglycemia, inadequate sleep hygiene, vit d deficiency, iron deficiency anemia, migraine, BL allergic conjunctivitis and rhinitis, and family hx of breast ca, stroke, seizure, heart attack, diabetes, and hypertension presents to the office with a BP of 123/83. She takes Synthroid 125 mcg daily and requests a refill today. Pt requests to review labs from 11/8/2019. A1C 5.8, vit d 27.5, Cr 0.71, LFTs wnl, TSH 1.86, FT4 1.55, CBC wnl, , HDL 66, trigs 54, urinalysis showed a trace of blood. She requests a refill of Singulair 10 mg. Pt plans to get her colonoscopy scheduled. She already has a referral. no GI complaints. Pt's most recent mammogram was on 12/13/2018 with Fam Jones. Negative for any signs of ca. Pt had an eye exam last week with Dr. Tawanna Mckinnon. Pt with obesity is down 34 lbs since 11/1/2018. She has reduced her portion sizes and has increased her protein and vegetable intake. She averages 7 hours of sleep nightly. Pt with a hx of DVT in 12/2019. Resolved after Xarelto x 3 months. Pt with mild intermittent asthma. She requests a three month script for her Astelin, albuterol, and Duo-Neb refills. New Concerns    None. Written by tracy Cottrell, as dictated by Dr. Teagan Brenner DO.    ROS    Review of Systems is negative except as mentioned above in HPI.     ALLERGIES:    Allergies   Allergen Reactions    Latex Rash    Junel 1.5/30 (21) [Norethindrone Ac-Eth Estradiol] Swelling     L FOOT DUE TO NEW DVT    Other Medication Hives and Rash     SUN    Pcn [Penicillins] Hives    Prednisone Hives    Sulfa (Sulfonamide Antibiotics) Hives    Garamycin [Gentamicin] Other (comments)     Itchy eyes due to sulfate    Metformin Diarrhea CURRENT MEDICATIONS:    Outpatient Medications Marked as Taking for the 11/15/19 encounter (Office Visit) with Jane Domingo, DO   Medication Sig Dispense Refill    albuterol (PROVENTIL HFA, VENTOLIN HFA, PROAIR HFA) 90 mcg/actuation inhaler Take 2 Puffs by inhalation every four (4) hours as needed for Wheezing. Indications: bronchospasm prevention 3 Inhaler 1    levothyroxine (SYNTHROID) 125 mcg tablet Take 1 Tab by mouth Daily (before breakfast). Indications: a condition with low thyroid hormone levels 90 Tab 3    albuterol-ipratropium (DUO-NEB) 2.5 mg-0.5 mg/3 ml nebu 3 mL by Nebulization route every six (6) hours as needed (breathing). 30 Nebule 5    montelukast (SINGULAIR) 10 mg tablet Take 1 Tab by mouth daily. 90 Tab 3    azelastine (ASTELIN) 137 mcg (0.1 %) nasal spray 2 Sprays by Both Nostrils route two (2) times a day. Use in each nostril as directed  Indications: Seasonal Runny Nose 3 Bottle 1    omeprazole (PRILOSEC) 40 mg capsule TAKE 1 CAPSULE BY MOUTH EVERY DAY 90 Cap 1    hydrOXYzine HCl (ATARAX) 25 mg tablet TAKE 2 TABLETS BY MOUTH EVERY NIGHT& AS NEEDED DURING DAY FOR ITCHING 120 Tab 2    NAFTIN 2 % gel APPLY TO AFFECTED AREA TWO (2) TIMES A DAY. INDICATIONS: TINEA PEDIS 60 g 2    butalbital-acetaminophen-caffeine (FIORICET, ESGIC) -40 mg per tablet TAKE 1 TAB BY MOUTH EVERY SIX (6) HOURS AS NEEDED FOR PAIN OR HEADACHE. 40 Tab 0    Nebulizer & Compressor machine 1 Each by Other route four (4) times daily as needed. 1 Each 0    albuterol (PROVENTIL VENTOLIN) 2.5 mg /3 mL (0.083 %) nebulizer solution 6 mL by Nebulization route every four (4) hours as needed for Wheezing or Shortness of Breath. Indications: Acute Asthma Attack 24 Each 1    budesonide (PULMICORT) 1 mg/2 mL nbsp 2 mL by Nebulization route two (2) times a day.  Indications: MAINTENANCE THERAPY FOR ASTHMA (Patient taking differently: 1,000 mcg by Nebulization route two (2) times daily as needed.) 1 Each 1    cyclobenzaprine (FLEXERIL) 10 mg tablet TAKE 1 TABLET BY MOUTH 3 TIMES A DAY AS NEEDED FOR MUSCLE SPASM 90 Tab 0    ALPRAZolam (XANAX) 0.5 mg tablet Take 1 Tab by mouth two (2) times daily as needed for Anxiety or Sleep. Max Daily Amount: 1 mg. Indications: anxiety 60 Tab 2    terconazole (TERAZOL 7) 0.4 % vaginal cream   6    LOCOID 0.1 % lotn   1    valACYclovir (VALTREX) 500 mg tablet   1    terconazole (TERAZOL 3) 80 mg vaginal suppository   4    multivitamin (ONE A DAY) tablet Take 1 tablet by mouth daily.  PROPYLENE GLYCOL//PF (SYSTANE, PF, OP) Apply  to eye four (4) times daily.  magnesium oxide (MAG-OX) 400 mg tablet Take 400 mg by mouth daily.  Emollient Combination No.32 (EPICERAM) Emul by Apply Externally route. From Dr Alondra Medina.  metroNIDAZOLE (METROGEL) 1 % topical gel Apply  to affected area daily. Use a thin layer to affected areas after washing from Dr Alondra Medina   Indications: ACNE ROSACEA      latanoprost (XALATAN) 0.005 % ophthalmic solution Administer 1 Drop to both eyes nightly.  fexofenadine (ALLEGRA) 180 mg tablet Take 180 mg by mouth daily as needed.  LACTOBACILLUS/FOS/PECTIN (PROBIOTIC COMPLEX PO) Take  by mouth daily.  cholecalciferol, vitamin d3, (VITAMIN D) 1,000 unit tablet Take 1,000 Units by mouth daily. PAST MEDICAL HISTORY:    Past Medical History:   Diagnosis Date    Acne rosacea     Dr. Alondra Medina.  Allergy, unspecified not elsewhere classified childhood     Txd with immunotherapy. Dr. Jazmín Amato Anemia NEC     borderline    Ankle sprain 2007    Right. Dr. Andrade Erickson    Ankle sprain 11/2012    Right. Dr. Michael Covington.  Asthma childhood    Chickenpox childhood    Chronic low back pain with right-sided sciatica     and SI joint dysfunction. Dr. Moreno Guardado.  Chronic otitis media     Dr. Jazmín Amato Dry eye syndrome 2013    Dr. Amador Clarke.     DVT of deep femoral vein ([facility] [address] 12/07/2018 Acute occlusive DVT of L deep femoral vein and Acute partially occlusive DVT of L Common Fem Vein and L Prox Fem Vein. Txd with Xarelto x 3 months. due to BCP. Dr. Nando Felton EBV infection 6/27/2011    Hearing loss     Mild high frequency sensorineuronal hearing loss. Dr. Tuan Valero Caro murmur     Hernia of abdominal wall 09/2003, 81/3557    umbilical.  Dr. Leanna Kayser. Dr. Raghu Pena    Hyperglycemia 2013    Hypothyroidism 06/2010    Dr. Gonsalo Rosas pressure increase 12/2011    Dr. Fly Ivory. Dr. Heriberto Barragan. Dr. Nikunj Toro.  Knee pain, left 04/2012    Left. Dr. Fausto Omer Measles childhood    Migraine 2017, 07/2019    w aura and facial paresthesia. Dr. Lester Batres    Mumps childhood    Plantar fasciitis, bilateral 2010    Dr. hPil Slaughter Sciatica 2008    Right.  with OA. Dr. Jocelyn Jeans Tinnitus of right ear 2012    Dr. Ovidio Houston:    Past Surgical History:   Procedure Laterality Date   Preston Baez  2011    Dr. Vanessa Doran.  HAND/FINGER SURGERY UNLISTED  09/2003    Right Index finger repair due to cut    HX HERNIA REPAIR  34/8682    Umbilical.  Dr. Leanna Kayser.  HX HERNIA REPAIR  9/23/2011    recurrent umbilical.  Laparoscopy incisional.  Dr. Raghu Pena.     HX TONSILLECTOMY  childhood    LAP,CHOLECYSTECTOMY  07/2001       FAMILY HISTORY:    Family History   Problem Relation Age of Onset    Hypertension Mother     Cancer Mother         small cell lung with bone mets to spine/MELANOMA    Cancer Father         melanoma    Arthritis-osteo Father     Colon Polyps Father     Diabetes Maternal Grandmother     Stroke Maternal Grandmother     Seizures Maternal Grandmother     Colon Polyps Maternal Grandmother     Breast Cancer Maternal Grandmother     Heart Disease Paternal Grandmother         heart failure    Heart Attack Paternal Grandmother     Asthma Paternal Grandmother     Depression Sister     Other Maternal Grandfather         Louisa Mancuso Ds       SOCIAL HISTORY:    Social History     Socioeconomic History    Marital status: SINGLE     Spouse name: Not on file    Number of children: Not on file    Years of education: Not on file    Highest education level: Not on file   Tobacco Use    Smoking status: Passive Smoke Exposure - Never Smoker    Smokeless tobacco: Never Used    Tobacco comment: lived with smoker dad and mom then step mom x 20 yrs   Substance and Sexual Activity    Alcohol use: Yes     Alcohol/week: 1.0 standard drinks     Types: 1 Glasses of wine per week     Frequency: Monthly or less     Drinks per session: 1 or 2     Binge frequency: Never     Comment: RARE    Drug use: No    Sexual activity: Yes     Partners: Male     Birth control/protection: Condom   Lifestyle    Physical activity:     Days per week: 4 days     Minutes per session: 30 min    Stress: Only a little       IMMUNIZATIONS:    Immunization History   Administered Date(s) Administered    Influenza Vaccine 10/15/2013, 11/07/2016, 11/03/2017, 10/01/2019    Influenza Vaccine (Quad) 11/03/2015    Influenza Vaccine (Quad) PF 11/20/2014, 11/07/2016, 11/12/2018    Influenza Vaccine Split 11/18/2011, 11/07/2012    Influenza Vaccine Whole 10/01/2010    TD Vaccine 09/01/2003    Tdap 11/03/2015         PHYSICAL EXAMINATION    Vital signs     Visit Vitals  /83 (BP 1 Location: Right arm, BP Patient Position: Sitting)   Pulse 60   Temp 97.9 °F (36.6 °C) (Oral)   Resp 16   Ht 5' 1\" (1.549 m)   Wt 300 lb 6.4 oz (136.3 kg)   SpO2 100%   BMI 56.76 kg/m²        General appearance - Well nourished. Well appearing. Well developed. No acute distress. Obese. Head - Normocephalic. Atraumatic. Non tender sinuses x 4. Eyes - pupils equal and reactive, extraocular eye movements intact, sclera anicteric. Mildly injected sclera.   Ears - Hearing is grossly normal bilaterally. Bilateral TM's intact. External ear canals normal without evidence of blood or swelling. R EAC is mildly erythematous. Nose - normal and patent. No erythema or turbinate edema. No discharge. No polyps. Mouth - mucous membranes with adequate moisture. Posterior pharynx normal without lesions, white exudate, or obstruction. Neck - supple. Midline trachea. No carotid bruits are noted. No thyromegaly noted. Neck is supple without rigidity. Chest - clear to auscultation bilaterally anterriorly and posteriorly. No wheezes, rales or rhonchi. Breath sounds are symmetrical bilaterally. Unlabored respirations. Heart - normal rate. Regular rhythm. Normal S1, S2. No murmurs. No rubs, clicks or gallops noted. Abdomen - soft and distended. No masses or organomegaly. No rebound, rigidity or guarding. Bowel sounds normal x 4 quadrants. No tenderness noted. Back exam - normal range of motion. No pain on palpation of the spinous processes in the cervical, thoracic, lumbar, sacral regions. No CVA tenderness. Neurological - awake, alert and oriented to person, place, and time and event. Cranial nerves II through XII intact. No focal findings. Clear speech. Muscle strength is +5/5 x 4 extremities. Sensation is intact to light touch bilaterally. Steady gait. Musculoskeletal - Intact x 4 extremities. Full ROM x 4 extremities. No pain with movement. No pain on palpation of the bilateral shoulders, elbows, wrists, hands. No tenderness in the pelvis, pubic bone, bilateral hips, knees, ankles. No obvious deformity  Heme/Lymph - peripheral pulses normal x 4 extremities. No peripheral edema is noted. No cervical adenopathy noted. Skin - no rashes, erythema, ecchymosis, lacerations, abrasions, suspicious moles  Psychological -   normal behavior, dress and thought processes. Good insight. Good eye contact. Normal affect. Appropriate mood. Normal speech.       DATA REVIEWED    Lab Results   Component Value Date/Time    WBC 7.7 11/08/2019 10:36 AM    HGB 12.1 11/08/2019 10:36 AM    HCT 37.5 11/08/2019 10:36 AM    PLATELET 265 66/03/9015 10:36 AM    MCV 89 11/08/2019 10:36 AM     Lab Results   Component Value Date/Time    Sodium 138 11/08/2019 10:36 AM    Potassium 4.7 11/08/2019 10:36 AM    Chloride 102 11/08/2019 10:36 AM    CO2 22 11/08/2019 10:36 AM    Anion gap 10 09/24/2011 02:30 AM    Glucose 89 11/08/2019 10:36 AM    BUN 13 11/08/2019 10:36 AM    Creatinine 0.71 11/08/2019 10:36 AM    BUN/Creatinine ratio 18 11/08/2019 10:36 AM    GFR est  11/08/2019 10:36 AM    GFR est non-AA 99 11/08/2019 10:36 AM    Calcium 8.8 11/08/2019 10:36 AM    Bilirubin, total 0.3 11/08/2019 10:36 AM    AST (SGOT) 15 11/08/2019 10:36 AM    Alk. phosphatase 108 11/08/2019 10:36 AM    Protein, total 6.3 11/08/2019 10:36 AM    Albumin 4.2 11/08/2019 10:36 AM    Globulin 3.4 05/19/2010 10:46 AM    A-G Ratio 2.0 11/08/2019 10:36 AM    ALT (SGPT) 23 11/08/2019 10:36 AM     Lab Results   Component Value Date/Time    Cholesterol, total 179 11/08/2019 10:36 AM    HDL Cholesterol 66 11/08/2019 10:36 AM    LDL, calculated 102 (H) 11/08/2019 10:36 AM    VLDL, calculated 11 11/08/2019 10:36 AM    Triglyceride 54 11/08/2019 10:36 AM    CHOL/HDL Ratio 2.5 05/19/2010 10:46 AM     Lab Results   Component Value Date/Time    Vitamin D 25-Hydroxy 32.2 08/11/2011 08:15 AM    VITAMIN D, 25-HYDROXY 27.5 (L) 11/08/2019 10:36 AM       Lab Results   Component Value Date/Time    Hemoglobin A1c 5.8 (H) 11/08/2019 10:36 AM     Lab Results   Component Value Date/Time    TSH 1.860 11/08/2019 10:36 AM         ASSESSMENT and PLAN    ICD-10-CM ICD-9-CM    1. Well woman exam (no gynecological exam) Z00.00 V70.0    2. Dyslipidemia E78.5 272.4     improved with diet changes   3. Hypothyroidism due to acquired atrophy of thyroid E03.4 244.8 levothyroxine (SYNTHROID) 125 mcg tablet     246.8     stable   4.  Hyperglycemia R73.9 790.29     improving with diet changes   5. Vitamin D deficiency E55.9 268.9     stable   6. Iron deficiency anemia secondary to inadequate dietary iron intake D50.8 280.1     resolved   7. Inadequate sleep hygiene Z72.821 307.49    8. Weight loss R63.4 783.21     34# since 11/21/18 due to decreased portions and diet changes   9. Obesity, morbid, BMI 50 or higher (HCC) E66.01 278.01    10. History of deep venous thrombosis (DVT) of distal vein of left lower extremity Z86.718 V12.51     12/2018, resolved after Xarelto x 3 months   11. Family history of stroke Z82.3 V17.1    12. Family history of heart attack Z82.49 V17.3    13. Family history of seizures Z84.89 V19.8    14. Family history of breast cancer Z80.3 V16.3    15. Family history of diabetes mellitus (DM) Z83.3 V18.0    12. Family history of hypertension Z82.49 V17.49    16. Mild intermittent asthma without complication D90.90 160.51 albuterol (PROVENTIL HFA, VENTOLIN HFA, PROAIR HFA) 90 mcg/actuation inhaler      albuterol-ipratropium (DUO-NEB) 2.5 mg-0.5 mg/3 ml nebu      montelukast (SINGULAIR) 10 mg tablet    stable   18. Migraine without aura and without status migrainosus, not intractable G43.009 346.10     1 q 2 months   19. Allergic conjunctivitis and rhinitis, bilateral H10.13 372.05 azelastine (ASTELIN) 137 mcg (0.1 %) nasal spray    J30.9 477.9     stable     See ophthalmologist every 2 to 3 years unless otherwise directed. See dentist every 6 months. See dermatologist for annual check. Prescriptions written and sent to pharmacist or given to patient. Continue current medications. Increase Vit D to 5,000IU daily during the winter months due to low level. Chart reviewed and updated. Specialist notes reviewed and appreciated. Get ov notes from Dr. Hannah Chauhan. Advised pt to sign medical release form at check out today. Referrals given. Keep appointments with specialists. Discussed recent test results and goals with patient.   Recheck pertinent labs. Recent colonoscopy, PAP and mammograms reviewed. Recheck as instructed by specialists. Immunizations are noted. Advise flu vaccine between the months September to December. Advised balanced diet and exercise. Proper rest.  Increase water and fiber. Avoid tobacco.  Decrease alcohol and caffeine. Decrease carbohydrates, increase green leafy vegetables and protein. Increase water intake. Eat 3-5 small meals daily. Increase physical activity. Relevant handouts provided and discussed with pt. Counselled pt on:  Patient health concerns. Thyroid, cholesterol, hyperglycemia, vit d, anemia, weight, asthma. Allergy hygiene. Discussed the patient's BMI with her. The BMI follow up plan is as follows: I have counseled this patient on diet and exercise regimens. Follow-up and Dispositions    · Return in about 1 year (around 11/15/2020) for yearly physical exam, weight, med refills. Patient was offered a choice/choices in the treatment plan today. Patient expresses understanding of the plan and agrees with recommendations. Written by Daryle Abelson, scribe, as dictated by Dr. Ruddy Dow DO. Documentation True and Accepted by Nataly Gaston. Candi Mitchell. Patient Instructions        Learning About Low-Carbohydrate Diets for Weight Loss  What is a low-carbohydrate diet? Low-carb diets avoid foods that are high in carbohydrate. These high-carb foods include pasta, bread, rice, cereal, fruits, and starchy vegetables. Instead, these diets usually have you eat foods that are high in fat and protein. Many people lose weight quickly on a low-carb diet. But the early weight loss is water. People on this diet often gain the weight back after they start eating carbs again. Not all diet plans are safe or work well. A lot of the evidence shows that low-carb diets aren't healthy. That's because these diets often don't include healthy foods like fruits and vegetables.  Losing weight safely means balancing protein, fat, and carbs with every meal and snack. And low-carb diets don't always provide the vitamins, minerals, and fiber you need. If you have a serious medical condition, talk to your doctor before you try any diet. These conditions include kidney disease, heart disease, type 2 diabetes, high cholesterol, and high blood pressure. If you are pregnant, it may not be safe for your baby if you are on a low-carb diet. How can you lose weight safely? You might have heard that a diet plan helped another person lose weight. But that doesn't mean that it will work for you. It is very hard to stay on a diet that includes lots of big changes in your eating habits. If you want to get to a healthy weight and stay there, making healthy lifestyle changes will often work better than dieting. These steps can help. · Make a plan for change. Work with your doctor to create a plan that is right for you. · See a dietitian. He or she can show you how to make healthy changes in your eating habits. · Manage stress. If you have a lot of stress in your life, it can be hard to focus on making healthy changes to your daily habits. · Track your food and activity. You are likely to do better at losing weight if you keep track of what you eat and what you do. Follow-up care is a key part of your treatment and safety. Be sure to make and go to all appointments, and call your doctor if you are having problems. It's also a good idea to know your test results and keep a list of the medicines you take. Where can you learn more? Go to http://alejandro-eddie.info/. Enter A121 in the search box to learn more about \"Learning About Low-Carbohydrate Diets for Weight Loss. \"  Current as of: November 7, 2018  Content Version: 12.2  © 8753-2695 [employer]. Care instructions adapted under license by [employer] (which disclaims liability or warranty for this information).  If you have questions about a medical condition or this instruction, always ask your healthcare professional. [address] any warranty or liability for your use of this information. WEIGHT LOSS PLAN    1. Read food labels and count calories. Goal 7550-4418 calories daily for women and 4612-8936 calories daily for men. Achieve this by decreasing caloric intake by 500 calories by weekly increments. NOTE:  1 pound = 3500 calories! Www.loseit. [place]  Www.CalAmpnesspal.[employer] [place]  Www.iConclude  Www.A2B. gov  Weight watchers mobile    Calories needed to lose 1-2 pounds a week = 10 x your weight in pounds    2. Increase water intake. Per [facility] Weight loss Program, it is important to drink 1/2 your body weight in ounces of water daily. Decrease your water consumption 2-3 hours before bedtime to prevent sleep disturbance from frequent urination. 3.  Decrease sugary beverages. Each can or glass of soda increases your risk of obesity by 60%. Can lose 10 pounds in a month by avoiding any soda. 12 oz can of soda = 140 calories, 16 oz cup of sweet tea = 200 calories    16 oz orange juice = 200 calories, 10 oz apple juice = 150 calories   32 oz sports drink = 200 calories, 16 oz punch = 240 calories   3.5 oz alcohol = 100-150 calories     4. Avoid High Fructose Corn Syrup products. This ingredient makes products highly addictive! 5. Exercise 30 minutes daily 5 days weekly, minimally. If you burn 3500 calories that equals a pound! Use a pedometer to count steps. Visit www. SP3H. [place] for a free pedometer and diet recommendations. Your maximum heart rate = 220 - your age    Never exercise at your maximum heart rate. See handout for target heart rate. 6.  Decrease carbohydrates (white bread, pasta, rice, potatoes, sweet foods and sweet drinks like soda, tea, coffee, juice and sports drinks). Increase fiber and protein.     Goal:   calories daily of carbohydrates     Try CHROMIUM PICONATE 200 MG THREE TIMES DAILY,    to decrease Premenstrual carbohydrate cravings. 7.  Eat 3-5 small meals daily, include lots of protein (beans/legumes, nuts, lean meat, eggs) and green vegetables with each. (Breakfast, lunch, dinner with 2 healthy snacks)    8. Get proper rest 7-8 hours uninterrupted. When you get less than 6 hours, it triggers hunger by affecting your Grehlin:Leptin ratio and this results in weight gain. 9.  Watch your food portions. Green leafy vegetables should cover 1/2 of the plate, lean meat 1/4 of the plate, starchy vegetable 1/4 of the plate. Use smaller plates. 10.  Do not eat until you are full. Eat until you are no longer hungry. If you are not sure, try drinking a glass of water before getting your second serving of food. 11.  Do not weigh yourself daily. Wait until your next office visit. Use how you feel and  how your clothes fit as measurements of success. 12.  Address your spirituality to draw strength from above during your journey. Remember \"I am fearfully and wonderfully made. Marvelous in His eyes. \"    13. Set realistic, appropriate and achievable weight loss goals:     RECOMMENDED TARGET WEIGHT LOSS:  Initial weight loss of 5-10% of your initial body weight achieved over 6 months or a decrease of 2 BMI units. MINIMUM GOAL OF WEIGHT LOSS:  Reduce body weight and maintain a lower body weight. Prevent weight gain. RELATED WEBSITES:      Www.obesityaction. org  (and consider joining the Obesity Action Coalition for $25/year. The [facility] mission is to elevated and empower those affected by obesity through education, advocacy and support. Quarterly journals included in membership fee. )    Www.[employer]. [employer]  www."[employer].com     RELATED DIETS:  Dr. Salvador Whiteside Weight loss challenge, Mediterranean diet, [address] [place] [address] Watchers, Marlo Diet (anti-inflammatory diet)    EXERCISE 150 MINUTES WEEKLY. THIS CAN BE ACHIEVED BY WORKING OUT OR WALKING A MINIMUM OF 30 MINUTES FOR 5 DAYS WEEKLY. YOU CAN EXERCISE IN INCREMENTS OF 10-15 MINUTES UP TO 3 TIMES A DAY. Consider performing \"brainless exercise. \"  Choose your favorite tv program.  Five minutes before and for 5 minutes after the tv program, stretch your body. While the program is on, walk in place watching the show. When commercials come on, rest or walk around the house to do other things. When the program begins, return to walking in place. When you are able keep walking during the commercials and add light weights to your ankles or hands. By the end of the show, you would have walked 30 minutes. If you need shorter spurts of exercise, walk during the commercials and rest during the show. Drink to glasses of water prior to any exercise to prevent dehydration and to improve the results of the work out. Your RESTING HEART RATE is the number of times your heart beats per minute when you are not exerting yourself. The more fit you are, the lower your resting heart rate will be. Your MAXIMUM HEART RATE is the number of times per minute your heart pumps when it is working at 100% capacity. NEVER EXERCISE AT YOUR MAXIMUM HEART RATE. MAX HEART RATE = 220 - your age    For example, in a 48year old, the maximum heart rate is 220-50 = 170 beats per minute    Your [place] is the number of beats per minute our heart should pump during aerobic exercise. Reaching your target heart rate indicates that your body is receiving maximum cardiovascular and fat burning benefits.      If you are fit, your TARGET HEART RATE = 70-85% of your maximum Heart rate      For a 46year old with vigorous intensity physical activity,         Target heart rate= 170 bpm x .70 = 119 bpm     Target heart rate = 170 bpm x .85=  145 bpm        Therefore, your target heart rate during physical activity is 119-145      If you are not fit, TARGET HEART RATE = 50-70% of your maximum Heart rate    MODERATE CONSISTENT AEROBIC EXERCISE OR WALKING FOR AT LEAST 150 MINUTES WEEKLY IS AN ESSENTIAL PART OF ANY WEIGHT LOSS OF WEIGHT MAINTENANCE PROGRAM.     During WEIGHT LOSS PHASE, at least 75% of your exercise needs to be walking or moderate aerobic activity and 25% or 0% can be Isometric or Resistance type exercises (the latter can be deferred to the weight maintenance phase.)     During WEIGHT MAINTENANCE PHASE, at least 50% of your exercise needs to be aerobic and 50% Isometric or Resistance type exercises. BENEFITS OF MODERATE INTENSITY EXERCISE MOST DAYS OF THE WEEK:     1. Insulin Resistance improves 30-85%   2. Abdominal Fat decreases by 30%   3. Inflammatory Markers decrease by 30% (therefore pain decreases)   4. Systolic and Diastolic Blood Pressure decrease by 4 mmHg   5. HDL improves by 5%   6. Triglycerides decrease by 15%   7. Shift from small dense LDL to large dense LDL (therefore decrease Insulin Resistance)   8.   Decrease coagulability

## 2019-11-15 NOTE — PROGRESS NOTES
Identified pt with two pt identifiers(name and ). Reviewed record in preparation for visit and have obtained necessary documentation. Chief Complaint   Patient presents with    Physical    Results    Medication Refill        Health Maintenance Due   Topic    Shingrix Vaccine Age 50> (1 of 2)    FOBT Q 1 YEAR AGE 54-65     Influenza Age 5 to Adult     BREAST CANCER SCRN MAMMOGRAM         Visit Vitals  /83 (BP 1 Location: Right arm, BP Patient Position: Sitting)   Pulse 60   Temp 97.9 °F (36.6 °C) (Oral)   Resp 16   Ht 5' 1\" (1.549 m)   Wt 300 lb 6.4 oz (136.3 kg)   SpO2 100%   BMI 56.76 kg/m²     Pain Scale: 0 - No pain/10    Coordination of Care Questionnaire:  :   1. Have you been to the ER, urgent care clinic since your last visit? Hospitalized since your last visit? No    2. Have you seen or consulted any other health care providers outside of the [address] [place] [address] since your last visit? Include any pap smears or colon screening.  Yes Where: Yearly Eye exam, 2019, [facility] INVASIVE SURGERY [facility], Dr. Harper Rodas

## 2019-11-15 NOTE — PATIENT INSTRUCTIONS
Learning About Low-Carbohydrate Diets for Weight Loss  What is a low-carbohydrate diet? Low-carb diets avoid foods that are high in carbohydrate. These high-carb foods include pasta, bread, rice, cereal, fruits, and starchy vegetables. Instead, these diets usually have you eat foods that are high in fat and protein. Many people lose weight quickly on a low-carb diet. But the early weight loss is water. People on this diet often gain the weight back after they start eating carbs again. Not all diet plans are safe or work well. A lot of the evidence shows that low-carb diets aren't healthy. That's because these diets often don't include healthy foods like fruits and vegetables. Losing weight safely means balancing protein, fat, and carbs with every meal and snack. And low-carb diets don't always provide the vitamins, minerals, and fiber you need. If you have a serious medical condition, talk to your doctor before you try any diet. These conditions include kidney disease, heart disease, type 2 diabetes, high cholesterol, and high blood pressure. If you are pregnant, it may not be safe for your baby if you are on a low-carb diet. How can you lose weight safely? You might have heard that a diet plan helped another person lose weight. But that doesn't mean that it will work for you. It is very hard to stay on a diet that includes lots of big changes in your eating habits. If you want to get to a healthy weight and stay there, making healthy lifestyle changes will often work better than dieting. These steps can help. · Make a plan for change. Work with your doctor to create a plan that is right for you. · See a dietitian. He or she can show you how to make healthy changes in your eating habits. · Manage stress. If you have a lot of stress in your life, it can be hard to focus on making healthy changes to your daily habits. · Track your food and activity.  You are likely to do better at losing weight if you keep track of what you eat and what you do. Follow-up care is a key part of your treatment and safety. Be sure to make and go to all appointments, and call your doctor if you are having problems. It's also a good idea to know your test results and keep a list of the medicines you take. Where can you learn more? Go to http://alejandro-eddie.info/. Enter A121 in the search box to learn more about \"Learning About Low-Carbohydrate Diets for Weight Loss. \"  Current as of: November 7, 2018  Content Version: 12.2  © 0761-6368 [employer]. Care instructions adapted under license by [employer] (which disclaims liability or warranty for this information). If you have questions about a medical condition or this instruction, always ask your healthcare professional. [address] any warranty or liability for your use of this information. WEIGHT LOSS PLAN    1. Read food labels and count calories. Goal 7432-1920 calories daily for women and 2591-1973 calories daily for men. Achieve this by decreasing caloric intake by 500 calories by weekly increments. NOTE:  1 pound = 3500 calories! Www.loseit. com  Www.myfitnesspal.com  Www.[employer]. [employer]  Www.[employer] Media[employer]. gov  Weight watchers mobile    Calories needed to lose 1-2 pounds a week = 10 x your weight in pounds    2. Increase water intake. Per [employer] [facility], it is important to drink 1/2 your body weight in ounces of water daily. Decrease your water consumption 2-3 hours before bedtime to prevent sleep disturbance from frequent urination. 3.  Decrease sugary beverages. Each can or glass of soda increases your risk of obesity by 60%. Can lose 10 pounds in a month by avoiding any soda.      12 oz can of soda = 140 calories, 16 oz cup of sweet tea = 200 calories    16 oz orange juice = 200 calories, 10 oz apple juice = 150 calories   32 oz sports drink = 200 calories, 16 oz punch = 240 calories   3.5 oz alcohol = 100-150 calories     4. Avoid High Fructose Corn Syrup products. This ingredient makes products highly addictive! 5. Exercise 30 minutes daily 5 days weekly, minimally. If you burn 3500 calories that equals a pound! Use a pedometer to count steps. Visit www. Chosen.fm for a free pedometer and diet recommendations. Your maximum heart rate = 220 - your age    Never exercise at your maximum heart rate. See handout for target heart rate. 6.  Decrease carbohydrates (white bread, pasta, rice, potatoes, sweet foods and sweet drinks like soda, tea, coffee, juice and sports drinks). Increase fiber and protein. Goal:   calories daily of carbohydrates     Try CHROMIUM PICONATE 200 MG THREE TIMES DAILY,    to decrease Premenstrual carbohydrate cravings. 7.  Eat 3-5 small meals daily, include lots of protein (beans/legumes, nuts, lean meat, eggs) and green vegetables with each. (Breakfast, lunch, dinner with 2 healthy snacks)    8. Get proper rest 7-8 hours uninterrupted. When you get less than 6 hours, it triggers hunger by affecting your [place]:Leptin ratio and this results in weight gain. 9.  Watch your food portions. Green leafy vegetables should cover 1/2 of the plate, lean meat 1/4 of the plate, starchy vegetable 1/4 of the plate. Use smaller plates. 10.  Do not eat until you are full. Eat until you are no longer hungry. If you are not sure, try drinking a glass of water before getting your second serving of food. 11.  Do not weigh yourself daily. Wait until your next office visit. Use how you feel and  how your clothes fit as measurements of success. 12.  Address your spirituality to draw strength from above during your journey. Remember \"I am fearfully and wonderfully made. Marvelous in His eyes. \"    13.   Set realistic, appropriate and achievable weight loss goals:     RECOMMENDED TARGET WEIGHT LOSS:  Initial weight loss of 5-10% of your initial body weight achieved over 6 months or a decrease of 2 BMI units. MINIMUM GOAL OF WEIGHT LOSS:  Reduce body weight and maintain a lower body weight. Prevent weight gain. RELATED WEBSITES:      Www.obesityaction. org  (and consider joining the Ob[employer] for $25/year. The [facility] mission is to elevated and empower those affected by obesity through education, advocacy and support. Quarterly journals included in membership fee. )    Www.[employer]  www.[employer]     RELATED DIETS:  Dr. Vania lElis Weight loss challenge, Mediterranean diet, [address] [place] [address] Watchers, Paleo Diet (anti-inflammatory diet)    EXERCISE 150 MINUTES WEEKLY. THIS CAN BE ACHIEVED BY WORKING OUT OR WALKING A MINIMUM OF 30 MINUTES FOR 5 DAYS WEEKLY. YOU CAN EXERCISE IN INCREMENTS OF 10-15 MINUTES UP TO 3 TIMES A DAY. Consider performing \"brainless exercise. \"  Choose your favorite tv program.  Five minutes before and for 5 minutes after the tv program, stretch your body. While the program is on, walk in place watching the show. When commercials come on, rest or walk around the house to do other things. When the program begins, return to walking in place. When you are able keep walking during the commercials and add light weights to your ankles or hands. By the end of the show, you would have walked 30 minutes. If you need shorter spurts of exercise, walk during the commercials and rest during the show. Drink to glasses of water prior to any exercise to prevent dehydration and to improve the results of the work out. Your RESTING HEART RATE is the number of times your heart beats per minute when you are not exerting yourself. The more fit you are, the lower your resting heart rate will be. Your MAXIMUM HEART RATE is the number of times per minute your heart pumps when it is working at 100% capacity. NEVER EXERCISE AT YOUR MAXIMUM HEART RATE.     MAX HEART RATE = 220 - your age    For example, in a 46year old, the maximum heart rate is 220-50 = 170 beats per minute    Your TARGET HEART RATE is the number of beats per minute our heart should pump during aerobic exercise. Reaching your target heart rate indicates that your body is receiving maximum cardiovascular and fat burning benefits. If you are fit, your TARGET HEART RATE = 70-85% of your maximum Heart rate      For a 46year old with vigorous intensity physical activity,         Target heart rate= 170 bpm x .70 = 119 bpm     Target heart rate = 170 bpm x .85=  145 bpm        Therefore, your target heart rate during physical activity is 119-145      If you are not fit, TARGET HEART RATE = 50-70% of your maximum Heart rate    MODERATE CONSISTENT AEROBIC EXERCISE OR WALKING FOR AT LEAST 150 MINUTES WEEKLY IS AN ESSENTIAL PART OF ANY WEIGHT LOSS OF WEIGHT MAINTENANCE PROGRAM.     During WEIGHT LOSS PHASE, at least 75% of your exercise needs to be walking or moderate aerobic activity and 25% or 0% can be Isometric or Resistance type exercises (the latter can be deferred to the weight maintenance phase.)     During WEIGHT MAINTENANCE PHASE, at least 50% of your exercise needs to be aerobic and 50% Isometric or Resistance type exercises. BENEFITS OF MODERATE INTENSITY EXERCISE MOST DAYS OF THE WEEK:     1. Insulin Resistance improves 30-85%   2. Abdominal Fat decreases by 30%   3. Inflammatory Markers decrease by 30% (therefore pain decreases)   4. Systolic and Diastolic Blood Pressure decrease by 4 mmHg   5. HDL improves by 5%   6. Triglycerides decrease by 15%   7. Shift from small dense LDL to large dense LDL (therefore decrease Insulin Resistance)   8.   Decrease coagulability

## 2019-12-04 ENCOUNTER — OFFICE VISIT (OUTPATIENT)
Dept: FAMILY MEDICINE CLINIC | Age: 49
End: 2019-12-04

## 2019-12-04 VITALS
HEART RATE: 83 BPM | TEMPERATURE: 97.4 F | HEIGHT: 61 IN | OXYGEN SATURATION: 97 % | BODY MASS INDEX: 55.32 KG/M2 | RESPIRATION RATE: 18 BRPM | SYSTOLIC BLOOD PRESSURE: 142 MMHG | WEIGHT: 293 LBS | DIASTOLIC BLOOD PRESSURE: 67 MMHG

## 2019-12-04 DIAGNOSIS — R05.9 COUGH: Primary | ICD-10-CM

## 2019-12-04 DIAGNOSIS — J01.90 ACUTE NON-RECURRENT SINUSITIS, UNSPECIFIED LOCATION: ICD-10-CM

## 2019-12-04 DIAGNOSIS — R06.2 WHEEZING: ICD-10-CM

## 2019-12-04 DIAGNOSIS — J40 BRONCHITIS: ICD-10-CM

## 2019-12-04 LAB
FLUAV+FLUBV AG NOSE QL IA.RAPID: NEGATIVE POS/NEG
FLUAV+FLUBV AG NOSE QL IA.RAPID: NEGATIVE POS/NEG
VALID INTERNAL CONTROL?: YES

## 2019-12-04 RX ORDER — BENZONATATE 200 MG/1
200 CAPSULE ORAL
Qty: 30 CAP | Refills: 0 | Status: SHIPPED | OUTPATIENT
Start: 2019-12-04 | End: 2020-05-06 | Stop reason: ALTCHOICE

## 2019-12-04 RX ORDER — DOXYCYCLINE HYCLATE 50 MG/1
CAPSULE ORAL
Refills: 5 | COMMUNITY
Start: 2019-11-19 | End: 2019-12-04 | Stop reason: DRUGHIGH

## 2019-12-04 RX ORDER — DOXYCYCLINE 100 MG/1
100 TABLET ORAL 2 TIMES DAILY
Qty: 20 TAB | Refills: 0 | Status: SHIPPED | OUTPATIENT
Start: 2019-12-04 | End: 2019-12-14

## 2019-12-04 RX ORDER — CLOBETASOL PROPIONATE 0.5 MG/G
CREAM TOPICAL AS NEEDED
Refills: 1 | COMMUNITY
Start: 2019-11-04

## 2019-12-04 NOTE — PROGRESS NOTES
Chief Complaint   Patient presents with    Cough     pt states, persistent x4-5 days. Not much SOB. no chest pain. Head pressure.  Sinus Infection    Ringing in Ear       HPI:  The patient is a 44 y.o. female who presents today for URI symptoms. Agree with nurse's note. SUBJECTIVE:   Janes Morales complains of congestion, sore throat, productive cough, headache, bilateral ear fullness, right greater than left, suspected fevers but not measured at home, mild wheezing at night and yellow nasal discharge for 7 days, gradually worsening since that time. She denies a history of chest pain, myalgias, nausea, shortness of breath and vomiting. Tried OTC cold remedies with temporary relief including dayquil, mucinex. Patient admits to a history of asthma. Patient does not smoke cigarettes. Any evaluation to date? no  Recent Travel? no  Sick Contacts? Yes, coworkers had flu intermittently    FLU VACCINE? yes   Immunization History   Administered Date(s) Administered    Influenza Vaccine 10/15/2013, 11/07/2016, 11/03/2017, 10/01/2019    Influenza Vaccine (Quad) 11/03/2015    Influenza Vaccine (Quad) PF 11/20/2014, 11/07/2016, 11/12/2018     Pneumonia Vaccine? no There is no immunization history for the selected administration types on file for this patient. Chart reviewed: immunizations are up to date and documented. Review of Systems  A comprehensive review of systems was negative except for that written in the HPI.     Patient Active Problem List   Diagnosis Code    Allergic rhinitis J30.9    Chronic ear infection H66.90    GERD (gastroesophageal reflux disease) K21.9    Fe deficiency anemia D50.9    Intraocular pressure increase H40.059    Acne rosacea L71.9    Knee pain, left M25.562    Vitamin D deficiency E55.9    Right ankle sprain S93.401A    Dyslipidemia E78.5    Tinnitus of right ear H93.11    Dry eyes H04.123    Plantar fasciitis, bilateral M72.2    Family history of breast cancer Z80.3    Family history of stroke Z80.3    Family history of diabetes mellitus (DM) Z83.3    Family history of heart attack Z80.55    Family history of colonic polyps Z83.71    Family history of melanoma Z80.8    Migraine without aura and without status migrainosus, not intractable G43.009    Hearing loss H91.90    Hypothyroidism due to acquired atrophy of thyroid E03.4    Chronic low back pain with right-sided sciatica M54.41, G89.29    Mild intermittent asthma without complication Y15.97    Hyperglycemia R73.9    Obesity, morbid (HCC) E66.01    Allergy to sulfa drugs Z88.2    Penicillin allergy Z88.0    Adverse effect of anabolic steroid F17.0O0S    Family history of lung cancer Z80.1    DVT of deep femoral vein ([facility]) I82.419    Chronic otitis media H66.90       Past Medical History:   Diagnosis Date    Acne rosacea     Dr. Ana María Pollard.  Allergy, unspecified not elsewhere classified childhood     Txd with immunotherapy. Dr. Jimmie Hilliard Anemia NEC     borderline    Ankle sprain 2007    Right. Dr. Andrade Sellashley    Ankle sprain 11/2012    Right. Dr. Leonel Farley.  Asthma childhood    Chickenpox childhood    Chronic low back pain with right-sided sciatica     and SI joint dysfunction. Dr. Vicenta Castellanos.  Chronic otitis media     Dr. Jimmie Hilliard Dry eye syndrome 2013    Dr. Dedra Ames.  DVT of deep femoral vein ([facility] [address] 12/07/2018    Acute occlusive DVT of L deep femoral vein and Acute partially occlusive DVT of L Common Fem Vein and L Prox Fem Vein. Txd with Xarelto x 3 months. due to BCP. Dr. Leroy Lab EBV infection 6/27/2011    Hearing loss     Mild high frequency sensorineuronal hearing loss. Dr. Jonh Hollis murmur     Hernia of abdominal wall 09/2003, 23/1155    umbilical.  Dr. Gary Martinez.   Dr. Deanna Hartmann    Hyperglycemia 2013    Hypothyroidism 06/2010    Dr. Osvaldo Cherry pressure increase 12/2011    Dr. Valdez Sessions. Dr. Villanueva Sheets. Dr. Brittney Hickey.  Knee pain, left 04/2012    Left. Dr. Vamsi Hernandez Measles childhood    Migraine 2017, 07/2019    w aura and facial paresthesia. Dr. Mario Campbell    Mumps childhood    Plantar fasciitis, bilateral 2010    Dr. Katie Ford Sciatica 2008    Right.  with OA. Dr. Maria T Mancuso Tinnitus of right ear 2012    Dr. Song Mortensen       Past Surgical History:   Procedure Laterality Date   Wyatt Perdomo  2011    Dr. Willard Vides.  HAND/FINGER SURGERY UNLISTED  09/2003    Right Index finger repair due to cut    HX HERNIA REPAIR  44/7662    Umbilical.  Dr. Jeffrey Dunham.  HX HERNIA REPAIR  9/23/2011    recurrent umbilical.  Laparoscopy incisional.  Dr. Radha Gibson.  HX TONSILLECTOMY  childhood    LAP,CHOLECYSTECTOMY  07/2001       Social History     Tobacco Use    Smoking status: Passive Smoke Exposure - Never Smoker    Smokeless tobacco: Never Used    Tobacco comment: lived with smoker dad and mom then step mom x 20 yrs   Substance Use Topics    Alcohol use:  Yes     Alcohol/week: 1.0 standard drinks     Types: 1 Glasses of wine per week     Frequency: Monthly or less     Drinks per session: 1 or 2     Binge frequency: Never     Comment: RARE    Drug use: No       Family History   Problem Relation Age of Onset    Hypertension Mother     Cancer Mother         small cell lung with bone mets to spine/MELANOMA    Cancer Father         melanoma    Arthritis-osteo Father     Colon Polyps Father     Diabetes Maternal Grandmother     Stroke Maternal Grandmother     Seizures Maternal Grandmother     Colon Polyps Maternal Grandmother     Breast Cancer Maternal Grandmother     Heart Disease Paternal Grandmother         heart failure    Heart Attack Paternal Grandmother     Asthma Paternal Grandmother     Depression Sister     Other Maternal Grandfather         Raul Ruiz Ds       Outpatient Medications Marked as Taking for the 12/4/19 encounter (Office Visit) with Kylie Catherine, NP   Medication Sig Dispense Refill    clobetasol (TEMOVATE) 0.05 % topical cream as needed. 1    doxycycline (ADOXA) 100 mg tablet Take 1 Tab by mouth two (2) times a day for 10 days. 20 Tab 0    benzonatate (TESSALON) 200 mg capsule Take 1 Cap by mouth three (3) times daily as needed for Cough. 30 Cap 0    albuterol (PROVENTIL HFA, VENTOLIN HFA, PROAIR HFA) 90 mcg/actuation inhaler Take 2 Puffs by inhalation every four (4) hours as needed for Wheezing. Indications: bronchospasm prevention 3 Inhaler 1    levothyroxine (SYNTHROID) 125 mcg tablet Take 1 Tab by mouth Daily (before breakfast). Indications: a condition with low thyroid hormone levels 90 Tab 3    albuterol-ipratropium (DUO-NEB) 2.5 mg-0.5 mg/3 ml nebu 3 mL by Nebulization route every six (6) hours as needed (breathing). 30 Nebule 5    montelukast (SINGULAIR) 10 mg tablet Take 1 Tab by mouth daily. 90 Tab 3    azelastine (ASTELIN) 137 mcg (0.1 %) nasal spray 2 Sprays by Both Nostrils route two (2) times a day. Use in each nostril as directed  Indications: Seasonal Runny Nose 3 Bottle 1    omeprazole (PRILOSEC) 40 mg capsule TAKE 1 CAPSULE BY MOUTH EVERY DAY 90 Cap 1    hydrOXYzine HCl (ATARAX) 25 mg tablet TAKE 2 TABLETS BY MOUTH EVERY NIGHT& AS NEEDED DURING DAY FOR ITCHING 120 Tab 2    NAFTIN 2 % gel APPLY TO AFFECTED AREA TWO (2) TIMES A DAY. INDICATIONS: TINEA PEDIS 60 g 2    butalbital-acetaminophen-caffeine (FIORICET, ESGIC) -40 mg per tablet TAKE 1 TAB BY MOUTH EVERY SIX (6) HOURS AS NEEDED FOR PAIN OR HEADACHE. 40 Tab 0    Nebulizer & Compressor machine 1 Each by Other route four (4) times daily as needed. 1 Each 0    albuterol (PROVENTIL VENTOLIN) 2.5 mg /3 mL (0.083 %) nebulizer solution 6 mL by Nebulization route every four (4) hours as needed for Wheezing or Shortness of Breath.  Indications: Acute Asthma Attack 24 Each 1    budesonide (PULMICORT) 1 mg/2 mL nbsp 2 mL by Nebulization route two (2) times a day. Indications: MAINTENANCE THERAPY FOR ASTHMA (Patient taking differently: 1,000 mcg by Nebulization route two (2) times daily as needed.) 1 Each 1    cyclobenzaprine (FLEXERIL) 10 mg tablet TAKE 1 TABLET BY MOUTH 3 TIMES A DAY AS NEEDED FOR MUSCLE SPASM 90 Tab 0    ALPRAZolam (XANAX) 0.5 mg tablet Take 1 Tab by mouth two (2) times daily as needed for Anxiety or Sleep. Max Daily Amount: 1 mg. Indications: anxiety 60 Tab 2    terconazole (TERAZOL 7) 0.4 % vaginal cream   6    LOCOID 0.1 % lotn   1    valACYclovir (VALTREX) 500 mg tablet   1    terconazole (TERAZOL 3) 80 mg vaginal suppository   4    multivitamin (ONE A DAY) tablet Take 1 tablet by mouth daily.  PROPYLENE GLYCOL//PF (SYSTANE, PF, OP) Apply  to eye four (4) times daily.  magnesium oxide (MAG-OX) 400 mg tablet Take 400 mg by mouth daily.  Emollient Combination No.32 (EPICERAM) Emul by Apply Externally route. From Dr Dawson Owusu.  metroNIDAZOLE (METROGEL) 1 % topical gel Apply  to affected area daily. Use a thin layer to affected areas after washing from Dr Dawson Owusu   Indications: ACNE ROSACEA      latanoprost (XALATAN) 0.005 % ophthalmic solution Administer 1 Drop to both eyes nightly.  fexofenadine (ALLEGRA) 180 mg tablet Take 180 mg by mouth daily as needed.  LACTOBACILLUS/FOS/PECTIN (PROBIOTIC COMPLEX PO) Take  by mouth daily.  cholecalciferol, vitamin d3, (VITAMIN D) 1,000 unit tablet Take 1,000 Units by mouth daily.          Allergies   Allergen Reactions    Latex Rash    Junel 1.5/30 (21) [Norethindrone Ac-Eth Estradiol] Swelling     L FOOT DUE TO NEW DVT    Other Medication Hives and Rash     SUN    Pcn [Penicillins] Hives    Prednisone Hives    Sulfa (Sulfonamide Antibiotics) Hives    Garamycin [Gentamicin] Other (comments)     Itchy eyes due to sulfate    Metformin Diarrhea       OBJECTIVE:    PE:  Visit Vitals  BP 142/67 (BP 1 Location: Left arm, BP Patient Position: Sitting)   Pulse 83   Temp 97.4 °F (36.3 °C) (Oral)   Resp 18   Ht 5' 1.02\" (1.55 m)   Wt 298 lb 8 oz (135.4 kg)   SpO2 97%   BMI 56.36 kg/m²     She appears well, vital signs are as noted above   PAIN: No complaints of pain today. HEAD:  Normocephalic. Atraumatic.  + tender sinuses x 4. EYE: PERRLA. EOMs intact. Sclera anicteric without injection. No drainage or discharge. EARS: Hearing intact bilaterally. External ear canals normal without evidence of blood or swelling. Bilateral TM's intact, pearly grey with landmarks visible. + erythema or effusion. NOSE: Patent. Nasal turbinates pink. No polyps noted. + erythema/edema. + clear  discharge. MOUTH: mucous membranes pink and moist. Posterior pharynx normal with cobblestone appearance. + erythema, no white exudate or obstruction. NECK: supple. Midline trachea. RESP: Breath sounds are symmetrical bilaterally. Unlabored without SOB. Speaking in full sentences. Clear to auscultation bilaterally anteriorly and posteriorly. No wheezes. No rales or rhonchi. Non-productive cough when elicited. CV: normal rate. Regular rhythm. S1, S2 audible. No murmur noted. No rubs, clicks or gallops noted. HEME/LYMPH: peripheral pulses palpable 2+ x 4 extremities. No peripheral edema is noted. + cervical adenopathy noted. SKIN: clean dry and intact throughout. no rashes, erythema, ecchymosis, lacerations, abrasions, suspicious moles noted    Results for orders placed or performed in visit on 12/04/19   AMB POC VENANCIO INFLUENZA A/B TEST   Result Value Ref Range    VALID INTERNAL CONTROL POC Yes     Influenza A Ag POC Negative Negative Pos/Neg    Influenza B Ag POC Negative Negative Pos/Neg       Assessment/Plan:  Differential diagnosis and treatment options reviewed with patient who is in agreement with treatment plan as outlined below. ICD-10-CM ICD-9-CM    1.  Cough R05 786.2 AMB POC VENANCIO INFLUENZA A/B TEST      doxycycline (ADOXA) 100 mg tablet      benzonatate (TESSALON) 200 mg capsule   2. Wheezing R06.2 786.07 AMB POC VENANCIO INFLUENZA A/B TEST      doxycycline (ADOXA) 100 mg tablet   3. Acute non-recurrent sinusitis, unspecified location J01.90 461.9 doxycycline (ADOXA) 100 mg tablet   4. Bronchitis J40 490 doxycycline (ADOXA) 100 mg tablet      benzonatate (TESSALON) 200 mg capsule     Diagnoses and all orders for this visit:    1. Cough  -     AMB POC VENANCIO INFLUENZA A/B TEST  -     doxycycline (ADOXA) 100 mg tablet; Take 1 Tab by mouth two (2) times a day for 10 days. -     benzonatate (TESSALON) 200 mg capsule; Take 1 Cap by mouth three (3) times daily as needed for Cough. 2. Wheezing  -     AMB POC VENANCIO INFLUENZA A/B TEST  -     doxycycline (ADOXA) 100 mg tablet; Take 1 Tab by mouth two (2) times a day for 10 days. 3. Acute non-recurrent sinusitis, unspecified location  -     doxycycline (ADOXA) 100 mg tablet; Take 1 Tab by mouth two (2) times a day for 10 days. 4. Bronchitis  -     doxycycline (ADOXA) 100 mg tablet; Take 1 Tab by mouth two (2) times a day for 10 days. -     benzonatate (TESSALON) 200 mg capsule; Take 1 Cap by mouth three (3) times daily as needed for Cough. Follow-up and Dispositions    · Return if symptoms worsen or fail to improve.       lab results and schedule of future lab studies reviewed with patient  reviewed diet, exercise and weight control  reviewed medications and side effects in detail    Symptomatic therapy suggested: push fluids, rest and return office visit prn if symptoms persist or worsen. Lack of antibiotic effectiveness discussed with her. Call or return to clinic prn if these symptoms worsen or fail to improve as anticipated. Health Maintenance reviewed - deferred to PCP. Recommended healthy diet low in carbohydrates, fats, sodium and cholesterol. Recommended regular cardiovascular exercise 3-6 times per week for 30-60 minutes daily.       Chart is reviewed and updated today in the office. Records requested for other providers patient has seen and is currently seeing. Verbal and written instructions (see AVS) provided. Patient expresses understanding of diagnosis and treatment plan.

## 2019-12-04 NOTE — PROGRESS NOTES
Room 5    Identified pt with two pt identifiers(name and ). Reviewed record in preparation for visit and have obtained necessary documentation. All patient medications has been reviewed. Chief Complaint   Patient presents with    Cough     pt states, persistent x4-5 days. Not much SOB. no chest pain. Head pressure.  Sinus Infection    Ringing in Ear       Health Maintenance Due   Topic    Shingrix Vaccine Age 50> (1 of 2)    FOBT Q 1 YEAR AGE 50-75     BREAST CANCER SCRN MAMMOGRAM        Vitals:    19 1141   BP: 142/67   Pulse: 83   Resp: 18   Temp: 97.4 °F (36.3 °C)   TempSrc: Oral   SpO2: 97%   Weight: 298 lb 8 oz (135.4 kg)   Height: 5' 1.02\" (1.55 m)   PainSc:   0 - No pain       Wt Readings from Last 3 Encounters:   19 298 lb 8 oz (135.4 kg)   11/15/19 300 lb 6.4 oz (136.3 kg)   19 300 lb (136.1 kg)     Temp Readings from Last 3 Encounters:   19 97.4 °F (36.3 °C) (Oral)   11/15/19 97.9 °F (36.6 °C) (Oral)   19 97.6 °F (36.4 °C) (Oral)     BP Readings from Last 3 Encounters:   19 142/67   11/15/19 123/83   19 133/60     Pulse Readings from Last 3 Encounters:   19 83   11/15/19 60   19 82       Lab Results   Component Value Date/Time    Hemoglobin A1c 5.8 (H) 2019 10:36 AM       Coordination of Care Questionnaire:   1) Have you been to an emergency room, urgent care, or hospitalized since your last visit?   no       2. Have seen or consulted any other health care provider since your last visit? NO    3) Do you have an Advanced Directive/ Living Will in place? YES  If yes, do we have a copy on file YES  If no, would you like information NO    Patient is accompanied by self I have received verbal consent from Jo-Ann Cherry to discuss any/all medical information while they are present in the room.

## 2019-12-04 NOTE — PATIENT INSTRUCTIONS
Bronchitis: Care Instructions  Your Care Instructions    Bronchitis is inflammation of the bronchial tubes, which carry air to the lungs. The tubes swell and produce mucus, or phlegm. The mucus and inflamed bronchial tubes make you cough. You may have trouble breathing. Most cases of bronchitis are caused by viruses like those that cause colds. Antibiotics usually do not help and they may be harmful. Bronchitis usually develops rapidly and lasts about 2 to 3 weeks in otherwise healthy people. Follow-up care is a key part of your treatment and safety. Be sure to make and go to all appointments, and call your doctor if you are having problems. It's also a good idea to know your test results and keep a list of the medicines you take. How can you care for yourself at home? · Take all medicines exactly as prescribed. Call your doctor if you think you are having a problem with your medicine. · Get some extra rest.  · Take an over-the-counter pain medicine, such as acetaminophen (Tylenol), ibuprofen (Advil, Motrin), or naproxen (Aleve) to reduce fever and relieve body aches. Read and follow all instructions on the label. · Do not take two or more pain medicines at the same time unless the doctor told you to. Many pain medicines have acetaminophen, which is Tylenol. Too much acetaminophen (Tylenol) can be harmful. · Take an over-the-counter cough medicine that contains dextromethorphan to help quiet a dry, hacking cough so that you can sleep. Avoid cough medicines that have more than one active ingredient. Read and follow all instructions on the label. · Breathe moist air from a humidifier, hot shower, or sink filled with hot water. The heat and moisture will thin mucus so you can cough it out. · Do not smoke. Smoking can make bronchitis worse. If you need help quitting, talk to your doctor about stop-smoking programs and medicines. These can increase your chances of quitting for good.   When should you call for help? Call 911 anytime you think you may need emergency care. For example, call if:    · You have severe trouble breathing.    Call your doctor now or seek immediate medical care if:    · You have new or worse trouble breathing.     · You cough up dark brown or bloody mucus (sputum).     · You have a new or higher fever.     · You have a new rash.    Watch closely for changes in your health, and be sure to contact your doctor if:    · You cough more deeply or more often, especially if you notice more mucus or a change in the color of your mucus.     · You are not getting better as expected. Where can you learn more? Go to http://alejandro-eddie.info/. Enter H333 in the search box to learn more about \"Bronchitis: Care Instructions. \"  Current as of: June 9, 2019  Content Version: 12.2  © 1089-6219 [employer]. Care instructions adapted under license by [employer] (which disclaims liability or warranty for this information). If you have questions about a medical condition or this instruction, always ask your healthcare professional. [place] [address] any warranty or liability for your use of this information. Cough: Care Instructions  Your Care Instructions    A cough is your body's response to something that bothers your throat or airways. Many things can cause a cough. You might cough because of a cold or the flu, bronchitis, or asthma. Smoking, postnasal drip, allergies, and stomach acid that backs up into your throat also can cause coughs. A cough is a symptom, not a disease. Most coughs stop when the cause, such as a cold, goes away. You can take a few steps at home to cough less and feel better. Follow-up care is a key part of your treatment and safety. Be sure to make and go to all appointments, and call your doctor if you are having problems.  It's also a good idea to know your test results and keep a list of the medicines you take.  How can you care for yourself at home? · Drink lots of water and other fluids. This helps thin the mucus and soothes a dry or sore throat. Honey or lemon juice in hot water or tea may ease a dry cough. · Take cough medicine as directed by your doctor. · Prop up your head on pillows to help you breathe and ease a dry cough. · Try cough drops to soothe a dry or sore throat. Cough drops don't stop a cough. Medicine-flavored cough drops are no better than candy-flavored drops or hard candy. · Do not smoke. Avoid secondhand smoke. If you need help quitting, talk to your doctor about stop-smoking programs and medicines. These can increase your chances of quitting for good. When should you call for help? Call 911 anytime you think you may need emergency care. For example, call if:    · You have severe trouble breathing.    Call your doctor now or seek immediate medical care if:    · You cough up blood.     · You have new or worse trouble breathing.     · You have a new or higher fever.     · You have a new rash.    Watch closely for changes in your health, and be sure to contact your doctor if:    · You cough more deeply or more often, especially if you notice more mucus or a change in the color of your mucus.     · You have new symptoms, such as a sore throat, an earache, or sinus pain.     · You do not get better as expected. Where can you learn more? Go to http://alejandro-eddie.info/. Enter D279 in the search box to learn more about \"Cough: Care Instructions. \"  Current as of: June 9, 2019  Content Version: 12.2  © 7205-8472 [employer]. Care instructions adapted under license by [employer] (which disclaims liability or warranty for this information). If you have questions about a medical condition or this instruction, always ask your healthcare professional. [address] any warranty or liability for your use of this information.

## 2019-12-19 DIAGNOSIS — B35.3 TINEA PEDIS OF BOTH FEET: ICD-10-CM

## 2019-12-26 RX ORDER — NAFTIFINE HYDROCHLORIDE 2 G/100G
GEL TOPICAL
Qty: 60 G | Refills: 2 | Status: SHIPPED | OUTPATIENT
Start: 2019-12-26 | End: 2019-12-27 | Stop reason: SDUPTHER

## 2019-12-27 DIAGNOSIS — B35.3 TINEA PEDIS OF BOTH FEET: ICD-10-CM

## 2019-12-28 RX ORDER — NAFTIFINE HYDROCHLORIDE 2 G/100G
GEL TOPICAL
Qty: 60 G | Refills: 2 | Status: SHIPPED | OUTPATIENT
Start: 2019-12-28 | End: 2022-09-15 | Stop reason: SDUPTHER

## 2019-12-30 DIAGNOSIS — B35.3 TINEA PEDIS OF BOTH FEET: ICD-10-CM

## 2019-12-30 NOTE — TELEPHONE ENCOUNTER
Requested Prescriptions     Pending Prescriptions Disp Refills    NAFTIN 2 % gel 60 g 2     Sig: Use as directed  Indications: athlete's foot

## 2019-12-31 RX ORDER — NAFTIFINE HYDROCHLORIDE 2 G/100G
GEL TOPICAL
Qty: 60 G | Refills: 2 | OUTPATIENT
Start: 2019-12-31

## 2020-04-27 ENCOUNTER — TELEPHONE (OUTPATIENT)
Dept: ONCOLOGY | Age: 50
End: 2020-04-27

## 2020-04-27 NOTE — TELEPHONE ENCOUNTER
Called patient and confirmed x2 identifiers   Due to [address] request for patient to reschedule follow-up out 1-2 months   Patient agreed with plan     Patient transferred to front office to reschedule   No new questions or concerns at this time

## 2020-05-06 ENCOUNTER — VIRTUAL VISIT (OUTPATIENT)
Dept: FAMILY MEDICINE CLINIC | Age: 50
End: 2020-05-06

## 2020-05-06 VITALS — BODY MASS INDEX: 55.32 KG/M2 | HEIGHT: 61 IN | WEIGHT: 293 LBS

## 2020-05-06 DIAGNOSIS — L29.9 PRURITIC DERMATITIS: ICD-10-CM

## 2020-05-06 DIAGNOSIS — Z88.0 HISTORY OF PENICILLIN ALLERGY: ICD-10-CM

## 2020-05-06 DIAGNOSIS — Z88.2 ALLERGY TO SULFA DRUGS: ICD-10-CM

## 2020-05-06 DIAGNOSIS — L71.9 ACNE ROSACEA: ICD-10-CM

## 2020-05-06 DIAGNOSIS — J01.80 ACUTE NON-RECURRENT SINUSITIS OF OTHER SINUS: Primary | ICD-10-CM

## 2020-05-06 RX ORDER — DOXYCYCLINE 100 MG/1
100 TABLET ORAL 2 TIMES DAILY
Qty: 14 TAB | Refills: 0 | Status: SHIPPED | OUTPATIENT
Start: 2020-05-06 | End: 2020-05-13

## 2020-05-06 RX ORDER — HYDROXYZINE 25 MG/1
50 TABLET, FILM COATED ORAL
Qty: 270 TAB | Refills: 3 | Status: SHIPPED | OUTPATIENT
Start: 2020-05-06 | End: 2020-08-20 | Stop reason: SDUPTHER

## 2020-05-06 NOTE — PROGRESS NOTES
Hilda Brown is a 44 y.o. female  Chief Complaint   Patient presents with    Ear Pain     about a week now     Health Maintenance Due   Topic Date Due    Pneumococcal 0-64 years (1 of 1 - PPSV23) 08[birth year]/1974    Shingrix Vaccine Age 50> (1 of 2) 08/23/2018    FOBT Q1Y Age 54-65  08/23/2018     There were no vitals taken for this visit. 1. Have you been to the ER, urgent care clinic since your last visit? Hospitalized since your last visit? No     2. Have you seen or consulted any other health care providers outside of the [address] [place] [address] since your last visit? Include any pap smears or colon screening.   No     No bp cuff (dosnt feel elevated though)  Weight - 295 LB last week

## 2020-07-02 DIAGNOSIS — H10.13 ALLERGIC CONJUNCTIVITIS AND RHINITIS, BILATERAL: ICD-10-CM

## 2020-07-02 DIAGNOSIS — J30.9 ALLERGIC CONJUNCTIVITIS AND RHINITIS, BILATERAL: ICD-10-CM

## 2020-07-09 RX ORDER — AZELASTINE 1 MG/ML
SPRAY, METERED NASAL
Qty: 1 BOTTLE | Refills: 1 | OUTPATIENT
Start: 2020-07-09

## 2020-08-13 ENCOUNTER — VIRTUAL VISIT (OUTPATIENT)
Dept: FAMILY MEDICINE CLINIC | Age: 50
End: 2020-08-13
Payer: COMMERCIAL

## 2020-08-13 ENCOUNTER — HOSPITAL ENCOUNTER (OUTPATIENT)
Dept: ULTRASOUND IMAGING | Age: 50
Discharge: HOME | End: 2020-08-13
Payer: COMMERCIAL

## 2020-08-13 ENCOUNTER — PATIENT MESSAGE (OUTPATIENT)
Dept: FAMILY MEDICINE CLINIC | Age: 50
End: 2020-08-13

## 2020-08-13 DIAGNOSIS — Z86.718 HISTORY OF DVT OF LOWER EXTREMITY: ICD-10-CM

## 2020-08-13 DIAGNOSIS — M79.605 LEFT LEG PAIN: ICD-10-CM

## 2020-08-13 DIAGNOSIS — M79.605 LEFT LEG PAIN: Primary | ICD-10-CM

## 2020-08-13 DIAGNOSIS — M79.89 LEFT LEG SWELLING: ICD-10-CM

## 2020-08-13 PROBLEM — I82.409 DEEP VEIN THROMBOSIS (DVT) (HCC): Status: ACTIVE | Noted: 2020-08-13

## 2020-08-13 PROCEDURE — 93971 EXTREMITY STUDY: CPT | Performed by: FAMILY MEDICINE

## 2020-08-13 PROCEDURE — 99442 PR PHYS/QHP TELEPHONE EVALUATION 11-20 MIN: CPT | Performed by: FAMILY MEDICINE

## 2020-08-13 NOTE — PROGRESS NOTES
Heidi Raymond is a 44 y.o. female, evaluated via audio-only technology on 8/13/2020 for Leg Pain and Leg Swelling  . Assessment & Plan:   Diagnoses and all orders for this visit:    1. Left leg pain  -     DUPLEX LOWER EXT VENOUS LEFT; Future    2. Left leg swelling  -     DUPLEX LOWER EXT VENOUS LEFT; Future      The complexity of medical decision making for this visit is moderate         12  Subjective:     Sitting more with working at home. Working longer hours. Previous blood clot left leg last Dec 2018. Swelling and pain left leg 3 weeks ago. Began in foot. Right leg fine. Prior to Admission medications    Medication Sig Start Date End Date Taking? Authorizing Provider   hydrOXYzine HCL (ATARAX) 25 mg tablet Take 2 Tabs by mouth every eight (8) hours as needed (urticaria). Indications: hives 5/6/20   Pradip NAVA, DO   NAFTIN 2 % gel Use as directed  Indications: athlete's foot 12/28/19   Pradip NAVA, DO   clobetasol (TEMOVATE) 0.05 % topical cream as needed. 11/4/19   Provider, Historical   albuterol (PROVENTIL HFA, VENTOLIN HFA, PROAIR HFA) 90 mcg/actuation inhaler Take 2 Puffs by inhalation every four (4) hours as needed for Wheezing. Indications: bronchospasm prevention 11/15/19   Pradip NAVA, DO   levothyroxine (SYNTHROID) 125 mcg tablet Take 1 Tab by mouth Daily (before breakfast). Indications: a condition with low thyroid hormone levels 11/15/19   Pradip NAVA, DO   albuterol-ipratropium (DUO-NEB) 2.5 mg-0.5 mg/3 ml nebu 3 mL by Nebulization route every six (6) hours as needed (breathing). 11/15/19   Pradip NAVA, DO   montelukast (SINGULAIR) 10 mg tablet Take 1 Tab by mouth daily. 11/15/19   Pradip NAVA, DO   azelastine (ASTELIN) 137 mcg (0.1 %) nasal spray 2 Sprays by Both Nostrils route two (2) times a day.  Use in each nostril as directed  Indications: Seasonal Runny Nose 11/15/19   Pradip NAVA, DO   omeprazole (PRILOSEC) 40 mg capsule TAKE 1 CAPSULE BY MOUTH EVERY DAY 11/11/19   Tosin Herzog R, DO   butalbital-acetaminophen-caffeine (FIORICET, ESGIC) -40 mg per tablet TAKE 1 TAB BY MOUTH EVERY SIX (6) HOURS AS NEEDED FOR PAIN OR HEADACHE. 11/12/18   Tosin Herzog R, DO   Nebulizer & Compressor machine 1 Each by Other route four (4) times daily as needed. 2/21/18   Tosin Herzog R, DO   albuterol (PROVENTIL VENTOLIN) 2.5 mg /3 mL (0.083 %) nebulizer solution 6 mL by Nebulization route every four (4) hours as needed for Wheezing or Shortness of Breath. Indications: Acute Asthma Attack 2/21/18   Tosin Herzog R, DO   budesonide (PULMICORT) 1 mg/2 mL nbsp 2 mL by Nebulization route two (2) times a day. Indications: MAINTENANCE THERAPY FOR ASTHMA  Patient taking differently: 1,000 mcg by Nebulization route two (2) times daily as needed. 2/21/18   Tosin Herzog R, DO   cyclobenzaprine (FLEXERIL) 10 mg tablet TAKE 1 TABLET BY MOUTH 3 TIMES A DAY AS NEEDED FOR MUSCLE SPASM 11/9/17   Marguerite Dakins, YUNI   ALPRAZolam [place]) 0.5 mg tablet Take 1 Tab by mouth two (2) times daily as needed for Anxiety or Sleep. Max Daily Amount: 1 mg. Indications: anxiety 6/29/17   Tosin Herzog R, DO   terconazole (TERAZOL 7) 0.4 % vaginal cream  9/2/15   Provider, Historical   LOCOID 0.1 % lotn  9/24/15   Provider, Historical   valACYclovir (VALTREX) 500 mg tablet  6/16/15   Provider, Historical   terconazole (TERAZOL 3) 80 mg vaginal suppository  5/10/15   Provider, Historical   multivitamin (ONE A DAY) tablet Take 1 tablet by mouth daily. Provider, Historical   PROPYLENE GLYCOL//PF (SYSTANE, PF, OP) Apply  to eye four (4) times daily. Provider, Historical   Emollient Combination No.32 (EPICERAM) Emul by Apply Externally route two (2) times a day. From Dr Dorothea Cummins. For Acne rosacea. Provider, Historical   metroNIDAZOLE (METROGEL) 1 % topical gel Apply  to affected area two (2) times a day.  Use a thin layer to affected areas after washing from Dr Santiago Jose   Indications: a skin condition on the cheeks and nose with a reddish rash and acne called acne rosacea    Provider, Historical   latanoprost (XALATAN) 0.005 % ophthalmic solution Administer 1 Drop to both eyes nightly. Provider, Historical   LACTOBACILLUS/FOS/PECTIN (PROBIOTIC COMPLEX PO) Take  by mouth daily. Provider, Historical   cholecalciferol, vitamin d3, (VITAMIN D) 1,000 unit tablet Take 1,000 Units by mouth daily.     Provider, Historical     Patient Active Problem List   Diagnosis Code    Allergic rhinitis J30.9    Chronic ear infection H66.90    GERD (gastroesophageal reflux disease) K21.9    Fe deficiency anemia D50.9    Intraocular pressure increase H40.059    Acne rosacea L71.9    Knee pain, left M25.562    Vitamin D deficiency E55.9    Right ankle sprain S80.5A    Dyslipidemia E78.5    Tinnitus of right ear H93.11    Dry eyes H04.123    Plantar fasciitis, bilateral M72.2    Family history of breast cancer Z80.3    Family history of stroke Z80.3    Family history of diabetes mellitus (DM) Z83.3    Family history of heart attack Z80.55    Family history of colonic polyps Z83.71    Family history of melanoma Z80.8    Migraine without aura and without status migrainosus, not intractable G43.009    Hearing loss H91.90    Hypothyroidism due to acquired atrophy of thyroid E03.4    Chronic low back pain with right-sided sciatica M54.41, G89.29    Mild intermittent asthma without complication D11.74    Hyperglycemia R73.9    Obesity, morbid (HCC) E66.01    Allergy to sulfa drugs Z88.2    Penicillin allergy Z88.0    Adverse effect of anabolic steroid Z59.0G3P    Family history of lung cancer Z80.1    DVT of deep femoral vein (HCC) I82.419    Chronic otitis media H66.90    Deep vein thrombosis (DVT) (HCC) I82.409     Current Outpatient Medications   Medication Sig Dispense Refill    hydrOXYzine HCL (ATARAX) 25 mg tablet Take 2 Tabs by mouth every eight (8) hours as needed (urticaria). Indications: hives 270 Tab 3    NAFTIN 2 % gel Use as directed  Indications: athlete's foot 60 g 2    clobetasol (TEMOVATE) 0.05 % topical cream as needed. 1    albuterol (PROVENTIL HFA, VENTOLIN HFA, PROAIR HFA) 90 mcg/actuation inhaler Take 2 Puffs by inhalation every four (4) hours as needed for Wheezing. Indications: bronchospasm prevention 3 Inhaler 1    levothyroxine (SYNTHROID) 125 mcg tablet Take 1 Tab by mouth Daily (before breakfast). Indications: a condition with low thyroid hormone levels 90 Tab 3    albuterol-ipratropium (DUO-NEB) 2.5 mg-0.5 mg/3 ml nebu 3 mL by Nebulization route every six (6) hours as needed (breathing). 30 Nebule 5    montelukast (SINGULAIR) 10 mg tablet Take 1 Tab by mouth daily. 90 Tab 3    azelastine (ASTELIN) 137 mcg (0.1 %) nasal spray 2 Sprays by Both Nostrils route two (2) times a day. Use in each nostril as directed  Indications: Seasonal Runny Nose 3 Bottle 1    omeprazole (PRILOSEC) 40 mg capsule TAKE 1 CAPSULE BY MOUTH EVERY DAY 90 Cap 1    butalbital-acetaminophen-caffeine (FIORICET, ESGIC) -40 mg per tablet TAKE 1 TAB BY MOUTH EVERY SIX (6) HOURS AS NEEDED FOR PAIN OR HEADACHE. 40 Tab 0    Nebulizer & Compressor machine 1 Each by Other route four (4) times daily as needed. 1 Each 0    albuterol (PROVENTIL VENTOLIN) 2.5 mg /3 mL (0.083 %) nebulizer solution 6 mL by Nebulization route every four (4) hours as needed for Wheezing or Shortness of Breath. Indications: Acute Asthma Attack 24 Each 1    budesonide (PULMICORT) 1 mg/2 mL nbsp 2 mL by Nebulization route two (2) times a day.  Indications: MAINTENANCE THERAPY FOR ASTHMA (Patient taking differently: 1,000 mcg by Nebulization route two (2) times daily as needed.) 1 Each 1    cyclobenzaprine (FLEXERIL) 10 mg tablet TAKE 1 TABLET BY MOUTH 3 TIMES A DAY AS NEEDED FOR MUSCLE SPASM 90 Tab 0    ALPRAZolam (XANAX) 0.5 mg tablet Take 1 Tab by mouth two (2) times daily as needed for Anxiety or Sleep. Max Daily Amount: 1 mg. Indications: anxiety 60 Tab 2    terconazole (TERAZOL 7) 0.4 % vaginal cream   6    LOCOID 0.1 % lotn   1    valACYclovir (VALTREX) 500 mg tablet   1    terconazole (TERAZOL 3) 80 mg vaginal suppository   4    multivitamin (ONE A DAY) tablet Take 1 tablet by mouth daily.  PROPYLENE GLYCOL//PF (SYSTANE, PF, OP) Apply  to eye four (4) times daily.  Emollient Combination No.32 (EPICERAM) Emul by Apply Externally route two (2) times a day. From Dr Tiago Wasserman. For Acne rosacea.  metroNIDAZOLE (METROGEL) 1 % topical gel Apply  to affected area two (2) times a day. Use a thin layer to affected areas after washing from Dr Tiago Wasserman   Indications: a skin condition on the cheeks and nose with a reddish rash and acne called acne rosacea      latanoprost (XALATAN) 0.005 % ophthalmic solution Administer 1 Drop to both eyes nightly.  LACTOBACILLUS/FOS/PECTIN (PROBIOTIC COMPLEX PO) Take  by mouth daily.  cholecalciferol, vitamin d3, (VITAMIN D) 1,000 unit tablet Take 1,000 Units by mouth daily. Allergies   Allergen Reactions    Latex Rash    Junel 1.5/30 (21) [Norethindrone Ac-Eth Estradiol] Swelling     L FOOT DUE TO NEW DVT    Other Medication Hives and Rash     SUN    Pcn [Penicillins] Hives    Prednisone Hives    Sulfa (Sulfonamide Antibiotics) Hives    Garamycin [Gentamicin] Other (comments)     Itchy eyes due to sulfate    Metformin Diarrhea     Past Medical History:   Diagnosis Date    Acne rosacea     Dr. Tiago Wasserman.  Allergy, unspecified not elsewhere classified childhood     Txd with immunotherapy. Dr. Joslyn Sierra Anemia NEC     borderline    Ankle sprain 2007    Right. Dr. Danny Gayle    Ankle sprain 11/2012    Right. Dr. Henderson Husbands.     Asthma childhood    Chickenpox childhood    Chronic low back pain with right-sided sciatica     and SI joint dysfunction. Dr. Derian Starks.  Chronic otitis media     Dr. Saba Pierre Dry eye syndrome 2013    Dr. Sue Faria.  DVT of deep femoral vein ([address] 12/07/2018    Acute occlusive DVT of L deep femoral vein and Acute partially occlusive DVT of L Common Fem Vein and L Prox Fem Vein. Txd with Xarelto x 3 months. due to BCP. Dr. Whitlock Must EBV infection 6/27/2011    Hearing loss     Mild high frequency sensorineuronal hearing loss. Dr. Noah Givens murmur     Hernia of abdominal wall 09/2003, 69/5300    umbilical.  Dr. Noemy Lopez. Dr. Sean Wright    Hyperglycemia 2013    Hypothyroidism 06/2010    Dr. Kojo Esposito pressure increase 12/2011    Dr. Navarro Gave. Dr. Sue Faria. Dr. Declan Bazzi.  Knee pain, left 04/2012    Left. Dr. Lesia Heimlich Measles childhood    Migraine 2017, 07/2019    w aura and facial paresthesia. Dr. Precious Hutchins    Mumps childhood    Plantar fasciitis, bilateral 2010    Dr. Mariely Nuno Delay Sciatica 2008    Right.  with OA. Dr. Bud Hopper Tinnitus of right ear 2012    Dr. Melody Tellez     Past Surgical History:   Procedure Laterality Date   [place]  2011    Dr. Karen Gr.  HAND/FINGER SURGERY UNLISTED  09/2003    Right Index finger repair due to cut    HX HERNIA REPAIR  96/9633    Umbilical.  Dr. Noemy Lopez.  HX HERNIA REPAIR  9/23/2011    recurrent umbilical.  Laparoscopy incisional.  Dr. Sean Wright.     HX TONSILLECTOMY  childhood    LAP,CHOLECYSTECTOMY  07/2001     Family History   Problem Relation Age of Onset    Hypertension Mother     Cancer Mother         small cell lung with bone mets to spine/MELANOMA    Cancer Father         melanoma    Arthritis-osteo Father     Colon Polyps Father     Diabetes Maternal Grandmother     Stroke Maternal Grandmother     Seizures Maternal Grandmother     Colon Polyps Maternal Grandmother     Breast Cancer Maternal Grandmother     Heart Disease Paternal Grandmother         heart failure    Heart Attack Paternal Grandmother     Asthma Paternal Grandmother     Depression Sister     Other Maternal Grandfather         Kamranla Ganser Ds     Social History     Tobacco Use    Smoking status: Passive Smoke Exposure - Never Smoker    Smokeless tobacco: Never Used    Tobacco comment: lived with smoker dad and mom then step mom x 20 yrs   Substance Use Topics    Alcohol use: Yes     Alcohol/week: 1.0 standard drinks     Types: 1 Glasses of wine per week     Frequency: Monthly or less     Drinks per session: 1 or 2     Binge frequency: Never     Comment: RARE       ROS    No flowsheet data found. Anthony Ha, who was evaluated through a patient-initiated, synchronous (real-time) audio only encounter, and/or her healthcare decision maker, is aware that it is a billable service, with coverage as determined by her insurance carrier. She provided verbal consent to proceed: Yes. She has not had a related appointment within my department in the past 7 days or scheduled within the next 24 hours.       Total Time: minutes: 11-20 minutes    Dawson Stover MD

## 2020-08-13 NOTE — PROGRESS NOTES
Chief Complaint   Patient presents with    Leg Pain    Leg Swelling       Patient-Reported Vitals 8/13/2020   Patient-Reported Weight 301   Patient-Reported Height 5' 2\"       Pain Scale: /10  Pain Location:

## 2020-08-20 DIAGNOSIS — L29.9 PRURITIC DERMATITIS: ICD-10-CM

## 2020-08-23 RX ORDER — HYDROXYZINE 25 MG/1
50 TABLET, FILM COATED ORAL
Qty: 540 TAB | Refills: 3 | Status: SHIPPED | OUTPATIENT
Start: 2020-08-23 | End: 2021-08-26 | Stop reason: SDUPTHER

## 2020-09-22 ENCOUNTER — VIRTUAL VISIT (OUTPATIENT)
Dept: ONCOLOGY | Age: 50
End: 2020-09-22
Payer: COMMERCIAL

## 2020-09-22 DIAGNOSIS — I82.4Y2 DEEP VEIN THROMBOSIS (DVT) OF PROXIMAL VEIN OF LEFT LOWER EXTREMITY, UNSPECIFIED CHRONICITY (HCC): Primary | ICD-10-CM

## 2020-09-22 DIAGNOSIS — E66.01 OBESITY, MORBID (HCC): ICD-10-CM

## 2020-09-22 PROCEDURE — 99213 OFFICE O/P EST LOW 20 MIN: CPT | Performed by: INTERNAL MEDICINE

## 2020-09-22 NOTE — PROGRESS NOTES
Chris Conley is a 44 y.o. female  Chief Complaint   Patient presents with    Follow-up     DVT     1. Have you been to the ER, urgent care clinic since your last visit? Hospitalized since your last visit? 8/2020-[facility] sinus infection  2. Have you seen or consulted any other health care providers outside of the [address] [place] [address] since your last visit? Include any pap smears or colon screening.   [facility]

## 2020-09-22 NOTE — PROGRESS NOTES
[facility] [address], [id number] [place] [address] Road, [address] [place] [address] Ela  W: [phone]  F: [phone]    Reason for Visit:   Herlinda Aceves is a 44 y.o. female who is seen by synchronous (real-time) audio-video technology on 9/22/2020 for follow up of DVT    Treatment History:   · none    History of Present Illness:   Patient is a 44 y.o. with past medical history as reviewed below who is seen for evaluation of DVT. She was apparently [facility] December 2018 with left leg and thigh pain for a few days. She had no preceding trauma or surgeries or illnesses. Aleve did not help her. At which point she was diagnosed with deep venous thrombosis. Doppler showed extensive DVT on the left involving the left common femoral vein, left proximal femoral vein and deep femoral vein. She has since been on Xarelto. She had reported ongoing lower extremity cramps and had a repeat Doppler study on 1/25/2019 that had showed resolution of thrombosis. She was on OCPs for 30+ years preceding the clot. DVT considered provoked and stopped Xarelto after 3 months. She is seen for follow up    She is well. Has had rare L leg swelling which resolves on leg elevation. She has no pain, no CP, SOB, Bleeding. She is uptodate with PAP and mammogram. She has a stable weight, appetite , no bleeding        She has never smoked       Past Medical History:   Diagnosis Date    Acne rosacea     Dr. Margarette Christensen.  Allergy, unspecified not elsewhere classified childhood     Txd with immunotherapy. Dr. Sanaz Jose Anemia NEC     borderline    Ankle sprain 2007    Right. Dr. Darren Younger    Ankle sprain 11/2012    Right. Dr. Zina Kern.  Asthma childhood    Chickenpox childhood    Chronic low back pain with right-sided sciatica     and SI joint dysfunction. Dr. Franca Flores.  Chronic otitis media     Dr. Sanaz Jose Dry eye syndrome 2013    Dr. Mohinder Cortes.     DVT of deep femoral vein ([facility] [address] 12/07/2018    Acute occlusive DVT of L deep femoral vein and Acute partially occlusive DVT of L Common Fem Vein and L Prox Fem Vein. Txd with Xarelto x 3 months. due to BCP. Dr. Tiara Wilburn EBV infection 6/27/2011    Hearing loss     Mild high frequency sensorineuronal hearing loss. Dr. Ester Sánchez murmur     Hernia of abdominal wall 09/2003, 63/7308    umbilical.  Dr. Patti Cadena. Dr. Melanie Aponte    Hyperglycemia 2013    Hypothyroidism 06/2010    Dr. Jenae An pressure increase 12/2011    Dr. Bob Huertas. Dr. Mauro Almaraz. Dr. Gloria Goldberg.  Knee pain, left 04/2012    Left. Dr. Foy Catching Measles childhood    Migraine 2017, 07/2019    w aura and facial paresthesia. Dr. Berlin Pike    Mumps childhood    Plantar fasciitis, bilateral 2010    Dr. Aury Yuan   [place] Sciatica 2008    Right.  with OA. Dr. Renee Duane Tinnitus of right ear 2012    Dr. Mary Henderson      Past Surgical History:   Procedure Laterality Date   Phuong Burks  2011    Dr. Afshan Astorga.  HAND/FINGER SURGERY UNLISTED  09/2003    Right Index finger repair due to cut    HX HERNIA REPAIR  37/4274    Umbilical.  Dr. Patti Cadena.  HX HERNIA REPAIR  9/23/2011    recurrent umbilical.  Laparoscopy incisional.  Dr. Melanie Aponte.  HX TONSILLECTOMY  childhood    LAP,CHOLECYSTECTOMY  07/2001      Social History     Tobacco Use    Smoking status: Passive Smoke Exposure - Never Smoker    Smokeless tobacco: Never Used    Tobacco comment: lived with smoker dad and mom then step mom x 20 yrs   Substance Use Topics    Alcohol use:  Yes     Alcohol/week: 1.0 standard drinks     Types: 1 Glasses of wine per week     Frequency: Monthly or less     Drinks per session: 1 or 2     Binge frequency: Never     Comment: RARE      Family History   Problem Relation Age of Onset    Hypertension Mother     Cancer Mother         small cell lung with bone mets to spine/MELANOMA    Cancer Father         melanoma    Arthritis-osteo Father     Colon Polyps Father     Diabetes Maternal Grandmother     Stroke Maternal Grandmother     Seizures Maternal Grandmother     Colon Polyps Maternal Grandmother     Breast Cancer Maternal Grandmother     Heart Disease Paternal Grandmother         heart failure    Heart Attack Paternal Grandmother     Asthma Paternal Grandmother     Depression Sister     Other Maternal Grandfather         Jose Luis Abarca     Current Outpatient Medications   Medication Sig    hydrOXYzine HCL (ATARAX) 25 mg tablet Take 2 Tabs by mouth every eight (8) hours as needed (urticaria). Indications: hives    NAFTIN 2 % gel Use as directed  Indications: athlete's foot    clobetasol (TEMOVATE) 0.05 % topical cream as needed.  albuterol (PROVENTIL HFA, VENTOLIN HFA, PROAIR HFA) 90 mcg/actuation inhaler Take 2 Puffs by inhalation every four (4) hours as needed for Wheezing. Indications: bronchospasm prevention    levothyroxine (SYNTHROID) 125 mcg tablet Take 1 Tab by mouth Daily (before breakfast). Indications: a condition with low thyroid hormone levels    albuterol-ipratropium (DUO-NEB) 2.5 mg-0.5 mg/3 ml nebu 3 mL by Nebulization route every six (6) hours as needed (breathing).  montelukast (SINGULAIR) 10 mg tablet Take 1 Tab by mouth daily.  azelastine (ASTELIN) 137 mcg (0.1 %) nasal spray 2 Sprays by Both Nostrils route two (2) times a day. Use in each nostril as directed  Indications: Seasonal Runny Nose    omeprazole (PRILOSEC) 40 mg capsule TAKE 1 CAPSULE BY MOUTH EVERY DAY    butalbital-acetaminophen-caffeine (FIORICET, ESGIC) -40 mg per tablet TAKE 1 TAB BY MOUTH EVERY SIX (6) HOURS AS NEEDED FOR PAIN OR HEADACHE.  Nebulizer & Compressor machine 1 Each by Other route four (4) times daily as needed.     albuterol (PROVENTIL VENTOLIN) 2.5 mg /3 mL (0.083 %) nebulizer solution 6 mL by Nebulization route every four (4) hours as needed for Wheezing or Shortness of Breath. Indications: Acute Asthma Attack    budesonide (PULMICORT) 1 mg/2 mL nbsp 2 mL by Nebulization route two (2) times a day. Indications: MAINTENANCE THERAPY FOR ASTHMA (Patient taking differently: 1,000 mcg by Nebulization route two (2) times daily as needed.)    cyclobenzaprine (FLEXERIL) 10 mg tablet TAKE 1 TABLET BY MOUTH 3 TIMES A DAY AS NEEDED FOR MUSCLE SPASM    ALPRAZolam (XANAX) 0.5 mg tablet Take 1 Tab by mouth two (2) times daily as needed for Anxiety or Sleep. Max Daily Amount: 1 mg. Indications: anxiety    terconazole (TERAZOL 7) 0.4 % vaginal cream     LOCOID 0.1 % lotn     valACYclovir (VALTREX) 500 mg tablet     terconazole (TERAZOL 3) 80 mg vaginal suppository     multivitamin (ONE A DAY) tablet Take 1 tablet by mouth daily.  PROPYLENE GLYCOL//PF (SYSTANE, PF, OP) Apply  to eye four (4) times daily.  Emollient Combination No.32 (EPICERAM) Emul by Apply Externally route two (2) times a day. From Dr Antonio Calvo. For Acne rosacea.  metroNIDAZOLE (METROGEL) 1 % topical gel Apply  to affected area two (2) times a day. Use a thin layer to affected areas after washing from Dr Antonio Calvo   Indications: a skin condition on the cheeks and nose with a reddish rash and acne called acne rosacea    latanoprost (XALATAN) 0.005 % ophthalmic solution Administer 1 Drop to both eyes nightly.  LACTOBACILLUS/FOS/PECTIN (PROBIOTIC COMPLEX PO) Take  by mouth daily.  cholecalciferol, vitamin d3, (VITAMIN D) 1,000 unit tablet Take 1,000 Units by mouth daily. No current facility-administered medications for this visit.        Allergies   Allergen Reactions    Latex Rash    Junel 1.5/30 (21) [Norethindrone Ac-Eth Estradiol] Swelling     L FOOT DUE TO NEW DVT    Other Medication Hives and Rash     SUN    Pcn [Penicillins] Hives    Prednisone Hives    Sulfa (Sulfonamide Antibiotics) Hives    Garamycin [Gentamicin] Other (comments)     Itchy eyes due to sulfate    Metformin Diarrhea        Review of Systems: A complete review of systems was obtained, negative except as described above. Physical Exam:     Due to this being a TeleHealth evaluation, many elements of the physical examination are unable to be assessed. Constitutional: Appears well-developed and well-nourished in no apparent distress   Mental status: Alert and awake, Oriented to person/place/time, Able to follow commands  Eyes: EOM normal, Sclera normal, No visible ocular discharge  HENT: Normocephalic, atraumatic; Mouth/Throat: Moist mucous membranes, External Ears normal  Neck: No visualized mass  Skin: No significant exanthematous lesions or discoloration noted on facial skin  Psychiatric: Normal affect, normal judgment/insight. No hallucinations       Results:     Lab Results   Component Value Date/Time    WBC 7.7 11/08/2019 10:36 AM    HGB 12.1 11/08/2019 10:36 AM    HCT 37.5 11/08/2019 10:36 AM    PLATELET 082 98/28/7929 10:36 AM    MCV 89 11/08/2019 10:36 AM    ABS. NEUTROPHILS 4.7 03/18/2019 03:13 PM     Lab Results   Component Value Date/Time    Sodium 138 11/08/2019 10:36 AM    Potassium 4.7 11/08/2019 10:36 AM    Chloride 102 11/08/2019 10:36 AM    CO2 22 11/08/2019 10:36 AM    Glucose 89 11/08/2019 10:36 AM    BUN 13 11/08/2019 10:36 AM    Creatinine 0.71 11/08/2019 10:36 AM    GFR est  11/08/2019 10:36 AM    GFR est non-AA 99 11/08/2019 10:36 AM    Calcium 8.8 11/08/2019 10:36 AM    Glucose (POC) 97 09/24/2011 12:06 PM     Lab Results   Component Value Date/Time    Bilirubin, total 0.3 11/08/2019 10:36 AM    ALT (SGPT) 23 11/08/2019 10:36 AM    Alk. phosphatase 108 11/08/2019 10:36 AM    Protein, total 6.3 11/08/2019 10:36 AM    Albumin 4.2 11/08/2019 10:36 AM    Globulin 3.4 05/19/2010 10:46 AM         Records reviewed and summarized above. Pathology report(s) reviewed above. Radiology report(s) reviewed above.     Duplex reviewed      Assessment:   1) Provoked DVT    12/18- Symptomatic and extensive  Provoked by OCPs and possibly compounded by BMI    Estimated risk of recurrence after the first VTE provoked by non-surgical factor  5 percent for the first year; 2.5 percent/year thereafter    Per ACCP guidelines 3 months of uninterrupted anticoagulation is recommended which she has completed and hence after 3 months of AC - xarelto was stopped    She has no recurrences and is doing well      2) H/O Anemia  Resolved    3) Morbid obesity  Counseled on a healthy life style    4)   Counseled on the importance of a screening colonoscopy- gave her names so she can go ahead and schedule an appointment        Plan:     · No anticoagulation except prophylaxis in high risk situations like surgeries  · No additional follow up    I appreciate the opportunity to participate in [facility]. Signed By: Cortney Hodge MD      I was in the office while conducting this encounter. The patient was at her home. Consent:  She and/or her healthcare decision maker is aware that this patient-initiated Telehealth encounter is a billable service, with coverage as determined by her insurance carrier. She is aware that she may receive a bill and has provided verbal consent to proceed: Yes    Pursuant to the emergency declaration under the [address] and the [facility] [place], [address] waiver authority and the [employer] Act, this Virtual  Visit was conducted, with patient's (and/or legal guardian's) consent, to reduce the patient's risk of exposure to COVID-19 and provide necessary medical care. Services were provided through a video synchronous discussion virtually to substitute for in-person visit.

## 2020-11-17 ENCOUNTER — OFFICE VISIT (OUTPATIENT)
Dept: FAMILY MEDICINE CLINIC | Age: 50
End: 2020-11-17
Payer: COMMERCIAL

## 2020-11-17 VITALS
HEIGHT: 61 IN | WEIGHT: 293 LBS | BODY MASS INDEX: 55.32 KG/M2 | RESPIRATION RATE: 17 BRPM | SYSTOLIC BLOOD PRESSURE: 133 MMHG | OXYGEN SATURATION: 98 % | TEMPERATURE: 97.3 F | DIASTOLIC BLOOD PRESSURE: 82 MMHG | HEART RATE: 75 BPM

## 2020-11-17 DIAGNOSIS — Z00.00 WELL ADULT EXAM: ICD-10-CM

## 2020-11-17 DIAGNOSIS — I82.4Y2 DEEP VEIN THROMBOSIS (DVT) OF PROXIMAL VEIN OF LEFT LOWER EXTREMITY, UNSPECIFIED CHRONICITY (HCC): Primary | ICD-10-CM

## 2020-11-17 DIAGNOSIS — Z00.00 LABORATORY EXAM ORDERED AS PART OF ROUTINE GENERAL MEDICAL EXAMINATION: ICD-10-CM

## 2020-11-17 DIAGNOSIS — Z23 NEEDS FLU SHOT: ICD-10-CM

## 2020-11-17 DIAGNOSIS — E66.01 OBESITY, MORBID (HCC): ICD-10-CM

## 2020-11-17 DIAGNOSIS — E78.5 DYSLIPIDEMIA: ICD-10-CM

## 2020-11-17 DIAGNOSIS — Z12.11 SCREEN FOR COLON CANCER: ICD-10-CM

## 2020-11-17 DIAGNOSIS — R73.03 PRE-DIABETES: ICD-10-CM

## 2020-11-17 DIAGNOSIS — E55.9 VITAMIN D DEFICIENCY: ICD-10-CM

## 2020-11-17 LAB
25(OH)D3 SERPL-MCNC: 23.9 NG/ML (ref 30–100)
ALBUMIN SERPL-MCNC: 4.3 G/DL (ref 3.5–5)
ALBUMIN/GLOB SERPL: 1.3 {RATIO} (ref 1.1–2.2)
ALP SERPL-CCNC: 137 U/L (ref 45–117)
ALT SERPL-CCNC: 38 U/L (ref 12–78)
ANION GAP SERPL CALC-SCNC: 4 MMOL/L (ref 5–15)
APPEARANCE UR: CLEAR
AST SERPL-CCNC: 17 U/L (ref 15–37)
BACTERIA URNS QL MICRO: NEGATIVE /HPF
BASOPHILS # BLD: 0.1 K/UL (ref 0–0.1)
BASOPHILS NFR BLD: 1 % (ref 0–1)
BILIRUB SERPL-MCNC: 0.4 MG/DL (ref 0.2–1)
BILIRUB UR QL: NEGATIVE
BUN SERPL-MCNC: 15 MG/DL (ref 6–20)
BUN/CREAT SERPL: 20 (ref 12–20)
CALCIUM SERPL-MCNC: 9.4 MG/DL (ref 8.5–10.1)
CHLORIDE SERPL-SCNC: 104 MMOL/L (ref 97–108)
CHOLEST SERPL-MCNC: 216 MG/DL
CO2 SERPL-SCNC: 29 MMOL/L (ref 21–32)
COLOR UR: NORMAL
CREAT SERPL-MCNC: 0.76 MG/DL (ref 0.55–1.02)
DIFFERENTIAL METHOD BLD: NORMAL
EOSINOPHIL # BLD: 0.2 K/UL (ref 0–0.4)
EOSINOPHIL NFR BLD: 2 % (ref 0–7)
EPITH CASTS URNS QL MICRO: NORMAL /LPF
ERYTHROCYTE [DISTWIDTH] IN BLOOD BY AUTOMATED COUNT: 13.5 % (ref 11.5–14.5)
EST. AVERAGE GLUCOSE BLD GHB EST-MCNC: 126 MG/DL
GLOBULIN SER CALC-MCNC: 3.3 G/DL (ref 2–4)
GLUCOSE SERPL-MCNC: 98 MG/DL (ref 65–100)
GLUCOSE UR STRIP.AUTO-MCNC: NEGATIVE MG/DL
HBA1C MFR BLD: 6 % (ref 4–5.6)
HCT VFR BLD AUTO: 40.9 % (ref 35–47)
HDLC SERPL-MCNC: 75 MG/DL
HDLC SERPL: 2.9 {RATIO} (ref 0–5)
HGB BLD-MCNC: 12.8 G/DL (ref 11.5–16)
HGB UR QL STRIP: NEGATIVE
HYALINE CASTS URNS QL MICRO: NORMAL /LPF (ref 0–5)
IMM GRANULOCYTES # BLD AUTO: 0 K/UL (ref 0–0.04)
IMM GRANULOCYTES NFR BLD AUTO: 0 % (ref 0–0.5)
KETONES UR QL STRIP.AUTO: NEGATIVE MG/DL
LDLC SERPL CALC-MCNC: 124.8 MG/DL (ref 0–100)
LEUKOCYTE ESTERASE UR QL STRIP.AUTO: NEGATIVE
LIPID PROFILE,FLP: ABNORMAL
LYMPHOCYTES # BLD: 2.9 K/UL (ref 0.8–3.5)
LYMPHOCYTES NFR BLD: 34 % (ref 12–49)
MCH RBC QN AUTO: 28 PG (ref 26–34)
MCHC RBC AUTO-ENTMCNC: 31.3 G/DL (ref 30–36.5)
MCV RBC AUTO: 89.5 FL (ref 80–99)
MONOCYTES # BLD: 0.6 K/UL (ref 0–1)
MONOCYTES NFR BLD: 7 % (ref 5–13)
NEUTS SEG # BLD: 4.7 K/UL (ref 1.8–8)
NEUTS SEG NFR BLD: 56 % (ref 32–75)
NITRITE UR QL STRIP.AUTO: NEGATIVE
NRBC # BLD: 0 K/UL (ref 0–0.01)
NRBC BLD-RTO: 0 PER 100 WBC
PH UR STRIP: 6 [PH] (ref 5–8)
PLATELET # BLD AUTO: 289 K/UL (ref 150–400)
PMV BLD AUTO: 11.5 FL (ref 8.9–12.9)
POTASSIUM SERPL-SCNC: 4.2 MMOL/L (ref 3.5–5.1)
PROT SERPL-MCNC: 7.6 G/DL (ref 6.4–8.2)
PROT UR STRIP-MCNC: NEGATIVE MG/DL
RBC # BLD AUTO: 4.57 M/UL (ref 3.8–5.2)
RBC #/AREA URNS HPF: NORMAL /HPF (ref 0–5)
SODIUM SERPL-SCNC: 137 MMOL/L (ref 136–145)
SP GR UR REFRACTOMETRY: 1.01 (ref 1–1.03)
TRIGL SERPL-MCNC: 81 MG/DL (ref ?–150)
TSH SERPL DL<=0.05 MIU/L-ACNC: 1.33 UIU/ML (ref 0.36–3.74)
UA: UC IF INDICATED,UAUC: NORMAL
UROBILINOGEN UR QL STRIP.AUTO: 0.2 EU/DL (ref 0.2–1)
VLDLC SERPL CALC-MCNC: 16.2 MG/DL
WBC # BLD AUTO: 8.5 K/UL (ref 3.6–11)
WBC URNS QL MICRO: NORMAL /HPF (ref 0–4)

## 2020-11-17 PROCEDURE — 99396 PREV VISIT EST AGE 40-64: CPT | Performed by: NURSE PRACTITIONER

## 2020-11-17 PROCEDURE — 90686 IIV4 VACC NO PRSV 0.5 ML IM: CPT

## 2020-11-17 PROCEDURE — 90471 IMMUNIZATION ADMIN: CPT

## 2020-11-17 RX ORDER — ZOSTER VACCINE RECOMBINANT, ADJUVANTED 50 MCG/0.5
0.5 KIT INTRAMUSCULAR ONCE
Qty: 0.5 ML | Refills: 1 | Status: SHIPPED | OUTPATIENT
Start: 2020-11-17 | End: 2020-11-17

## 2020-11-17 NOTE — PATIENT INSTRUCTIONS
1) Healthy Weight  Body Mass Index is a noninvasive way to screen for weight and body fat. This is a mathematical calculation based on your height and weight. A healthy BMI is between 20% -24.9%. Your BMI is 57 %. There is a relationship between high BMI and various healthy problems, such as osteoarthritis, muscle pain, increased risk of cancer, diabetes, heart disease, stroke, hypertension, high cholesterol, sleep apnea, breathing problems, depression, which is why a healthy BMI is so important. In terms of weight loss, a 5-10% reduction in body weight over 3-6 months is a reasonable goal.  There would likely be improvements in risk for disease such as diabetes and heart disease, with this amount of weight loss. In order to lose weight, try reducing your daily intake of calories by decreasing portion size of food and increase exercise to help reduce weight. Eat 3-5 small meals per day instead of 3 large meals. A good tip to losing weight : DON'T EAT OR DRINK ANYTHING AFTER DINNER! Choose lean meats for protein source which include chicken, pork, and turkey. The recommended serving size for protein is a 2-3 oz serving (the size of your fist), and 1-1.5 oz of carbohydrate per meal (about 1 cup). Increase servings of fruits and vegetables. Limit processed carbohydrates, (i.e. most breads, crackers, pasta, chips, rice, breaded or battered food, etc). If you choose to eat carbohydrates, whole wheat, (instead of white) is a healthier option for bread, rice, and crackers. Avoid fried foods. Limit sugar. Do your best to avoid all sweetened beverages, such as alcohol, juice, sodas or sweet teas, drink water, unsweet tea, diet soda, or crystal light as options instead. (Don't drink more than 2 of the 12oz artificially sweetened drinks per day as these can increase hunger and make it more difficult to lose weight. The daily goal for water intake should be at least 64 oz/day for most people.      Daily exercise will also help with weight loss and overall health. A minimum of 150 minutes a week of moderate exercise is recommended (30 minutes per day). Make exercise a routine part of your day - for example, park in spaces far away from [facility], take stairs instead of elevator, if sitting for long periods, get up from chair and walk every hour. Recruit a friend or relative to exercise with you and keep you on schedule. It is much easier to exercise with a dai who will make sure you work out each day! Home DVDs can be a great way to work out, if you will do them (otherwise, they aren't worth the money!) [employer] is a eight week mixed martial arts (MMA) based workout. It has 5 main workout DVDs, each one is 45 minutes consisting of a 10 minute warm-up, five 5 minute workouts and a 6 minute stretching/cool down. It is easy to follow, with options to modify each move and you only need hand weights and some space to do the work out. Meal planning smart phone applications like GLO can help plan healthy meals. Get 7-8 hours of sleep each night. For reliable dietary information, go to www. EATRIGHT.org.    You may wish to consider seeing the nutritionist at [facility] ([phone]), [facility] ([phone]) or [facility] ([phone]). Alternatively, you can dial [employer] at [phone] (Monday - Friday 9am - 5pm) for a complimentary (free) conversation about your diet. Meal plans, grocery list and tips for healthy eating can be sent to you upon request.     Free smart phone application to help manage weight loss: MyFitnessPal = tracks food intake, exercise and weight. Daily nutritional summary. Meal      2) Labs  The following blood work was ordered today. Once the tests are completed, you will receive a letter, email or phone call with the results.   If you have not heard from us within 10-14 days, please call the office for the results. Complete Blood Count (CBC) helps evaluate your overall health, including hemoglobin and red blood cells that carry oxygen, white blood cells that fight infection and platelets that help with clotting. Complete Metabolic Panel (CMP) assesses electrolytes, kidney and liver function.  (A Basic Metabolic Panel (BMP) does not include liver function.)  Electrolytes include sodium, potassium, calcium, and chloride. These are necessary for cell function and an imbalance can cause serious problems. BUN and creatinine are markers of kidney function. ALT and AST are markers of liver function. Hemoglobin A1c is a 3 month average of your blood sugar and used as a marker of your diabetes control. A normal value is less than 5.7%. Total Cholesterol is the total number of cholesterol particles in your blood, which includes triglycerides, HDL and LDL. A small amount of cholesterol is needed for the body's cells, hormones, and metabolism. Goal is less than 200. Triglycerides are the short term fats in your blood which are used for energy. Goal is less than 150. High Density Lipids (HDL) is the \"good\" cholesterol in your blood. HDL helps remove bad cholesterol from your blood. Goal is more than 40. Low Density Lipids (LDL) is the \"bad\" cholesterol in your blood. LDL can stick to your arteries, raising the risk for heart attack and stroke. Goal is less than 100    Urinalysis - this is a test of your urine to check your overall health. It is recommended as a part of a routine check up and screens for a variety of disorders, such as urinary tract infections, kidney disease and diabetes. Your thyroid is responsible for secreting hormones and regulating your metabolism. Thyroid Stimulating Hormone (TSH) is a test for thyroid function. TSH is a hormone made by the pituitary gland in the brain and tells your thyroid gland to make hormones and release them into your blood.  If your thyroid isn't producing enough hormone, you may have symptoms such as unexplained weight gain, fatigue, hair loss. If your thyroid is producing too much hormone, you may have symptoms of diarrhea, heart palpitations, fatigue, unexplained weight loss. A normal TSH value is between 0.45 - 4.5. D-Dimer = history of DVT, will check this year    Vitamin D is important for absorbing calcium and promoting bone growth. If you are low in Vitamin D, you may experience fatigue, back or bone pain, hair loss, muscle pain, slow wound healing, depression. Your body can make Vitamin D after exposure to sunlight or through foods like fatty fish (tuna, mackerel, salmon), food with Vitamin D added (dairy products, orange juice and cereals) and cheese, egg yolks and liver. Vitamin D decreases with age and in the winter time when the sun is not strong. A deficiency in Vitamin D has been linked to certain cancers (breast, prostate, colon) as well as heart disease, depression and weight gain. 3)   Shingles Vaccine - Shingrix  Herpes Zoster (Shingles)     Herpes zoster (shingles) is a painful rash caused by the same virus that causes chickenpox. After an episode of chickenpox, the virus retreats to cells of the nervous system, where it can reside quietly for decades. However, later in life, the varicella-zoster virus can become active again. When it reactivates, it causes shingles. Shingles can affect people of all ages. It is particularly common in adults over age 48 years. It is also more common in individuals of all ages with conditions that weaken the immune system. Pain is usually the first sign of shingles. Other symptoms include: fever, chills, headache, numbness, tingling and/or burning pain and a skin rash. The rash can appear anywhere on your body, but most commonly on the torso. It is usually on only 1 side of your body, in a band or beltlike pattern.   During the first several days, small, painful blisters appear in the area. Scarring may occur where the blisters appear and there may be continued sensitivity and pain in that patch of skin for months after the infection. Treatment:   There is no cure for shingles - it is usually self-limiting. However, anti-viral medications can reduce the spread of the rash, speed healing and decrease the risk of complications. Supportive Treatment:   1)  Tylenol or ibuprofen can be used to reduce pain  2) Colloidal oatmeal bath or wet cool compresses may help with itching. Make a solution of cool water and cornstarch, baking soda, or Aveeno Oatmeal.  Apply the solution as a compress to the area. This will soothe the skin. 3) Wash the skin with soap and water to keep rash free of infection. 4) Reduce stress - exercise, yoga, healthy diet      THE BLISTER FLUID IS CONTAGIOUS TO ANYONE WHO HAS NOT ALREADY HAD CHICKEN POX. Stay home from work or school until all blisters have formed a crust (usually 2 weeks after the illness begins) and you are no longer contagious. Prevention:  The CDC recommends everyone over the age of 61 receive the shingles vaccine. This is recommended for people who have already had a shingles outbreak as it could prevent future occurrences of the disease. According to the CDC, it is also recommended for people born before [birth year] in the [address] [place] [address] who have not had varicella (chicken pox) as they are considered immune. [facility] is a vaccine indicated for prevention of herpes zoster (shingles) in adults aged 48 years and older and is the preferred vaccine for preventing shingles and related complications    The Center for Disease Control and Prevention recommended Shingrix for the following:  - healthy adults aged 48 years and older to prevent shingles and related complications  - adults who previously received the current shingles vaccine (Zostavax®) to prevent shingles and related complications    Two doses (0.5 mL each) are needed for full efficacy.   The vaccines are administered intramuscularly, with the second one administered 2-6 months after the initial vaccine. Well Visit, Women 48 to 72: Care Instructions  Your Care Instructions     Physical exams can help you stay healthy. Your doctor has checked your overall health and may have suggested ways to take good care of yourself. He or she also may have recommended tests. At home, you can help prevent illness with healthy eating, regular exercise, and other steps. Follow-up care is a key part of your treatment and safety. Be sure to make and go to all appointments, and call your doctor if you are having problems. It's also a good idea to know your test results and keep a list of the medicines you take. How can you care for yourself at home? · Reach and stay at a healthy weight. This will lower your risk for many problems, such as obesity, diabetes, heart disease, and high blood pressure. · Get at least 30 minutes of exercise on most days of the week. Walking is a good choice. You also may want to do other activities, such as running, swimming, cycling, or playing tennis or team sports. · Do not smoke. Smoking can make health problems worse. If you need help quitting, talk to your doctor about stop-smoking programs and medicines. These can increase your chances of quitting for good. · Protect your skin from too much sun. When you're outdoors from 10 a.m. to 4 p.m., stay in the shade or cover up with clothing and a hat with a wide brim. Wear sunglasses that block UV rays. Even when it's cloudy, put broad-spectrum sunscreen (SPF 30 or higher) on any exposed skin. · See a dentist one or two times a year for checkups and to have your teeth cleaned. · Wear a seat belt in the car. Follow your doctor's advice about when to have certain tests. These tests can spot problems early. · Cholesterol.  Your doctor will tell you how often to have this done based on your age, family history, or other things that can increase your risk for heart attack and stroke. · Blood pressure. Have your blood pressure checked during a routine doctor visit. Your doctor will tell you how often to check your blood pressure based on your age, your blood pressure results, and other factors. · Mammogram. Ask your doctor how often you should have a mammogram, which is an X-ray of your breasts. A mammogram can spot breast cancer before it can be felt and when it is easiest to treat. · Pap test and pelvic exam. Ask your doctor how often you should have a Pap test. You may not need to have a Pap test as often as you used to. · Vision. Have your eyes checked every year or two or as often as your doctor suggests. Some experts recommend that you have yearly exams for glaucoma and other age-related eye problems starting at age 48. · Hearing. Tell your doctor if you notice any change in your hearing. You can have tests to find out how well you hear. · Diabetes. Ask your doctor whether you should have tests for diabetes. · Colorectal cancer. Your risk for colorectal cancer gets higher as you get older. Some experts say that adults should start regular screening at age 48 and stop at age 76. Others say to start before age 48 or continue after age 76. Talk with your doctor about your risk and when to start and stop screening. · Thyroid disease. Talk to your doctor about whether to have your thyroid checked as part of a regular physical exam. Women have an increased chance of a thyroid problem. · Osteoporosis. You should begin tests for bone density at age 72. If you are younger than 72, ask your doctor whether you have factors that may increase your risk for this disease. You may want to have this test before age 72. · Heart attack and stroke risk. At least every 4 to 6 years, you should have your risk for heart attack and stroke assessed.  Your doctor uses factors such as your age, blood pressure, cholesterol, and whether you smoke or have diabetes to show what your risk for a heart attack or stroke is over the next 10 years. When should you call for help? Watch closely for changes in your health, and be sure to contact your doctor if you have any problems or symptoms that concern you. Where can you learn more? Go to http://www.gray.com/  Enter [id number] in the search box to learn more about \"Well Visit, Women 50 to 72: Care Instructions. \"  Current as of: May 27, 2020               Content Version: 12.6  © 1711-0590 [employer]. Care instructions adapted under license by [employer] (which disclaims liability or warranty for this information). If you have questions about a medical condition or this instruction, always ask your healthcare professional. [address] any warranty or liability for your use of this information. Vaccine Information Statement    Influenza (Flu) Vaccine (Inactivated or Recombinant): What You Need to Know    Many Vaccine Information Statements are available in Swedish and other languages. See www.immunize.org/vis  Hojas de información sobre vacunas están disponibles en español y en muchos otros idiomas. Visite www.immunize.org/vis    1. Why get vaccinated? Influenza vaccine can prevent influenza (flu). Flu is a contagious disease that spreads around the [place] every year, usually between October and May. Anyone can get the flu, but it is more dangerous for some people. Infants and young children, people 72years of age and older, pregnant women, and people with certain health conditions or a weakened immune system are at greatest risk of flu complications. Pneumonia, bronchitis, sinus infections and ear infections are examples of flu-related complications. If you have a medical condition, such as heart disease, cancer or diabetes, flu can make it worse.     Flu can cause fever and chills, sore throat, muscle aches, fatigue, cough, headache, and runny or stuffy nose. Some people may have vomiting and diarrhea, though this is more common in children than adults. Each year thousands of people in the [facility] die from flu, and many more are hospitalized. Flu vaccine prevents millions of illnesses and flu-related visits to the doctor each year. 2. Influenza vaccines     CDC recommends everyone 10months of age and older get vaccinated every flu season. Children 6 months through 6years of age may need 2 doses during a single flu season. Everyone else needs only 1 dose each flu season. It takes about 2 weeks for protection to develop after vaccination. There are many flu viruses, and they are always changing. Each year a new flu vaccine is made to protect against three or four viruses that are likely to cause disease in the upcoming flu season. Even when the vaccine doesnt exactly match these viruses, it may still provide some protection. Influenza vaccine does not cause flu. Influenza vaccine may be given at the same time as other vaccines. 3. Talk with your health care provider    Tell your vaccine provider if the person getting the vaccine:   Has had an allergic reaction after a previous dose of influenza vaccine, or has any severe, life-threatening allergies.  Has ever had Guillain-Barré Syndrome (also called GBS). In some cases, your health care provider may decide to postpone influenza vaccination to a future visit. People with minor illnesses, such as a cold, may be vaccinated. People who are moderately or severely ill should usually wait until they recover before getting influenza vaccine. Your health care provider can give you more information. 4. Risks of a reaction     Soreness, redness, and swelling where shot is given, fever, muscle aches, and headache can happen after influenza vaccine.    There may be a very small increased risk of Guillain-Barré Syndrome (GBS) after inactivated influenza vaccine (the flu shot). [employer] children who get the flu shot along with pneumococcal vaccine (PCV13), and/or DTaP vaccine at the same time might be slightly more likely to have a seizure caused by fever. Tell your health care provider if a child who is getting flu vaccine has ever had a seizure. People sometimes faint after medical procedures, including vaccination. Tell your provider if you feel dizzy or have vision changes or ringing in the ears. As with any medicine, there is a very remote chance of a vaccine causing a severe allergic reaction, other serious injury, or death. 5. What if there is a serious problem? An allergic reaction could occur after the vaccinated person leaves the clinic. If you see signs of a severe allergic reaction (hives, swelling of the face and throat, difficulty breathing, a fast heartbeat, dizziness, or weakness), call 9-1-1 and get the person to the nearest hospital.    For other signs that concern you, call your health care provider. Adverse reactions should be reported to the Vaccine Adverse Event Reporting System (VAERS). Your health care provider will usually file this report, or you can do it yourself. Visit the VAERS website at www.vaers. hhs.gov or call 0-918.600.7015. VAERS is only for reporting reactions, and VAERS staff do not give medical advice. 6. The National Vaccine Injury Compensation Program    The [employer] Vaccine Injury Compensation Program (VICP) is a federal program that was created to compensate people who may have been injured by certain vaccines. Visit the VICP website at www.hrsa.gov/vaccinecompensation or call 9-878.487.4277 to learn about the program and about filing a claim. There is a time limit to file a claim for compensation. 7. How can I learn more?  Ask your health care provider.  Call your local or state health department.    Contact the Centers for Disease Control and Prevention (CDC):  - Call 8-177.749.8163 (7-644-QPR-INFO) or  - Visit CDCs influenza website at www.cdc.gov/flu    Vaccine Information Statement (Interim)  Inactivated Influenza Vaccine   8/15/2019  [address] PINKY Menendez [id number]   Department of Health and Human Services  Centers for Disease Control and Prevention    Office Use Only

## 2020-11-17 NOTE — PROGRESS NOTES
Juve White is a 44 y.o. female    HIPAA verified by two patient identifiers. Chief Complaint   Patient presents with    Complete Physical    Labs     Pt is fasting     Immunization/Injection     Flu. Pt states that she does not want the Pneumonia shot     Medication Refill     Synthroid, Hydroxyzine, Fioricet, Albuterol inhaler, Nasal Spray      1. Have you been to the ER, urgent care clinic since your last visit? Hospitalized since your last visit? No    2. Have you seen or consulted any other health care providers outside of the [address] [place] [address] since your last visit? Include any pap smears or colon screening.  No    Visit Vitals  /82 (BP 1 Location: Left arm, BP Patient Position: Sitting)   Pulse 75   Temp 97.3 °F (36.3 °C) (Temporal)   Resp 17   Ht 5' 1\" (1.549 m)   Wt 302 lb 14.4 oz (137.4 kg)   SpO2 98%   BMI 57.23 kg/m²       Pain Scale: 0 - No pain/10  Pain Location:

## 2020-11-17 NOTE — PROGRESS NOTES
S: Kaz Luna is a 44 y.o. WHITE OR  female who presents for CPE     Assessment/Plan:    1. Well Adult  -discussed healthy diet and increasing daily exercise  -labs today: CBC, CMP, urinalysis, A1c, lipid, TSH  -HM: flu vaccine today, FIT, shingrix rx given    2. Hx of Deep vein thrombosis (DVT) of proximal vein of left lower extremity, unspecified chronicity (HCC)  - D DIMER    3. GERD  -current therapy:omeprazole 40mg    4. Asthma  -current therapy: montelukast 10mg + pulmicort bid + albuterol   -no issues currently     5. Hypthyroidism  -current therapy: levo 125mcg  -TSH FUTURE    6.  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    Social History     Tobacco Use   Smoking Status Passive Smoke Exposure - Never Smoker   Smokeless Tobacco Never Used   Tobacco Comment    lived with smoker dad and mom then step mom x 20 yrs     Social History     Substance and Sexual Activity   Alcohol Use Yes    Alcohol/week: 1.0 standard drinks    Types: 1 Glasses of wine per week    Frequency: Monthly or less    Drinks per session: 1 or 2    Binge frequency: Never    Comment: RARE     Social History     Substance and Sexual Activity   Drug Use No     Social History     Substance and Sexual Activity   Sexual Activity Yes    Partners: Male    Birth control/protection: Condom     Review of Systems:  - Constitutional Symptoms: no fevers/chills  - Eyes: no blurry vision or double vision  - Cardiovascular: no chest pain or palpitations  - Respiratory: no cough or shortness of breath  - Gastrointestinal: no dysphagia or abdominal pain  - Musculoskeletal: no joint pains or weakness  - Integumentary: no rashes - sees derm  - Endocrine:  no heat or cold intolerance, no polyuria or polydipsia  ROS is negative otherwise. 3 most recent PHQ Screens 9/22/2020   Little interest or pleasure in doing things Not at all   Feeling down, depressed, irritable, or hopeless Not at all   Total Score PHQ 2 0       I reviewed the following:  Past Medical History:   Diagnosis Date    Acne rosacea     Dr. Woo Nails.  Allergy, unspecified not elsewhere classified childhood     Txd with immunotherapy. Dr. Leopold Filippo Anemia NEC     borderline    Ankle sprain 2007    Right. Dr. Esa Colón    Ankle sprain 11/2012    Right. Dr. Lew Lee.  Asthma childhood    Chickenpox childhood    Chronic low back pain with right-sided sciatica     and SI joint dysfunction. Dr. Swetha Del Castillo.  Chronic otitis media     Dr. Leopold Filippo Dry eye syndrome 2013    Dr. Slava Wright.  DVT of deep femoral vein ([facility] [address] 12/07/2018    Acute occlusive DVT of L deep femoral vein and Acute partially occlusive DVT of L Common Fem Vein and L Prox Fem Vein. Txd with Xarelto x 3 months. due to BCP. Dr. Anthony Perdomo EBV infection 6/27/2011    Hearing loss     Mild high frequency sensorineuronal hearing loss. Dr. Carmel Stock murmur     Hernia of abdominal wall 09/2003, 73/8820    umbilical.  Dr. Roseanne Ordonez. Dr. Bettye Habermann    Hyperglycemia 2013    Hypothyroidism 06/2010    Dr. Dora Zapata pressure increase 12/2011    Dr. Agata Noel. Dr. Slava Wright. Dr. Zari Mcmahan.  Knee pain, left 04/2012    Left. Dr. Farheen Bach Measles childhood    Migraine 2017, 07/2019    w aura and facial paresthesia.    Azucena Blush    Mumps childhood    Plantar fasciitis, bilateral 2010    Dr. Ozzy Montejo    Sciatica 2008    Right.  with OA. Dr. Margie Ruiz Tinnitus of right ear 2012    Dr. Sirena Nagel       Current Outpatient Medications   Medication Sig Dispense Refill    hydrOXYzine HCL (ATARAX) 25 mg tablet Take 2 Tabs by mouth every eight (8) hours as needed (urticaria). Indications: hives 540 Tab 3    NAFTIN 2 % gel Use as directed  Indications: athlete's foot 60 g 2    clobetasol (TEMOVATE) 0.05 % topical cream as needed. 1    albuterol (PROVENTIL HFA, VENTOLIN HFA, PROAIR HFA) 90 mcg/actuation inhaler Take 2 Puffs by inhalation every four (4) hours as needed for Wheezing. Indications: bronchospasm prevention 3 Inhaler 1    levothyroxine (SYNTHROID) 125 mcg tablet Take 1 Tab by mouth Daily (before breakfast). Indications: a condition with low thyroid hormone levels 90 Tab 3    albuterol-ipratropium (DUO-NEB) 2.5 mg-0.5 mg/3 ml nebu 3 mL by Nebulization route every six (6) hours as needed (breathing). 30 Nebule 5    montelukast (SINGULAIR) 10 mg tablet Take 1 Tab by mouth daily. 90 Tab 3    azelastine (ASTELIN) 137 mcg (0.1 %) nasal spray 2 Sprays by Both Nostrils route two (2) times a day. Use in each nostril as directed  Indications: Seasonal Runny Nose 3 Bottle 1    omeprazole (PRILOSEC) 40 mg capsule TAKE 1 CAPSULE BY MOUTH EVERY DAY 90 Cap 1    butalbital-acetaminophen-caffeine (FIORICET, ESGIC) -40 mg per tablet TAKE 1 TAB BY MOUTH EVERY SIX (6) HOURS AS NEEDED FOR PAIN OR HEADACHE. 40 Tab 0    Nebulizer & Compressor machine 1 Each by Other route four (4) times daily as needed. 1 Each 0    albuterol (PROVENTIL VENTOLIN) 2.5 mg /3 mL (0.083 %) nebulizer solution 6 mL by Nebulization route every four (4) hours as needed for Wheezing or Shortness of Breath. Indications: Acute Asthma Attack 24 Each 1    budesonide (PULMICORT) 1 mg/2 mL nbsp 2 mL by Nebulization route two (2) times a day. Indications: MAINTENANCE THERAPY FOR ASTHMA (Patient taking differently: 1,000 mcg by Nebulization route two (2) times daily as needed.) 1 Each 1    cyclobenzaprine (FLEXERIL) 10 mg tablet TAKE 1 TABLET BY MOUTH 3 TIMES A DAY AS NEEDED FOR MUSCLE SPASM 90 Tab 0    ALPRAZolam (XANAX) 0.5 mg tablet Take 1 Tab by mouth two (2) times daily as needed for Anxiety or Sleep. Max Daily Amount: 1 mg. Indications: anxiety 60 Tab 2    terconazole (TERAZOL 7) 0.4 % vaginal cream   6    LOCOID 0.1 % lotn   1    valACYclovir (VALTREX) 500 mg tablet   1    terconazole (TERAZOL 3) 80 mg vaginal suppository   4    multivitamin (ONE A DAY) tablet Take 1 tablet by mouth daily.  PROPYLENE GLYCOL//PF (SYSTANE, PF, OP) Apply  to eye four (4) times daily.  Emollient Combination No.32 (EPICERAM) Emul by Apply Externally route two (2) times a day. From Dr Bonnie Rodriges. For Acne rosacea.  metroNIDAZOLE (METROGEL) 1 % topical gel Apply  to affected area two (2) times a day. Use a thin layer to affected areas after washing from Dr Bonnie Rodriges   Indications: a skin condition on the cheeks and nose with a reddish rash and acne called acne rosacea      latanoprost (XALATAN) 0.005 % ophthalmic solution Administer 1 Drop to both eyes nightly.  LACTOBACILLUS/FOS/PECTIN (PROBIOTIC COMPLEX PO) Take  by mouth daily.  cholecalciferol, vitamin d3, (VITAMIN D) 1,000 unit tablet Take 1,000 Units by mouth daily.          Allergies   Allergen Reactions    Latex Rash    Junel 1.5/30 (21) [Norethindrone Ac-Eth Estradiol] Swelling     L FOOT DUE TO NEW DVT    Other Medication Hives and Rash     SUN    Pcn [Penicillins] Hives    Prednisone Hives    Sulfa (Sulfonamide Antibiotics) Hives    Garamycin [Gentamicin] Other (comments)     Itchy eyes due to sulfate    Metformin Diarrhea        Health Maintenance:  Eye exam: 2020 - takes drops to prevent glaucoma  PAP: sees gyn  Mammogram: 12/2019  FIT - 2016; no   Tdap: 2015  Flu: today   Shingles: discussed     O: VS:   Visit Vitals  /82 (BP 1 Location: Left arm, BP Patient Position: Sitting)   Pulse 75   Temp 97.3 °F (36.3 °C) (Temporal)   Resp 17   Ht 5' 1\" (1.549 m)   Wt 302 lb 14.4 oz (137.4 kg)   SpO2 98%   BMI 57.23 kg/m²       Data Reviewed:  Mona Sánchez has gained 4lbs since last visit 12/2019  · Labs:  A1C:      Lab Results   Component Value Date/Time    Hemoglobin A1c 5.8 (H) 11/08/2019 10:36 AM     · Lipids:  Lab Results   Component Value Date/Time    Cholesterol, total 179 11/08/2019 10:36 AM    HDL Cholesterol 66 11/08/2019 10:36 AM    LDL, calculated 102 (H) 11/08/2019 10:36 AM    VLDL, calculated 11 11/08/2019 10:36 AM    Triglyceride 54 11/08/2019 10:36 AM    CHOL/HDL Ratio 2.5 05/19/2010 10:46 AM       GENERAL: Mona Sánchez is in no acute distress. Non-toxic. Well nourished. Well developed. Appropriately groomed. HEAD:  Normocephalic. Atraumatic. Non tender sinuses x 4. EYE: PERRLA. EOMs intact. Sclera anicteric without injection. No drainage or discharge. EARS: Hearing intact bilaterally. External ear canals normal without evidence of blood or swelling. Bilateral TM's intact, pearly grey with landmarks visible. No erythema or effusion. NOSE: Patent. Nasal turbinates pink. No erythema. No discharge. MOUTH: mucous membranes pink and moist. Posterior pharynx normal with cobblestone appearance. No erythema, white exudate or obstruction. NECK: supple. Midline trachea. No thyromegaly noted. No cervical adenopathy noted. RESP: Unlabored without SOB. Speaking in full sentences. Breath sounds are symmetrical bilaterally. Clear to auscultation bilaterally anteriorly and posteriorly. No wheeze, crackles or rhonchi noted. CV: normal rate. Regular rhythm. S1, S2 audible. No murmur noted. No rubs, clicks or gallops noted. ABDOMEN: Limited by body habitus. Soft and nondistended. No rebound, rigidity or guarding.   Bowel sounds normal x 4 quadrants. No tenderness on palpation  BACK: No visible deformities or curvature. Full ROM. No pain on palpation of the spinous processes in the cervical, thoracic, lumbar, sacral regions. No CVA tenderness. NEURO:  awake, alert and oriented to person, place, and time and event. Cranial nerves II through XII intact. Clear speech. Muscle strength is +5/5 x 4 extremities. Sensation is intact to light touch bilaterally. Steady gait. MUSC:  Intact x 4 extremities. Full ROM x 4 extremities. No pain with movement. Patellar reflexes 2+  HEME/LYMPH: peripheral pulses palpable 2+ x 4 extremities. No peripheral edema is noted. SKIN: Skin is warm and dry. Turgor is normal. No petechiae, no purpura, no rash. No cyanosis. No mottling, jaundice or pallor. PSYCH: appropriate behavior, dress and thought processes. Good eye contact. Clear and coherent speech. Full affect. Good insight.   ______________________________________________________________________  Patient education was done. Advised on nutrition, physical activity, weight management, tobacco, alcohol and safety. Counseling included discussion of diagnosis, differentials, treatment options, prescribed treatment, warning signs and follow up. Medication risks/benefits,interactions and alternatives discussed with patient. Patient verbalized understanding and agreed to plan of care. Patient was given an after visit summary which included current diagnoses, medications and vital signs. Follow up as directed.

## 2020-11-18 LAB — D DIMER PPP FEU-MCNC: 0.37 MG/L FEU (ref 0–0.65)

## 2020-11-18 RX ORDER — ERGOCALCIFEROL 1.25 MG/1
50000 CAPSULE ORAL
Qty: 12 CAP | Refills: 0 | Status: SHIPPED | OUTPATIENT
Start: 2020-11-18 | End: 2021-12-02 | Stop reason: ALTCHOICE

## 2020-11-18 NOTE — PROGRESS NOTES
D= 23, rx 50kunits q7days x12wks, ASCVD = 1.2%, LM rec, A1c incr from 5.8 to 6.0, consider metformin if no weight loss. Result ltr mailed.

## 2020-11-19 ENCOUNTER — TELEPHONE (OUTPATIENT)
Dept: FAMILY MEDICINE CLINIC | Age: 50
End: 2020-11-19

## 2020-11-19 NOTE — TELEPHONE ENCOUNTER
[facility] called and states that they were not able to get the D-Dimer lab  because they did not have the correct testing tube and wanted to make PCP aware in case they wanted her to go back to have it done. Advised that I will get to PCP who ordered for pt. Verified understanding.

## 2020-11-27 DIAGNOSIS — J45.20 MILD INTERMITTENT ASTHMA WITHOUT COMPLICATION: ICD-10-CM

## 2020-11-27 DIAGNOSIS — J45.41 MODERATE PERSISTENT ASTHMA WITH ACUTE EXACERBATION: ICD-10-CM

## 2020-11-27 RX ORDER — ALBUTEROL SULFATE 0.83 MG/ML
2.5 SOLUTION RESPIRATORY (INHALATION)
Qty: 30 EACH | Refills: 1 | Status: SHIPPED | OUTPATIENT
Start: 2020-11-27

## 2020-11-27 RX ORDER — ALBUTEROL SULFATE 0.83 MG/ML
5 SOLUTION RESPIRATORY (INHALATION)
Qty: 24 EACH | Refills: 1 | Status: SHIPPED | OUTPATIENT
Start: 2020-11-27 | End: 2020-11-27 | Stop reason: SDUPTHER

## 2020-12-21 DIAGNOSIS — J45.20 MILD INTERMITTENT ASTHMA WITHOUT COMPLICATION: ICD-10-CM

## 2020-12-21 RX ORDER — MONTELUKAST SODIUM 10 MG/1
TABLET ORAL
Qty: 90 TAB | Refills: 0 | Status: SHIPPED | OUTPATIENT
Start: 2020-12-21 | End: 2021-02-06

## 2020-12-29 LAB — HEMOCCULT STL QL IA: NORMAL

## 2020-12-30 ENCOUNTER — TELEPHONE (OUTPATIENT)
Dept: FAMILY MEDICINE CLINIC | Age: 50
End: 2020-12-30

## 2020-12-30 DIAGNOSIS — Z12.11 SCREEN FOR COLON CANCER: Primary | ICD-10-CM

## 2020-12-30 NOTE — TELEPHONE ENCOUNTER
Called [facility] and was informed that it was cancelled due to the sample being collected out of the 15 day range they did not recived specimen until 12/28/20 , and the specimen was collected on the sample on 12/12/20.

## 2020-12-30 NOTE — TELEPHONE ENCOUNTER
----- Message from Antione Lewis NP sent at 12/29/2020  9:12 PM EST -----  Please find out why order was cancelled.  Thanks.   ----- Message -----  From: [facility] Results In  Sent: 12/29/2020   8:35 PM EST  To: Antione Lewis NP

## 2020-12-31 ENCOUNTER — TELEPHONE (OUTPATIENT)
Dept: FAMILY MEDICINE CLINIC | Age: 50
End: 2020-12-31

## 2021-01-21 LAB — HEMOCCULT STL QL IA: NEGATIVE

## 2021-02-01 ENCOUNTER — TELEPHONE (OUTPATIENT)
Dept: FAMILY MEDICINE CLINIC | Age: 51
End: 2021-02-01

## 2021-02-02 DIAGNOSIS — G43.009 MIGRAINE WITHOUT AURA AND WITHOUT STATUS MIGRAINOSUS, NOT INTRACTABLE: ICD-10-CM

## 2021-02-02 DIAGNOSIS — J45.20 MILD INTERMITTENT ASTHMA WITHOUT COMPLICATION: ICD-10-CM

## 2021-02-06 RX ORDER — MONTELUKAST SODIUM 10 MG/1
TABLET ORAL
Qty: 90 TAB | Refills: 0 | Status: SHIPPED | OUTPATIENT
Start: 2021-02-06 | End: 2021-07-08 | Stop reason: SDUPTHER

## 2021-02-06 RX ORDER — ALBUTEROL SULFATE 90 UG/1
AEROSOL, METERED RESPIRATORY (INHALATION)
Qty: 6.7 INHALER | Refills: 1 | Status: SHIPPED | OUTPATIENT
Start: 2021-02-06 | End: 2021-12-15

## 2021-02-06 RX ORDER — BUTALBITAL, ACETAMINOPHEN AND CAFFEINE 50; 325; 40 MG/1; MG/1; MG/1
TABLET ORAL
Qty: 30 TAB | Refills: 0 | Status: SHIPPED | OUTPATIENT
Start: 2021-02-06 | End: 2021-08-26 | Stop reason: SDUPTHER

## 2021-02-08 ENCOUNTER — TELEPHONE (OUTPATIENT)
Dept: FAMILY MEDICINE CLINIC | Age: 51
End: 2021-02-08

## 2021-02-08 NOTE — TELEPHONE ENCOUNTER
Pt returing call to office. Made patient aware that nurse is helping room a patient, but ill put in a message for a return call.

## 2021-02-08 NOTE — TELEPHONE ENCOUNTER
Noted. Please inform pt I am covering for Dr Vi Pineda while she is out on leave, and we received call from the pharmacy. The fioricet and albuterol can both raise heart rate and/or blood pressure. Ask her if she has ever had reaction such as that, or has she taken them together before. Is her asthma controlled, and how often does she need the albuterol?

## 2021-02-08 NOTE — TELEPHONE ENCOUNTER
Spoke to Thiago alegre at [facility] who states she is checking on prescription for Fioricet due to a contraindication with asthma. Spoke to Bebe Lundberg, covering for Dr. Vi Pineda, who states pt has been on medication since 2011, but will call patient to verify no problems with medication before filling. Pharmacy will hold prescription until called back.

## 2021-02-08 NOTE — TELEPHONE ENCOUNTER
Phuong Hankins with [facility] pharmacy would like a call about the patient medication. Medication is Fioricet.     Call back number is [phone]

## 2021-02-08 NOTE — TELEPHONE ENCOUNTER
Spoke to pt who states she takes each prescription as needed, never together, has more miagraines than breathing difficulties. Rarely takes albuterol, generally only needs every few months. Pt states she has never had any problems with medication, denies any elevated pulse or blood pressure when using medication.

## 2021-02-09 NOTE — TELEPHONE ENCOUNTER
OK, please call the pharmacy back and let them know we reviewed with pt, and they can fill the fioricet.

## 2021-04-26 ENCOUNTER — VIRTUAL VISIT (OUTPATIENT)
Dept: FAMILY MEDICINE CLINIC | Age: 51
End: 2021-04-26
Payer: COMMERCIAL

## 2021-04-26 DIAGNOSIS — J01.00 ACUTE NON-RECURRENT MAXILLARY SINUSITIS: Primary | ICD-10-CM

## 2021-04-26 PROCEDURE — 99213 OFFICE O/P EST LOW 20 MIN: CPT | Performed by: PHYSICIAN ASSISTANT

## 2021-04-26 RX ORDER — DOXYCYCLINE 100 MG/1
100 TABLET ORAL 2 TIMES DAILY
Qty: 14 TAB | Refills: 0 | Status: SHIPPED | OUTPATIENT
Start: 2021-04-26 | End: 2021-05-03

## 2021-04-26 NOTE — PROGRESS NOTES
Chief Complaint   Patient presents with    Ear Pain     Virtual Visit send like to [phone]  Pt states she has had drainage, congestion and right ear pain x 2.5 weeks. .    1. Have you been to the ER, urgent care clinic since your last visit? Hospitalized since your last visit? No    2. Have you seen or consulted any other health care providers outside of the [address] [place] [address] since your last visit? Include any pap smears or colon screening.  No

## 2021-04-26 NOTE — PROGRESS NOTES
Maria Luisa Kiser is a 44 y.o. female who was seen by synchronous (real-time) audio-video technology on 4/26/2021 for Ear Pain        Assessment & Plan:   Diagnoses and all orders for this visit:    1. Acute non-recurrent maxillary sinusitis  -     doxycycline (ADOXA) 100 mg tablet; Take 1 Tab by mouth two (2) times a day for 7 days. Stop acne medicine/antibx while on doxycycline 100 mg BID x 7d  Add mucinex prn to loosen congestion  Continue singulair, Astelin, Xyzal  Stay well hydrated, ensure good water intake    Her PCP is on leave and our office is closing  Schedule f/u with me at my new office in May to establish care and f/u on 11/2020 labs    Follow-up and Dispositions    · Return in about 4 weeks (around 5/24/2021) for Vit D, Prediabetes; call my new office now to sally for May. I spent at least 25 minutes on this visit with this established patient.   712  Subjective:     C/O sinus congestion and ear pain, R more than L,  x 2.5 wks  sxs started with the pollen and season change  Is coughing up purulent mucus, PND, and green nasal mucus  She has had fluid in her ears, which historically occurs when she gets congested  (+)max sinus pain, right more than left  Has allergies and asthma, on Singulair and Astelin nasal spray, she switched her zyrtec to xyzal about 10 d ago to see if that is better  -has not needed her pulmicort nebulizer or albuterol  -no chest pain or wheezing  -had a low grade fever at the onset, no fever since the first few days  -she reports having not had good response in recent years to [address]  -last occurrence in May 2020, she had doxy 100 mg BID x 7 days, she takes a low dose doxycycline for her acne that she stops while on antibx for her sinus infection    Last labs 11/2020  Low Vit D, Rx'ed ergo 50K  A1C 6.0%  -she would like to schedule f/u on those labs to monitor her progress    PCP is Dr Ferdinand Closs who is out on leave and not scheduled to return before our office closes on 5/1/21  She will resume care with me      Prior to Admission medications    Medication Sig Start Date End Date Taking? Authorizing Provider   montelukast (SINGULAIR) 10 mg tablet TAKE 1 TABLET BY MOUTH EVERY DAY 2/6/21  Yes Ella Conte MD   albuterol (PROVENTIL HFA, VENTOLIN HFA, PROAIR HFA) 90 mcg/actuation inhaler INHALE 2 PUFFS BY EVERY FOUR (4) HOURS AS NEEDED FOR WHEEZING. INDICATIONS: BRONCHOSPASM PREVENTION 2/6/21  Yes Veronica Alejandre PA-C   butalbital-acetaminophen-caffeine (FIORICET, ESGIC) -40 mg per tablet TAKE 1 TABLET BY MOUTH EVERY 6 HOURS AS NEEDED FOR PAIN OR HEADACHE 2/6/21  Yes Veronica Alejandre PA-C   albuterol (PROVENTIL VENTOLIN) 2.5 mg /3 mL (0.083 %) nebu 3 mL by Nebulization route every four (4) hours as needed for Wheezing or Shortness of Breath. Indications: asthma attack 11/27/20  Yes Carol Goodpasture, NP   levothyroxine (SYNTHROID) 125 mcg tablet TAKE 1 TABLET BY MOUTH EVERY DAY BEFORE BREAKFAST 11/23/20  Yes Roel BLUM NP   albuterol-ipratropium (DUO-NEB) 2.5 mg-0.5 mg/3 ml nebu USE 1 VIAL VIA NEBULLIZER EVERY 6 HOURS AS NEEDED FOR BREATHING 11/20/20  Yes Carol Goodpasture, NP   hydrOXYzine HCL (ATARAX) 25 mg tablet Take 2 Tabs by mouth every eight (8) hours as needed (urticaria). Indications: hives 8/23/20  Yes Miguel Ángel Novoa R, DO   NAFTIN 2 % gel Use as directed  Indications: athlete's foot 12/28/19  Yes Billy Ramires, DO   clobetasol (TEMOVATE) 0.05 % topical cream as needed. 11/4/19  Yes Provider, Historical   azelastine (ASTELIN) 137 mcg (0.1 %) nasal spray 2 Sprays by Both Nostrils route two (2) times a day. Use in each nostril as directed  Indications: Seasonal Runny Nose 11/15/19  Yes Royetta [place], Joynita R, DO   omeprazole (PRILOSEC) 40 mg capsule TAKE 1 CAPSULE BY MOUTH EVERY DAY 11/11/19  Yes Miguel Ángel Novoa R, DO   Nebulizer & Compressor machine 1 Each by Other route four (4) times daily as needed.  2/21/18  Yes Daniel Ho, DO   budesonide (PULMICORT) 1 mg/2 mL nbsp 2 mL by Nebulization route two (2) times a day. Indications: MAINTENANCE THERAPY FOR ASTHMA  Patient taking differently: 1,000 mcg by Nebulization route two (2) times daily as needed. 2/21/18  Yes Billy Vu R, DO   cyclobenzaprine (FLEXERIL) 10 mg tablet TAKE 1 TABLET BY MOUTH 3 TIMES A DAY AS NEEDED FOR MUSCLE SPASM 11/9/17  Yes Andree Re, NP   ALPRAZolam Gillermina Henry) 0.5 mg tablet Take 1 Tab by mouth two (2) times daily as needed for Anxiety or Sleep. Max Daily Amount: 1 mg. Indications: anxiety 6/29/17  Yes Za Hutchins R, DO   terconazole (TERAZOL 7) 0.4 % vaginal cream  9/2/15  Yes Provider, Historical   LOCOID 0.1 % lotn  9/24/15  Yes Provider, Historical   valACYclovir (VALTREX) 500 mg tablet  6/16/15  Yes Provider, Historical   terconazole (TERAZOL 3) 80 mg vaginal suppository  5/10/15  Yes Provider, Historical   multivitamin (ONE A DAY) tablet Take 1 tablet by mouth daily. Yes Provider, Historical   PROPYLENE GLYCOL//PF (SYSTANE, PF, OP) Apply  to eye four (4) times daily. Yes Provider, Historical   Emollient Combination No.32 (EPICERAM) Emul by Apply Externally route two (2) times a day. From Dr Everardo Mckeon. For Acne rosacea. Yes Provider, Historical   metroNIDAZOLE (METROGEL) 1 % topical gel Apply  to affected area two (2) times a day. Use a thin layer to affected areas after washing from Dr Everardo Mckeon   Indications: a skin condition on the cheeks and nose with a reddish rash and acne called acne rosacea   Yes Provider, Historical   latanoprost (XALATAN) 0.005 % ophthalmic solution Administer 1 Drop to both eyes nightly. Yes Provider, Historical   LACTOBACILLUS/FOS/PECTIN (PROBIOTIC COMPLEX PO) Take  by mouth daily. Yes Provider, Historical   cholecalciferol, vitamin d3, (VITAMIN D) 1,000 unit tablet Take 1,000 Units by mouth daily.    Yes Provider, Historical   ergocalciferol (ERGOCALCIFEROL) 1,250 mcg (50,000 unit) capsule Take 1 Cap by mouth every seven (7) days. Indications: vitamin D deficiency (high dose therapy) 11/18/20   Yessy Perez NP     Patient Active Problem List    Diagnosis Date Noted    Prediabetes 11/17/2020    Deep vein thrombosis (DVT) ([facility] [address] 08/13/2020    History of DVT of lower extremity 08/13/2020    Chronic otitis media     DVT of deep femoral vein ([facility] [address] 12/07/2018    Family history of lung cancer 11/12/2018    Allergy to sulfa drugs 12/29/2017    Penicillin allergy 12/29/2017    Adverse effect of anabolic steroid 66/44/8283    Obesity, morbid ([facility] [address] 12/22/2017    Hyperglycemia 06/29/2017    Hypothyroidism due to acquired atrophy of thyroid 11/07/2016    Mild intermittent asthma without complication 86/27/3222    Chronic low back pain with right-sided sciatica     Hearing loss 08/22/2016    Dry eyes 11/03/2015    Family history of breast cancer 11/03/2015    Family history of stroke 11/03/2015    Family history of diabetes mellitus (DM) 11/03/2015    Family history of heart attack 11/03/2015    Family history of colonic polyps 11/03/2015    Family history of melanoma 11/03/2015    Migraine without aura and without status migrainosus, not intractable 11/03/2015    Plantar fasciitis, bilateral     Dyslipidemia 06/23/2015    Tinnitus of right ear     Right ankle sprain 11/27/2012    Vitamin D deficiency 11/14/2012    Knee pain, left 05/30/2012    Acne rosacea 11/21/2011    Intraocular pressure increase     Allergic rhinitis 07/31/2009    Chronic ear infection 07/31/2009    GERD (gastroesophageal reflux disease) 07/31/2009    Fe deficiency anemia 07/31/2009     Current Outpatient Medications   Medication Sig Dispense Refill    doxycycline (ADOXA) 100 mg tablet Take 1 Tab by mouth two (2) times a day for 7 days.  14 Tab 0    montelukast (SINGULAIR) 10 mg tablet TAKE 1 TABLET BY MOUTH EVERY DAY 90 Tab 0    albuterol (PROVENTIL HFA, VENTOLIN HFA, PROAIR HFA) 90 mcg/actuation inhaler INHALE 2 PUFFS BY EVERY FOUR (4) HOURS AS NEEDED FOR WHEEZING. INDICATIONS: BRONCHOSPASM PREVENTION 6.7 Inhaler 1    butalbital-acetaminophen-caffeine (FIORICET, ESGIC) -40 mg per tablet TAKE 1 TABLET BY MOUTH EVERY 6 HOURS AS NEEDED FOR PAIN OR HEADACHE 30 Tab 0    albuterol (PROVENTIL VENTOLIN) 2.5 mg /3 mL (0.083 %) nebu 3 mL by Nebulization route every four (4) hours as needed for Wheezing or Shortness of Breath. Indications: asthma attack 30 Each 1    levothyroxine (SYNTHROID) 125 mcg tablet TAKE 1 TABLET BY MOUTH EVERY DAY BEFORE BREAKFAST 90 Tab 2    albuterol-ipratropium (DUO-NEB) 2.5 mg-0.5 mg/3 ml nebu USE 1 VIAL VIA NEBULLIZER EVERY 6 HOURS AS NEEDED FOR BREATHING 90 mL 1    hydrOXYzine HCL (ATARAX) 25 mg tablet Take 2 Tabs by mouth every eight (8) hours as needed (urticaria). Indications: hives 540 Tab 3    NAFTIN 2 % gel Use as directed  Indications: athlete's foot 60 g 2    clobetasol (TEMOVATE) 0.05 % topical cream as needed. 1    azelastine (ASTELIN) 137 mcg (0.1 %) nasal spray 2 Sprays by Both Nostrils route two (2) times a day. Use in each nostril as directed  Indications: Seasonal Runny Nose 3 Bottle 1    omeprazole (PRILOSEC) 40 mg capsule TAKE 1 CAPSULE BY MOUTH EVERY DAY 90 Cap 1    Nebulizer & Compressor machine 1 Each by Other route four (4) times daily as needed. 1 Each 0    budesonide (PULMICORT) 1 mg/2 mL nbsp 2 mL by Nebulization route two (2) times a day. Indications: MAINTENANCE THERAPY FOR ASTHMA (Patient taking differently: 1,000 mcg by Nebulization route two (2) times daily as needed.) 1 Each 1    cyclobenzaprine (FLEXERIL) 10 mg tablet TAKE 1 TABLET BY MOUTH 3 TIMES A DAY AS NEEDED FOR MUSCLE SPASM 90 Tab 0    ALPRAZolam (XANAX) 0.5 mg tablet Take 1 Tab by mouth two (2) times daily as needed for Anxiety or Sleep. Max Daily Amount: 1 mg.  Indications: anxiety 60 Tab 2    terconazole (TERAZOL 7) 0.4 % vaginal cream   6    LOCOID 0.1 % lotn 1    valACYclovir (VALTREX) 500 mg tablet   1    terconazole (TERAZOL 3) 80 mg vaginal suppository   4    multivitamin (ONE A DAY) tablet Take 1 tablet by mouth daily.  PROPYLENE GLYCOL//PF (SYSTANE, PF, OP) Apply  to eye four (4) times daily.  Emollient Combination No.32 (EPICERAM) Emul by Apply Externally route two (2) times a day. From Dr Charlene Varela. For Acne rosacea.  metroNIDAZOLE (METROGEL) 1 % topical gel Apply  to affected area two (2) times a day. Use a thin layer to affected areas after washing from Dr Charlene Varela   Indications: a skin condition on the cheeks and nose with a reddish rash and acne called acne rosacea      latanoprost (XALATAN) 0.005 % ophthalmic solution Administer 1 Drop to both eyes nightly.  LACTOBACILLUS/FOS/PECTIN (PROBIOTIC COMPLEX PO) Take  by mouth daily.  cholecalciferol, vitamin d3, (VITAMIN D) 1,000 unit tablet Take 1,000 Units by mouth daily.  ergocalciferol (ERGOCALCIFEROL) 1,250 mcg (50,000 unit) capsule Take 1 Cap by mouth every seven (7) days. Indications: vitamin D deficiency (high dose therapy) 12 Cap 0     Allergies   Allergen Reactions    Latex Rash    Junel 1.5/30 (21) [Norethindrone Ac-Eth Estradiol] Swelling     L FOOT DUE TO NEW DVT    Other Medication Hives and Rash     SUN    Pcn [Penicillins] Hives    Prednisone Hives    Sulfa (Sulfonamide Antibiotics) Hives    Garamycin [Gentamicin] Other (comments)     Itchy eyes due to sulfate    Metformin Diarrhea     Past Medical History:   Diagnosis Date    Acne rosacea     Dr. Charlene Varela.  Allergy, unspecified not elsewhere classified childhood     Txd with immunotherapy. Dr. Ye Outlaw Anemia NEC     borderline    Ankle sprain 2007    Right. Dr. Alfonso Bunting    Ankle sprain 11/2012    Right. Dr. Babar Velazquez.  Asthma childhood    Chickenpox childhood    Chronic low back pain with right-sided sciatica     and SI joint dysfunction.   Dr. Martinez [place] Angeli.    Chronic otitis media     Dr. Mrecedes Lawrence Dry eye syndrome 2013    Dr. Svetlana Martinez.  DVT of deep femoral vein ([address] 12/07/2018    Acute occlusive DVT of L deep femoral vein and Acute partially occlusive DVT of L Common Fem Vein and L Prox Fem Vein. Txd with Xarelto x 3 months. due to BCP. Dr. Adali Rivas EBV infection 6/27/2011    Hearing loss     Mild high frequency sensorineuronal hearing loss. Dr. Delio Rendon murmur     Hernia of abdominal wall 09/2003, 51/3711    umbilical.  Dr. Twana Frankel. Dr. Miladys Cruz    Hyperglycemia 2013    Hypothyroidism 06/2010    Dr. Tami Gonzalez pressure increase 12/2011    Dr. Charlee Miner. Dr. Svetlana Martinez. Dr. Lior Oliveira.  Knee pain, left 04/2012    Left. Dr. Christine Lloyd Measles childhood    Migraine 2017, 07/2019    w aura and facial paresthesia. Dr. Rika Vasquez    Mumps childhood    Plantar fasciitis, bilateral 2010    Dr. Yu Hollis   [facility] Sciatica 2008    Right.  with OA. Dr. Sina Day Tinnitus of right ear 2012    Dr. Tomas Montero     Past Surgical History:   Procedure Laterality Date   Clarissa Hackett  2011    Dr. Tameka Servin.  HAND/FINGER SURGERY UNLISTED  09/2003    Right Index finger repair due to cut    HX HERNIA REPAIR  19/9442    Umbilical.  Dr. Twana Frankel.  HX HERNIA REPAIR  9/23/2011    recurrent umbilical.  Laparoscopy incisional.  Dr. Miladys Cruz.     HX TONSILLECTOMY  childhood    VT LAP,CHOLECYSTECTOMY  07/2001     Family History   Problem Relation Age of Onset    Hypertension Mother     Cancer Mother         small cell lung with bone mets to spine/MELANOMA    Cancer Father         melanoma    Arthritis-osteo Father     Colon Polyps Father     Diabetes Maternal Grandmother     Stroke Maternal Grandmother     Seizures Maternal Grandmother     Colon Polyps Maternal Grandmother     Breast Cancer Maternal Grandmother     Heart Disease Paternal Grandmother         heart failure    Heart Attack Paternal Grandmother     Asthma Paternal Grandmother     Depression Sister     Other Maternal Grandfather         Brandi Patel Ds     Social History     Tobacco Use    Smoking status: Passive Smoke Exposure - Never Smoker    Smokeless tobacco: Never Used    Tobacco comment: lived with smoker dad and mom then step mom x 20 yrs   Substance Use Topics    Alcohol use: Yes     Alcohol/week: 1.0 standard drinks     Types: 1 Glasses of wine per week     Frequency: Monthly or less     Drinks per session: 1 or 2     Binge frequency: Never     Comment: RARE       ROS  Per HPI    Objective:     Patient-Reported Vitals 8/13/2020   Patient-Reported Weight 301   Patient-Reported Height 5' 2\"      General: alert, cooperative, no distress   Mental  status: normal mood, behavior, speech, dress, motor activity, and thought processes, able to follow commands   HENT: NCAT   Neck: no visualized mass   Resp: no respiratory distress   Neuro: no gross deficits   Skin: no discoloration or lesions of concern on visible areas   Psychiatric: normal affect, consistent with stated mood, no evidence of hallucinations     Additional exam findings: reports discomfort to right maxillary sinus percussion      We discussed the expected course, resolution and complications of the diagnosis(es) in detail. Medication risks, benefits, costs, interactions, and alternatives were discussed as indicated. I advised her to contact the office if her condition worsens, changes or fails to improve as anticipated. She expressed understanding with the diagnosis(es) and plan. Ramya Ann, was evaluated through a synchronous (real-time) audio-video encounter. The patient (or guardian if applicable) is aware that this is a billable service. Verbal consent to proceed has been obtained within the past 12 months.  The visit was conducted pursuant to the emergency declaration under the [facility] [address] waiver authority and the [email or web address] General Act. Patient identification was verified, and a caregiver was present when appropriate. The patient was located in a state where the provider was credentialed to provide care.     Veronica Rueda PA-C

## 2021-04-27 ENCOUNTER — PATIENT MESSAGE (OUTPATIENT)
Dept: FAMILY MEDICINE CLINIC | Age: 51
End: 2021-04-27

## 2021-04-28 NOTE — TELEPHONE ENCOUNTER
Notified pt PCP has been changed back to Dr. Arthur Guardado, understanding voiced. From: Marnell Lundborg  To: Ritchie Rodas DO  Sent: 4/27/2021  7:37 PM EDT  Subject: Visit Follow-Up Question    Hello,     I know that we are uncertain as to where Dr Arthur Guardado will be reassigned after the office closes. Please keep her as my primary PCP so I can continue to see her when she resumes. I have been seeing her for several years and would like to continue. I know that I can see other doctors during the transition as needed. Please contact me [phone] and confirm it is updated to reflect Dr Arthur Guardado as my pcp.     Thank you   Carlitos Zhou  [phone]

## 2021-07-07 ENCOUNTER — PATIENT MESSAGE (OUTPATIENT)
Dept: INTERNAL MEDICINE CLINIC | Age: 51
End: 2021-07-07

## 2021-07-07 DIAGNOSIS — J45.20 MILD INTERMITTENT ASTHMA WITHOUT COMPLICATION: ICD-10-CM

## 2021-07-08 RX ORDER — MONTELUKAST SODIUM 10 MG/1
10 TABLET ORAL DAILY
Qty: 90 TABLET | Refills: 0 | Status: SHIPPED | OUTPATIENT
Start: 2021-07-08 | End: 2021-10-05

## 2021-07-08 NOTE — TELEPHONE ENCOUNTER
Edith Eden 7/7/2021 4:55 PM EDT      ----- Message -----  From: Rocio Hathaway  Sent: 7/7/2021 4:12 PM EDT  To: [facility]  Subject: Prescription Question     I need a refill on singular until I obtain a new pcp    Please advise    Thank you  Travis Jeter

## 2021-07-12 DIAGNOSIS — J45.20 MILD INTERMITTENT ASTHMA WITHOUT COMPLICATION: ICD-10-CM

## 2021-07-12 NOTE — TELEPHONE ENCOUNTER
Last Refill: 07/08/2021  Last Visit: 04/26/2021   Next Visit: 8/26/2021    Requested Prescriptions     Pending Prescriptions Disp Refills    montelukast (SINGULAIR) 10 mg tablet 90 Tablet 0     Sig: Take 1 Tablet by mouth daily.

## 2021-07-13 RX ORDER — MONTELUKAST SODIUM 10 MG/1
10 TABLET ORAL DAILY
Qty: 90 TABLET | Refills: 0 | OUTPATIENT
Start: 2021-07-13

## 2021-08-26 ENCOUNTER — OFFICE VISIT (OUTPATIENT)
Dept: INTERNAL MEDICINE CLINIC | Age: 51
End: 2021-08-26
Payer: COMMERCIAL

## 2021-08-26 VITALS
BODY MASS INDEX: 53.92 KG/M2 | DIASTOLIC BLOOD PRESSURE: 84 MMHG | HEIGHT: 62 IN | OXYGEN SATURATION: 97 % | WEIGHT: 293 LBS | HEART RATE: 87 BPM | TEMPERATURE: 98 F | RESPIRATION RATE: 18 BRPM | SYSTOLIC BLOOD PRESSURE: 124 MMHG

## 2021-08-26 DIAGNOSIS — Z86.718 HISTORY OF DVT (DEEP VEIN THROMBOSIS): ICD-10-CM

## 2021-08-26 DIAGNOSIS — E03.4 HYPOTHYROIDISM DUE TO ACQUIRED ATROPHY OF THYROID: ICD-10-CM

## 2021-08-26 DIAGNOSIS — R73.03 PREDIABETES: ICD-10-CM

## 2021-08-26 DIAGNOSIS — N95.1 PERIMENOPAUSAL: ICD-10-CM

## 2021-08-26 DIAGNOSIS — G47.00 INSOMNIA, UNSPECIFIED TYPE: ICD-10-CM

## 2021-08-26 DIAGNOSIS — R22.42 LOCALIZED SWELLING OF LEFT LOWER LEG: Primary | ICD-10-CM

## 2021-08-26 DIAGNOSIS — G43.009 MIGRAINE WITHOUT AURA AND WITHOUT STATUS MIGRAINOSUS, NOT INTRACTABLE: ICD-10-CM

## 2021-08-26 DIAGNOSIS — L29.9 PRURITIC DERMATITIS: ICD-10-CM

## 2021-08-26 DIAGNOSIS — E55.9 VITAMIN D DEFICIENCY: ICD-10-CM

## 2021-08-26 PROCEDURE — 99214 OFFICE O/P EST MOD 30 MIN: CPT | Performed by: PHYSICIAN ASSISTANT

## 2021-08-26 RX ORDER — HYDROXYZINE 25 MG/1
50 TABLET, FILM COATED ORAL
Qty: 540 TABLET | Refills: 3 | Status: SHIPPED | OUTPATIENT
Start: 2021-08-26 | End: 2022-08-29

## 2021-08-26 RX ORDER — BUTALBITAL, ACETAMINOPHEN AND CAFFEINE 50; 325; 40 MG/1; MG/1; MG/1
TABLET ORAL
Qty: 30 TABLET | Refills: 0 | Status: SHIPPED | OUTPATIENT
Start: 2021-08-26 | End: 2021-12-15

## 2021-08-26 NOTE — PROGRESS NOTES
Rocio Hathaway is a 46 y.o. female     Chief Complaint   Patient presents with   [facility] Establish Care     new patient       Visit Vitals  /84 (BP 1 Location: Left arm, BP Patient Position: Sitting, BP Cuff Size: Adult)   Pulse 87   Temp 98 °F (36.7 °C) (Temporal)   Resp 18   Ht 5' 2\" (1.575 m)   Wt 317 lb 9.6 oz (144.1 kg)   SpO2 97%   BMI 58.09 kg/m²       Health Maintenance Due   Topic Date Due    Hepatitis C Screening  Never done    COVID-19 Vaccine (1) Never done    Shingrix Vaccine Age 50> (1 of 2) Never done    PAP AKA CERVICAL CYTOLOGY  11/27/2021       1. Have you been to the ER, urgent care clinic since your last visit? Hospitalized since your last visit? No     2. Have you seen or consulted any other health care providers outside of the [address] [place] [address] since your last visit? Include any pap smears or colon screening. No     No recent immunizations other than covid vaccine patient will call us with vaccine dates.

## 2021-08-26 NOTE — PROGRESS NOTES
HPI:  46 y.o.  presents to establish care. Prior PCP was Dr Berta Soria, who is now in specialty care. Last visit at prior PCP office at [facility] was with me VV 4/26/21 for acute sinusitis. PMH of DVT, dyslipidemia, hypothyroidism, hyperglycemia, inadequate sleep hygiene, vit d deficiency, iron deficiency anemia, migraine, BL allergic conjunctivitis and rhinitis. TODAY, she c/o increased swelling Left foot and ankle starting 3-4 weeks ago. 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     Migraines  Usually occurs at end of month due to more deadlines for her accounting work, which is also associated with not sleeping well and increased stress  Has increased stress caring for her elderly father  She takes fioricet 1-3 times per month  She may take Excedrin Migraine at times as well, she tries to alternate between the 2    Insomnia  Takes hydroxyzine 50 mg QHS every night  States she also has itching at night time, which the hydroxyzine helps    Menses  Has been without menses x 11 months and on verge of official menopause  She sees Gyn, had abnl pap and was going q3-6 mos, Dr Jose Cruz Kebede    Hypothyroidism  On levothyroxine 125 mcg  Last TSH Nl 11/2020    Prediabetes  Last A1C 6.0%  Briefly tried metformin but had \"diarrhea nonstop\"  She has not seen nutritionist    Vit D deficiency  Low D 23.9 on 11/2020, completed ergo 50K weekly x 3 mos, she is now taking D3 2000 units daily      Patient Active Problem List    Diagnosis    Prediabetes    Deep vein thrombosis (DVT) ([facility])    History of DVT of lower extremity    Chronic otitis media     Dr. Lisa Simpson DVT of deep femoral vein ([facility])     Acute partially occlusive DVT of L Common Fem Vein and L Prox Fem Vein and Acute occlusive DVT of L deep femoral vein. Txd with Xarelto x 3 months. due to BCP.  Family history of lung cancer    Allergy to sulfa drugs    Penicillin allergy    Adverse effect of anabolic steroid     flushing and leathery skin after taking oral steroids      Obesity, morbid (HCC)    Hyperglycemia    Hypothyroidism due to acquired atrophy of thyroid    Mild intermittent asthma without complication    Chronic low back pain with right-sided sciatica     and SI joint dysfunction. Dr. Lucy Waggoner.  Hearing loss    Dry eyes    Family history of breast cancer    Family history of stroke    Family history of diabetes mellitus (DM)    Family history of heart attack    Family history of colonic polyps    Family history of melanoma    Migraine without aura and without status migrainosus, not intractable    Plantar fasciitis, bilateral     Dr. Brittany Devine      Dyslipidemia    Tinnitus of right ear     Dr. Cordell Pina Right ankle sprain    Vitamin D deficiency    Knee pain, left    Acne rosacea    Intraocular pressure increase    Allergic rhinitis    Chronic ear infection     R>L      GERD (gastroesophageal reflux disease)    Fe deficiency anemia     Borderline           Past Medical History:   Diagnosis Date    Acne rosacea     Dr. Linda Farr.  Allergy, unspecified not elsewhere classified childhood     Txd with immunotherapy. Dr. Shira Lee Anemia NEC     borderline    Ankle sprain 2007    Right. Dr. Cristi Sandhoff    Ankle sprain 11/2012    Right. Dr. Argelia Fay.     Asthma childhood    Chickenpox childhood    Chronic low back pain with right-sided sciatica     and SI joint dysfunction. Dr. Sonali Samson.  Chronic otitis media     Dr. Daniel Ramirez Dry eye syndrome 2013    Dr. Georgiana Crawford.  DVT of deep femoral vein ([address] 12/07/2018    Acute occlusive DVT of L deep femoral vein and Acute partially occlusive DVT of L Common Fem Vein and L Prox Fem Vein. Txd with Xarelto x 3 months. due to BCP. Dr. Kelton Aguilar EBV infection 6/27/2011    Hearing loss     Mild high frequency sensorineuronal hearing loss. Dr. Zofia Meier Gave murmur     Hernia of abdominal wall 09/2003, 03/6130    umbilical.  Dr. Mari Iraheta. Dr. Kendra Wang    Hyperglycemia 2013    Hypothyroidism 06/2010    Dr. Barajas Even pressure increase 12/2011    Dr. Callie Parry. Dr. Georgiana Crawford. Dr. Randi Noble.  Knee pain, left 04/2012    Left. Dr. Jack Davila Measles childhood    Migraine 2017, 07/2019    w aura and facial paresthesia. Dr. Monique Bio    Mumps childhood    Plantar fasciitis, bilateral 2010    Dr. Chantal Padilla   [facility] Sciatica 2008    Right.  with OA. Dr. Shivani Kasper Tinnitus of right ear 2012    Dr. Mata Goldman History     Tobacco Use    Smoking status: Passive Smoke Exposure - Never Smoker    Smokeless tobacco: Never Used    Tobacco comment: lived with smoker dad and mom then step mom x 20 yrs   Vaping Use    Vaping Use: Never used   Substance Use Topics    Alcohol use: Yes     Alcohol/week: 1.0 standard drinks     Types: 1 Glasses of wine per week     Comment: RARE    Drug use: No       Outpatient Medications Marked as Taking for the 8/26/21 encounter (Office Visit) with Veronica Scott PA-C   Medication Sig Dispense Refill    montelukast (SINGULAIR) 10 mg tablet Take 1 Tablet by mouth daily.  90 Tablet 0    albuterol (PROVENTIL HFA, VENTOLIN HFA, PROAIR HFA) 90 mcg/actuation inhaler INHALE 2 PUFFS BY EVERY FOUR (4) HOURS AS NEEDED FOR WHEEZING. INDICATIONS: BRONCHOSPASM PREVENTION 6.7 Inhaler 1    butalbital-acetaminophen-caffeine (FIORICET, ESGIC) -40 mg per tablet TAKE 1 TABLET BY MOUTH EVERY 6 HOURS AS NEEDED FOR PAIN OR HEADACHE 30 Tab 0    albuterol (PROVENTIL VENTOLIN) 2.5 mg /3 mL (0.083 %) nebu 3 mL by Nebulization route every four (4) hours as needed for Wheezing or Shortness of Breath. Indications: asthma attack 30 Each 1    levothyroxine (SYNTHROID) 125 mcg tablet TAKE 1 TABLET BY MOUTH EVERY DAY BEFORE BREAKFAST 90 Tab 2    albuterol-ipratropium (DUO-NEB) 2.5 mg-0.5 mg/3 ml nebu USE 1 VIAL VIA NEBULLIZER EVERY 6 HOURS AS NEEDED FOR BREATHING 90 mL 1    hydrOXYzine HCL (ATARAX) 25 mg tablet Take 2 Tabs by mouth every eight (8) hours as needed (urticaria). Indications: hives 540 Tab 3    NAFTIN 2 % gel Use as directed  Indications: athlete's foot 60 g 2    clobetasol (TEMOVATE) 0.05 % topical cream as needed. 1    azelastine (ASTELIN) 137 mcg (0.1 %) nasal spray 2 Sprays by Both Nostrils route two (2) times a day. Use in each nostril as directed  Indications: Seasonal Runny Nose 3 Bottle 1    omeprazole (PRILOSEC) 40 mg capsule TAKE 1 CAPSULE BY MOUTH EVERY DAY 90 Cap 1    Nebulizer & Compressor machine 1 Each by Other route four (4) times daily as needed. 1 Each 0    budesonide (PULMICORT) 1 mg/2 mL nbsp 2 mL by Nebulization route two (2) times a day. Indications: MAINTENANCE THERAPY FOR ASTHMA (Patient taking differently: 1,000 mcg by Nebulization route two (2) times daily as needed.) 1 Each 1    cyclobenzaprine (FLEXERIL) 10 mg tablet TAKE 1 TABLET BY MOUTH 3 TIMES A DAY AS NEEDED FOR MUSCLE SPASM 90 Tab 0    ALPRAZolam (XANAX) 0.5 mg tablet Take 1 Tab by mouth two (2) times daily as needed for Anxiety or Sleep. Max Daily Amount: 1 mg.  Indications: anxiety 60 Tab 2    terconazole (TERAZOL 7) 0.4 % vaginal cream   6    LOCOID 0.1 % lotn   1    valACYclovir (VALTREX) 500 mg tablet   1    terconazole (TERAZOL 3) 80 mg vaginal suppository   4    multivitamin (ONE A DAY) tablet Take 1 tablet by mouth daily.  PROPYLENE GLYCOL//PF (SYSTANE, PF, OP) Apply  to eye four (4) times daily.  Emollient Combination No.32 (EPICERAM) Emul by Apply Externally route two (2) times a day. From Dr Desmond Romeo. For Acne rosacea.  metroNIDAZOLE (METROGEL) 1 % topical gel Apply  to affected area two (2) times a day. Use a thin layer to affected areas after washing from Dr Desmond Romeo   Indications: a skin condition on the cheeks and nose with a reddish rash and acne called acne rosacea      latanoprost (XALATAN) 0.005 % ophthalmic solution Administer 1 Drop to both eyes nightly.  LACTOBACILLUS/FOS/PECTIN (PROBIOTIC COMPLEX PO) Take  by mouth daily.  cholecalciferol, vitamin d3, (VITAMIN D) 1,000 unit tablet Take 1,000 Units by mouth daily. Allergies   Allergen Reactions    Latex Rash    Junel 1.5/30 (21) [Norethindrone Ac-Eth Estradiol] Swelling     L FOOT DUE TO NEW DVT    Other Medication Hives and Rash     SUN    Pcn [Penicillins] Hives    Prednisone Hives    Sulfa (Sulfonamide Antibiotics) Hives    Garamycin [Gentamicin] Other (comments)     Itchy eyes due to sulfate    Metformin Diarrhea       ROS:  ROS negative except as per HPI. PE:  Visit Vitals  /84 (BP 1 Location: Left arm, BP Patient Position: Sitting, BP Cuff Size: Adult)   Pulse 87   Temp 98 °F (36.7 °C) (Temporal)   Resp 18   Ht 5' 2\" (1.575 m)   Wt 317 lb 9.6 oz (144.1 kg)   SpO2 97%   BMI 58.09 kg/m²     Gen: alert, oriented, no acute distress  Head: normocephalic, atraumatic  Eyes: pupils equal round reactive to light, sclera clear, conjunctiva clear  Neck: symmetric normal sized thyroid, no lymphadenopathy   Resp: no increase work of breathing, lungs clear to ausculation bilaterally, no wheezing, rales or rhonchi  CV: S1, S2 normal.  No murmurs, rubs, or gallops.     Skin: no lesion or rash  Extremities: no appreciable edema of ankles, (-)Homen's sign, no calf tenderness, no erythema    No results found for this visit on 08/26/21. Assessment/Plan:      ICD-10-CM ICD-9-CM    1. Localized swelling of left lower leg  E58.01 800.5 METABOLIC PANEL, COMPREHENSIVE      CBC WITH AUTOMATED DIFF      D DIMER      DUPLEX LOWER EXT VENOUS LEFT      D DIMER      CBC WITH AUTOMATED DIFF      METABOLIC PANEL, COMPREHENSIVE   2. History of DVT (deep vein thrombosis)  Z86.718 V12.51 D DIMER      DUPLEX LOWER EXT VENOUS LEFT      D DIMER   3. Pruritic dermatitis  L29.9 698.9 hydrOXYzine HCL (ATARAX) 25 mg tablet   4. Migraine without aura and without status migrainosus, not intractable  G43.009 346.10 butalbital-acetaminophen-caffeine (FIORICET, ESGIC) -40 mg per tablet    1 q 2 months   5. Prediabetes  R73.03 790.29 HEMOGLOBIN A1C WITH EAG      HEMOGLOBIN A1C WITH EAG   6. Vitamin D deficiency  E55.9 268.9 VITAMIN D, 25 HYDROXY      VITAMIN D, 25 HYDROXY   7. Insomnia, unspecified type  G47.00 780.52 hydrOXYzine HCL (ATARAX) 25 mg tablet   8. Hypothyroidism due to acquired atrophy of thyroid  E03.4 244.8      246.8    9. Perimenopausal  N95.1 627.2      Will check doppler and labs with h/o DVT  O/W increase calf exercises  Medications refilled  Advised fioricet limit of 4 days monthly  Intolerant of metformin, encouraged pre-diabetic diet  Further rec's after labs  Will check TSH, fasting lipids with next labs  Continue follow up with [address] [place] [address] reviewed - updated.     Orders Placed This Encounter    METABOLIC PANEL, COMPREHENSIVE     Standing Status:   Future     Number of Occurrences:   1     Standing Expiration Date:   8/26/2022    CBC WITH AUTOMATED DIFF     Standing Status:   Future     Number of Occurrences:   1     Standing Expiration Date:   8/26/2022    HEMOGLOBIN A1C WITH EAG     Standing Status:   Future     Number of Occurrences:   1     Standing Expiration Date:   8/26/2022    VITAMIN D, 25 HYDROXY     Standing Status:   Future     Number of Occurrences:   1     Standing Expiration Date:   8/26/2022   Roshan ORTEGA     Standing Status:   Future     Number of Occurrences:   1     Standing Expiration Date:   8/26/2022    hydrOXYzine HCL (ATARAX) 25 mg tablet     Sig: Take 2 Tablets by mouth every eight (8) hours as needed (urticaria). Indications: hives     Dispense:  540 Tablet     Refill:  3    butalbital-acetaminophen-caffeine (FIORICET, ESGIC) -40 mg per tablet     Sig: TAKE 1 TABLET BY MOUTH EVERY 6 HOURS AS NEEDED FOR PAIN OR HEADACHE     Dispense:  30 Tablet     Refill:  0     DX Code Needed  . Medications Discontinued During This Encounter   Medication Reason    hydrOXYzine HCL (ATARAX) 25 mg tablet REORDER    butalbital-acetaminophen-caffeine (FIORICET, ESGIC) -40 mg per tablet REORDER       Current Outpatient Medications   Medication Sig Dispense Refill    hydrOXYzine HCL (ATARAX) 25 mg tablet Take 2 Tablets by mouth every eight (8) hours as needed (urticaria). Indications: hives 540 Tablet 3    butalbital-acetaminophen-caffeine (FIORICET, ESGIC) -40 mg per tablet TAKE 1 TABLET BY MOUTH EVERY 6 HOURS AS NEEDED FOR PAIN OR HEADACHE 30 Tablet 0    montelukast (SINGULAIR) 10 mg tablet Take 1 Tablet by mouth daily. 90 Tablet 0    albuterol (PROVENTIL HFA, VENTOLIN HFA, PROAIR HFA) 90 mcg/actuation inhaler INHALE 2 PUFFS BY EVERY FOUR (4) HOURS AS NEEDED FOR WHEEZING. INDICATIONS: BRONCHOSPASM PREVENTION 6.7 Inhaler 1    albuterol (PROVENTIL VENTOLIN) 2.5 mg /3 mL (0.083 %) nebu 3 mL by Nebulization route every four (4) hours as needed for Wheezing or Shortness of Breath.  Indications: asthma attack 30 Each 1    levothyroxine (SYNTHROID) 125 mcg tablet TAKE 1 TABLET BY MOUTH EVERY DAY BEFORE BREAKFAST 90 Tab 2    albuterol-ipratropium (DUO-NEB) 2.5 mg-0.5 mg/3 ml nebu USE 1 VIAL VIA NEBULLIZER EVERY 6 HOURS AS NEEDED FOR BREATHING 90 mL 1    NAFTIN 2 % gel Use as directed  Indications: athlete's foot 60 g 2    clobetasol (TEMOVATE) 0.05 % topical cream as needed. 1    azelastine (ASTELIN) 137 mcg (0.1 %) nasal spray 2 Sprays by Both Nostrils route two (2) times a day. Use in each nostril as directed  Indications: Seasonal Runny Nose 3 Bottle 1    omeprazole (PRILOSEC) 40 mg capsule TAKE 1 CAPSULE BY MOUTH EVERY DAY 90 Cap 1    Nebulizer & Compressor machine 1 Each by Other route four (4) times daily as needed. 1 Each 0    budesonide (PULMICORT) 1 mg/2 mL nbsp 2 mL by Nebulization route two (2) times a day. Indications: MAINTENANCE THERAPY FOR ASTHMA (Patient taking differently: 1,000 mcg by Nebulization route two (2) times daily as needed.) 1 Each 1    cyclobenzaprine (FLEXERIL) 10 mg tablet TAKE 1 TABLET BY MOUTH 3 TIMES A DAY AS NEEDED FOR MUSCLE SPASM 90 Tab 0    ALPRAZolam (XANAX) 0.5 mg tablet Take 1 Tab by mouth two (2) times daily as needed for Anxiety or Sleep. Max Daily Amount: 1 mg. Indications: anxiety 60 Tab 2    terconazole (TERAZOL 7) 0.4 % vaginal cream   6    LOCOID 0.1 % lotn   1    valACYclovir (VALTREX) 500 mg tablet   1    terconazole (TERAZOL 3) 80 mg vaginal suppository   4    multivitamin (ONE A DAY) tablet Take 1 tablet by mouth daily.  PROPYLENE GLYCOL//PF (SYSTANE, PF, OP) Apply  to eye four (4) times daily.  Emollient Combination No.32 (EPICERAM) Emul by Apply Externally route two (2) times a day. From Dr Heidi Chavis. For Acne rosacea.  metroNIDAZOLE (METROGEL) 1 % topical gel Apply  to affected area two (2) times a day. Use a thin layer to affected areas after washing from Dr Heidi Chavis   Indications: a skin condition on the cheeks and nose with a reddish rash and acne called acne rosacea      latanoprost (XALATAN) 0.005 % ophthalmic solution Administer 1 Drop to both eyes nightly.  LACTOBACILLUS/FOS/PECTIN (PROBIOTIC COMPLEX PO) Take  by mouth daily.       cholecalciferol, vitamin d3, (VITAMIN D) 1,000 unit tablet Take 1,000 Units by mouth daily.  ergocalciferol (ERGOCALCIFEROL) 1,250 mcg (50,000 unit) capsule Take 1 Cap by mouth every seven (7) days. Indications: vitamin D deficiency (high dose therapy) (Patient not taking: Reported on 8/26/2021) 12 Cap 0       Recommended healthy diet low in carbohydrates, fats, sodium and cholesterol. Recommended regular cardiovascular exercise 3-6 times per week for 30-60 minutes daily. Verbal and written instructions (see AVS) provided. Patient expresses understanding of diagnosis and treatment plan. Follow-up and Dispositions    · Return in about 3 months (around 11/26/2021) for cholesterol, thyroid (come in fasting).        Future Appointments   Date Time Provider Cindy Brown   12/2/2021 10:30 AM Veronica Alejandre PA-C [facility]

## 2021-08-26 NOTE — PATIENT INSTRUCTIONS
Learning About Sleeping Well  What does sleeping well mean? Sleeping well means getting enough sleep. How much sleep is enough varies among people. The number of hours you sleep is not as important as how you feel when you wake up. If you do not feel refreshed, you probably need more sleep. Another sign of not getting enough sleep is feeling tired during the day. The average total nightly sleep time is 7½ to 8 hours. Healthy adults may need a little more or a little less than this. Why is getting enough sleep important? Getting enough quality sleep is a basic part of good health. When your sleep suffers, your mood and your thoughts can suffer too. You may find yourself feeling more grumpy or stressed. Not getting enough sleep also can lead to serious problems, including injury, accidents, anxiety, and depression. What might cause poor sleeping? Many things can cause sleep problems, including:  · Stress. Stress can be caused by fear about a single event, such as giving a speech. Or you may have ongoing stress, such as worry about work or school. · Depression, anxiety, and other mental or emotional conditions. · Changes in your sleep habits or surroundings. This includes changes that happen where you sleep, such as noise, light, or sleeping in a different bed. It also includes changes in your sleep pattern, such as having jet lag or working a late shift. · Health problems, such as pain, breathing problems, and restless legs syndrome. · Lack of regular exercise. How can you help yourself? Here are some tips that may help you sleep more soundly and wake up feeling more refreshed. Your sleeping area   · Use your bedroom only for sleeping and sex. A bit of light reading may help you fall asleep. But if it doesn't, do your reading elsewhere in the house. Don't watch TV in bed. · Be sure your bed is big enough to stretch out comfortably, especially if you have a sleep partner.   · Keep your bedroom quiet, dark, and cool. Use curtains, blinds, or a sleep mask to block out light. To block out noise, use earplugs, soothing music, or a \"white noise\" machine. Your evening and bedtime routine   · Create a relaxing bedtime routine. You might want to take a warm shower or bath, listen to soothing music, or drink a cup of noncaffeinated tea. · Go to bed at the same time every night. And get up at the same time every morning, even if you feel tired. What to avoid   · Limit caffeine (coffee, tea, caffeinated sodas) during the day, and don't have any for at least 4 to 6 hours before bedtime. · Don't drink alcohol before bedtime. Alcohol can cause you to wake up more often during the night. · Don't smoke or use tobacco, especially in the evening. Nicotine can keep you awake. · Don't take naps during the day, especially close to bedtime. · Don't lie in bed awake for too long. If you can't fall asleep, or if you wake up in the middle of the night and can't get back to sleep within 15 minutes or so, get out of bed and go to another room until you feel sleepy. · Don't take medicine right before bed that may keep you awake or make you feel hyper or energized. Your doctor can tell you if your medicine may do this and if you can take it earlier in the day. If you can't sleep   · Imagine yourself in a peaceful, pleasant scene. Focus on the details and feelings of being in a place that is relaxing. · Get up and do a quiet or boring activity until you feel sleepy. · Don't drink any liquids after 6 p.m. if you wake up often because you have to go to the bathroom. Where can you learn more? Go to http://www.gray.com/  Enter I908 in the search box to learn more about \"Learning About Sleeping Well. \"  Current as of: September 23, 2020               Content Version: 12.8  © 5495-6002 Healthwise, [employer].    Care instructions adapted under license by [employer] (which disclaims liability or warranty for this information). If you have questions about a medical condition or this instruction, always ask your healthcare professional. [address] any warranty or liability for your use of this information. Prediabetes: Care Instructions  Overview     Prediabetes is a warning sign that you're at risk for getting type 2 diabetes. It means that your blood sugar is higher than it should be. But it's not high enough to be diabetes. The food you eat naturally turns into sugar. Your body uses the sugar for energy. Normally, an organ called the pancreas makes insulin. And insulin allows the sugar in your blood to get into your body's cells. But sometimes the body can't use insulin the right way. So the sugar stays in your blood instead. This is called insulin resistance. The buildup of sugar in your blood means you have prediabetes. The good news is that you may be able to prevent or delay diabetes. Making small lifestyle changes, like getting active and changing your eating habits, may help you get your blood sugar back to normal. You can work with your doctor to make a treatment plan. Follow-up care is a key part of your treatment and safety. Be sure to make and go to all appointments, and call your doctor if you are having problems. It's also a good idea to know your test results and keep a list of the medicines you take. How can you care for yourself at home? · Watch your weight. A healthy weight helps your body use insulin properly. · Limit the amount of calories, sweets, and unhealthy fat you eat. Ask your doctor if you should see a dietitian. A registered dietitian can help you create meal plans that fit your lifestyle. · Get at least 30 minutes of exercise on most days of the week. Exercise helps control your blood sugar. It also helps you maintain a healthy weight. Walking is a good choice.  You also may want to do other activities, such as running, swimming, cycling, or playing tennis or team sports. · Do not smoke. Smoking can make prediabetes worse. If you need help quitting, talk to your doctor about stop-smoking programs and medicines. These can increase your chances of quitting for good. · If your doctor prescribed medicines, take them exactly as prescribed. Call your doctor if you think you are having a problem with your medicine. You will get more details on the specific medicines your doctor prescribes. When should you call for help? Watch closely for changes in your health, and be sure to contact your doctor if:    · You have any symptoms of diabetes. These may include:  ? Being thirsty more often. ? Urinating more. ? Being hungrier. ? Losing weight. ? Being very tired. ? Having blurry vision.     · You have a wound that will not heal.     · You have an infection that will not go away.     · You have problems with your blood pressure.     · You want more information about diabetes and how you can keep from getting it. Where can you learn more? Go to http://www.gray.com/  Enter I222 in the search box to learn more about \"Prediabetes: Care Instructions. \"  Current as of: August 31, 2020               Content Version: 12.8  © 0567-4313 Healthwise, [employer]. Care instructions adapted under license by [employer] (which disclaims liability or warranty for this information). If you have questions about a medical condition or this instruction, always ask your healthcare professional. [place] [address] any warranty or liability for your use of this information.

## 2021-08-27 ENCOUNTER — HOSPITAL ENCOUNTER (OUTPATIENT)
Dept: ULTRASOUND IMAGING | Age: 51
Discharge: HOME | End: 2021-08-27
Payer: COMMERCIAL

## 2021-08-27 DIAGNOSIS — R22.42 LOCALIZED SWELLING OF LEFT LOWER LEG: ICD-10-CM

## 2021-08-27 DIAGNOSIS — Z86.718 HISTORY OF DVT (DEEP VEIN THROMBOSIS): ICD-10-CM

## 2021-08-27 LAB
25(OH)D3 SERPL-MCNC: 27.1 NG/ML (ref 30–100)
ALBUMIN SERPL-MCNC: 4 G/DL (ref 3.5–5)
ALBUMIN/GLOB SERPL: 1.3 {RATIO} (ref 1.1–2.2)
ALP SERPL-CCNC: 119 U/L (ref 45–117)
ALT SERPL-CCNC: 34 U/L (ref 12–78)
ANION GAP SERPL CALC-SCNC: 8 MMOL/L (ref 5–15)
AST SERPL-CCNC: 18 U/L (ref 15–37)
BASOPHILS # BLD: 0.1 K/UL (ref 0–0.1)
BASOPHILS NFR BLD: 1 % (ref 0–1)
BILIRUB SERPL-MCNC: 0.2 MG/DL (ref 0.2–1)
BUN SERPL-MCNC: 17 MG/DL (ref 6–20)
BUN/CREAT SERPL: 23 (ref 12–20)
CALCIUM SERPL-MCNC: 9 MG/DL (ref 8.5–10.1)
CHLORIDE SERPL-SCNC: 107 MMOL/L (ref 97–108)
CO2 SERPL-SCNC: 25 MMOL/L (ref 21–32)
CREAT SERPL-MCNC: 0.73 MG/DL (ref 0.55–1.02)
D DIMER PPP FEU-MCNC: 0.37 MG/L FEU (ref 0–0.65)
DIFFERENTIAL METHOD BLD: ABNORMAL
EOSINOPHIL # BLD: 0.2 K/UL (ref 0–0.4)
EOSINOPHIL NFR BLD: 2 % (ref 0–7)
ERYTHROCYTE [DISTWIDTH] IN BLOOD BY AUTOMATED COUNT: 14.3 % (ref 11.5–14.5)
EST. AVERAGE GLUCOSE BLD GHB EST-MCNC: 128 MG/DL
GLOBULIN SER CALC-MCNC: 3.2 G/DL (ref 2–4)
GLUCOSE SERPL-MCNC: 92 MG/DL (ref 65–100)
HBA1C MFR BLD: 6.1 % (ref 4–5.6)
HCT VFR BLD AUTO: 38.8 % (ref 35–47)
HGB BLD-MCNC: 12.2 G/DL (ref 11.5–16)
IMM GRANULOCYTES # BLD AUTO: 0.1 K/UL (ref 0–0.04)
IMM GRANULOCYTES NFR BLD AUTO: 1 % (ref 0–0.5)
LYMPHOCYTES # BLD: 3.2 K/UL (ref 0.8–3.5)
LYMPHOCYTES NFR BLD: 31 % (ref 12–49)
MCH RBC QN AUTO: 28.2 PG (ref 26–34)
MCHC RBC AUTO-ENTMCNC: 31.4 G/DL (ref 30–36.5)
MCV RBC AUTO: 89.8 FL (ref 80–99)
MONOCYTES # BLD: 0.8 K/UL (ref 0–1)
MONOCYTES NFR BLD: 8 % (ref 5–13)
NEUTS SEG # BLD: 5.9 K/UL (ref 1.8–8)
NEUTS SEG NFR BLD: 57 % (ref 32–75)
NRBC # BLD: 0 K/UL (ref 0–0.01)
NRBC BLD-RTO: 0 PER 100 WBC
PLATELET # BLD AUTO: 257 K/UL (ref 150–400)
PMV BLD AUTO: 12 FL (ref 8.9–12.9)
POTASSIUM SERPL-SCNC: 4.4 MMOL/L (ref 3.5–5.1)
PROT SERPL-MCNC: 7.2 G/DL (ref 6.4–8.2)
RBC # BLD AUTO: 4.32 M/UL (ref 3.8–5.2)
SODIUM SERPL-SCNC: 140 MMOL/L (ref 136–145)
WBC # BLD AUTO: 10.2 K/UL (ref 3.6–11)

## 2021-08-27 PROCEDURE — 93971 EXTREMITY STUDY: CPT | Performed by: RADIOLOGY

## 2021-08-27 NOTE — PROGRESS NOTES
Lauren msg sent  The D-dimer is normal, so low risk for clot. The Vitamin D is slightly low. Take vitamin D3 2000 units twice a day for 3 months, then drop back down to once daily. The prediabetes shows A1C 6.1%, you have hovered around this level. Please be very diligent with your diet, and try to increase exercise as well to help manage this. The remainder of the labs looked good.

## 2021-08-27 NOTE — PROGRESS NOTES
Lauren jaimesg sent  No clot! This is great news! Try to perform calf exercises when seated at work, get up and walk around each hour, and increase exercise in your daily routine to goal of 30 minutes daily.

## 2021-12-02 ENCOUNTER — OFFICE VISIT (OUTPATIENT)
Dept: INTERNAL MEDICINE CLINIC | Age: 51
End: 2021-12-02
Payer: COMMERCIAL

## 2021-12-02 VITALS
OXYGEN SATURATION: 100 % | TEMPERATURE: 97.4 F | HEIGHT: 62 IN | BODY MASS INDEX: 53.92 KG/M2 | DIASTOLIC BLOOD PRESSURE: 74 MMHG | WEIGHT: 293 LBS | HEART RATE: 69 BPM | RESPIRATION RATE: 18 BRPM | SYSTOLIC BLOOD PRESSURE: 126 MMHG

## 2021-12-02 DIAGNOSIS — E78.5 DYSLIPIDEMIA: ICD-10-CM

## 2021-12-02 DIAGNOSIS — M54.50 CHRONIC BILATERAL LOW BACK PAIN WITHOUT SCIATICA: ICD-10-CM

## 2021-12-02 DIAGNOSIS — J45.20 MILD INTERMITTENT ASTHMA WITHOUT COMPLICATION: ICD-10-CM

## 2021-12-02 DIAGNOSIS — E55.9 VITAMIN D DEFICIENCY: ICD-10-CM

## 2021-12-02 DIAGNOSIS — Z63.6 CAREGIVER STRESS: Primary | ICD-10-CM

## 2021-12-02 DIAGNOSIS — Z11.59 NEED FOR HEPATITIS C SCREENING TEST: ICD-10-CM

## 2021-12-02 DIAGNOSIS — L71.9 ACNE ROSACEA: ICD-10-CM

## 2021-12-02 DIAGNOSIS — L50.9 URTICARIA: ICD-10-CM

## 2021-12-02 DIAGNOSIS — R73.03 PREDIABETES: ICD-10-CM

## 2021-12-02 DIAGNOSIS — E03.4 HYPOTHYROIDISM DUE TO ACQUIRED ATROPHY OF THYROID: ICD-10-CM

## 2021-12-02 DIAGNOSIS — G89.29 CHRONIC BILATERAL LOW BACK PAIN WITHOUT SCIATICA: ICD-10-CM

## 2021-12-02 DIAGNOSIS — Z23 NEEDS FLU SHOT: ICD-10-CM

## 2021-12-02 LAB
25(OH)D3 SERPL-MCNC: 19.8 NG/ML (ref 30–100)
ALBUMIN SERPL-MCNC: 4.2 G/DL (ref 3.5–5)
ALBUMIN/GLOB SERPL: 1.2 {RATIO} (ref 1.1–2.2)
ALP SERPL-CCNC: 130 U/L (ref 45–117)
ALT SERPL-CCNC: 34 U/L (ref 12–78)
ANION GAP SERPL CALC-SCNC: 6 MMOL/L (ref 5–15)
AST SERPL-CCNC: 13 U/L (ref 15–37)
BASOPHILS # BLD: 0.1 K/UL (ref 0–0.1)
BASOPHILS NFR BLD: 1 % (ref 0–1)
BILIRUB SERPL-MCNC: 0.4 MG/DL (ref 0.2–1)
BUN SERPL-MCNC: 19 MG/DL (ref 6–20)
BUN/CREAT SERPL: 25 (ref 12–20)
CALCIUM SERPL-MCNC: 9.4 MG/DL (ref 8.5–10.1)
CHLORIDE SERPL-SCNC: 104 MMOL/L (ref 97–108)
CHOLEST SERPL-MCNC: 207 MG/DL
CO2 SERPL-SCNC: 27 MMOL/L (ref 21–32)
CREAT SERPL-MCNC: 0.75 MG/DL (ref 0.55–1.02)
DIFFERENTIAL METHOD BLD: NORMAL
EOSINOPHIL # BLD: 0.2 K/UL (ref 0–0.4)
EOSINOPHIL NFR BLD: 2 % (ref 0–7)
ERYTHROCYTE [DISTWIDTH] IN BLOOD BY AUTOMATED COUNT: 13.5 % (ref 11.5–14.5)
EST. AVERAGE GLUCOSE BLD GHB EST-MCNC: 123 MG/DL
GLOBULIN SER CALC-MCNC: 3.6 G/DL (ref 2–4)
GLUCOSE SERPL-MCNC: 99 MG/DL (ref 65–100)
HBA1C MFR BLD: 5.9 % (ref 4–5.6)
HCT VFR BLD AUTO: 40.2 % (ref 35–47)
HCV AB SERPL QL IA: NONREACTIVE
HDLC SERPL-MCNC: 80 MG/DL
HDLC SERPL: 2.6 {RATIO} (ref 0–5)
HGB BLD-MCNC: 12.6 G/DL (ref 11.5–16)
IMM GRANULOCYTES # BLD AUTO: 0 K/UL (ref 0–0.04)
IMM GRANULOCYTES NFR BLD AUTO: 0 % (ref 0–0.5)
LDLC SERPL CALC-MCNC: 111.8 MG/DL (ref 0–100)
LYMPHOCYTES # BLD: 3.2 K/UL (ref 0.8–3.5)
LYMPHOCYTES NFR BLD: 32 % (ref 12–49)
MCH RBC QN AUTO: 28 PG (ref 26–34)
MCHC RBC AUTO-ENTMCNC: 31.3 G/DL (ref 30–36.5)
MCV RBC AUTO: 89.3 FL (ref 80–99)
MONOCYTES # BLD: 0.7 K/UL (ref 0–1)
MONOCYTES NFR BLD: 7 % (ref 5–13)
NEUTS SEG # BLD: 5.7 K/UL (ref 1.8–8)
NEUTS SEG NFR BLD: 58 % (ref 32–75)
NRBC # BLD: 0 K/UL (ref 0–0.01)
NRBC BLD-RTO: 0 PER 100 WBC
PLATELET # BLD AUTO: 278 K/UL (ref 150–400)
PMV BLD AUTO: 11.9 FL (ref 8.9–12.9)
POTASSIUM SERPL-SCNC: 4.3 MMOL/L (ref 3.5–5.1)
PROT SERPL-MCNC: 7.8 G/DL (ref 6.4–8.2)
RBC # BLD AUTO: 4.5 M/UL (ref 3.8–5.2)
SODIUM SERPL-SCNC: 137 MMOL/L (ref 136–145)
TRIGL SERPL-MCNC: 76 MG/DL (ref ?–150)
TSH SERPL DL<=0.05 MIU/L-ACNC: 1.36 UIU/ML (ref 0.36–3.74)
VLDLC SERPL CALC-MCNC: 15.2 MG/DL
WBC # BLD AUTO: 9.9 K/UL (ref 3.6–11)

## 2021-12-02 PROCEDURE — 90471 IMMUNIZATION ADMIN: CPT | Performed by: PHYSICIAN ASSISTANT

## 2021-12-02 PROCEDURE — 90686 IIV4 VACC NO PRSV 0.5 ML IM: CPT | Performed by: PHYSICIAN ASSISTANT

## 2021-12-02 PROCEDURE — 99214 OFFICE O/P EST MOD 30 MIN: CPT | Performed by: PHYSICIAN ASSISTANT

## 2021-12-02 RX ORDER — CYCLOBENZAPRINE HCL 10 MG
TABLET ORAL
Qty: 90 TABLET | Refills: 0 | Status: SHIPPED | OUTPATIENT
Start: 2021-12-02 | End: 2022-01-06

## 2021-12-02 RX ORDER — DOXYCYCLINE HYCLATE 50 MG/1
50 CAPSULE ORAL DAILY
COMMUNITY

## 2021-12-02 RX ORDER — ALPRAZOLAM 0.5 MG/1
0.5 TABLET ORAL
Qty: 30 TABLET | Refills: 0 | Status: SHIPPED | OUTPATIENT
Start: 2021-12-02 | End: 2022-03-10 | Stop reason: SDUPTHER

## 2021-12-02 RX ORDER — MONTELUKAST SODIUM 10 MG/1
10 TABLET ORAL DAILY
Qty: 90 TABLET | Refills: 1 | Status: SHIPPED | OUTPATIENT
Start: 2021-12-02 | End: 2022-06-13 | Stop reason: SDUPTHER

## 2021-12-02 SDOH — SOCIAL STABILITY - SOCIAL INSECURITY: DEPENDENT RELATIVE NEEDING CARE AT HOME: Z63.6

## 2021-12-02 NOTE — PROGRESS NOTES
Raudel Montelongo is a 46 y.o. female     Chief Complaint   Patient presents with    Cholesterol Problem     3M follow up    Thyroid Problem     3M follow up                                                                       Visit Vitals  /74 (BP 1 Location: Left arm, BP Patient Position: Sitting, BP Cuff Size: Adult)   Pulse 69   Temp 97.4 °F (36.3 °C) (Temporal)   Resp 18   Ht 5' 2\" (1.575 m)   Wt 316 lb 6.4 oz (143.5 kg)   SpO2 100%   BMI 57.87 kg/m²       Health Maintenance Due   Topic Date Due    Hepatitis C Screening  Never done    Shingrix Vaccine Age 50> (1 of 2) Never done    Flu Vaccine (1) 09/01/2021    Cervical cancer screen  11/10/2021       1. Have you been to the ER, urgent care clinic since your last visit? Hospitalized since your last visit? No     2. Have you seen or consulted any other health care providers outside of the [address] [place] [address] since your last visit? Include any pap smears or colon screening. No     Administered influenza vaccine in left deltoid patient tolerated injection well.     Lot#:[id number]    Exp:JUNE 30 2022    J:[id number]

## 2021-12-02 NOTE — PATIENT INSTRUCTIONS
Vaccine Information Statement    Influenza (Flu) Vaccine (Inactivated or Recombinant): What You Need to Know    Many vaccine information statements are available in Mongolian and other languages. See www.immunize.org/vis. Hojas de información sobre vacunas están disponibles en español y en muchos otros idiomas. Visite www.immunize.org/vis. 1. Why get vaccinated? Influenza vaccine can prevent influenza (flu). Flu is a contagious disease that spreads around the [facility] every year, usually between October and May. Anyone can get the flu, but it is more dangerous for some people. Infants and young children, people 72 years and older, pregnant people, and people with certain health conditions or a weakened immune system are at greatest risk of flu complications. Pneumonia, bronchitis, sinus infections, and ear infections are examples of flu-related complications. If you have a medical condition, such as heart disease, cancer, or diabetes, flu can make it worse. Flu can cause fever and chills, sore throat, muscle aches, fatigue, cough, headache, and runny or stuffy nose. Some people may have vomiting and diarrhea, though this is more common in children than adults. In an average year, thousands of people in the [facility] die from flu, and many more are hospitalized. Flu vaccine prevents millions of illnesses and flu-related visits to the doctor each year. 2. Influenza vaccines     CDC recommends everyone 6 months and older get vaccinated every flu season. Children 6 months through 6years of age may need 2 doses during a single flu season. Everyone else needs only 1 dose each flu season. It takes about 2 weeks for protection to develop after vaccination. There are many flu viruses, and they are always changing. Each year a new flu vaccine is made to protect against the influenza viruses believed to be likely to cause disease in the upcoming flu season.  Even when the vaccine doesnt exactly match these viruses, it may still provide some protection. Influenza vaccine does not cause flu. Influenza vaccine may be given at the same time as other vaccines. 3. Talk with your health care provider    Tell your vaccination provider if the person getting the vaccine:   Has had an allergic reaction after a previous dose of influenza vaccine, or has any severe, life-threatening allergies    Has ever had Guillain-Barré Syndrome (also called GBS)    In some cases, your health care provider may decide to postpone influenza vaccination until a future visit. Influenza vaccine can be administered at any time during pregnancy. People who are or will be pregnant during influenza season should receive inactivated influenza vaccine. People with minor illnesses, such as a cold, may be vaccinated. People who are moderately or severely ill should usually wait until they recover before getting influenza vaccine. Your health care provider can give you more information. 4. Risks of a vaccine reaction     Soreness, redness, and swelling where the shot is given, fever, muscle aches, and headache can happen after influenza vaccination.  There may be a very small increased risk of Guillain-Barré Syndrome (GBS) after inactivated influenza vaccine (the flu shot). Rexene Sinks children who get the flu shot along with pneumococcal vaccine (PCV13) and/or DTaP vaccine at the same time might be slightly more likely to have a seizure caused by fever. Tell your health care provider if a child who is getting flu vaccine has ever had a seizure. People sometimes faint after medical procedures, including vaccination. Tell your provider if you feel dizzy or have vision changes or ringing in the ears. As with any medicine, there is a very remote chance of a vaccine causing a severe allergic reaction, other serious injury, or death. 5. What if there is a serious problem?     An allergic reaction could occur after the vaccinated person leaves the clinic. If you see signs of a severe allergic reaction (hives, swelling of the face and throat, difficulty breathing, a fast heartbeat, dizziness, or weakness), call 9-1-1 and get the person to the nearest hospital.    For other signs that concern you, call your health care provider. Adverse reactions should be reported to the Vaccine Adverse Event Reporting System (VAERS). Your health care provider will usually file this report, or you can do it yourself. Visit the VAERS website at www.vaers. [facility].gov or call 7-116.279.2322. VAERS is only for reporting reactions, and VAERS staff members do not give medical advice. 6. The National Vaccine Injury Compensation Program    The [facility] Vaccine Injury Compensation Program (VICP) is a federal program that was created to compensate people who may have been injured by certain vaccines. Claims regarding alleged injury or death due to vaccination have a time limit for filing, which may be as short as two years. Visit the VICP website at www.[facility].gov/vaccinecompensation or call 4-129.889.7027 to learn about the program and about filing a claim. 7. How can I learn more?  Ask your health care provider.  Call your local or state health department.  Visit the website of the Food and Drug Administration (FDA) for vaccine package inserts and additional information at www.fda.gov/vaccines-blood-biologics/vaccines.  Contact the Centers for Disease Control and Prevention (CDC):  - Call 0-292.559.4703 (1-800-CDC-INFO) or  - Visit CDCs influenza website at www.cdc.gov/flu. Vaccine Information Statement   Inactivated Influenza Vaccine   8/6/2021  42 PINKY Rodríguez [id number]   Department of Health and Human Services  Centers for Disease Control and Prevention    Office Use Only           Learning About the Mediterranean Diet  What is the Mediterranean diet? The Mediterranean diet is a style of eating rather than a diet plan.  It features foods eaten in [place], [place], [place] and [place], and other countries along the [facility]. It emphasizes eating foods like fish, fruits, vegetables, beans, high-fiber breads and whole grains, nuts, and olive oil. This style of eating includes limited red meat, cheese, and sweets. Why choose the Mediterranean diet? A Mediterranean-style diet may improve heart health. It contains more fat than other heart-healthy diets. But the fats are mainly from nuts, unsaturated oils (such as fish oils and olive oil), and certain nut or seed oils (such as canola, soybean, or flaxseed oil). These fats may help protect the heart and blood vessels. How can you get started on the Mediterranean diet? Here are some things you can do to switch to a more Mediterranean way of eating. What to eat  · Eat a variety of fruits and vegetables each day, such as grapes, blueberries, tomatoes, broccoli, peppers, figs, olives, spinach, eggplant, beans, lentils, and chickpeas. · Eat a variety of whole-grain foods each day, such as oats, brown rice, and whole wheat bread, pasta, and couscous. · Eat fish at least 2 times a week. Try tuna, salmon, mackerel, lake trout, herring, or sardines. · Eat moderate amounts of low-fat dairy products, such as milk, cheese, or yogurt. · Eat moderate amounts of poultry and eggs. · Choose healthy (unsaturated) fats, such as nuts, olive oil, and certain nut or seed oils like canola, soybean, and flaxseed. · Limit unhealthy (saturated) fats, such as butter, palm oil, and coconut oil. And limit fats found in animal products, such as meat and dairy products made with whole milk. Try to eat red meat only a few times a month in very small amounts. · Limit sweets and desserts to only a few times a week. This includes sugar-sweetened drinks like soda. The Mediterranean diet may also include red wine with your meal--1 glass each day for women and up to 2 glasses a day for men.   Tips for eating at home  · Use herbs, spices, garlic, lemon zest, and citrus juice instead of salt to add flavor to foods. · Add avocado slices to your sandwich instead of gordon. · Have fish for lunch or dinner instead of red meat. Brush the fish with olive oil, and broil or grill it. · Sprinkle your salad with seeds or nuts instead of cheese. · Cook with olive or canola oil instead of butter or oils that are high in saturated fat. · Switch from 2% milk or whole milk to 1% or fat-free milk. · Dip raw vegetables in a vinaigrette dressing or hummus instead of dips made from mayonnaise or sour cream.  · Have a piece of fruit for dessert instead of a piece of cake. Try baked apples, or have some dried fruit. Tips for eating out  · Try broiled, grilled, baked, or poached fish instead of having it fried or breaded. · Ask your  to have your meals prepared with olive oil instead of butter. · Order dishes made with marinara sauce or sauces made from olive oil. Avoid sauces made from cream or mayonnaise. · Choose whole-grain breads, whole wheat pasta and pizza crust, brown rice, beans, and lentils. · Cut back on butter or margarine on bread. Instead, you can dip your bread in a small amount of olive oil. · Ask for a side salad or grilled vegetables instead of french fries or chips. Where can you learn more? Go to http://www.[employer].com/  Enter O407 in the search box to learn more about \"Learning About the Mediterranean Diet. \"  Current as of: December 17, 2020               Content Version: 13.0  © 3837-5149 [employer]. Care instructions adapted under license by [employer] (which disclaims liability or warranty for this information). If you have questions about a medical condition or this instruction, always ask your healthcare professional. [address] any warranty or liability for your use of this information.

## 2021-12-02 NOTE — PROGRESS NOTES
HPI:  46 y.o.  presents for follow up appointment.   No hospital, ER or specialist visits since last primary care visit except as noted below.       Hypothyroidism  On levothyroxine 125 mcg  Last TSH Nl 2020     Prediabetes  Last A1C 6.1% in 2021, previously 6.0% 2020  Briefly tried metformin but had \"diarrhea nonstop\"  She has not seen nutritionist, declines today until she knows what her numbers are  Has been having pumpkin spice lattes this fall and new baking for the holidays (apple cake)    Dyslipidemia  Diet controlled  Last labs 2020 showed increase in her numbers with total 216,      Vit D deficiency  Low D 23.9 on 2020, completed ergo 50K weekly x 3 mos, she is now taking D3 2000 units daily  Levels checked 2021 showed D 27.1, so I increased her to D3 4000 units total daily which she is still taking    Asthma and allergies  Daily meds include singulair and Astelin nasal, and Xyzal OTC  Has albuterol and Duoneb on hand for nebulizer, uses prn about twice a year  Albuterol HFA prn, rare use about 6x/year    Hives  Takes hydroxyzine 50 mg QHS, and may take 25 mg daytime prn itching    Rosacea  Sees derm Dr Arnaud Beckford  On epiceram cream, metogel topical, and doxycycline 50 mg daily    Rondi Blocker Dr Annabelle Arriaga, on [address]  Her father is ailing and she has had many doctor's appt's, and he doesn't want to listen to what the doctor says, so she has trouble keeping her patience  She has used xanax when her mother  - and asks for refill for prn use  She has been going 1-2x/week appt's recently with her dad (PT, neuropathy follow ups, cataract surgery, etc)    Chronic low back pain  Gets muscle spasms and sees chiropractor twice a month on usual basis to maintain, may go more often for flares  She has exercises and stretching daily  She uses flexeril prn, 3x/week on average, needs refill    Patient Active Problem List    Diagnosis    Prediabetes    Deep vein thrombosis (DVT) (HCC)    History of DVT of lower extremity    Chronic otitis media     Dr. Adali Arguello DVT of deep femoral vein (HCC)     Acute partially occlusive DVT of L Common Fem Vein and L Prox Fem Vein and Acute occlusive DVT of L deep femoral vein. Txd with Xarelto x 3 months. due to BCP.  Family history of lung cancer    Allergy to sulfa drugs    Penicillin allergy    Adverse effect of anabolic steroid     flushing and leathery skin after taking oral steroids      Obesity, morbid (HCC)    Hyperglycemia    Hypothyroidism due to acquired atrophy of thyroid    Mild intermittent asthma without complication    Chronic low back pain with right-sided sciatica     and SI joint dysfunction. Dr. Chato Betts.  Hearing loss    Dry eyes    Family history of breast cancer    Family history of stroke    Family history of diabetes mellitus (DM)    Family history of heart attack    Family history of colonic polyps    Family history of melanoma    Migraine without aura and without status migrainosus, not intractable    Plantar fasciitis, bilateral     Dr. Miriam Mcclain      Dyslipidemia    Tinnitus of right ear     Dr. Marybel Leong Right ankle sprain    Vitamin D deficiency    Knee pain, left    Acne rosacea    Intraocular pressure increase    Allergic rhinitis    Chronic ear infection     R>L      GERD (gastroesophageal reflux disease)    Fe deficiency anemia     Borderline           Past Medical History:   Diagnosis Date    Acne rosacea     Dr. Xochilt Jones.  Allergy, unspecified not elsewhere classified childhood     Txd with immunotherapy. Dr. Rad Mayberry Anemia NEC     borderline    Ankle sprain 2007    Right. Dr. Turner Naty    Ankle sprain 11/2012    Right. Dr. Lawson Morgan.  Asthma childhood    Chickenpox childhood    Chronic low back pain with right-sided sciatica     and SI joint dysfunction. Dr. Chato Betts.     Chronic otitis media     Dr. Shira Jacob Dry eye syndrome 2013    Dr. Harsh Dior.  DVT of deep femoral vein ([address] 12/07/2018    Acute occlusive DVT of L deep femoral vein and Acute partially occlusive DVT of L Common Fem Vein and L Prox Fem Vein. Txd with Xarelto x 3 months. due to BCP. Dr. Sravani Goncalves EBV infection 6/27/2011    Hearing loss     Mild high frequency sensorineuronal hearing loss. Dr. Rodriguez Memory     Dr. Conrad Trejo murmur     Hernia of abdominal wall 09/2003, [id number]/[id number]    umbilical.  Dr. Carolin Elliott. Dr. Lazara Arndt    Hyperglycemia 2013    Hypothyroidism 06/2010    Dr. Mary Mejia pressure increase 12/2011    Dr. Mellissa Mcelroy. Dr. Harsh Dior. Dr. Gavin Milton.  Knee pain, left 04/2012    Left. Dr. Miguel Purcell Measles childhood    Migraine 2017, 07/2019    w aura and facial paresthesia. Dr. Dougie Myers    Mumps childhood    Plantar fasciitis, bilateral 2010    Dr. Roblero Figures   Radha Reddy Sciatica 2008    Right.  with OA. Dr. Miracle Cunha Tinnitus of right ear 2012    Dr. Lexie Mann History     Tobacco Use    Smoking status: Passive Smoke Exposure - Never Smoker    Smokeless tobacco: Never Used    Tobacco comment: lived with smoker dad and mom then step mom x 20 yrs   Vaping Use    Vaping Use: Never used   Substance Use Topics    Alcohol use: Yes     Alcohol/week: 1.0 standard drink     Types: 1 Glasses of wine per week     Comment: RARE    Drug use: No       Outpatient Medications Marked as Taking for the 12/2/21 encounter (Office Visit) with Veronica Cid PA-C   Medication Sig Dispense Refill    levothyroxine (SYNTHROID) 125 mcg tablet Take 1 Tablet by mouth Daily (before breakfast). 90 Tablet 0    montelukast (SINGULAIR) 10 mg tablet TAKE 1 TABLET BY MOUTH EVERY DAY 90 Tablet 1    hydrOXYzine HCL (ATARAX) 25 mg tablet Take 2 Tablets by mouth every eight (8) hours as needed (urticaria).  Indications: hives 540 Tablet 3  butalbital-acetaminophen-caffeine (FIORICET, ESGIC) -40 mg per tablet TAKE 1 TABLET BY MOUTH EVERY 6 HOURS AS NEEDED FOR PAIN OR HEADACHE 30 Tablet 0    albuterol (PROVENTIL HFA, VENTOLIN HFA, PROAIR HFA) 90 mcg/actuation inhaler INHALE 2 PUFFS BY EVERY FOUR (4) HOURS AS NEEDED FOR WHEEZING. INDICATIONS: BRONCHOSPASM PREVENTION 6.7 Inhaler 1    albuterol (PROVENTIL VENTOLIN) 2.5 mg /3 mL (0.083 %) nebu 3 mL by Nebulization route every four (4) hours as needed for Wheezing or Shortness of Breath. Indications: asthma attack 30 Each 1    albuterol-ipratropium (DUO-NEB) 2.5 mg-0.5 mg/3 ml nebu USE 1 VIAL VIA NEBULLIZER EVERY 6 HOURS AS NEEDED FOR BREATHING 90 mL 1    NAFTIN 2 % gel Use as directed  Indications: athlete's foot 60 g 2    clobetasol (TEMOVATE) 0.05 % topical cream as needed. 1    azelastine (ASTELIN) 137 mcg (0.1 %) nasal spray 2 Sprays by Both Nostrils route two (2) times a day. Use in each nostril as directed  Indications: Seasonal Runny Nose 3 Bottle 1    omeprazole (PRILOSEC) 40 mg capsule TAKE 1 CAPSULE BY MOUTH EVERY DAY 90 Cap 1    Nebulizer & Compressor machine 1 Each by Other route four (4) times daily as needed. 1 Each 0    budesonide (PULMICORT) 1 mg/2 mL nbsp 2 mL by Nebulization route two (2) times a day. Indications: MAINTENANCE THERAPY FOR ASTHMA (Patient taking differently: 1,000 mcg by Nebulization route two (2) times daily as needed.) 1 Each 1    cyclobenzaprine (FLEXERIL) 10 mg tablet TAKE 1 TABLET BY MOUTH 3 TIMES A DAY AS NEEDED FOR MUSCLE SPASM 90 Tab 0    ALPRAZolam (XANAX) 0.5 mg tablet Take 1 Tab by mouth two (2) times daily as needed for Anxiety or Sleep. Max Daily Amount: 1 mg. Indications: anxiety 60 Tab 2    terconazole (TERAZOL 7) 0.4 % vaginal cream   6    LOCOID 0.1 % lotn   1    valACYclovir (VALTREX) 500 mg tablet   1    terconazole (TERAZOL 3) 80 mg vaginal suppository   4    multivitamin (ONE A DAY) tablet Take 1 tablet by mouth daily.       PROPYLENE GLYCOL//PF (SYSTANE, PF, OP) Apply  to eye four (4) times daily.  Emollient Combination No.32 (EPICERAM) Emul by Apply Externally route two (2) times a day. From Dr Maria R Martinez. For Acne rosacea.  metroNIDAZOLE (METROGEL) 1 % topical gel Apply  to affected area two (2) times a day. Use a thin layer to affected areas after washing from Dr Maria R Martinez   Indications: a skin condition on the cheeks and nose with a reddish rash and acne called acne rosacea      latanoprost (XALATAN) 0.005 % ophthalmic solution Administer 1 Drop to both eyes nightly.  LACTOBACILLUS/FOS/PECTIN (PROBIOTIC COMPLEX PO) Take  by mouth daily.  cholecalciferol, vitamin d3, (VITAMIN D) 1,000 unit tablet Take 1,000 Units by mouth daily. Allergies   Allergen Reactions    Latex Rash    Junel 1.5/30 (21) [Norethindrone Ac-Eth Estradiol] Swelling     L FOOT DUE TO NEW DVT    Other Medication Hives and Rash     SUN    Pcn [Penicillins] Hives    Prednisone Hives    Sulfa (Sulfonamide Antibiotics) Hives    Garamycin [Gentamicin] Other (comments)     Itchy eyes due to sulfate    Metformin Diarrhea       ROS:  ROS negative except as per HPI. PE:  Visit Vitals  /74 (BP 1 Location: Left arm, BP Patient Position: Sitting, BP Cuff Size: Adult)   Pulse 69   Temp 97.4 °F (36.3 °C) (Temporal)   Resp 18   Ht 5' 2\" (1.575 m)   Wt 316 lb 6.4 oz (143.5 kg)   SpO2 100%   BMI 57.87 kg/m²     Gen: alert, oriented, no acute distress  Head: normocephalic, atraumatic  Eyes: pupils equal round reactive to light, sclera clear, conjunctiva clear  Oral: masked  Neck: symmetric normal sized thyroid, no carotid bruits, no lymphadenopathy  Resp: no increase work of breathing, lungs clear to ausculation bilaterally, no wheezing, rales or rhonchi  CV: S1, S2 normal.  No murmurs, rubs, or gallops. Abd: soft, not tender, not distended.      Neuro: grossly intact  Skin: no lesion or rash  Extremities: no cyanosis or edema  Psych:  alert, oriented to person, place, and time, normal mood, behavior, speech, dress, motor activity, and thought processes      No results found for this visit on 12/02/21. Assessment/Plan:      ICD-10-CM ICD-9-CM    1. Caregiver stress  Z63.6 V61.49 ALPRAZolam (XANAX) 0.5 mg tablet   2. Mild intermittent asthma without complication  G80.12 113.14 montelukast (SINGULAIR) 10 mg tablet    stable   3. Urticaria  L50.9 708.9    4. Hypothyroidism due to acquired atrophy of thyroid  E03.4 244.8 TSH 3RD GENERATION     246.8 TSH 3RD GENERATION   5. Prediabetes  R73.03 790.29 HEMOGLOBIN A1C WITH EAG      METABOLIC PANEL, COMPREHENSIVE      CBC WITH AUTOMATED DIFF      CBC WITH AUTOMATED DIFF      METABOLIC PANEL, COMPREHENSIVE      HEMOGLOBIN A1C WITH EAG   6. Dyslipidemia  E78.5 272.4 LIPID PANEL      METABOLIC PANEL, COMPREHENSIVE      CBC WITH AUTOMATED DIFF      CBC WITH AUTOMATED DIFF      METABOLIC PANEL, COMPREHENSIVE      LIPID PANEL   7. Vitamin D deficiency  E55.9 268.9 VITAMIN D, 25 HYDROXY      VITAMIN D, 25 HYDROXY   8. Chronic bilateral low back pain without sciatica  M54.50 724.2 cyclobenzaprine (FLEXERIL) 10 mg tablet    G89.29 338.29    9. Need for hepatitis C screening test  Z11.59 V73.89 HEPATITIS C AB      HEPATITIS C AB   10. Needs flu shot  Z23 V04.81 INFLUENZA VIRUS VAC QUAD,SPLIT,PRESV FREE SYRINGE IM   11.  Acne rosacea  L71.9 695.3        Caregiver stress, caring for her father who has declining health and by her report has been a difficult patient   reviewed and appropriate  Reviewed proper use of xanax prn and its status as a controlled substance, risks of potential abuse, and she understands  She currently states she is anxious on days of going to doctor's appts with her father which is about twice a week, so expect current Rx for #30 to last for #90 days  If increasing use more than 2-3x/week, then we reviewed other maintenance medicines more appropriate for anxiety such as SSRI    Asthma and urticaria controlled on current regimen    Hypothyroidism has been euthyroid on current dose    Prediabetes and Hyperlipidemia, diet controlled, intolerant of metformin, reports not adhering to diet recently. Lipids went up on last check. We reviewed proper diet. She will consider nutritionist if her numbers are going up. Handout on Mediterranean diet    Vit D deficiency, currently taking D3 4000 units daily    Chronic low back pain maintained by chiropractor, flexeril prn    Rosacea per dermatology    Flu shot today    HCV screening    Further recommendations pending lab results        Health Maintenance reviewed - updated. Orders Placed This Encounter    Influenza Virus Vaccine QUAD, PF Syr 6 Months + (Flulaval, Fluzone, Fluarix [id number])    HEMOGLOBIN A1C WITH EAG     Standing Status:   Future     Number of Occurrences:   1     Standing Expiration Date:   12/2/2022    TSH 3RD GENERATION     Standing Status:   Future     Number of Occurrences:   1     Standing Expiration Date:   12/2/2022    VITAMIN D, 25 HYDROXY     Standing Status:   Future     Number of Occurrences:   1     Standing Expiration Date:   12/2/2022    LIPID PANEL     Standing Status:   Future     Number of Occurrences:   1     Standing Expiration Date:   95/4/1490    METABOLIC PANEL, COMPREHENSIVE     Standing Status:   Future     Number of Occurrences:   1     Standing Expiration Date:   12/2/2022    CBC WITH AUTOMATED DIFF     Standing Status:   Future     Number of Occurrences:   1     Standing Expiration Date:   12/2/2022    HEPATITIS C AB     Standing Status:   Future     Number of Occurrences:   1     Standing Expiration Date:   12/2/2022    cyclobenzaprine (FLEXERIL) 10 mg tablet     Sig: TAKE 1 TABLET BY MOUTH 3 TIMES A DAY AS NEEDED FOR MUSCLE SPASM     Dispense:  90 Tablet     Refill:  0    montelukast (SINGULAIR) 10 mg tablet     Sig: Take 1 Tablet by mouth daily.      Dispense:  90 Tablet     Refill:  1    ALPRAZolam (XANAX) 0.5 mg tablet     Sig: Take 1 Tablet by mouth daily as needed for Anxiety. Dispense:  30 Tablet     Refill:  0       Medications Discontinued During This Encounter   Medication Reason    budesonide (PULMICORT) 1 mg/2 mL nbsp Therapy Completed    ergocalciferol (ERGOCALCIFEROL) 1,250 mcg (50,000 unit) capsule Therapy Completed    cyclobenzaprine (FLEXERIL) 10 mg tablet REORDER    montelukast (SINGULAIR) 10 mg tablet REORDER    ALPRAZolam (XANAX) 0.5 mg tablet REORDER       Current Outpatient Medications   Medication Sig Dispense Refill    cyclobenzaprine (FLEXERIL) 10 mg tablet TAKE 1 TABLET BY MOUTH 3 TIMES A DAY AS NEEDED FOR MUSCLE SPASM 90 Tablet 0    montelukast (SINGULAIR) 10 mg tablet Take 1 Tablet by mouth daily. 90 Tablet 1    doxycycline (VIBRAMYCIN) 50 mg capsule Take 50 mg by mouth daily.  ALPRAZolam (XANAX) 0.5 mg tablet Take 1 Tablet by mouth daily as needed for Anxiety. 30 Tablet 0    hydrOXYzine HCL (ATARAX) 25 mg tablet Take 2 Tablets by mouth every eight (8) hours as needed (urticaria). Indications: hives 540 Tablet 3    butalbital-acetaminophen-caffeine (FIORICET, ESGIC) -40 mg per tablet TAKE 1 TABLET BY MOUTH EVERY 6 HOURS AS NEEDED FOR PAIN OR HEADACHE 30 Tablet 0    albuterol (PROVENTIL HFA, VENTOLIN HFA, PROAIR HFA) 90 mcg/actuation inhaler INHALE 2 PUFFS BY EVERY FOUR (4) HOURS AS NEEDED FOR WHEEZING. INDICATIONS: BRONCHOSPASM PREVENTION 6.7 Inhaler 1    albuterol (PROVENTIL VENTOLIN) 2.5 mg /3 mL (0.083 %) nebu 3 mL by Nebulization route every four (4) hours as needed for Wheezing or Shortness of Breath. Indications: asthma attack 30 Each 1    albuterol-ipratropium (DUO-NEB) 2.5 mg-0.5 mg/3 ml nebu USE 1 VIAL VIA NEBULLIZER EVERY 6 HOURS AS NEEDED FOR BREATHING 90 mL 1    NAFTIN 2 % gel Use as directed  Indications: athlete's foot 60 g 2    clobetasol (TEMOVATE) 0.05 % topical cream as needed.   1    azelastine (ASTELIN) 137 mcg (0.1 %) nasal spray 2 Sprays by Both Nostrils route two (2) times a day. Use in each nostril as directed  Indications: Seasonal Runny Nose 3 Bottle 1    omeprazole (PRILOSEC) 40 mg capsule TAKE 1 CAPSULE BY MOUTH EVERY DAY 90 Cap 1    Nebulizer & Compressor machine 1 Each by Other route four (4) times daily as needed. 1 Each 0    terconazole (TERAZOL 7) 0.4 % vaginal cream   6    LOCOID 0.1 % lotn   1    valACYclovir (VALTREX) 500 mg tablet   1    terconazole (TERAZOL 3) 80 mg vaginal suppository   4    multivitamin (ONE A DAY) tablet Take 1 tablet by mouth daily.  PROPYLENE GLYCOL//PF (SYSTANE, PF, OP) Apply  to eye four (4) times daily.  Emollient Combination No.32 (EPICERAM) Emul by Apply Externally route two (2) times a day. From Dr Merle Finnegan. For Acne rosacea.  metroNIDAZOLE (METROGEL) 1 % topical gel Apply  to affected area two (2) times a day. Use a thin layer to affected areas after washing from Dr Merle Finnegan   Indications: a skin condition on the cheeks and nose with a reddish rash and acne called acne rosacea      latanoprost (XALATAN) 0.005 % ophthalmic solution Administer 1 Drop to both eyes nightly.  LACTOBACILLUS/FOS/PECTIN (PROBIOTIC COMPLEX PO) Take  by mouth daily.  cholecalciferol, vitamin d3, (VITAMIN D) 1,000 unit tablet Take 1,000 Units by mouth daily.  levothyroxine (SYNTHROID) 125 mcg tablet Take 1 Tablet by mouth Daily (before breakfast). 90 Tablet 3       Recommended healthy diet low in carbohydrates, fats, sodium and cholesterol. Recommended regular cardiovascular exercise 3-6 times per week for 30-60 minutes daily. Verbal and written instructions (see AVS) provided. Patient expresses understanding of diagnosis and treatment plan. Follow-up and Dispositions    · Return in about 3 months (around 3/2/2022) for caregiver stress, prediabetes.        Future Appointments   Date Time Provider Cindy Brown   3/10/2022  2:00 PM Jeremy Alejandre, [facility]

## 2021-12-04 DIAGNOSIS — E55.9 VITAMIN D DEFICIENCY: Primary | ICD-10-CM

## 2021-12-04 RX ORDER — ERGOCALCIFEROL 1.25 MG/1
50000 CAPSULE ORAL
Qty: 13 CAPSULE | Refills: 0 | Status: SHIPPED | OUTPATIENT
Start: 2021-12-04 | End: 2022-03-04

## 2021-12-04 NOTE — PROGRESS NOTES
Mychart msg sent  The cholesterol has improved slightly from last year. Keep up the great work with the diet! The vitamin D level came down again. Stop the D3, and again I will prescribe you weekly vitamin D called ergocalciferol 50,000 units; take this once weekly for 3 months. After you have completed the prescription for 3 months, then take Vitamin D3 2000 units twice daily obtained over-the-counter and stay on this as a long-term daily supplement. We should recheck you levels in 6 months. The prediabetes is improved despite the pumpkin spice lattes! Current A1C 5.9%, previously 6.1%. Continue to follow a low starch/low sugar diet. The thyroid is stable. No signs of hepatitis C    Remainder labs OK.

## 2022-01-04 DIAGNOSIS — G89.29 CHRONIC BILATERAL LOW BACK PAIN WITHOUT SCIATICA: ICD-10-CM

## 2022-01-04 DIAGNOSIS — M54.50 CHRONIC BILATERAL LOW BACK PAIN WITHOUT SCIATICA: ICD-10-CM

## 2022-01-06 RX ORDER — CYCLOBENZAPRINE HCL 10 MG
TABLET ORAL
Qty: 90 TABLET | Refills: 0 | Status: SHIPPED | OUTPATIENT
Start: 2022-01-06

## 2022-03-10 ENCOUNTER — OFFICE VISIT (OUTPATIENT)
Dept: INTERNAL MEDICINE CLINIC | Age: 52
End: 2022-03-10
Payer: COMMERCIAL

## 2022-03-10 VITALS
DIASTOLIC BLOOD PRESSURE: 76 MMHG | WEIGHT: 293 LBS | SYSTOLIC BLOOD PRESSURE: 132 MMHG | HEIGHT: 62 IN | HEART RATE: 69 BPM | RESPIRATION RATE: 18 BRPM | OXYGEN SATURATION: 99 % | BODY MASS INDEX: 53.92 KG/M2 | TEMPERATURE: 97.8 F

## 2022-03-10 DIAGNOSIS — G43.009 MIGRAINE WITHOUT AURA AND WITHOUT STATUS MIGRAINOSUS, NOT INTRACTABLE: ICD-10-CM

## 2022-03-10 DIAGNOSIS — Z63.6 CAREGIVER STRESS: Primary | ICD-10-CM

## 2022-03-10 DIAGNOSIS — Z79.899 LONG-TERM CURRENT USE OF BENZODIAZEPINE: ICD-10-CM

## 2022-03-10 DIAGNOSIS — R73.03 PREDIABETES: ICD-10-CM

## 2022-03-10 PROCEDURE — 99214 OFFICE O/P EST MOD 30 MIN: CPT | Performed by: PHYSICIAN ASSISTANT

## 2022-03-10 RX ORDER — SUMATRIPTAN 50 MG/1
TABLET, FILM COATED ORAL
Qty: 12 TABLET | Refills: 5 | Status: SHIPPED | OUTPATIENT
Start: 2022-03-10

## 2022-03-10 RX ORDER — ALPRAZOLAM 0.5 MG/1
0.5 TABLET ORAL
Qty: 30 TABLET | Refills: 0 | Status: SHIPPED | OUTPATIENT
Start: 2022-03-10 | End: 2022-06-13 | Stop reason: SDUPTHER

## 2022-03-10 SDOH — SOCIAL STABILITY - SOCIAL INSECURITY: DEPENDENT RELATIVE NEEDING CARE AT HOME: Z63.6

## 2022-03-10 NOTE — PATIENT INSTRUCTIONS
Migraine Headache: Care Instructions  Overview     Migraines are painful, throbbing headaches that often start on one side of the head. They may cause nausea and vomiting and make you sensitive to light, sound, or smell. Without treatment, migraines can last from 4 hours to a few days. Medicines can help prevent migraines or stop them after they have started. Your doctor can help you find which ones work best for you. Follow-up care is a key part of your treatment and safety. Be sure to make and go to all appointments, and call your doctor if you are having problems. It's also a good idea to know your test results and keep a list of the medicines you take. How can you care for yourself at home? · Do not drive if you have taken a prescription pain medicine. · Rest in a quiet, dark room until your headache is gone. Close your eyes, and try to relax or go to sleep. Don't watch TV or read. · Put a cold, moist cloth or cold pack on the painful area for 10 to 20 minutes at a time. Put a thin cloth between the cold pack and your skin. · Use a warm, moist towel or a heating pad set on low to relax tight shoulder and neck muscles. · Have someone gently massage your neck and shoulders. · Take your medicines exactly as prescribed. Call your doctor if you think you are having a problem with your medicine. You will get more details on the specific medicines your doctor prescribes. · Don't take medicine for headache pain too often. Talk to your doctor if you are taking medicine more than 2 days a week to stop a headache. Taking too much pain medicine can lead to more headaches. These are called medicine-overuse headaches. To prevent migraines  · Keep a headache diary so you can figure out what triggers your headaches. Avoiding triggers may help you prevent headaches. Record when each headache began, how long it lasted, and what the pain was like.  Write down any other symptoms you had with the headache, such as nausea, flashing lights or dark spots, or sensitivity to bright light or loud noise. Note if the headache occurred near your period. List anything that might have triggered the headache. Triggers may include certain foods (chocolate, cheese, wine) or odors, smoke, bright light, stress, or lack of sleep. · If your doctor has prescribed medicine for your migraines, take it as directed. You may have medicine that you take only when you get a migraine and medicine that you take all the time to help prevent migraines. ? If your doctor has prescribed medicine for when you get a headache, take it at the first sign of a migraine, unless your doctor has given you other instructions. ? If your doctor has prescribed medicine to prevent migraines, take it exactly as prescribed. Call your doctor if you think you are having a problem with your medicine. · Find healthy ways to deal with stress. Migraines are most common during or right after stressful times. Try finding ways to reduce stress like practicing mindfulness or deep breathing exercises. · Get plenty of sleep and exercise. But be careful to not push yourself too hard during exercise. It may trigger a headache. · Eat meals on a regular schedule. Avoid foods and drinks that often trigger migraines. These include chocolate, alcohol (especially red wine and port), aspartame, monosodium glutamate (MSG), and some additives found in foods (such as hot dogs, gordon, cold cuts, aged cheeses, and pickled foods). · Limit caffeine. Don't drink too much coffee, tea, or soda. But don't quit caffeine suddenly. That can also give you migraines. · Do not smoke or allow others to smoke around you. If you need help quitting, talk to your doctor about stop-smoking programs and medicines. These can increase your chances of quitting for good. · If you are taking birth control pills or hormone therapy, talk to your doctor about whether they are triggering your migraines.   When should you call for help? Call 911 anytime you think you may need emergency care. For example, call if:    · You have signs of a stroke. These may include:  ? Sudden numbness, paralysis, or weakness in your face, arm, or leg, especially on only one side of your body. ? Sudden vision changes. ? Sudden trouble speaking. ? Sudden confusion or trouble understanding simple statements. ? Sudden problems with walking or balance. ? A sudden, severe headache that is different from past headaches. Call your doctor now or seek immediate medical care if:    · You have new or worse nausea and vomiting.     · You have a new or higher fever.     · Your headache gets much worse. Watch closely for changes in your health, and be sure to contact your doctor if:    · You are not getting better after 2 days (48 hours). Where can you learn more? Go to http://www.gray.com/  Enter O924 in the search box to learn more about \"Migraine Headache: Care Instructions. \"  Current as of: December 13, 2021               Content Version: 13.2  © 2006-2022 [employer]. Care instructions adapted under license by [employer] (which disclaims liability or warranty for this information). If you have questions about a medical condition or this instruction, always ask your healthcare professional. [address] any warranty or liability for your use of this information. Prediabetes: Care Instructions  Overview     Prediabetes is a warning sign that you're at risk for getting type 2 diabetes. It means that your blood sugar is higher than it should be. But it's not high enough to be diabetes. The food you eat naturally turns into sugar. Your body uses the sugar for energy. Normally, an organ called the pancreas makes insulin. And insulin allows the sugar in your blood to get into your body's cells. But sometimes the body can't use insulin the right way.  So the sugar stays in your blood instead. This is called insulin resistance. The buildup of sugar in your blood means you have prediabetes. The good news is that you may be able to prevent or delay diabetes. Making small lifestyle changes, like getting active and changing your eating habits, may help you get your blood sugar back to normal. You can work with your doctor to make a treatment plan. Follow-up care is a key part of your treatment and safety. Be sure to make and go to all appointments, and call your doctor if you are having problems. It's also a good idea to know your test results and keep a list of the medicines you take. How can you care for yourself at home? · Watch your weight. A healthy weight helps your body use insulin properly. · Limit the amount of calories, sweets, and unhealthy fat you eat. Ask your doctor if you should see a dietitian. A registered dietitian can help you create meal plans that fit your lifestyle. · Get at least 30 minutes of exercise on most days of the week. Exercise helps control your blood sugar. It also helps you maintain a healthy weight. Walking is a good choice. You also may want to do other activities, such as running, swimming, cycling, or playing tennis or team sports. · Do not smoke. Smoking can make prediabetes worse. If you need help quitting, talk to your doctor about stop-smoking programs and medicines. These can increase your chances of quitting for good. · If your doctor prescribed medicines, take them exactly as prescribed. Call your doctor if you think you are having a problem with your medicine. You will get more details on the specific medicines your doctor prescribes. When should you call for help? Watch closely for changes in your health, and be sure to contact your doctor if:    · You have any symptoms of diabetes. These may include:  ? Being thirsty more often. ? Urinating more. ? Being hungrier. ? Losing weight. ? Being very tired. ? Having blurry vision.   · You have a wound that will not heal.     · You have an infection that will not go away.     · You have problems with your blood pressure.     · You want more information about diabetes and how you can keep from getting it. Where can you learn more? Go to http://www.gray.com/  Enter I222 in the search box to learn more about \"Prediabetes: Care Instructions. \"  Current as of: July 28, 2021               Content Version: 13.2  © 2006-2022 [employer]. Care instructions adapted under license by [employer] (which disclaims liability or warranty for this information). If you have questions about a medical condition or this instruction, always ask your healthcare professional. [address] any warranty or liability for your use of this information.

## 2022-03-10 NOTE — PROGRESS NOTES
Lissette Myles is a 46 y.o. female     Chief Complaint   Patient presents with    Stress     3M follow up for caregiver stress       Visit Vitals  /76 (BP 1 Location: Left arm, BP Patient Position: Sitting, BP Cuff Size: Adult)   Pulse 69   Temp 97.8 °F (36.6 °C) (Temporal)   Resp 18   Ht 5' 2\" (1.575 m)   Wt 318 lb 1.6 oz (144.3 kg)   SpO2 99%   BMI 58.18 kg/m²       Health Maintenance Due   Topic Date Due    Pneumococcal 0-64 years (1 of 2 - PPSV23) Never done    Shingrix Vaccine Age 50> (1 of 2) Never done    COVID-19 Vaccine (3 - Booster for Moderna series) 10/05/2021    Cervical cancer screen  11/10/2021    Colorectal Cancer Screening Combo  01/10/2022         1. \"Have you been to the ER, urgent care clinic since your last visit? Hospitalized since your last visit? \" No    2. \"Have you seen or consulted any other health care providers outside of the [address] [place] [address] since your last visit? \" No     3. For patients aged 39-70: Has the patient had a colonoscopy / FIT/ Cologuard? No      If the patient is female:    4. For patients aged 41-77: Has the patient had a mammogram within the past 2 years? Yes - no Care Gap present      5. For patients aged 21-65: Has the patient had a pap smear?  No

## 2022-03-10 NOTE — LETTER
Name:El Gamble   ORP:[birth year]   MR #:[id number]   Provider Name:Fabian Alejandre PA-C   *[id number]*  BSMG-491 (5/16)  Page 1 of 5 Initial [employer]    CONTROLLED SUBSTANCE AGREEMENT    I may be prescribed medications that are controlled substances as part  of my treatment plan for management of my medical condition(s). The goal of my treatment plan is to maintain and/or improve my health and wellbeing. Because controlled substances have an increased risk of abuse or harm, continual re-evaluation is needed determine if the goals of my treatment plan are being met for my safety and the safety of others. Donavanradhamesrolo Beatty  am entering into this Controlled Substance Agreement with my provider, Marino Pineda PA-C at [address] [place] [address] . I understand that successful treatment requires mutual trust and honesty between me and my provider. I understand that there are state and federal laws and regulations which apply to the medications that my provider may prescribe that must be followed. I understand there are risks and benefits ts of taking the medicines that my provider may prescribe. I understand and agree that following this Agreement is necessary in continuing my provider-patient relationship and success of my treatment plan. As a part of my treatment plan, I agree to the following:    COMMUNICATION:    1. I will communicate fully with my provider about my medical condition(s), including the effect on my daily life and how well my medications are helping. I will tell my provider all of the medications that I take for any reason, including medications I receive from another health care provider, and will notify my provider about all issues, problems or concerns, including any side effects, which may be related to my medications. I understand that this information allows my provider to adjust my treatment plan to help manage my medical condition.  I understand that this information will become part of my permanent medical record. 2. I will notify my provider if I have a history of alcohol/drug misuse/addiction or if I have had treatment for alcohol/drug addiction in the past, or if I have a new problem with or concern about alcohol/drug use/addiction, because this increases the likelihood of high risk behaviors and may lead to serious medical conditions. 3. Females Only: I will notify my provider if I am or become pregnant, or if I intend to become pregnant, or if I intend to breastfeed. I understand that communication of these issues with my provider is important, due to possible effects my medication could have on an unborn fetus or breastfeeding child. Name:Isabelle Kay   GFP:[birth year]   MR #:[id number]   Provider Name:Veronica Alejandre PA-C   *[id number]*  BSMG-491 (5/16)  Page 2 of 5 Initial [facility]      MISUSE OF MEDICATIONS / DRUGS:    1. I agree to take all controlled substances as prescribed, and will not misuse or abuse any controlled substances prescribed by my provider. For my safety, I will not increase the amount of medicine I take without first talking with and getting permission from my provider. 2. If I have a medical emergency, another health care provider may prescribe me medication. If I seek emergency treatment, I will notify my provider within seventy-two (72) hours. 3. I understand that my provider may discuss my use and/or possible misuse/abuse of controlled substances and alcohol, as appropriate, with any health care provider involved in my care, pharmacist or legal authority. ILLEGAL DRUGS:    1. I will not use illegal drugs of any kind, including but not limited to marijuana, heroin, cocaine, or any prescription drug which is not prescribed to me. DRUG DIVERSION / PRESCRIPTION FRAUD:    1. I will not share, sell, trade, give away, or otherwise misuse my prescriptions or medications.     2. I will not alter any prescriptions provided to me by my provider. SINGLE PROVIDER:    1. I agree that all controlled substances that I take will be prescribed only by my provider (or his/her covering provider) under this Agreement. This agreement does not prevent me from seeking emergency medical treatment or receiving pain management related to a surgery. PROTECTING MEDICATIONS:    1. I am responsible for keeping my prescriptions and medications in a safe and secure place including safeguarding them from loss or theft. I understand that lost, stolen or damaged/destroyed prescriptions or medications will not be replaced. Name:Isabelle Barreto   HCA:0[birth year]   MR #:[id number]   Provider Name:Veronica Alejandre PA-C   *[id number]*  BSMG-491 (5/16)  Page 3 of 5 Initial [employer]  PRESCRIPTION RENEWALS/REFILLS:    1. I will follow my controlled substance medication schedule as prescribed by my provider. 2. I understand and agree that I will make any requests for renewals or refills of my prescriptions only at the time of an office visit or during my providers regular office hours subject to the prescription refill requirements of the individual practice. 3. I understand that my provider may not call in prescriptions for controlled substances to my pharmacy. 4. I understand that my provider may adjust or discontinue these medications as deemed appropriate for my medical treatment plan. This Agreement does not guarantee the prescription of controlled medications. 5. I agree that if my medications are adjusted or discontinued, I will properly dispose of any remaining medications. I understand that I will be required to dispose of any remaining controlled medications prior to being provided with any prescriptions for other controlled medications. 6. I understand that the renewal of my prescription depends on my medical condition, my consistent participation, and my adherence with my treatment plan and this Agreement.     7. I understand that if I do not keep an appointment with my provider, I may not receive a renewal or refill for my controlled substance medication. PRESCRIPTION MONITORING / DRUG TESTIN. I understand that my provider may require me to provide urine, saliva or blood for testing at any time. I understand that this testing will be used to monitor for safety and adherence with my treatment plan and this Agreement. 2. I understand that my provider may ask me to provide an observed urine specimen, which means that a nurse or other health care provider may watch me provide urine, and I agree to cooperate if I am asked to provide an observed specimen. 3. I understand that if I do not provide urine, saliva or blood samples within two (2) hours of my providers request, or other timeframe decided by my provider, my treatment plan could be changed, or my prescriptions and medications may be changed or ended. 4. I understand that urine, saliva and blood test results will be a part of my permanent medical record. Name:Isabelle Caldera   OZR:   MR #:[id number]   Provider Name:Malgorzata Alejandre PA-C   *[id number]*  BSMG-491 ()  Page 4 of 5 Initial [facility]    5. I understand that my provider is required to obtain a copy of my State Prescription Monitoring Program () Report at any time in order to safely prescribe medications. 6. I will bring all of my prescribed controlled substance medications in their original bottles to all of my scheduled appointments. 7. I understand that my provider may ask me to come to the practice with all of my prescribed medications for a random pill count at any time. I agree to cooperate if I am asked to come in for a random pill count. I understand that if I do not arrive in the timeframe decided by my provider, my treatment plan could be changed, or my prescriptions and medications may be changed or ended.     COOPERATION WITH INVESTIGATIONS:    1. I authorize my provider and my pharmacy to cooperate fully with any local, state, or federal law enforcement agency in the investigation of any possible misuse, sale, or other diversion of my controlled substance prescriptions or medications. RISKS:    1. I understand that my level of consciousness may be affected from the use of controlled substances, and I understand that there are risks, benefits, effects and potential alternatives (including no treatment) to the medications that my provider has prescribed. 2. I understand that I may become drowsy, tired, dizzy, constipated, and sick to my stomach, or have changes in my mood or in my sleep while taking my medications. I have talked with my provider about these possible side effects, risks, benefits, and alternative treatments, and my provider has answered all of my questions. 3. I understand that I should not suddenly stop taking my medications without first speaking with my provider. I understand that if I suddenly stop taking my medications, I may experience nausea, vomiting, sweating,anxiety, sleeplessness, itching or other uncomfortable feelings. 4. I will not take my medications with alcohol of any kind, including beer, wine or liquor. I understand that drinking alcohol with my medications increases the chances of side effects, including breathing problems or even death. 5. I understand that if I have a history of alcoholism or other drug addiction I may be at increased risk of addiction to my medications. Signs of addiction might include craving, compulsive use, and continued use despite harm. Since addiction is a disease, I understand my provider may decide to change my medications and refer me to appropriate treatment services. I understand that this information would become part of my permanent medical record. Name:Isabelle Greer   [facility]:[birth year]   MR #:[id number]   Provider Name:Priyanka Alejandre   *[id number]*  BSMG-491 (5/16)  Page 5 of 5 Initial [facility]      6. Females only: Children born to women who regularly take controlled substances are likely to have physical problems and suffer withdrawal symptoms at birth. If I am of child-bearing age, I understand that I should use safe and effective birth control while taking any controlled substances to avoid the impact of medications on an unborn fetus or  child. I agree to notify my provider immediately if I should become pregnant so that my treatment plan can be adjusted. 7. Males only: I understand that chronic use of controlled substances has been associated with low testosterone levels in males which may affect my mood, stamina, sexual desire, and general health. I understand that my provider may order the appropriate laboratory test to determine my testosterone level,and I agree to this testing. ADHERENCE:    1. I understand that if I do not adhere to this Agreement in any way, my provider may change my prescriptions, stop prescribing controlled substances or end our provider-patient relationship. 2. If my provider decides to stop prescribing medication, or decides to end our provider-patient relationship,my provider may require that I taper my medications slowly. If necessary, my provider may also provide a prescription for other medications to treat my withdrawal symptoms. UNDERSTANDING THIS AGREEMENT:    I understand that my provider may adjust or stop my prescriptions for controlled substances based on my medical condition and my treatment plan. I understand that this Agreement does not guarantee that I will be prescribed medications or controlled substances. I understand that controlled substances may be just one part  of my treatment plan. My initial on each page and my signature below shows that I have read each page of this Agreement, I have had an opportunity to ask questions, and all of my questions have been answered to my satisfaction by my provider.     By signing below, I agree to comply with this Agreement, and I understand that if I do not follow the Agreements listed above, my provider may stop        _________________________________________  Date/Time 3/10/2022 3:17 PM                 (Patient Signature)

## 2022-03-10 NOTE — PROGRESS NOTES
HPI:  46 y.o.  presents for follow up appointment. No hospital, ER or specialist visits since last primary care visit except as noted below.     Last visit 21    Caretaker stress  -on xanax 0.5 mg prn  -she takes 1 dose about twice a week due to stress with her dad or at work    Her father is ailing and she has had many doctor's appt's, and he doesn't want to listen to what the doctor says, so she has trouble keeping her patience  She has used xanax when her mother  - and asks for refill for prn use  She has been going 1-2x/week appt's recently with her dad (PT, neuropathy follow ups, cataract surgery, etc)     reviewed and appropriate  Xanax 0.5 mg #30 filled 21    At visit 21: Reviewed proper use of xanax prn and its status as a controlled substance, risks of potential abuse, and she understands  She currently states she is anxious on days of going to doctor's appts with her father which is about twice a week, so expect current Rx for #30 to last for #90 days  If increasing use more than 2-3x/week, then we reviewed other maintenance medicines more appropriate for anxiety such as SSRI    She reports increased stress at work with new manager  She works in collections  She is looking for another job    Prediabetes  Last A1C 5.9% in 2021; 6.1% in 2021, previously 6.0% 2020  Briefly tried metformin but had \"diarrhea nonstop\"  She has not seen nutritionist, previously declined  Had pumpkin spice lattes this fall and new baking for the holidays (apple cake)    Migraines  Started in her 25s  C/O throbbing of right temple, sees \"prism lights\" in right eye; always on right side  No N/V, (+)photophobia  Originally used Midrin, but when it was pulled from [facility], she was switched to fioricet  Increased HAs over the last 3 mos, which she feels is due to stress  She may take Excedrin migraine at first onset, or fioricet if more severe  Has had 2 HA episodes recently that were daily for almost a week and more severe pain than usual  No jaw pain, no increased tears or unusual taste, no jaw or tongue claudication  Has never been on a triptan  She saw a neuro in the past, last visit with Dr Homero Ocampo in 7/2019  No h/o CAD    Patient Active Problem List    Diagnosis    Prediabetes    Deep vein thrombosis (DVT) ([facility] [address]    History of DVT of lower extremity    Chronic otitis media     Dr. Frannie Mujica DVT of deep femoral vein ([facility] [address]     Acute partially occlusive DVT of L Common Fem Vein and L Prox Fem Vein and Acute occlusive DVT of L deep femoral vein. Txd with Xarelto x 3 months. due to BCP.  Family history of lung cancer    Allergy to sulfa drugs    Penicillin allergy    Adverse effect of anabolic steroid     flushing and leathery skin after taking oral steroids      Obesity, morbid (HCC)    Hyperglycemia    Hypothyroidism due to acquired atrophy of thyroid    Mild intermittent asthma without complication    Chronic low back pain with right-sided sciatica     and SI joint dysfunction. Dr. Lashell Corea.  Hearing loss    Dry eyes    Family history of breast cancer    Family history of stroke    Family history of diabetes mellitus (DM)    Family history of heart attack    Family history of colonic polyps    Family history of melanoma    Migraine without aura and without status migrainosus, not intractable    Plantar fasciitis, bilateral     Dr. Oswaldo Pradhan      Dyslipidemia    Tinnitus of right ear     Dr. Jean Led Right ankle sprain    Vitamin D deficiency    Knee pain, left    Acne rosacea    Intraocular pressure increase    Allergic rhinitis    Chronic ear infection     R>L      GERD (gastroesophageal reflux disease)    Fe deficiency anemia     Borderline           Past Medical History:   Diagnosis Date    Acne rosacea     Dr. Curtis Villegas.  Allergy, unspecified not elsewhere classified childhood     Txd with immunotherapy.   Dr. Anahi Hernandez Anemia NEC     borderline    Ankle sprain 2007    Right. Dr. Ceci De Jesus    Ankle sprain 11/2012    Right. Dr. Nate Riggs.  Asthma childhood    Chickenpox childhood    Chronic low back pain with right-sided sciatica     and SI joint dysfunction. Dr. Cheney Smoke.  Chronic otitis media      [address] [place] [address] Dry eye syndrome 2013    Dr. William Mead.  DVT of deep femoral vein ([address] 12/07/2018    Acute occlusive DVT of L deep femoral vein and Acute partially occlusive DVT of L Common Fem Vein and L Prox Fem Vein. Txd with Xarelto x 3 months. due to BCP. Dr. Kip Cordoba EBV infection 6/27/2011    Hearing loss     Mild high frequency sensorineuronal hearing loss. Dr. Joy Deleon Tennille murmur     Hernia of abdominal wall 09/2003, 77/1214    umbilical.  Dr. Sarah Henry. Dr. Hauser Earnest    Hyperglycemia 2013    Hypothyroidism 06/2010    Dr. Isa Lynch pressure increase 12/2011    Dr. Jose L Cassidy. Dr. William Mead. Dr. Yecenia Mcclellan.  Knee pain, left 04/2012    Left. Dr. Chris Fuller Measles childhood    Migraine 2017, 07/2019    w aura and facial paresthesia. Dr. Angela Bagley    Mumps childhood    Plantar fasciitis, bilateral 2010    Dr. Gracia Crisostomo [place] Sciatica 2008    Right.  with OA. Dr. Alexis Woody Tinnitus of right ear 2012    Dr. Harmon Kitchen History     Tobacco Use    Smoking status: Passive Smoke Exposure - Never Smoker    Smokeless tobacco: Never Used    Tobacco comment: lived with smoker dad and mom then step mom x 20 yrs   Vaping Use    Vaping Use: Never used   Substance Use Topics    Alcohol use:  Yes     Alcohol/week: 1.0 standard drink     Types: 1 Glasses of wine per week     Comment: RARE    Drug use: No       Outpatient Medications Marked as Taking for the 3/10/22 encounter (Office Visit) with Mayra Ventura PA-C   Medication Sig Dispense Refill    cyclobenzaprine (FLEXERIL) 10 mg tablet TAKE 1 TABLET BY MOUTH 3 TIMES A DAY AS NEEDED FOR MUSCLE SPASMS 90 Tablet 0    butalbital-acetaminophen-caffeine (FIORICET, ESGIC) -40 mg per tablet TAKE 1 TABLET BY MOUTH EVERY 6 HOURS AS NEEDED FOR PAIN OR HEADACHE 30 Tablet 0    albuterol (PROVENTIL HFA, VENTOLIN HFA, PROAIR HFA) 90 mcg/actuation inhaler INHALE 2 PUFFS BY MOUTH EVERY 4 HOURS AS NEEDED FOR WHEEZING. INDICATIONS: BRONCHOSPASM PREVENTIONM 6.7 Each 1    levothyroxine (SYNTHROID) 125 mcg tablet Take 1 Tablet by mouth Daily (before breakfast). 90 Tablet 3    montelukast (SINGULAIR) 10 mg tablet Take 1 Tablet by mouth daily. 90 Tablet 1    doxycycline (VIBRAMYCIN) 50 mg capsule Take 50 mg by mouth daily.  ALPRAZolam (XANAX) 0.5 mg tablet Take 1 Tablet by mouth daily as needed for Anxiety. 30 Tablet 0    hydrOXYzine HCL (ATARAX) 25 mg tablet Take 2 Tablets by mouth every eight (8) hours as needed (urticaria). Indications: hives 540 Tablet 3    albuterol (PROVENTIL VENTOLIN) 2.5 mg /3 mL (0.083 %) nebu 3 mL by Nebulization route every four (4) hours as needed for Wheezing or Shortness of Breath. Indications: asthma attack 30 Each 1    albuterol-ipratropium (DUO-NEB) 2.5 mg-0.5 mg/3 ml nebu USE 1 VIAL VIA NEBULLIZER EVERY 6 HOURS AS NEEDED FOR BREATHING 90 mL 1    NAFTIN 2 % gel Use as directed  Indications: athlete's foot 60 g 2    clobetasol (TEMOVATE) 0.05 % topical cream as needed. 1    azelastine (ASTELIN) 137 mcg (0.1 %) nasal spray 2 Sprays by Both Nostrils route two (2) times a day. Use in each nostril as directed  Indications: Seasonal Runny Nose 3 Bottle 1    omeprazole (PRILOSEC) 40 mg capsule TAKE 1 CAPSULE BY MOUTH EVERY DAY 90 Cap 1    Nebulizer & Compressor machine 1 Each by Other route four (4) times daily as needed.  1 Each 0    terconazole (TERAZOL 7) 0.4 % vaginal cream   6    LOCOID 0.1 % lotn   1    valACYclovir (VALTREX) 500 mg tablet   1    terconazole (TERAZOL 3) 80 mg vaginal suppository   4    multivitamin (ONE A DAY) tablet Take 1 tablet by mouth daily.  PROPYLENE GLYCOL//PF (SYSTANE, PF, OP) Apply  to eye four (4) times daily.  Emollient Combination No.32 (EPICERAM) Emul by Apply Externally route two (2) times a day. From Dr Darrius Arnett. For Acne rosacea.  metroNIDAZOLE (METROGEL) 1 % topical gel Apply  to affected area two (2) times a day. Use a thin layer to affected areas after washing from Dr Darrius Arnett   Indications: a skin condition on the cheeks and nose with a reddish rash and acne called acne rosacea      latanoprost (XALATAN) 0.005 % ophthalmic solution Administer 1 Drop to both eyes nightly.  LACTOBACILLUS/FOS/PECTIN (PROBIOTIC COMPLEX PO) Take  by mouth daily. Allergies   Allergen Reactions    Latex Rash    Junel 1.5/30 (21) [Norethindrone Ac-Eth Estradiol] Swelling     L FOOT DUE TO NEW DVT    Other Medication Hives and Rash     SUN    Pcn [Penicillins] Hives    Prednisone Hives    Sulfa (Sulfonamide Antibiotics) Hives    Garamycin [Gentamicin] Other (comments)     Itchy eyes due to sulfate    Metformin Diarrhea       ROS:  ROS negative except as per HPI. PE:  Visit Vitals  /76 (BP 1 Location: Left arm, BP Patient Position: Sitting, BP Cuff Size: Adult)   Pulse 69   Temp 97.8 °F (36.6 °C) (Temporal)   Resp 18   Ht 5' 2\" (1.575 m)   Wt 318 lb 1.6 oz (144.3 kg)   SpO2 99%   BMI 58.18 kg/m²     Gen: alert, oriented, no acute distress  Head: normocephalic, atraumatic  Eyes: pupils equal round reactive to light, sclera clear, conjunctiva clear  Oral: moist mucus membranes, no oral lesions, no pharyngeal inflammation or exudate  Neck: symmetric normal sized thyroid, no carotid bruits, no jugular vein distention  Resp: no increase work of breathing, lungs clear to ausculation bilaterally, no wheezing, rales or rhonchi  CV: S1, S2 normal.  No murmurs, rubs, or gallops.   Normal temporal pulse bilaterally, not tender; no palpable cord Abd: soft, not tender, not distended. .    Neuro: cranial nerves intact, normal strength and movement in all extremities. Skin: no lesion or rash  Extremities: no cyanosis or edema    No results found for this visit on 03/10/22. Results for orders placed or performed in visit on 12/02/21   HEPATITIS C AB   Result Value Ref Range    Hep C virus Ab Interp. NONREACTIVE NONREACTIVE     CBC WITH AUTOMATED DIFF   Result Value Ref Range    WBC 9.9 3.6 - 11.0 K/uL    RBC 4.50 3.80 - 5.20 M/uL    HGB 12.6 11.5 - 16.0 g/dL    HCT 40.2 35.0 - 47.0 %    MCV 89.3 80.0 - 99.0 FL    MCH 28.0 26.0 - 34.0 PG    MCHC 31.3 30.0 - 36.5 g/dL    RDW 13.5 11.5 - 14.5 %    PLATELET 019 706 - 755 K/uL    MPV 11.9 8.9 - 12.9 FL    NRBC 0.0 0  WBC    ABSOLUTE NRBC 0.00 0.00 - 0.01 K/uL    NEUTROPHILS 58 32 - 75 %    LYMPHOCYTES 32 12 - 49 %    MONOCYTES 7 5 - 13 %    EOSINOPHILS 2 0 - 7 %    BASOPHILS 1 0 - 1 %    IMMATURE GRANULOCYTES 0 0.0 - 0.5 %    ABS. NEUTROPHILS 5.7 1.8 - 8.0 K/UL    ABS. LYMPHOCYTES 3.2 0.8 - 3.5 K/UL    ABS. MONOCYTES 0.7 0.0 - 1.0 K/UL    ABS. EOSINOPHILS 0.2 0.0 - 0.4 K/UL    ABS. BASOPHILS 0.1 0.0 - 0.1 K/UL    ABS. IMM. GRANS. 0.0 0.00 - 0.04 K/UL    DF AUTOMATED     METABOLIC PANEL, COMPREHENSIVE   Result Value Ref Range    Sodium 137 136 - 145 mmol/L    Potassium 4.3 3.5 - 5.1 mmol/L    Chloride 104 97 - 108 mmol/L    CO2 27 21 - 32 mmol/L    Anion gap 6 5 - 15 mmol/L    Glucose 99 65 - 100 mg/dL    BUN 19 6 - 20 MG/DL    Creatinine 0.75 0.55 - 1.02 MG/DL    BUN/Creatinine ratio 25 (H) 12 - 20      GFR est AA >60 >60 ml/min/1.73m2    GFR est non-AA >60 >60 ml/min/1.73m2    Calcium 9.4 8.5 - 10.1 MG/DL    Bilirubin, total 0.4 0.2 - 1.0 MG/DL    ALT (SGPT) 34 12 - 78 U/L    AST (SGOT) 13 (L) 15 - 37 U/L    Alk.  phosphatase 130 (H) 45 - 117 U/L    Protein, total 7.8 6.4 - 8.2 g/dL    Albumin 4.2 3.5 - 5.0 g/dL    Globulin 3.6 2.0 - 4.0 g/dL    A-G Ratio 1.2 1.1 - 2.2     LIPID PANEL   Result Value Ref Range    Cholesterol, total 207 (H) <200 MG/DL    Triglyceride 76 <150 MG/DL    HDL Cholesterol 80 MG/DL    LDL, calculated 111.8 (H) 0 - 100 MG/DL    VLDL, calculated 15.2 MG/DL    CHOL/HDL Ratio 2.6 0.0 - 5.0     VITAMIN D, 25 HYDROXY   Result Value Ref Range    Vitamin D 25-Hydroxy 19.8 (L) 30 - 100 ng/mL   TSH 3RD GENERATION   Result Value Ref Range    TSH 1.36 0.36 - 3.74 uIU/mL   HEMOGLOBIN A1C WITH EAG   Result Value Ref Range    Hemoglobin A1c 5.9 (H) 4.0 - 5.6 %    Est. average glucose 123 mg/dL         Assessment/Plan:      ICD-10-CM ICD-9-CM    1. Caregiver stress  Z63.6 V61.49 ALPRAZolam (XANAX) 0.5 mg tablet   2. Prediabetes  R73.03 790.29 HEMOGLOBIN A1C WITH EAG      HEMOGLOBIN A1C WITH EAG   3. Migraine without aura and without status migrainosus, not intractable  G43.009 346.10 SUMAtriptan (IMITREX) 50 mg tablet      CBC WITH AUTOMATED DIFF      SED RATE (ESR)      SED RATE (ESR)      CBC WITH AUTOMATED DIFF   4. Long-term current use of benzodiazepine  Z79.899 V58.69 TOXASSURE SELECT 13 (MW)       Xanax refilled   reviewed and appropriate  Controlled substance agreement reviewed and signed  UDS today    Prediabetes diet controlled    Migraine  No relief with fioricet  Has seen neuro in past  Increasing temporal pain, will check ESR and CBC, reassuring that she is not tender and no jaw pain or claudication  First trial of triptan, new start imitrex 50 mg  Follow up for recheck 3 mos  She asked about nurtec, will try triptan first    Health Maintenance reviewed - updated.   Gyn at [facility], had pap, records requested    Orders Placed This Encounter    Alysa Jackson 13 (MW)     Standing Status:   Future     Standing Expiration Date:   3/10/2023    HEMOGLOBIN A1C WITH EAG     Standing Status:   Future     Number of Occurrences:   1     Standing Expiration Date:   3/10/2023    CBC WITH AUTOMATED DIFF     Standing Status:   Future     Number of Occurrences:   1     Standing Expiration Date: 3/10/2023    SED RATE (ESR)     Standing Status:   Future     Number of Occurrences:   1     Standing Expiration Date:   3/10/2023    SUMAtriptan (IMITREX) 50 mg tablet     Sig: Take 1 tab po at first onset migraine. May repeat in 2 hours if needed. No more than 2 pills in 24 hours. Dispense:  12 Tablet     Refill:  5    ALPRAZolam (XANAX) 0.5 mg tablet     Sig: Take 1 Tablet by mouth daily as needed for Anxiety. Dispense:  30 Tablet     Refill:  0       Medications Discontinued During This Encounter   Medication Reason    ALPRAZolam (XANAX) 0.5 mg tablet REORDER       Current Outpatient Medications   Medication Sig Dispense Refill    SUMAtriptan (IMITREX) 50 mg tablet Take 1 tab po at first onset migraine. May repeat in 2 hours if needed. No more than 2 pills in 24 hours. 12 Tablet 5    ALPRAZolam (XANAX) 0.5 mg tablet Take 1 Tablet by mouth daily as needed for Anxiety. 30 Tablet 0    cyclobenzaprine (FLEXERIL) 10 mg tablet TAKE 1 TABLET BY MOUTH 3 TIMES A DAY AS NEEDED FOR MUSCLE SPASMS 90 Tablet 0    butalbital-acetaminophen-caffeine (FIORICET, ESGIC) -40 mg per tablet TAKE 1 TABLET BY MOUTH EVERY 6 HOURS AS NEEDED FOR PAIN OR HEADACHE 30 Tablet 0    albuterol (PROVENTIL HFA, VENTOLIN HFA, PROAIR HFA) 90 mcg/actuation inhaler INHALE 2 PUFFS BY MOUTH EVERY 4 HOURS AS NEEDED FOR WHEEZING. INDICATIONS: BRONCHOSPASM PREVENTIONM 6.7 Each 1    levothyroxine (SYNTHROID) 125 mcg tablet Take 1 Tablet by mouth Daily (before breakfast). 90 Tablet 3    montelukast (SINGULAIR) 10 mg tablet Take 1 Tablet by mouth daily. 90 Tablet 1    doxycycline (VIBRAMYCIN) 50 mg capsule Take 50 mg by mouth daily.  hydrOXYzine HCL (ATARAX) 25 mg tablet Take 2 Tablets by mouth every eight (8) hours as needed (urticaria). Indications: hives 540 Tablet 3    albuterol (PROVENTIL VENTOLIN) 2.5 mg /3 mL (0.083 %) nebu 3 mL by Nebulization route every four (4) hours as needed for Wheezing or Shortness of Breath. Indications: asthma attack 30 Each 1    albuterol-ipratropium (DUO-NEB) 2.5 mg-0.5 mg/3 ml nebu USE 1 VIAL VIA NEBULLIZER EVERY 6 HOURS AS NEEDED FOR BREATHING 90 mL 1    NAFTIN 2 % gel Use as directed  Indications: athlete's foot 60 g 2    clobetasol (TEMOVATE) 0.05 % topical cream as needed. 1    azelastine (ASTELIN) 137 mcg (0.1 %) nasal spray 2 Sprays by Both Nostrils route two (2) times a day. Use in each nostril as directed  Indications: Seasonal Runny Nose 3 Bottle 1    omeprazole (PRILOSEC) 40 mg capsule TAKE 1 CAPSULE BY MOUTH EVERY DAY 90 Cap 1    Nebulizer & Compressor machine 1 Each by Other route four (4) times daily as needed. 1 Each 0    terconazole (TERAZOL 7) 0.4 % vaginal cream   6    LOCOID 0.1 % lotn   1    valACYclovir (VALTREX) 500 mg tablet   1    terconazole (TERAZOL 3) 80 mg vaginal suppository   4    multivitamin (ONE A DAY) tablet Take 1 tablet by mouth daily.  PROPYLENE GLYCOL//PF (SYSTANE, PF, OP) Apply  to eye four (4) times daily.  Emollient Combination No.32 (EPICERAM) Emul by Apply Externally route two (2) times a day. From Dr Cecil Whalen. For Acne rosacea.  metroNIDAZOLE (METROGEL) 1 % topical gel Apply  to affected area two (2) times a day. Use a thin layer to affected areas after washing from Dr Cecil Whalen   Indications: a skin condition on the cheeks and nose with a reddish rash and acne called acne rosacea      latanoprost (XALATAN) 0.005 % ophthalmic solution Administer 1 Drop to both eyes nightly.  LACTOBACILLUS/FOS/PECTIN (PROBIOTIC COMPLEX PO) Take  by mouth daily. Recommended healthy diet low in carbohydrates, fats, sodium and cholesterol. Recommended regular cardiovascular exercise 3-6 times per week for 30-60 minutes daily. Verbal and written instructions (see AVS) provided. Patient expresses understanding of diagnosis and treatment plan.        Follow-up and Dispositions    · Return in about 3 months (around 6/10/2022) for stress, migraines, vitamin D.        Future Appointments   Date Time Provider Cindy Brown   6/13/2022  9:00 AM Veronica Alejandre PA-C [facility]

## 2022-03-11 LAB
BASOPHILS # BLD: 0.1 K/UL (ref 0–0.1)
BASOPHILS NFR BLD: 1 % (ref 0–1)
DIFFERENTIAL METHOD BLD: ABNORMAL
EOSINOPHIL # BLD: 0.2 K/UL (ref 0–0.4)
EOSINOPHIL NFR BLD: 2 % (ref 0–7)
ERYTHROCYTE [DISTWIDTH] IN BLOOD BY AUTOMATED COUNT: 14.3 % (ref 11.5–14.5)
ERYTHROCYTE [SEDIMENTATION RATE] IN BLOOD: 20 MM/HR (ref 0–30)
EST. AVERAGE GLUCOSE BLD GHB EST-MCNC: 131 MG/DL
HBA1C MFR BLD: 6.2 % (ref 4–5.6)
HCT VFR BLD AUTO: 40.1 % (ref 35–47)
HGB BLD-MCNC: 12.2 G/DL (ref 11.5–16)
IMM GRANULOCYTES # BLD AUTO: 0.1 K/UL (ref 0–0.04)
IMM GRANULOCYTES NFR BLD AUTO: 1 % (ref 0–0.5)
LYMPHOCYTES # BLD: 3.2 K/UL (ref 0.8–3.5)
LYMPHOCYTES NFR BLD: 37 % (ref 12–49)
MCH RBC QN AUTO: 27.2 PG (ref 26–34)
MCHC RBC AUTO-ENTMCNC: 30.4 G/DL (ref 30–36.5)
MCV RBC AUTO: 89.5 FL (ref 80–99)
MONOCYTES # BLD: 0.7 K/UL (ref 0–1)
MONOCYTES NFR BLD: 8 % (ref 5–13)
NEUTS SEG # BLD: 4.5 K/UL (ref 1.8–8)
NEUTS SEG NFR BLD: 51 % (ref 32–75)
NRBC # BLD: 0 K/UL (ref 0–0.01)
NRBC BLD-RTO: 0 PER 100 WBC
PLATELET # BLD AUTO: 271 K/UL (ref 150–400)
PMV BLD AUTO: 11.9 FL (ref 8.9–12.9)
RBC # BLD AUTO: 4.48 M/UL (ref 3.8–5.2)
WBC # BLD AUTO: 8.6 K/UL (ref 3.6–11)

## 2022-03-18 PROBLEM — I82.409 DEEP VEIN THROMBOSIS (DVT) (HCC): Status: ACTIVE | Noted: 2020-08-13

## 2022-03-18 PROBLEM — T38.7X5A: Status: ACTIVE | Noted: 2017-12-29

## 2022-03-19 PROBLEM — I82.419 DVT OF DEEP FEMORAL VEIN (HCC): Status: ACTIVE | Noted: 2018-12-07

## 2022-03-19 PROBLEM — Z80.1 FAMILY HISTORY OF LUNG CANCER: Status: ACTIVE | Noted: 2018-11-12

## 2022-03-19 PROBLEM — Z86.718 HISTORY OF DVT OF LOWER EXTREMITY: Status: ACTIVE | Noted: 2020-08-13

## 2022-03-19 PROBLEM — Z88.0 PENICILLIN ALLERGY: Status: ACTIVE | Noted: 2017-12-29

## 2022-03-19 PROBLEM — R73.9 HYPERGLYCEMIA: Status: ACTIVE | Noted: 2017-06-29

## 2022-03-19 PROBLEM — Z88.2 ALLERGY TO SULFA DRUGS: Status: ACTIVE | Noted: 2017-12-29

## 2022-03-19 PROBLEM — R73.03 PREDIABETES: Status: ACTIVE | Noted: 2020-11-17

## 2022-03-19 PROBLEM — E66.01 OBESITY, MORBID (HCC): Status: ACTIVE | Noted: 2017-12-22

## 2022-03-23 LAB — DRUGS UR: NORMAL

## 2022-04-14 NOTE — PROGRESS NOTES
Lauren jaimesg sent  The prediabetes/blood sugar increased, A1C is 6.2%. Remember to reduce starches and sweets in the diet, and don't forget about sweetened beverages.  Let me know if you would like to see a nutritionist.    The remainder of the labs are normal.

## 2022-06-08 ENCOUNTER — TELEPHONE (OUTPATIENT)
Dept: ONCOLOGY | Age: 52
End: 2022-06-08

## 2022-06-08 ENCOUNTER — HOSPITAL ENCOUNTER (OUTPATIENT)
Dept: ULTRASOUND IMAGING | Age: 52
Discharge: HOME | End: 2022-06-08
Attending: INTERNAL MEDICINE
Payer: COMMERCIAL

## 2022-06-08 DIAGNOSIS — I82.4Y2 DEEP VEIN THROMBOSIS (DVT) OF PROXIMAL VEIN OF LEFT LOWER EXTREMITY, UNSPECIFIED CHRONICITY (HCC): ICD-10-CM

## 2022-06-08 DIAGNOSIS — I82.4Y2 DEEP VEIN THROMBOSIS (DVT) OF PROXIMAL VEIN OF LEFT LOWER EXTREMITY, UNSPECIFIED CHRONICITY (HCC): Primary | ICD-10-CM

## 2022-06-08 PROCEDURE — 93971 EXTREMITY STUDY: CPT

## 2022-06-08 NOTE — TELEPHONE ENCOUNTER
97 Lazara Roche Said pt HIPAA verified x2. Informed pt per Dr. Wilson Ends there is no DVT noted on scan. Pt voiced understanding and has no questions or concerns at this time.

## 2022-06-13 ENCOUNTER — OFFICE VISIT (OUTPATIENT)
Dept: INTERNAL MEDICINE CLINIC | Age: 52
End: 2022-06-13
Payer: COMMERCIAL

## 2022-06-13 VITALS
RESPIRATION RATE: 16 BRPM | DIASTOLIC BLOOD PRESSURE: 73 MMHG | WEIGHT: 293 LBS | OXYGEN SATURATION: 98 % | SYSTOLIC BLOOD PRESSURE: 120 MMHG | TEMPERATURE: 98.7 F | HEIGHT: 62 IN | BODY MASS INDEX: 53.92 KG/M2 | HEART RATE: 73 BPM

## 2022-06-13 DIAGNOSIS — G43.009 MIGRAINE WITHOUT AURA AND WITHOUT STATUS MIGRAINOSUS, NOT INTRACTABLE: Primary | ICD-10-CM

## 2022-06-13 DIAGNOSIS — H10.13 ALLERGIC CONJUNCTIVITIS AND RHINITIS, BILATERAL: ICD-10-CM

## 2022-06-13 DIAGNOSIS — J30.9 ALLERGIC CONJUNCTIVITIS AND RHINITIS, BILATERAL: ICD-10-CM

## 2022-06-13 DIAGNOSIS — J45.20 MILD INTERMITTENT ASTHMA WITHOUT COMPLICATION: ICD-10-CM

## 2022-06-13 DIAGNOSIS — Z63.6 CAREGIVER STRESS: ICD-10-CM

## 2022-06-13 DIAGNOSIS — R73.03 PREDIABETES: ICD-10-CM

## 2022-06-13 DIAGNOSIS — E55.9 VITAMIN D DEFICIENCY: ICD-10-CM

## 2022-06-13 LAB
25(OH)D3 SERPL-MCNC: 29 NG/ML (ref 30–100)
EST. AVERAGE GLUCOSE BLD GHB EST-MCNC: 128 MG/DL
HBA1C MFR BLD: 6.1 % (ref 4–5.6)

## 2022-06-13 PROCEDURE — 99214 OFFICE O/P EST MOD 30 MIN: CPT | Performed by: PHYSICIAN ASSISTANT

## 2022-06-13 RX ORDER — AZELASTINE 1 MG/ML
2 SPRAY, METERED NASAL 2 TIMES DAILY
Qty: 3 EACH | Refills: 1 | Status: SHIPPED | OUTPATIENT
Start: 2022-06-13

## 2022-06-13 RX ORDER — MONTELUKAST SODIUM 10 MG/1
10 TABLET ORAL DAILY
Qty: 90 TABLET | Refills: 1 | Status: SHIPPED | OUTPATIENT
Start: 2022-06-13

## 2022-06-13 RX ORDER — ALPRAZOLAM 0.5 MG/1
0.5 TABLET ORAL
Qty: 30 TABLET | Refills: 0 | Status: SHIPPED | OUTPATIENT
Start: 2022-06-13

## 2022-06-13 SDOH — SOCIAL STABILITY - SOCIAL INSECURITY: DEPENDENT RELATIVE NEEDING CARE AT HOME: Z63.6

## 2022-06-13 NOTE — PROGRESS NOTES
HPI:  46 y.o.  presents for follow up appointment. No hospital, ER or specialist visits since last primary care visit except as noted below.     Migraine  Visit 3/10/22 - No relief with fioricet  Has seen neuro in past  Increasing temporal pain, will check ESR and CBC, reassuring that she is not tender and no jaw pain or claudication  First trial of triptan, new start imitrex 50 mg  Follow up for recheck 3 mos  She asked about nurtec, will try triptan first  TODAY 6/13/22 - she has not had a severe HA since last visit, except had to take the fioricet once; she has not tried the imitrex yet  -normal ESR, CBC on 3/10/22      Caretaker stress  -on xanax 0.5 mg prn  -she takes 1 dose about twice a week due to stress with her dad or at work, rarely a 3rd dose in a week  - reviewed, last filled 3/10/22 xanax 0.5 mg #30  -she reports she has about #10 pills left, but did not bring bottle with her    Vitamin D deficiency  Last level 19.8 on 12/2021  She completed ergo 50K x 3 mos in 3/2022 and is now on D3 2000 units    Asthma and allergies  Daily meds include singulair and Astelin nasal, and Zyrtec OTC  Has albuterol and Duoneb on hand for nebulizer, uses prn about twice a year  Albuterol HFA prn, rare use about 6x/year    Hives  Takes hydroxyzine 50 mg QHS, and may take 25 mg daytime prn itching    Prediabetes  A1C 6.2% on 3/2022, previously 5.9%  She declines nutritionist    C/O fecal incontinence, so she scheduled with GI, has appt 6/16/22  She has never had colonoscopy, so will discuss that as well    Left leg tightness and pain, cramping in calf last week  H/o DVT, her oncologist Dr Oli Pollard ordered doppler, negative on 6/8/22  Pain improved with heating pad, elevation, and increased water intake      Patient Active Problem List    Diagnosis    Prediabetes    Deep vein thrombosis (DVT) ([facility] [address]    History of DVT of lower extremity    Chronic otitis media     Dr. Kiran Stern DVT of deep femoral vein ([facility] [address] Acute partially occlusive DVT of L Common Fem Vein and L Prox Fem Vein and Acute occlusive DVT of L deep femoral vein. Txd with Xarelto x 3 months. due to BCP.  Family history of lung cancer    Allergy to sulfa drugs    Penicillin allergy    Adverse effect of anabolic steroid     flushing and leathery skin after taking oral steroids      Obesity, morbid (HCC)    Hyperglycemia    Hypothyroidism due to acquired atrophy of thyroid    Mild intermittent asthma without complication    Chronic low back pain with right-sided sciatica     and SI joint dysfunction. Dr. Dominga Flores.  Hearing loss    Dry eyes    Family history of breast cancer    Family history of stroke    Family history of diabetes mellitus (DM)    Family history of heart attack    Family history of colonic polyps    Family history of melanoma    Migraine without aura and without status migrainosus, not intractable    Plantar fasciitis, bilateral     Dr. Joana Correa      Dyslipidemia    Tinnitus of right ear     Dr. Lupe Leong Right ankle sprain    Vitamin D deficiency    Knee pain, left    Acne rosacea    Intraocular pressure increase    Allergic rhinitis    Chronic ear infection     R>L      GERD (gastroesophageal reflux disease)    Fe deficiency anemia     Borderline           Past Medical History:   Diagnosis Date    Acne rosacea     Dr. Gorge Street.  Allergy, unspecified not elsewhere classified childhood     Txd with immunotherapy. Dr. Stewart Reyna Anemia NEC     borderline    Ankle sprain 2007    Right. Dr. Gustavo Moritz    Ankle sprain 11/2012    Right. Dr. Ressie Frankel.  Asthma childhood    Chickenpox childhood    Chronic low back pain with right-sided sciatica     and SI joint dysfunction. Dr. Dominga Flores.  Chronic otitis media     Dr. Stewart Reyna Dry eye syndrome 2013    Dr. Zhane Velazquez.     DVT of deep femoral vein ([facility] [address] 12/07/2018    Acute occlusive DVT of L deep femoral vein and Acute partially occlusive DVT of L Common Fem Vein and L Prox Fem Vein. Txd with Xarelto x 3 months. due to BCP. Dr. Nam Quiroz EBV infection 6/27/2011    Hearing loss     Mild high frequency sensorineuronal hearing loss. Dr. Jonah Ramírez murmur     Hernia of abdominal wall 09/2003, 97/1990    umbilical.  Dr. Benigno Burkitt. Dr. Lora Rodriges    Hyperglycemia 2013    Hypothyroidism 06/2010    Dr. Colón [place] pressure increase 12/2011    Dr. Adrianne Thompson. Dr. Trinity Cam. Dr. Alessio Leach.  Knee pain, left 04/2012    Left. Dr. Karyn Arrington Measles childhood    Migraine 2017, 07/2019    w aura and facial paresthesia. Dr. Miguel Noyola    Mumps childhood    Plantar fasciitis, bilateral 2010    Dr. Honorio Juarez   Trung Safe Sciatica 2008    Right.  with OA. Dr. Rafael Santos Tinnitus of right ear 2012    Dr. Caitlin Ventura History     Tobacco Use    Smoking status: Passive Smoke Exposure - Never Smoker    Smokeless tobacco: Never Used    Tobacco comment: lived with smoker dad and mom then step mom x 20 yrs   Vaping Use    Vaping Use: Never used   Substance Use Topics    Alcohol use: Yes     Alcohol/week: 1.0 standard drink     Types: 1 Glasses of wine per week     Comment: RARE    Drug use: No       Outpatient Medications Marked as Taking for the 6/13/22 encounter (Office Visit) with Veronica Layne PA-C   Medication Sig Dispense Refill    SUMAtriptan (IMITREX) 50 mg tablet Take 1 tab po at first onset migraine. May repeat in 2 hours if needed. No more than 2 pills in 24 hours. 12 Tablet 5    ALPRAZolam (XANAX) 0.5 mg tablet Take 1 Tablet by mouth daily as needed for Anxiety.  30 Tablet 0    cyclobenzaprine (FLEXERIL) 10 mg tablet TAKE 1 TABLET BY MOUTH 3 TIMES A DAY AS NEEDED FOR MUSCLE SPASMS 90 Tablet 0    butalbital-acetaminophen-caffeine (FIORICET, ESGIC) -40 mg per tablet TAKE 1 TABLET BY MOUTH EVERY 6 HOURS AS NEEDED FOR PAIN OR HEADACHE 30 Tablet 0    albuterol (PROVENTIL HFA, VENTOLIN HFA, PROAIR HFA) 90 mcg/actuation inhaler INHALE 2 PUFFS BY MOUTH EVERY 4 HOURS AS NEEDED FOR WHEEZING. INDICATIONS: BRONCHOSPASM PREVENTIONM 6.7 Each 1    levothyroxine (SYNTHROID) 125 mcg tablet Take 1 Tablet by mouth Daily (before breakfast). 90 Tablet 3    montelukast (SINGULAIR) 10 mg tablet Take 1 Tablet by mouth daily. 90 Tablet 1    doxycycline (VIBRAMYCIN) 50 mg capsule Take 50 mg by mouth daily.  hydrOXYzine HCL (ATARAX) 25 mg tablet Take 2 Tablets by mouth every eight (8) hours as needed (urticaria). Indications: hives 540 Tablet 3    albuterol (PROVENTIL VENTOLIN) 2.5 mg /3 mL (0.083 %) nebu 3 mL by Nebulization route every four (4) hours as needed for Wheezing or Shortness of Breath. Indications: asthma attack 30 Each 1    albuterol-ipratropium (DUO-NEB) 2.5 mg-0.5 mg/3 ml nebu USE 1 VIAL VIA NEBULLIZER EVERY 6 HOURS AS NEEDED FOR BREATHING 90 mL 1    NAFTIN 2 % gel Use as directed  Indications: athlete's foot 60 g 2    clobetasol (TEMOVATE) 0.05 % topical cream as needed. 1    azelastine (ASTELIN) 137 mcg (0.1 %) nasal spray 2 Sprays by Both Nostrils route two (2) times a day. Use in each nostril as directed  Indications: Seasonal Runny Nose 3 Bottle 1    omeprazole (PRILOSEC) 40 mg capsule TAKE 1 CAPSULE BY MOUTH EVERY DAY 90 Cap 1    Nebulizer & Compressor machine 1 Each by Other route four (4) times daily as needed. 1 Each 0    terconazole (TERAZOL 7) 0.4 % vaginal cream   6    LOCOID 0.1 % lotn   1    valACYclovir (VALTREX) 500 mg tablet   1    terconazole (TERAZOL 3) 80 mg vaginal suppository   4    multivitamin (ONE A DAY) tablet Take 1 tablet by mouth daily.  PROPYLENE GLYCOL//PF (SYSTANE, PF, OP) Apply  to eye four (4) times daily.  Emollient Combination No.32 (EPICERAM) Emul by Apply Externally route two (2) times a day. From Dr Shakira Koehler. For Acne rosacea.       metroNIDAZOLE (METROGEL) 1 % topical gel Apply  to affected area two (2) times a day. Use a thin layer to affected areas after washing from Dr [place]   Indications: a skin condition on the cheeks and nose with a reddish rash and acne called acne rosacea      latanoprost (XALATAN) 0.005 % ophthalmic solution Administer 1 Drop to both eyes nightly.  LACTOBACILLUS/FOS/PECTIN (PROBIOTIC COMPLEX PO) Take  by mouth daily. Allergies   Allergen Reactions    Latex Rash    Junel 1.5/30 (21) [Norethindrone Ac-Eth Estradiol] Swelling     L FOOT DUE TO NEW DVT    Other Medication Hives and Rash     SUN    Pcn [Penicillins] Hives    Prednisone Hives    Sulfa (Sulfonamide Antibiotics) Hives    Garamycin [Gentamicin] Other (comments)     Itchy eyes due to sulfate    Metformin Diarrhea       ROS:  ROS negative except as per HPI. PE:  Visit Vitals  /73 (BP 1 Location: Left arm, BP Patient Position: Sitting, BP Cuff Size: Large adult)   Pulse 73   Temp 98.7 °F (37.1 °C) (Oral)   Resp 16   Ht 5' 2\" (1.575 m)   Wt 325 lb (147.4 kg)   SpO2 98%   BMI 59.44 kg/m²     Gen: alert, oriented, no acute distress  Head: normocephalic, atraumatic  Eyes: pupils equal round reactive to light, sclera clear, conjunctiva clear  Oral: masked  Neck: supple  Resp: no increase work of breathing, lungs clear to ausculation bilaterally, no wheezing, rales or rhonchi  CV: S1, S2 normal.  No murmurs, rubs, or gallops. Abd: soft, not tender, not distended. Normal bowel sounds. Neuro: grossly intact  Skin: no lesion or rash  Extremities: no cyanosis or edema    No results found for this visit on 06/13/22.     Results for orders placed or performed in visit on 03/10/22   SED RATE (ESR)   Result Value Ref Range    Sed rate, automated 20 0 - 30 mm/hr   CBC WITH AUTOMATED DIFF   Result Value Ref Range    WBC 8.6 3.6 - 11.0 K/uL    RBC 4.48 3.80 - 5.20 M/uL    HGB 12.2 11.5 - 16.0 g/dL    HCT 40.1 35.0 - 47.0 %    MCV 89.5 80.0 - 99.0 FL    MCH 27.2 26.0 - 34.0 PG    MCHC 30.4 30.0 - 36.5 g/dL    RDW 14.3 11.5 - 14.5 %    PLATELET 689 180 - 511 K/uL    MPV 11.9 8.9 - 12.9 FL    NRBC 0.0 0  WBC    ABSOLUTE NRBC 0.00 0.00 - 0.01 K/uL    NEUTROPHILS 51 32 - 75 %    LYMPHOCYTES 37 12 - 49 %    MONOCYTES 8 5 - 13 %    EOSINOPHILS 2 0 - 7 %    BASOPHILS 1 0 - 1 %    IMMATURE GRANULOCYTES 1 (H) 0.0 - 0.5 %    ABS. NEUTROPHILS 4.5 1.8 - 8.0 K/UL    ABS. LYMPHOCYTES 3.2 0.8 - 3.5 K/UL    ABS. MONOCYTES 0.7 0.0 - 1.0 K/UL    ABS. EOSINOPHILS 0.2 0.0 - 0.4 K/UL    ABS. BASOPHILS 0.1 0.0 - 0.1 K/UL    ABS. IMM. GRANS. 0.1 (H) 0.00 - 0.04 K/UL    DF AUTOMATED     HEMOGLOBIN A1C WITH EAG   Result Value Ref Range    Hemoglobin A1c 6.2 (H) 4.0 - 5.6 %    Est. average glucose 131 mg/dL   TOXASSURE SELECT 13 (MW)   Result Value Ref Range    Summary Note          Assessment/Plan:      ICD-10-CM ICD-9-CM    1. Migraine without aura and without status migrainosus, not intractable  G43.009 346.10    2. Caregiver stress  Z63.6 V61.49 ALPRAZolam (XANAX) 0.5 mg tablet   3. Mild intermittent asthma without complication  G10.32 261.41 montelukast (SINGULAIR) 10 mg tablet    stable   4. Allergic conjunctivitis and rhinitis, bilateral  H10.13 372.05 azelastine (ASTELIN) 137 mcg (0.1 %) nasal spray    J30.9 477.9     stable   5. Prediabetes  R73.03 790.29 HEMOGLOBIN A1C WITH EAG   6. Vitamin D deficiency  E55.9 268.9 VITAMIN D, 25 HYDROXY     1. Migraine  At visit in March 2022, she was not having relief with Fioricet  She is having increasing temporal pain, so I checked CBC and sed rate which were normal  And gave her a trial of triptan, Imitrex 50 mg  Today she reports not having a severe headache since last visit and has not tried Imitrex yet  She is use Fioricet once since last visit  Continue same plan, triptan for severe headache and let me know how that is working for her    2.   Caregiver stress  Xanax 0.5 mg as needed, about twice weekly, sometimes a third dose in a week   reviewed and appropriate  Controlled substance agreement has been signed  UDS - 3/2022  Refill Xanax, follow-up 3 months    3/4. Asthma and allergies  Stable on Singulair and Astelin daily, refills given  She also takes Zyrtec daily  Has albuterol to use as needed    5. Prediabetes  A1c 6.2% on 3/2022, previously 5.9%  She declines seeing a nutritionist today  Low sugar, prediabetic diet reviewed    6. Vitamin D deficiency  Last level 19.8 on 2021  She completed ergocalciferol for 3 months in 2022, and is now taking D3 2000 units daily  Will monitor levels    Health Maintenance reviewed - updated. Seeing GI 22 to discuss colonoscopy    Orders Placed This Encounter    VITAMIN D, 25 HYDROXY     Standing Status:   Future     Standing Expiration Date:   2023    HEMOGLOBIN A1C WITH EAG     Standing Status:   Future     Standing Expiration Date:   2023    ALPRAZolam (XANAX) 0.5 mg tablet     Sig: Take 1 Tablet by mouth daily as needed for Anxiety. Dispense:  30 Tablet     Refill:  0    montelukast (SINGULAIR) 10 mg tablet     Sig: Take 1 Tablet by mouth daily. Dispense:  90 Tablet     Refill:  1    azelastine (ASTELIN) 137 mcg (0.1 %) nasal spray     Si Sprays by Both Nostrils route two (2) times a day. Use in each nostril as directed  Indications: seasonal runny nose     Dispense:  3 Each     Refill:  1       Medications Discontinued During This Encounter   Medication Reason    azelastine (ASTELIN) 137 mcg (0.1 %) nasal spray REORDER    montelukast (SINGULAIR) 10 mg tablet REORDER    ALPRAZolam (XANAX) 0.5 mg tablet REORDER       Current Outpatient Medications   Medication Sig Dispense Refill    ALPRAZolam (XANAX) 0.5 mg tablet Take 1 Tablet by mouth daily as needed for Anxiety. 30 Tablet 0    montelukast (SINGULAIR) 10 mg tablet Take 1 Tablet by mouth daily.  90 Tablet 1    azelastine (ASTELIN) 137 mcg (0.1 %) nasal spray 2 Sprays by Both Nostrils route two (2) times a day. Use in each nostril as directed  Indications: seasonal runny nose 3 Each 1    SUMAtriptan (IMITREX) 50 mg tablet Take 1 tab po at first onset migraine. May repeat in 2 hours if needed. No more than 2 pills in 24 hours. 12 Tablet 5    cyclobenzaprine (FLEXERIL) 10 mg tablet TAKE 1 TABLET BY MOUTH 3 TIMES A DAY AS NEEDED FOR MUSCLE SPASMS 90 Tablet 0    butalbital-acetaminophen-caffeine (FIORICET, ESGIC) -40 mg per tablet TAKE 1 TABLET BY MOUTH EVERY 6 HOURS AS NEEDED FOR PAIN OR HEADACHE 30 Tablet 0    albuterol (PROVENTIL HFA, VENTOLIN HFA, PROAIR HFA) 90 mcg/actuation inhaler INHALE 2 PUFFS BY MOUTH EVERY 4 HOURS AS NEEDED FOR WHEEZING. INDICATIONS: BRONCHOSPASM PREVENTIONM 6.7 Each 1    levothyroxine (SYNTHROID) 125 mcg tablet Take 1 Tablet by mouth Daily (before breakfast). 90 Tablet 3    doxycycline (VIBRAMYCIN) 50 mg capsule Take 50 mg by mouth daily.  hydrOXYzine HCL (ATARAX) 25 mg tablet Take 2 Tablets by mouth every eight (8) hours as needed (urticaria). Indications: hives 540 Tablet 3    albuterol (PROVENTIL VENTOLIN) 2.5 mg /3 mL (0.083 %) nebu 3 mL by Nebulization route every four (4) hours as needed for Wheezing or Shortness of Breath. Indications: asthma attack 30 Each 1    albuterol-ipratropium (DUO-NEB) 2.5 mg-0.5 mg/3 ml nebu USE 1 VIAL VIA NEBULLIZER EVERY 6 HOURS AS NEEDED FOR BREATHING 90 mL 1    NAFTIN 2 % gel Use as directed  Indications: athlete's foot 60 g 2    clobetasol (TEMOVATE) 0.05 % topical cream as needed. 1    omeprazole (PRILOSEC) 40 mg capsule TAKE 1 CAPSULE BY MOUTH EVERY DAY 90 Cap 1    Nebulizer & Compressor machine 1 Each by Other route four (4) times daily as needed.  1 Each 0    terconazole (TERAZOL 7) 0.4 % vaginal cream   6    LOCOID 0.1 % lotn   1    valACYclovir (VALTREX) 500 mg tablet   1    terconazole (TERAZOL 3) 80 mg vaginal suppository   4    multivitamin (ONE A DAY) tablet Take 1 tablet by mouth daily.      PROPYLENE GLYCOL//PF (SYSTANE, PF, OP) Apply  to eye four (4) times daily.  Emollient Combination No.32 (EPICERAM) Emul by Apply Externally route two (2) times a day. From Dr Trent Guevara. For Acne rosacea.  metroNIDAZOLE (METROGEL) 1 % topical gel Apply  to affected area two (2) times a day. Use a thin layer to affected areas after washing from Dr Trent Guevara   Indications: a skin condition on the cheeks and nose with a reddish rash and acne called acne rosacea      latanoprost (XALATAN) 0.005 % ophthalmic solution Administer 1 Drop to both eyes nightly.  LACTOBACILLUS/FOS/PECTIN (PROBIOTIC COMPLEX PO) Take  by mouth daily. Recommended healthy diet low in carbohydrates, fats, sodium and cholesterol. Recommended regular cardiovascular exercise 3-6 times per week for 30-60 minutes daily. Verbal and written instructions (see AVS) provided. Patient expresses understanding of diagnosis and treatment plan. Follow-up and Dispositions    · Return in about 3 months (around 9/13/2022) for stress, migraine.        Future Appointments   Date Time Provider Cindy Brown   9/15/2022  8:30 AM Veronica Alejandre PA-C [facility]

## 2022-06-13 NOTE — PROGRESS NOTES
Sari Burrell presents today at the clinic for follow up.      Chief Complaint   Patient presents with    Follow-up        Wt Readings from Last 3 Encounters:   06/13/22 325 lb (147.4 kg)   03/10/22 318 lb 1.6 oz (144.3 kg)   12/02/21 316 lb 6.4 oz (143.5 kg)     Temp Readings from Last 3 Encounters:   03/10/22 97.8 °F (36.6 °C) (Temporal)   12/02/21 97.4 °F (36.3 °C) (Temporal)   08/26/21 98 °F (36.7 °C) (Temporal)     BP Readings from Last 3 Encounters:   03/10/22 132/76   12/02/21 126/74   08/26/21 124/84     Pulse Readings from Last 3 Encounters:   03/10/22 69   12/02/21 69   08/26/21 87       Health Maintenance Due   Topic    Pneumococcal 0-64 years (1 - PCV)    Shingrix Vaccine Age 50> (1 of 2)    COVID-19 Vaccine (3 - Booster for Moderna series)    Cervical cancer screen     Colorectal Cancer Screening Combo          Learning Assessment:  :     Learning Assessment 4/18/2017 11/20/2014   PRIMARY LEARNER Patient Patient   HIGHEST LEVEL OF EDUCATION - PRIMARY LEARNER  - TR[employer] SCHO[employer]   BARRIERS PRIMARY LEARNER - NONE     - NONE   PRIMARY LANGUAGE ENGLISH ENGLISH   LEARNER PREFERENCE PRIMARY PICTURES PICTURES     READING READING     VIDEOS VIDEOS   ANSWERED BY patient patient   RELATIONSHIP SELF SELF       Depression Screening:  :     3 most recent PHQ Screens 6/13/2022   Little interest or pleasure in doing things Not at all   Feeling down, depressed, irritable, or hopeless Not at all   Total Score PHQ 2 0   Trouble falling or staying asleep, or sleeping too much -   Feeling tired or having little energy -   Poor appetite, weight loss, or overeating -   Feeling bad about yourself - or that you are a failure or have let yourself or your family down -   Trouble concentrating on things such as school, work, reading, or watching TV -   Moving or speaking so slowly that other people could have noticed; or the opposite being so fidgety that others notice -   Thoughts of being better off dead, or hurting yourself in some way -   PHQ 9 Score -       Fall Risk Assessment:  :     Fall Risk Assessment, last 12 mths 8/26/2021   Able to walk? Yes   Fall in past 12 months? 0   Do you feel unsteady? 0   Are you worried about falling 0       Abuse Screening:  :     Abuse Screening Questionnaire 8/26/2021 12/4/2019 1/24/2019 2/8/2018 6/23/2015   Do you ever feel afraid of your partner? N N N N N   Are you in a relationship with someone who physically or mentally threatens you? N N N N N   Is it safe for you to go home? Y Y Y Y Y       Coordination of Care Questionnaire:  :     1. Have you been to the ER, urgent care clinic since your last visit? Hospitalized since your last visit? No    2. Have you seen or consulted any other health care providers outside of the [address] [place] [address] since your last visit? Include any pap smears or colon screening.  No

## 2022-06-16 ENCOUNTER — TRANSCRIBE ORDER (OUTPATIENT)
Dept: SCHEDULING | Age: 52
End: 2022-06-16

## 2022-06-16 DIAGNOSIS — Z83.71 FAMILY HISTORY OF COLONIC POLYPS: Primary | ICD-10-CM

## 2022-06-16 DIAGNOSIS — R10.13 EPIGASTRIC PAIN: ICD-10-CM

## 2022-06-16 DIAGNOSIS — Z12.11 SCREEN FOR COLON CANCER: ICD-10-CM

## 2022-06-26 ENCOUNTER — OFFICE VISIT (OUTPATIENT)
Dept: URGENT CARE | Age: 52
End: 2022-06-26
Payer: COMMERCIAL

## 2022-06-26 VITALS
HEART RATE: 80 BPM | OXYGEN SATURATION: 100 % | TEMPERATURE: 99.8 F | BODY MASS INDEX: 53.92 KG/M2 | HEIGHT: 62 IN | WEIGHT: 293 LBS | SYSTOLIC BLOOD PRESSURE: 129 MMHG | DIASTOLIC BLOOD PRESSURE: 77 MMHG | RESPIRATION RATE: 15 BRPM

## 2022-06-26 DIAGNOSIS — J06.9 UPPER RESPIRATORY TRACT INFECTION, UNSPECIFIED TYPE: Primary | ICD-10-CM

## 2022-06-26 DIAGNOSIS — H65.92 LEFT OTITIS MEDIA WITH EFFUSION: ICD-10-CM

## 2022-06-26 PROCEDURE — 99202 OFFICE O/P NEW SF 15 MIN: CPT | Performed by: NURSE PRACTITIONER

## 2022-06-26 RX ORDER — LEVOFLOXACIN 750 MG/1
750 TABLET ORAL DAILY
Qty: 7 TABLET | Refills: 0 | Status: SHIPPED | OUTPATIENT
Start: 2022-06-26 | End: 2022-06-27 | Stop reason: SINTOL

## 2022-06-26 RX ORDER — BENZONATATE 100 MG/1
100 CAPSULE ORAL
Qty: 21 CAPSULE | Refills: 0 | Status: SHIPPED | OUTPATIENT
Start: 2022-06-26 | End: 2022-07-03

## 2022-06-26 NOTE — PROGRESS NOTES
The history is provided by the patient. Cough  The history is provided by the patient. This is a new problem. The current episode started more than 2 days ago. The problem occurs constantly. The problem has been gradually worsening. The cough is productive of sputum. There has been a fever of 100 - 100.9 F. Associated symptoms include ear congestion and rhinorrhea. Pertinent negatives include no chest pain. She has tried cough syrup and inhalers for the symptoms. The treatment provided mild relief. Her past medical history is significant for asthma. Past Medical History:   Diagnosis Date    Acne rosacea     Dr. Rosita Solitario.  Allergy, unspecified not elsewhere classified childhood     Txd with immunotherapy. Dr. Ernie Silverman Anemia NEC     borderline    Ankle sprain 2007    Right. Dr. Toledo Clarity    Ankle sprain 11/2012    Right. Dr. Cooper.  Asthma childhood    Chickenpox childhood    Chronic low back pain with right-sided sciatica     and SI joint dysfunction. Dr. Madeline Monzon.  Chronic otitis media     Dr. Ernie Silverman Dry eye syndrome 2013    Dr. Coy Dunaway.  DVT of deep femoral vein ([address] 12/07/2018    Acute occlusive DVT of L deep femoral vein and Acute partially occlusive DVT of L Common Fem Vein and L Prox Fem Vein. Txd with Xarelto x 3 months. due to BCP. Dr. Luz Maria Solitario EBV infection 6/27/2011    Hearing loss     Mild high frequency sensorineuronal hearing loss. Dr. Sullivan [facility]     Dr. Myriam Lewis murmur     Hernia of abdominal wall 09/2003, 47/9934    umbilical.  Dr. Gavin Johnson. Dr. Belén Mari    Hyperglycemia 2013    Hypothyroidism 06/2010    Dr. Eduardo Owusu pressure increase 12/2011    Dr. Pedro Chapman. Dr. Coy Dunaway. Dr. Ivonne Ordoñez.  Knee pain, left 04/2012    Left. Dr. Silvia Landry Measles childhood    Migraine 2017, 07/2019    w aura and facial paresthesia.   Dr. Sarita Vuong  Mumps childhood    Plantar fasciitis, bilateral 2010    Dr. Zane Rebolledo    Sciatica 2008    Right.  with OA. Dr. Zachary Mccarthy Tinnitus of right ear 2012    Dr. Jenean Merlin        Past Surgical History:   Procedure Laterality Date   Tarik Bishop  2011    Dr. Claudene Rana.  HAND/FINGER SURGERY UNLISTED  09/2003    Right Index finger repair due to cut    HX HERNIA REPAIR  88/8241    Umbilical.  Dr. Bhavana Yarbrough.  HX HERNIA REPAIR  9/23/2011    recurrent umbilical.  Laparoscopy incisional.  Dr. Monet Del Castillo.  HX TONSILLECTOMY  childhood    [place] LAP,CHOLECYSTECTOMY  07/2001         Family History   Problem Relation Age of Onset    Hypertension Mother     Cancer Mother         small cell lung with bone mets to spine/MELANOMA    Cancer Father         melanoma    OSTEOARTHRITIS Father     Colon Polyps Father     Diabetes Maternal Grandmother     Stroke Maternal Grandmother     Seizures Maternal Grandmother     Colon Polyps Maternal Grandmother     Breast Cancer Maternal Grandmother     Heart Disease Paternal Grandmother         heart failure    Heart Attack Paternal Grandmother     Asthma Paternal Grandmother     Depression Sister     Other Maternal Grandfather         Shannen Loza Ds        Social History     Socioeconomic History    Marital status: SINGLE     Spouse name: Not on file    Number of children: Not on file    Years of education: Not on file    Highest education level: Not on file   Occupational History    Not on file   Tobacco Use    Smoking status: Passive Smoke Exposure - Never Smoker    Smokeless tobacco: Never Used    Tobacco comment: lived with smoker dad and mom then step mom x 20 yrs   Vaping Use    Vaping Use: Never used   Substance and Sexual Activity    Alcohol use:  Yes     Alcohol/week: 1.0 standard drink     Types: 1 Glasses of wine per week     Comment: RARE    Drug use: No    Sexual activity: Yes     Partners: Male     Birth control/protection: Condom Other Topics Concern    Not on file   Social History Narrative    Not on file     Social Determinants of Health     Financial Resource Strain:     Difficulty of Paying Living Expenses: Not on file   Food Insecurity:     Worried About Running Out of Food in the Last Year: Not on file    Yordan of Food in the Last Year: Not on file   Transportation Needs:     Lack of Transportation (Medical): Not on file    Lack of Transportation (Non-Medical): Not on file   Physical Activity:     Days of Exercise per Week: Not on file    Minutes of Exercise per Session: Not on file   Stress:     Feeling of Stress : Not on file   Social Connections:     Frequency of Communication with Friends and Family: Not on file    Frequency of Social Gatherings with Friends and Family: Not on file    Attends Confucianism Services: Not on file    Active Member of [address] [place] [address] or Organizations: Not on file    Attends Club or Organization Meetings: Not on file    Marital Status: Not on file   Intimate Partner Violence:     Fear of Current or Ex-Partner: Not on file    Emotionally Abused: Not on file    Physically Abused: Not on file    Sexually Abused: Not on file   Housing Stability:     Unable to Pay for Housing in the Last Year: Not on file    Number of [place] in the Last Year: Not on file    Unstable Housing in the Last Year: Not on file                ALLERGIES: Latex, Junel 1.5/30 (21) [norethindrone ac-eth estradiol], Other medication, Pcn [penicillins], Prednisone, Sulfa (sulfonamide antibiotics), Garamycin [gentamicin], and Metformin    Review of Systems   Constitutional: Positive for fever. Negative for activity change and fatigue. HENT: Positive for congestion, postnasal drip and rhinorrhea. Respiratory: Positive for cough. Cardiovascular: Negative for chest pain.        Vitals:    06/26/22 1852   BP: 129/77   Pulse: 80   Resp: 15   Temp: 99.8 °F (37.7 °C)   SpO2: 100%   Weight: 321 lb (145.6 kg)   Height: 5' 2\" (1.575 m)       Physical Exam  Constitutional:       Appearance: Normal appearance. She is obese. HENT:      Right Ear: Tympanic membrane normal.      Left Ear: A middle ear effusion is present. Tympanic membrane is erythematous. Nose: Congestion and rhinorrhea present. Right Sinus: No maxillary sinus tenderness or frontal sinus tenderness. Left Sinus: No maxillary sinus tenderness or frontal sinus tenderness. Mouth/Throat:      Pharynx: No oropharyngeal exudate or posterior oropharyngeal erythema. Tonsils: No tonsillar exudate. Eyes:      Pupils: Pupils are equal, round, and reactive to light. Cardiovascular:      Rate and Rhythm: Regular rhythm. Heart sounds: Normal heart sounds. Pulmonary:      Breath sounds: Normal breath sounds. Lymphadenopathy:      Cervical: Cervical adenopathy present.          MDM    Procedures

## 2022-06-27 RX ORDER — CIPROFLOXACIN 500 MG/1
500 TABLET ORAL 2 TIMES DAILY
Qty: 14 TABLET | Refills: 0 | Status: SHIPPED | OUTPATIENT
Start: 2022-06-27 | End: 2022-09-15 | Stop reason: ALTCHOICE

## 2022-06-28 DIAGNOSIS — J45.20 MILD INTERMITTENT ASTHMA WITHOUT COMPLICATION: ICD-10-CM

## 2022-06-28 RX ORDER — ALBUTEROL SULFATE 90 UG/1
AEROSOL, METERED RESPIRATORY (INHALATION)
Qty: 6.7 EACH | Refills: 1 | Status: SHIPPED | OUTPATIENT
Start: 2022-06-28

## 2022-07-10 ENCOUNTER — OFFICE VISIT (OUTPATIENT)
Dept: URGENT CARE | Age: 52
End: 2022-07-10
Payer: COMMERCIAL

## 2022-07-10 DIAGNOSIS — H69.81 EUSTACHIAN TUBE DYSFUNCTION, RIGHT: Primary | ICD-10-CM

## 2022-07-10 PROCEDURE — 99213 OFFICE O/P EST LOW 20 MIN: CPT | Performed by: FAMILY MEDICINE

## 2022-07-10 RX ORDER — FLUTICASONE PROPIONATE 50 MCG
2 SPRAY, SUSPENSION (ML) NASAL DAILY
Qty: 1 EACH | Refills: 0 | Status: SHIPPED | OUTPATIENT
Start: 2022-07-10 | End: 2022-09-15 | Stop reason: SDUPTHER

## 2022-07-10 RX ORDER — CETIRIZINE HCL 10 MG
10 TABLET ORAL DAILY
Qty: 30 TABLET | Refills: 0 | Status: SHIPPED | OUTPATIENT
Start: 2022-07-10 | End: 2022-09-15 | Stop reason: SDUPTHER

## 2022-07-10 RX ORDER — CEFDINIR 300 MG/1
300 CAPSULE ORAL 2 TIMES DAILY
Qty: 20 CAPSULE | Refills: 0 | Status: SHIPPED | OUTPATIENT
Start: 2022-07-10 | End: 2022-09-15 | Stop reason: ALTCHOICE

## 2022-07-10 NOTE — PATIENT INSTRUCTIONS
Eustachian Tube Problems: Care Instructions  Overview     The eustachian (say \"you-STAY-shee-un\") tubes run between the inside of the ears and the throat. They keep air pressure stable in the ears. If your eustachian tubes become blocked, the air pressure in your ears changes. The fluids from a cold can clog eustachian tubes, causing pain in the ears. A quick change in air pressure can cause eustachian tubes to close up. This might happen when an airplane changes altitude or when a  goes up or down underwater. Eustachian tube problems often clear up on their own or after treating the cause of the blockage. If your tubes continue to be blocked, you may need surgery. Follow-up care is a key part of your treatment and safety. Be sure to make and go to all appointments, and call your doctor if you are having problems. It's also a good idea to know your test results and keep a list of the medicines you take. How can you care for yourself at home? · Try a simple exercise to help open blocked tubes. Close your mouth, hold your nose, and gently blow as if you are blowing your nose. Yawning and chewing gum also may help. You may hear or feel a \"pop\" when the tubes open. · To ease ear pain, apply a warm washcloth or a heating pad set on low. There may be some drainage from the ear when the heat melts earwax. Put a cloth between the heat source and your skin. · If your doctor prescribed antibiotics, take them as directed. Do not stop taking them just because you feel better. You need to take the full course of antibiotics. · Be safe with medicines. Depending on the cause of the problem, your doctor may recommend over-the-counter medicine. For example, adults may try decongestants for cold symptoms or nasal spray steroids for allergies. Follow the instructions carefully. · Be careful with cough and cold medicines.  Don't give them to children younger than 6, because they don't work for children that age and can even be harmful. For children 6 and older, always follow all the instructions carefully. Make sure you know how much medicine to give and how long to use it. And use the dosing device if one is included. When should you call for help? Call your doctor now or seek immediate medical care if:    · You develop sudden, complete hearing loss.     · You have severe pain or feel dizzy.     · You have new or increasing pus or blood draining from your ear.     · You have redness, swelling, or pain around or behind the ear. Watch closely for changes in your health, and be sure to contact your doctor if:    · You do not get better after 2 weeks.     · You have any new symptoms, such as itching or a feeling of fullness in the ear. Where can you learn more? Go to http://www.gray.com/  Enter Y822 in the search box to learn more about \"Eustachian Tube Problems: Care Instructions. \"  Current as of: September 8, 2021               Content Version: 13.2  © 2006-2022 [email or web address]. Care instructions adapted under license by [employer] (which disclaims liability or warranty for this information). If you have questions about a medical condition or this instruction, always ask your healthcare professional. [address] any warranty or liability for your use of this information.

## 2022-07-10 NOTE — PROGRESS NOTES
Ear Pain  This is a new problem. The current episode started more than 1 week ago. The problem occurs constantly. The problem has not changed since onset. Associated symptoms comments: Rt ear fullness and pressure  completely closed  Treated with Cipro- not improved   No vertigo= no hearing loss but muffleled  No headache. Exacerbated by: swallowing/ ywawning - pressure on ear  Nothing relieves the symptoms. Treatments tried: cipro. Past Medical History:   Diagnosis Date    Acne rosacea     Dr. Emani Israel.  Allergy, unspecified not elsewhere classified childhood     Txd with immunotherapy. Dr. Agata Arregiun Anemia NEC     borderline    Ankle sprain 2007    Right. Dr. Yen Kelley    Ankle sprain 11/2012    Right. Dr. Ender Hayes.  Asthma childhood    Chickenpox childhood    Chronic low back pain with right-sided sciatica     and SI joint dysfunction. Dr. Denise Da Silva.  Chronic otitis media     Dr. Agata Arreguin Dry eye syndrome 2013    Dr. Brian Brown.  DVT of deep femoral vein ([address] 12/07/2018    Acute occlusive DVT of L deep femoral vein and Acute partially occlusive DVT of L Common Fem Vein and L Prox Fem Vein. Txd with Xarelto x 3 months. due to BCP. Dr. Elidia Ribera EBV infection 6/27/2011    Hearing loss     Mild high frequency sensorineuronal hearing loss. Dr. Macarena Myles murmur     Hernia of abdominal wall 09/2003, 48/9430    umbilical.  Dr. Oliver Mcmullen. Dr. Francesco Elder    Hyperglycemia 2013    Hypothyroidism 06/2010    Dr. Darryl Jimenez pressure increase 12/2011    Dr. Caitlin Christianson. Dr. Brian Brown. Dr. Lesly Mcdaniel.  Knee pain, left 04/2012    Left. Dr. Lazaro Batista Measles childhood    Migraine 2017, 07/2019    w aura and facial paresthesia. Dr. Valeen Boeck    Mumps childhood    Plantar fasciitis, bilateral 2010    Dr. Wanda Arciniega   Dossie Mary Kay Sciatica 2008    Right.  with OA. Dr. Colletta Chris Tinnitus of right ear 2012    Dr. Liane King        Past Surgical History:   Procedure Laterality Date   [place]  2011    Dr. Cruzito Valdovinos.  HAND/FINGER SURGERY UNLISTED  09/2003    Right Index finger repair due to cut    HX HERNIA REPAIR  40/0705    Umbilical.  Dr. Damir Zamora.  HX HERNIA REPAIR  9/23/2011    recurrent umbilical.  Laparoscopy incisional.  Dr. Hawa Parks.  HX TONSILLECTOMY  childhood    [place] LAP,CHOLECYSTECTOMY  07/2001         Family History   Problem Relation Age of Onset    Hypertension Mother     Cancer Mother         small cell lung with bone mets to spine/MELANOMA    Cancer Father         melanoma    OSTEOARTHRITIS Father     Colon Polyps Father     Diabetes Maternal Grandmother     Stroke Maternal Grandmother     Seizures Maternal Grandmother     Colon Polyps Maternal Grandmother     Breast Cancer Maternal Grandmother     Heart Disease Paternal Grandmother         heart failure    Heart Attack Paternal Grandmother     Asthma Paternal Grandmother     Depression Sister     Other Maternal Grandfather         Makenzie Marker Ds        Social History     Socioeconomic History    Marital status: SINGLE     Spouse name: Not on file    Number of children: Not on file    Years of education: Not on file    Highest education level: Not on file   Occupational History    Not on file   Tobacco Use    Smoking status: Passive Smoke Exposure - Never Smoker    Smokeless tobacco: Never Used    Tobacco comment: lived with smoker dad and mom then step mom x 20 yrs   Vaping Use    Vaping Use: Never used   Substance and Sexual Activity    Alcohol use:  Yes     Alcohol/week: 1.0 standard drink     Types: 1 Glasses of wine per week     Comment: RARE    Drug use: No    Sexual activity: Yes     Partners: Male     Birth control/protection: Condom   Other Topics Concern    Not on file   Social History Narrative    Not on file     Social Determinants of Health Financial Resource Strain:     Difficulty of Paying Living Expenses: Not on file   Food Insecurity:     Worried About Running Out of Food in the Last Year: Not on file    Yordan of Food in the Last Year: Not on file   Transportation Needs:     Lack of Transportation (Medical): Not on file    Lack of Transportation (Non-Medical): Not on file   Physical Activity:     Days of Exercise per Week: Not on file    Minutes of Exercise per Session: Not on file   Stress:     Feeling of Stress : Not on file   Social Connections:     Frequency of Communication with Friends and Family: Not on file    Frequency of Social Gatherings with Friends and Family: Not on file    Attends Jewish Services: Not on file    Active Member of [address] [place] [address] or Organizations: Not on file    Attends Club or Organization Meetings: Not on file    Marital Status: Not on file   Intimate Partner Violence:     Fear of Current or Ex-Partner: Not on file    Emotionally Abused: Not on file    Physically Abused: Not on file    Sexually Abused: Not on file   Housing Stability:     Unable to Pay for Housing in the Last Year: Not on file    Number of [place] in the Last Year: Not on file    Unstable Housing in the Last Year: Not on file                ALLERGIES: Latex, Junel 1.5/30 (21) [norethindrone ac-eth estradiol], Other medication, Pcn [penicillins], Prednisone, Sulfa (sulfonamide antibiotics), Garamycin [gentamicin], and Metformin    Review of Systems   Constitutional: Negative for fatigue. HENT: Positive for congestion and ear pain. All other systems reviewed and are negative. There were no vitals filed for this visit. Physical Exam  Vitals and nursing note reviewed. Constitutional:       General: She is not in acute distress. Appearance: She is not ill-appearing. HENT:      Right Ear: A middle ear effusion (??) is present. There is no impacted cerumen. No mastoid tenderness.  Tympanic membrane is retracted (dull ??). Tympanic membrane is not erythematous. Left Ear: There is no impacted cerumen. Pulmonary:      Effort: Pulmonary effort is normal. No respiratory distress. Breath sounds: No wheezing. MDM    Procedures             ICD-10-CM ICD-9-CM    1. Eustachian tube dysfunction, right  H69.81 381.81      Medications Ordered Today   Medications    fluticasone propionate (FLONASE) 50 mcg/actuation nasal spray     Si Sprays by Both Nostrils route daily. Dispense:  1 Each     Refill:  0    cefdinir (OMNICEF) 300 mg capsule     Sig: Take 1 Capsule by mouth two (2) times a day. Dispense:  20 Capsule     Refill:  0    cetirizine (ZyrTEC) 10 mg tablet     Sig: Take 1 Tablet by mouth daily. Dispense:  30 Tablet     Refill:  0     No results found for any visits on 07/10/22. The patients condition was discussed with the patient and they understand. The patient is to follow up with primary care doctor. If signs and symptoms become worse the pt is to go to the ER. The patient is to take medications as prescribed. ICD-10-CM ICD-9-CM    1. Eustachian tube dysfunction, right  H69.81 381.81        Valsalva once every 30 minutes  Motrin- Flonase  Saline rinse  Follow with ENT if not resolved after 10 minute         Medications Ordered Today   Medications    fluticasone propionate (FLONASE) 50 mcg/actuation nasal spray     Si Sprays by Both Nostrils route daily. Dispense:  1 Each     Refill:  0    cefdinir (OMNICEF) 300 mg capsule     Sig: Take 1 Capsule by mouth two (2) times a day. Dispense:  20 Capsule     Refill:  0    cetirizine (ZyrTEC) 10 mg tablet     Sig: Take 1 Tablet by mouth daily. Dispense:  30 Tablet     Refill:  0     No results found for any visits on 07/10/22. The patients condition was discussed with the patient and they understand. The patient is to follow up with primary care doctor.   If signs and symptoms become worse the pt is to go to the ER. The patient is to take medications as prescribed.

## 2022-07-15 DIAGNOSIS — E55.9 VITAMIN D DEFICIENCY: Primary | ICD-10-CM

## 2022-07-15 RX ORDER — ERGOCALCIFEROL 1.25 MG/1
50000 CAPSULE ORAL
Qty: 13 CAPSULE | Refills: 0 | Status: SHIPPED | OUTPATIENT
Start: 2022-07-15 | End: 2022-10-13

## 2022-07-15 NOTE — PROGRESS NOTES
MyChart message sentThe prediabetes is stable, continue prediabetic diet. The vitamin D is improved but still low. I will send in another round of the prescription vitamin D, ergocalciferol once weekly for 3 months.

## 2022-08-29 DIAGNOSIS — L29.9 PRURITIC DERMATITIS: ICD-10-CM

## 2022-08-29 DIAGNOSIS — G47.00 INSOMNIA, UNSPECIFIED TYPE: ICD-10-CM

## 2022-08-29 RX ORDER — HYDROXYZINE 25 MG/1
50 TABLET, FILM COATED ORAL
Qty: 540 TABLET | Refills: 3 | Status: SHIPPED | OUTPATIENT
Start: 2022-08-29

## 2022-09-15 ENCOUNTER — OFFICE VISIT (OUTPATIENT)
Dept: INTERNAL MEDICINE CLINIC | Age: 52
End: 2022-09-15
Payer: COMMERCIAL

## 2022-09-15 VITALS
OXYGEN SATURATION: 100 % | SYSTOLIC BLOOD PRESSURE: 128 MMHG | HEIGHT: 62 IN | BODY MASS INDEX: 53.92 KG/M2 | RESPIRATION RATE: 18 BRPM | HEART RATE: 60 BPM | TEMPERATURE: 97.8 F | DIASTOLIC BLOOD PRESSURE: 76 MMHG | WEIGHT: 293 LBS

## 2022-09-15 DIAGNOSIS — J30.9 ALLERGIC CONJUNCTIVITIS AND RHINITIS, BILATERAL: ICD-10-CM

## 2022-09-15 DIAGNOSIS — E55.9 VITAMIN D DEFICIENCY: ICD-10-CM

## 2022-09-15 DIAGNOSIS — H10.13 ALLERGIC CONJUNCTIVITIS AND RHINITIS, BILATERAL: ICD-10-CM

## 2022-09-15 DIAGNOSIS — R73.03 PREDIABETES: ICD-10-CM

## 2022-09-15 DIAGNOSIS — G43.009 MIGRAINE WITHOUT AURA AND WITHOUT STATUS MIGRAINOSUS, NOT INTRACTABLE: Primary | ICD-10-CM

## 2022-09-15 DIAGNOSIS — B35.3 TINEA PEDIS OF BOTH FEET: ICD-10-CM

## 2022-09-15 DIAGNOSIS — Z63.6 CAREGIVER STRESS: ICD-10-CM

## 2022-09-15 PROCEDURE — 99214 OFFICE O/P EST MOD 30 MIN: CPT | Performed by: PHYSICIAN ASSISTANT

## 2022-09-15 RX ORDER — FLUTICASONE PROPIONATE 50 MCG
2 SPRAY, SUSPENSION (ML) NASAL DAILY
Qty: 3 EACH | Refills: 3 | Status: SHIPPED | OUTPATIENT
Start: 2022-09-15

## 2022-09-15 RX ORDER — NAFTIFINE HYDROCHLORIDE 2 G/100G
GEL TOPICAL
Qty: 60 G | Refills: 2 | Status: SHIPPED | OUTPATIENT
Start: 2022-09-15

## 2022-09-15 RX ORDER — CETIRIZINE HCL 10 MG
10 TABLET ORAL DAILY
Qty: 90 TABLET | Refills: 3 | Status: SHIPPED | OUTPATIENT
Start: 2022-09-15

## 2022-09-15 SDOH — SOCIAL STABILITY - SOCIAL INSECURITY: DEPENDENT RELATIVE NEEDING CARE AT HOME: Z63.6

## 2022-09-15 NOTE — PROGRESS NOTES
Abbi Partida is a 45 y.o. female     Chief Complaint   Patient presents with    Migraine     3M follow up       Visit Vitals  /76 (BP 1 Location: Left arm, BP Patient Position: Sitting, BP Cuff Size: Adult)   Pulse 60   Temp 97.8 °F (36.6 °C) (Oral)   Resp 18   Ht 5' 2\" (1.575 m)   Wt 323 lb 12.8 oz (146.9 kg)   SpO2 100%   BMI 59.22 kg/m²       Health Maintenance Due   Topic Date Due    Pneumococcal 0-64 years (1 - PCV) Never done    Shingrix Vaccine Age 50> (1 of 2) Never done    COVID-19 Vaccine (3 - Booster for Moderna series) 10/05/2021    Cervical cancer screen  11/10/2021    Colorectal Cancer Screening Combo  01/10/2022    Flu Vaccine (1) 09/01/2022         1. \"Have you been to the ER, urgent care clinic since your last visit? Hospitalized since your last visit? \" Yes When: 6/26/22 and 7/10/22 Where: [facility] Reason for visit: upper respiratory infection and ear infection. 2. \"Have you seen or consulted any other health care providers outside of the [address] [place] [address] since your last visit? \" No     3. For patients aged 39-70: Has the patient had a colonoscopy / FIT/ Cologuard? No      If the patient is female:    4. For patients aged 41-77: Has the patient had a mammogram within the past 2 years? Yes - no Care Gap present      5. For patients aged 21-65: Has the patient had a pap smear? Yes - Care Gap present.  Rooming MA/LPN to request most recent results

## 2022-09-15 NOTE — PROGRESS NOTES
HPI:  45 y.o.  presents for follow up appointment. No hospital, ER or specialist visits since last primary care visit except as noted below.     Last visit 6/13/22    Migraine  Visit 3/10/22 - No relief with fioricet  Has seen neuro in past  Increasing temporal pain, will check ESR and CBC, reassuring that she is not tender and no jaw pain or claudication  First trial of triptan, new start imitrex 50 mg  Follow up for recheck 3 mos  She asked about nurtec, will try triptan first  - 6/13/22 - she has not had a severe HA since last visit, except had to take the fioricet once; she has not tried the imitrex yet  -normal ESR, CBC on 3/10/22  TODAY 9/15/22 - has not had migraine since last visit, so has not yet tried the imitrex  -she is able to rest more and has made that a priority, and sleep more  -she overall is just as stressed, but feels she can deal with it better when she is more rested    Caretaker stress  -on xanax 0.5 mg prn  -6/13/22 she takes 1 dose about twice a week due to stress with her dad or at work, rarely a 3rd dose in a week  - reviewed, last filled 3/10/22 xanax 0.5 mg #30  -she reports she has about #10 pills left, but did not bring bottle with her  TODAY 9/13/22 - she is using xanax about once a week   reviewed, last filled 6/13/22 xanax 0.5 mg #30  -she did not bring in her bottled, but reports she does not need refill yet    Vitamin D deficiency  level 19.8 on 12/2021  She completed ergo 50K x 3 mos in 3/2022 and is now on D3 2000 units  Level 29 in 6/2022, so she had ergo 50K x 3 mos in 6/2022, has 2 pills left    Prediabetes  A1C 6.1% in 6/13/22, previously 6.2% on 3/2022, 5.9% in 12/2021  She declines nutritionist  She follows prediabetic diet \"in spurts\"    Asthma and allergies  Daily meds include singulair and Astelin nasal, and Zyrtec OTC  Has albuterol and Duoneb on hand for nebulizer, uses prn about twice a year  Albuterol HFA prn, rare use about 6x/year  -she had sinusitis and eustachian tube dysfunction in July 2022 and was started on Flonase at urgent care, reports the Flonase has really helped; she has continued on her usual singulair, Astelin and zyrtec as well    Tinea pedis  Has intermittent flares, will use naftin, asks for refill to have on hand, currently quiescent    Rosacea - she is currently on doxycycline for a flare, as well as usual metrogel and episerum      Patient Active Problem List    Diagnosis    Prediabetes    Deep vein thrombosis (DVT) ([facility] [address]    History of DVT of lower extremity    Chronic otitis media     Dr. Fredy Greenberg      DVT of deep femoral vein ([facility] [address]     Acute partially occlusive DVT of L Common Fem Vein and L Prox Fem Vein and Acute occlusive DVT of L deep femoral vein. Txd with Xarelto x 3 months. due to BCP. Family history of lung cancer    Allergy to sulfa drugs    Penicillin allergy    Adverse effect of anabolic steroid     flushing and leathery skin after taking oral steroids      Obesity, morbid ([facility] [address]    Hyperglycemia    Hypothyroidism due to acquired atrophy of thyroid    Mild intermittent asthma without complication    Chronic low back pain with right-sided sciatica     and SI joint dysfunction. Dr. Bari Casas.       Hearing loss    Dry eyes    Family history of breast cancer    Family history of stroke    Family history of diabetes mellitus (DM)    Family history of heart attack    Family history of colonic polyps    Family history of melanoma    Migraine without aura and without status migrainosus, not intractable    Plantar fasciitis, bilateral     Dr. Israel Campos      Dyslipidemia    Tinnitus of right ear     Dr. Devyn Lovelace      Right ankle sprain    Vitamin D deficiency    Knee pain, left    Acne rosacea    Intraocular pressure increase    Allergic rhinitis    Chronic ear infection     R>L      GERD (gastroesophageal reflux disease)    Fe deficiency anemia     Borderline           Past Medical History:   Diagnosis Date    Acne rosacea Dr. Tiffanie Fowler. Allergy, unspecified not elsewhere classified childhood     Txd with immunotherapy. Dr. Rafi Alaniz    Anemia NEC     borderline    Ankle sprain 2007    Right. Dr. Aimee Hensley    Ankle sprain 11/2012    Right. Dr. Marietta Sotomayor. Asthma childhood    Chickenpox childhood    Chronic low back pain with right-sided sciatica     and SI joint dysfunction. Dr. Uli Valiente. Chronic otitis media     Dr. Rafi Alaniz    Dry eye syndrome 2013    Dr. Sonia Velasco. DVT of deep femoral vein ([address] 12/07/2018    Acute occlusive DVT of L deep femoral vein and Acute partially occlusive DVT of L Common Fem Vein and L Prox Fem Vein. Txd with Xarelto x 3 months. due to BCP. Dr. James Timmons    EBV infection 6/27/2011    Hearing loss     Mild high frequency sensorineuronal hearing loss. Alen Foster Dilmagdalene murmur     Hernia of abdominal wall 09/2003, 58/0977    umbilical.  Dr. Sandy Fitzpatrick. Dr. Alyssa Merino    Hyperglycemia 2013    Hypothyroidism 06/2010    Dr. Mabel Tracey    Intraocular pressure increase 12/2011    Dr. Mai Govea. Dr. Soina Velasco. Dr. Adams President. Knee pain, left 04/2012    Left. Dr. Mark Reeves    Measles childhood    Migraine 2017, 07/2019    w aura and facial paresthesia. Dr. Alisson Mackey    Mumps childhood    Plantar fasciitis, bilateral 2010    Dr. Bettye Ventura    Sciatica 2008    Right.  with OA. Dr. Brenda Kiser    Tinnitus of right ear 2012    Dr. Cisneros Solo History     Tobacco Use    Smoking status: Passive Smoke Exposure - Never Smoker    Smokeless tobacco: Never    Tobacco comments:     lived with smoker dad and mom then step mom x 20 yrs   Vaping Use    Vaping Use: Never used   Substance Use Topics    Alcohol use:  Yes     Alcohol/week: 1.0 standard drink     Types: 1 Glasses of wine per week     Comment: RARE    Drug use: No       Outpatient Medications Marked as Taking for the 9/15/22 encounter (Office Visit) with Veronica Alejandre PA-C   Medication Sig Dispense Refill    hydrOXYzine HCL (ATARAX) 25 mg tablet TAKE 2 TABLETS BY MOUTH EVERY EIGHT (8) HOURS AS NEEDED (URTICARIA). INDICATIONS: HIVES 540 Tablet 3    ergocalciferol (ERGOCALCIFEROL) 1,250 mcg (50,000 unit) capsule Take 1 Capsule by mouth every seven (7) days for 90 days. 13 Capsule 0    fluticasone propionate (FLONASE) 50 mcg/actuation nasal spray 2 Sprays by Both Nostrils route daily. 1 Each 0    cetirizine (ZyrTEC) 10 mg tablet Take 1 Tablet by mouth daily. 30 Tablet 0    albuterol (PROVENTIL HFA, VENTOLIN HFA, PROAIR HFA) 90 mcg/actuation inhaler INHALE 2 PUFFS BY MOUTH EVERY 4 HOURS AS NEEDED FOR WHEEZING. INDICATIONS: BRONCHOSPASM PREVENTIONM 6.7 Each 1    ALPRAZolam (XANAX) 0.5 mg tablet Take 1 Tablet by mouth daily as needed for Anxiety. 30 Tablet 0    montelukast (SINGULAIR) 10 mg tablet Take 1 Tablet by mouth daily. 90 Tablet 1    azelastine (ASTELIN) 137 mcg (0.1 %) nasal spray 2 Sprays by Both Nostrils route two (2) times a day. Use in each nostril as directed  Indications: seasonal runny nose 3 Each 1    SUMAtriptan (IMITREX) 50 mg tablet Take 1 tab po at first onset migraine. May repeat in 2 hours if needed. No more than 2 pills in 24 hours. 12 Tablet 5    cyclobenzaprine (FLEXERIL) 10 mg tablet TAKE 1 TABLET BY MOUTH 3 TIMES A DAY AS NEEDED FOR MUSCLE SPASMS 90 Tablet 0    butalbital-acetaminophen-caffeine (FIORICET, ESGIC) -40 mg per tablet TAKE 1 TABLET BY MOUTH EVERY 6 HOURS AS NEEDED FOR PAIN OR HEADACHE 30 Tablet 0    levothyroxine (SYNTHROID) 125 mcg tablet Take 1 Tablet by mouth Daily (before breakfast). 90 Tablet 3    doxycycline (VIBRAMYCIN) 50 mg capsule Take 50 mg by mouth daily. albuterol (PROVENTIL VENTOLIN) 2.5 mg /3 mL (0.083 %) nebu 3 mL by Nebulization route every four (4) hours as needed for Wheezing or Shortness of Breath.  Indications: asthma attack 30 Each 1    albuterol-ipratropium (DUO-NEB) 2.5 mg-0.5 mg/3 ml nebu USE 1 VIAL VIA NEBULLIZER EVERY 6 HOURS AS NEEDED FOR BREATHING 90 mL 1    NAFTIN 2 % gel Use as directed  Indications: athlete's foot 60 g 2    clobetasol (TEMOVATE) 0.05 % topical cream as needed. 1    omeprazole (PRILOSEC) 40 mg capsule TAKE 1 CAPSULE BY MOUTH EVERY DAY 90 Cap 1    Nebulizer & Compressor machine 1 Each by Other route four (4) times daily as needed. 1 Each 0    terconazole (TERAZOL 7) 0.4 % vaginal cream   6    LOCOID 0.1 % lotn   1    valACYclovir (VALTREX) 500 mg tablet   1    terconazole (TERAZOL 3) 80 mg vaginal suppository   4    multivitamin (ONE A DAY) tablet Take 1 tablet by mouth daily. PROPYLENE GLYCOL//PF (SYSTANE, PF, OP) Apply  to eye four (4) times daily. Emollient Combination No.32 emul by Apply Externally route two (2) times a day. From Dr Giuliana Underwood. For Acne rosacea. metroNIDAZOLE (METROGEL) 1 % topical gel Apply  to affected area two (2) times a day. Use a thin layer to affected areas after washing from Dr Giuliana Underwood   Indications: a skin condition on the cheeks and nose with a reddish rash and acne called acne rosacea      latanoprost (XALATAN) 0.005 % ophthalmic solution Administer 1 Drop to both eyes nightly. LACTOBACILLUS/FOS/PECTIN (PROBIOTIC COMPLEX PO) Take  by mouth daily. Allergies   Allergen Reactions    Latex Rash    Junel 1.5/30 (21) [Norethindrone Ac-Eth Estradiol] Swelling     L FOOT DUE TO NEW DVT    Other Medication Hives and Rash     SUN    Pcn [Penicillins] Hives    Prednisone Hives    Sulfa (Sulfonamide Antibiotics) Hives    Garamycin [Gentamicin] Other (comments)     Itchy eyes due to sulfate    Metformin Diarrhea       ROS:  ROS negative except as per HPI.       PE:  Visit Vitals  /76 (BP 1 Location: Left arm, BP Patient Position: Sitting, BP Cuff Size: Adult)   Pulse 60   Temp 97.8 °F (36.6 °C) (Oral)   Resp 18   Ht 5' 2\" (1.575 m)   Wt 323 lb 12.8 oz (146.9 kg)   SpO2 100%   BMI 59.22 kg/m²     Gen: alert, oriented, no acute distress  Head: normocephalic, atraumatic  Eyes: pupils equal round reactive to light, sclera clear, conjunctiva clear  Oral: masked  Neck: supple  Resp: no increase work of breathing, lungs clear to ausculation bilaterally, no wheezing, rales or rhonchi  CV: S1, S2 normal.  No murmurs, rubs, or gallops. Abd: soft, not tender, not distended. Normal bowel sounds. Neuro: grossly intact  Skin: no lesion or rash, feet are clear  Extremities: no cyanosis or edema    No results found for this visit on 09/15/22. Lab Results   Component Value Date/Time    Hemoglobin A1c 6.1 (H) 06/13/2022 10:19 AM     Lab Results   Component Value Date/Time    Vitamin D 25-Hydroxy 29.0 (L) 06/13/2022 10:19 AM       Lab Results   Component Value Date/Time    WBC 8.6 03/10/2022 03:40 PM    HGB 12.2 03/10/2022 03:40 PM    HCT 40.1 03/10/2022 03:40 PM    PLATELET 187 60/24/5729 03:40 PM    MCV 89.5 03/10/2022 03:40 PM     Lab Results   Component Value Date/Time    Sodium 137 12/02/2021 12:04 PM    Potassium 4.3 12/02/2021 12:04 PM    Chloride 104 12/02/2021 12:04 PM    CO2 27 12/02/2021 12:04 PM    Anion gap 6 12/02/2021 12:04 PM    Glucose 99 12/02/2021 12:04 PM    BUN 19 12/02/2021 12:04 PM    Creatinine 0.75 12/02/2021 12:04 PM    BUN/Creatinine ratio 25 (H) 12/02/2021 12:04 PM    GFR est AA >60 12/02/2021 12:04 PM    GFR est non-AA >60 12/02/2021 12:04 PM    Calcium 9.4 12/02/2021 12:04 PM    Bilirubin, total 0.4 12/02/2021 12:04 PM    Alk. phosphatase 130 (H) 12/02/2021 12:04 PM    Protein, total 7.8 12/02/2021 12:04 PM    Albumin 4.2 12/02/2021 12:04 PM    Globulin 3.6 12/02/2021 12:04 PM    A-G Ratio 1.2 12/02/2021 12:04 PM    ALT (SGPT) 34 12/02/2021 12:04 PM    AST (SGOT) 13 (L) 12/02/2021 12:04 PM         Assessment/Plan:      ICD-10-CM ICD-9-CM    1. Migraine without aura and without status migrainosus, not intractable  G43.009 346.10       2. Caregiver stress  Z63.6 V61.49       3.  Prediabetes R73.03 790.29 HEMOGLOBIN A1C WITH EAG      HEMOGLOBIN A1C WITH EAG      4. Vitamin D deficiency  E55.9 268.9 VITAMIN D, 25 HYDROXY      VITAMIN D, 25 HYDROXY      5. Allergic conjunctivitis and rhinitis, bilateral  H10.13 372.05 cetirizine (ZyrTEC) 10 mg tablet    J30.9 477.9 fluticasone propionate (FLONASE) 50 mcg/actuation nasal spray      6. Tinea pedis of both feet  B35.3 110.4 Naftin 2 % gel        1. Migraine  Visit in March 2022, she was not having relief with her usual Fioricet  She was having increasing temporal pain, so I checked CBC and sed rate which were normal  I gave her a trial of triptan, Imitrex 50 mg  Again today, since last visit, she has not had a migraine and has not tried the Imitrex yet  She is focusing on getting more rest and sleep when she needs it and this seems to be helping  She will continue with lifestyle changes of sleep and rest  She has Fioricet and Imitrex to use for abortive treatment if needed and we will follow-up in 3 months    2. Caregiver stress  She has Xanax 0.5 mg to use as needed  Today reports her use has been less, about once per week, previously was twice weekly   reviewed and appropriate  Controlled substance agreement has been signed  UDS - 3/2022  Xanax refill not needed today  She will follow-up in 3 months    3. Prediabetes  Most recent A1c 6.1% on 6/2022  She has declined nutritionist in the past and again today  She follows prediabetic diet and spirits  Will monitor and make further recommendations  Reviewed the diagnosis of diabetes at 6.5% and I recommend nutritionist if she continues to be at current levels but she declines at this time    4. Vitamin D deficiency  Last level was 29 after having completed 3 months of ergocalciferol, so I gave her another round and she has 2 pills left  Will monitor levels today and make further recommendations    5.   Allergic rhinitis  She has maintained her usual Singulair, Astelin, Zyrtec  She had a flare of sinusitis and eustachian tube dysfunction in July and was started on Flonase with great benefit  Refill Flonase today and continue her usual regimen    6. Tinea pedis   history of this treated with Naftin, currently quiescent, would like Naftin on hand so a refill was given to use if it flares again      Health Maintenance reviewed - updated. Orders Placed This Encounter    VITAMIN D, 25 HYDROXY     Standing Status:   Future     Number of Occurrences:   1     Standing Expiration Date:   9/15/2023    HEMOGLOBIN A1C WITH EAG     Standing Status:   Future     Number of Occurrences:   1     Standing Expiration Date:   9/15/2023    Naftin 2 % gel     Sig: Use as directed  Indications: athlete's foot     Dispense:  60 g     Refill:  2    cetirizine (ZyrTEC) 10 mg tablet     Sig: Take 1 Tablet by mouth daily. Dispense:  90 Tablet     Refill:  3    fluticasone propionate (FLONASE) 50 mcg/actuation nasal spray     Si Sprays by Both Nostrils route daily. Dispense:  3 Each     Refill:  3       Medications Discontinued During This Encounter   Medication Reason    NAFTIN 2 % gel REORDER    fluticasone propionate (FLONASE) 50 mcg/actuation nasal spray REORDER    cetirizine (ZyrTEC) 10 mg tablet REORDER    cefdinir (OMNICEF) 300 mg capsule Therapy Completed    ciprofloxacin HCl (CIPRO) 500 mg tablet Therapy Completed       Current Outpatient Medications   Medication Sig Dispense Refill    Naftin 2 % gel Use as directed  Indications: athlete's foot 60 g 2    cetirizine (ZyrTEC) 10 mg tablet Take 1 Tablet by mouth daily. 90 Tablet 3    fluticasone propionate (FLONASE) 50 mcg/actuation nasal spray 2 Sprays by Both Nostrils route daily. 3 Each 3    hydrOXYzine HCL (ATARAX) 25 mg tablet TAKE 2 TABLETS BY MOUTH EVERY EIGHT (8) HOURS AS NEEDED (URTICARIA). INDICATIONS: HIVES 540 Tablet 3    ergocalciferol (ERGOCALCIFEROL) 1,250 mcg (50,000 unit) capsule Take 1 Capsule by mouth every seven (7) days for 90 days.  13 Capsule 0    albuterol (PROVENTIL HFA, VENTOLIN HFA, PROAIR HFA) 90 mcg/actuation inhaler INHALE 2 PUFFS BY MOUTH EVERY 4 HOURS AS NEEDED FOR WHEEZING. INDICATIONS: BRONCHOSPASM PREVENTIONM 6.7 Each 1    ALPRAZolam (XANAX) 0.5 mg tablet Take 1 Tablet by mouth daily as needed for Anxiety. 30 Tablet 0    montelukast (SINGULAIR) 10 mg tablet Take 1 Tablet by mouth daily. 90 Tablet 1    azelastine (ASTELIN) 137 mcg (0.1 %) nasal spray 2 Sprays by Both Nostrils route two (2) times a day. Use in each nostril as directed  Indications: seasonal runny nose 3 Each 1    SUMAtriptan (IMITREX) 50 mg tablet Take 1 tab po at first onset migraine. May repeat in 2 hours if needed. No more than 2 pills in 24 hours. 12 Tablet 5    cyclobenzaprine (FLEXERIL) 10 mg tablet TAKE 1 TABLET BY MOUTH 3 TIMES A DAY AS NEEDED FOR MUSCLE SPASMS 90 Tablet 0    butalbital-acetaminophen-caffeine (FIORICET, ESGIC) -40 mg per tablet TAKE 1 TABLET BY MOUTH EVERY 6 HOURS AS NEEDED FOR PAIN OR HEADACHE 30 Tablet 0    levothyroxine (SYNTHROID) 125 mcg tablet Take 1 Tablet by mouth Daily (before breakfast). 90 Tablet 3    doxycycline (VIBRAMYCIN) 50 mg capsule Take 50 mg by mouth daily. albuterol (PROVENTIL VENTOLIN) 2.5 mg /3 mL (0.083 %) nebu 3 mL by Nebulization route every four (4) hours as needed for Wheezing or Shortness of Breath. Indications: asthma attack 30 Each 1    albuterol-ipratropium (DUO-NEB) 2.5 mg-0.5 mg/3 ml nebu USE 1 VIAL VIA NEBULLIZER EVERY 6 HOURS AS NEEDED FOR BREATHING 90 mL 1    clobetasol (TEMOVATE) 0.05 % topical cream as needed. 1    omeprazole (PRILOSEC) 40 mg capsule TAKE 1 CAPSULE BY MOUTH EVERY DAY 90 Cap 1    Nebulizer & Compressor machine 1 Each by Other route four (4) times daily as needed.  1 Each 0    terconazole (TERAZOL 7) 0.4 % vaginal cream   6    LOCOID 0.1 % lotn   1    valACYclovir (VALTREX) 500 mg tablet   1    terconazole (TERAZOL 3) 80 mg vaginal suppository   4    multivitamin (ONE A DAY) tablet Take 1 tablet by mouth daily. PROPYLENE GLYCOL//PF (SYSTANE, PF, OP) Apply  to eye four (4) times daily. Emollient Combination No.32 emul by Apply Externally route two (2) times a day. From Dr Mariusz Peguero. For Acne rosacea. metroNIDAZOLE (METROGEL) 1 % topical gel Apply  to affected area two (2) times a day. Use a thin layer to affected areas after washing from Dr Mariusz Peguero   Indications: a skin condition on the cheeks and nose with a reddish rash and acne called acne rosacea      latanoprost (XALATAN) 0.005 % ophthalmic solution Administer 1 Drop to both eyes nightly. LACTOBACILLUS/FOS/PECTIN (PROBIOTIC COMPLEX PO) Take  by mouth daily. Recommended healthy diet low in carbohydrates, fats, sodium and cholesterol. Recommended regular cardiovascular exercise 3-6 times per week for 30-60 minutes daily. Verbal and written instructions (see AVS) provided. Patient expresses understanding of diagnosis and treatment plan. Follow-up and Dispositions    Return in about 3 months (around 12/15/2022) for stress, migraine.        Future Appointments   Date Time Provider Cindy Brown   12/15/2022  9:00 AM Veronica Alejandre PA-C [facility]

## 2022-09-16 LAB
25(OH)D3 SERPL-MCNC: 29.9 NG/ML (ref 30–100)
EST. AVERAGE GLUCOSE BLD GHB EST-MCNC: 131 MG/DL
HBA1C MFR BLD: 6.2 % (ref 4–5.6)

## 2022-10-26 ENCOUNTER — ANESTHESIA (OUTPATIENT)
Dept: ENDOSCOPY | Age: 52
End: 2022-10-26
Payer: COMMERCIAL

## 2022-10-26 ENCOUNTER — HOSPITAL ENCOUNTER (OUTPATIENT)
Age: 52
Setting detail: OUTPATIENT SURGERY
Discharge: HOME | End: 2022-10-26
Attending: INTERNAL MEDICINE | Admitting: INTERNAL MEDICINE
Payer: COMMERCIAL

## 2022-10-26 ENCOUNTER — ANESTHESIA EVENT (OUTPATIENT)
Dept: ENDOSCOPY | Age: 52
End: 2022-10-26
Payer: COMMERCIAL

## 2022-10-26 VITALS
SYSTOLIC BLOOD PRESSURE: 135 MMHG | RESPIRATION RATE: 28 BRPM | TEMPERATURE: 98 F | WEIGHT: 293 LBS | HEART RATE: 79 BPM | HEIGHT: 62 IN | DIASTOLIC BLOOD PRESSURE: 85 MMHG | OXYGEN SATURATION: 98 % | BODY MASS INDEX: 53.92 KG/M2

## 2022-10-26 PROCEDURE — 2709999900 HC NON-CHARGEABLE SUPPLY: Performed by: INTERNAL MEDICINE

## 2022-10-26 PROCEDURE — 77030039825 HC MSK NSL PAP SUPERNO2VA VYRM -B: Performed by: ANESTHESIOLOGY

## 2022-10-26 PROCEDURE — 88360 TUMOR IMMUNOHISTOCHEM/MANUAL: CPT

## 2022-10-26 PROCEDURE — 74011250636 HC RX REV CODE- 250/636: Performed by: INTERNAL MEDICINE

## 2022-10-26 PROCEDURE — 77030019988 HC FCPS ENDOSC DISP BSC -B: Performed by: INTERNAL MEDICINE

## 2022-10-26 PROCEDURE — 77030021593 HC FCPS BIOP ENDOSC BSC -A: Performed by: INTERNAL MEDICINE

## 2022-10-26 PROCEDURE — 76060000031 HC ANESTHESIA FIRST 0.5 HR: Performed by: INTERNAL MEDICINE

## 2022-10-26 PROCEDURE — 74011250636 HC RX REV CODE- 250/636: Performed by: ANESTHESIOLOGY

## 2022-10-26 PROCEDURE — 88305 TISSUE EXAM BY PATHOLOGIST: CPT

## 2022-10-26 PROCEDURE — 76040000019: Performed by: INTERNAL MEDICINE

## 2022-10-26 RX ORDER — PROPOFOL 10 MG/ML
INJECTION, EMULSION INTRAVENOUS AS NEEDED
Status: DISCONTINUED | OUTPATIENT
Start: 2022-10-26 | End: 2022-10-26 | Stop reason: HOSPADM

## 2022-10-26 RX ORDER — EPINEPHRINE 0.1 MG/ML
1 INJECTION INTRACARDIAC; INTRAVENOUS
Status: DISCONTINUED | OUTPATIENT
Start: 2022-10-26 | End: 2022-10-26 | Stop reason: HOSPADM

## 2022-10-26 RX ORDER — DEXTROMETHORPHAN/PSEUDOEPHED 2.5-7.5/.8
1.2 DROPS ORAL
Status: DISCONTINUED | OUTPATIENT
Start: 2022-10-26 | End: 2022-10-26 | Stop reason: HOSPADM

## 2022-10-26 RX ORDER — SODIUM CHLORIDE 9 MG/ML
75 INJECTION, SOLUTION INTRAVENOUS CONTINUOUS
Status: DISCONTINUED | OUTPATIENT
Start: 2022-10-26 | End: 2022-10-26 | Stop reason: HOSPADM

## 2022-10-26 RX ORDER — SODIUM CHLORIDE 0.9 % (FLUSH) 0.9 %
5-40 SYRINGE (ML) INJECTION EVERY 8 HOURS
Status: DISCONTINUED | OUTPATIENT
Start: 2022-10-26 | End: 2022-10-26 | Stop reason: HOSPADM

## 2022-10-26 RX ORDER — FLUMAZENIL 0.1 MG/ML
0.2 INJECTION INTRAVENOUS
Status: DISCONTINUED | OUTPATIENT
Start: 2022-10-26 | End: 2022-10-26 | Stop reason: HOSPADM

## 2022-10-26 RX ORDER — NALOXONE HYDROCHLORIDE 0.4 MG/ML
0.4 INJECTION, SOLUTION INTRAMUSCULAR; INTRAVENOUS; SUBCUTANEOUS
Status: DISCONTINUED | OUTPATIENT
Start: 2022-10-26 | End: 2022-10-26 | Stop reason: HOSPADM

## 2022-10-26 RX ORDER — SODIUM CHLORIDE 0.9 % (FLUSH) 0.9 %
5-40 SYRINGE (ML) INJECTION AS NEEDED
Status: DISCONTINUED | OUTPATIENT
Start: 2022-10-26 | End: 2022-10-26 | Stop reason: HOSPADM

## 2022-10-26 RX ORDER — ATROPINE SULFATE 0.1 MG/ML
0.5 INJECTION INTRAVENOUS
Status: DISCONTINUED | OUTPATIENT
Start: 2022-10-26 | End: 2022-10-26 | Stop reason: HOSPADM

## 2022-10-26 RX ADMIN — SODIUM CHLORIDE 75 ML/HR: 9 INJECTION, SOLUTION INTRAVENOUS at 13:45

## 2022-10-26 RX ADMIN — PROPOFOL 520 MG: 10 INJECTION, EMULSION INTRAVENOUS at 14:23

## 2022-10-26 NOTE — H&P
Gastroenterology Outpatient History and Physical    Patient: Katerina Gray    Physician: Danika Sanchez MD    Chief Complaint: Epigastric AP  History of Present Illness: FHx of colon polyps    History:  Past Medical History:   Diagnosis Date    Acne rosacea     Dr. Kamla Kessler. Allergy, unspecified not elsewhere classified childhood     Txd with immunotherapy. Dr. Teri Villegas    Anemia NEC     borderline    Ankle sprain 2007    Right. Dr. Arlet Salmon    Ankle sprain 11/2012    Right. Dr. Audrey Moreno. Asthma childhood    Chickenpox childhood    Chronic low back pain with right-sided sciatica     and SI joint dysfunction. Dr. Gerhard Hodgkins. Chronic otitis media     Dr. Teri Villegas    Dry eye syndrome 2013    Dr. Marcello Herzog. DVT of deep femoral vein ([address] 12/07/2018    Acute occlusive DVT of L deep femoral vein and Acute partially occlusive DVT of L Common Fem Vein and L Prox Fem Vein. Txd with Xarelto x 3 months. due to BCP. Dr. Tyrese Presley    EBV infection 6/27/2011    Hearing loss     Mild high frequency sensorineuronal hearing loss. Dr. Martha Burden murmur     Hernia of abdominal wall 09/2003, 77/2320    umbilical.  Dr. Misti Salvador. Dr. Lucien Urbina    Hyperglycemia 2013    Hypothyroidism 06/2010    Dr. Rosana Reyes    Intraocular pressure increase 12/2011    Dr. Laura Weinberg. Dr. Marcello Herzog. Dr. Stevie Deluca. Knee pain, left 04/2012    Left. Dr. Efren Hua    Measles childhood    Migraine 2017, 07/2019    w aura and facial paresthesia. Dr. Cl Jo    Mumps childhood    Plantar fasciitis, bilateral 2010    Dr. Blayne Darby    Sciatica 2008    Right.  with OA. Dr. Crystal Gilbert    Tinnitus of right ear 2012    Dr. Patrizia Newsome      Past Surgical History:   Procedure Laterality Date    BIOPSY VAGINAL  01/01/2011    Dr. Lino Pérez.     HAND/FINGER SURGERY UNLISTED  09/01/2003    Right Index finger repair due to cut    HX DILATION AND CURETTAGE      HX HERNIA REPAIR  99/72/5882    Umbilical.  Dr. Nate Carter. HX HERNIA REPAIR  09/23/2011    recurrent umbilical.  Laparoscopy incisional.  Dr. Frederick Iverson. HX TONSILLECTOMY  childhood    LA LAP,CHOLECYSTECTOMY  07/01/2001      Social History     Socioeconomic History    Marital status: SINGLE   Tobacco Use    Smoking status: Never     Passive exposure: Yes    Smokeless tobacco: Never    Tobacco comments:     lived with smoker dad and mom then step mom x 20 yrs   Vaping Use    Vaping Use: Never used   Substance and Sexual Activity    Alcohol use:  Yes     Alcohol/week: 1.0 standard drink     Types: 1 Glasses of wine per week     Comment: RARE    Drug use: No    Sexual activity: Yes     Partners: Male     Birth control/protection: Condom     Social Determinants of Health     Financial Resource Strain: Low Risk     Difficulty of Paying Living Expenses: Not hard at all   Food Insecurity: No Food Insecurity    Worried About Running Out of Food in the Last Year: Never true    Ran Out of Food in the Last Year: Never true      Family History   Problem Relation Age of Onset    Hypertension Mother     Cancer Mother         small cell lung with bone mets to spine/MELANOMA    Cancer Father         melanoma    OSTEOARTHRITIS Father     Colon Polyps Father     Diabetes Maternal Grandmother     Stroke Maternal Grandmother     Seizures Maternal Grandmother     Colon Polyps Maternal Grandmother     Breast Cancer Maternal Grandmother     Heart Disease Paternal Grandmother         heart failure    Heart Attack Paternal Grandmother     Asthma Paternal Grandmother     Depression Sister     Other Maternal Grandfather         Quentin Abarca      Patient Active Problem List   Diagnosis Code    Allergic rhinitis J30.9    Chronic ear infection H66.90    GERD (gastroesophageal reflux disease) K21.9    Fe deficiency anemia D50.9    Intraocular pressure increase H40.059    Acne rosacea L71.9    Knee pain, left M25.562    Vitamin D deficiency E55.9    Right ankle sprain S93.401A    Dyslipidemia E78.5    Tinnitus of right ear H93.11    Dry eyes H04.123    Plantar fasciitis, bilateral M72.2    Family history of breast cancer Z80.3    Family history of stroke Z82.3    Family history of diabetes mellitus (DM) Z83.3    Family history of heart attack Z82.49    Family history of colonic polyps Z83.71    Family history of melanoma Z80.8    Migraine without aura and without status migrainosus, not intractable G43.009    Hearing loss H91.90    Hypothyroidism due to acquired atrophy of thyroid E03.4    Chronic low back pain with right-sided sciatica M54.41, G89.29    Mild intermittent asthma without complication D25.29    Hyperglycemia R73.9    Obesity, morbid ([facility]) E66.01    Allergy to sulfa drugs Z88.2    Penicillin allergy Z88.0    Adverse effect of anabolic steroid I86.8X0B    Family history of lung cancer Z80.1    DVT of deep femoral vein ([facility]) I82.419    Chronic otitis media H66.90    Deep vein thrombosis (DVT) ([facility]) I82.409    History of DVT of lower extremity Z86.718    Prediabetes R73.03       Allergies: Allergies   Allergen Reactions    Latex Rash    Junel 1.5/30 (21) [Norethindrone Ac-Eth Estradiol] Swelling     L FOOT DUE TO NEW DVT    Other Medication Hives and Rash     SUN    Pcn [Penicillins] Hives    Prednisone Hives    Sulfa (Sulfonamide Antibiotics) Hives    Garamycin [Gentamicin] Other (comments)     Itchy eyes due to sulfate    Metformin Diarrhea     Medications:   Prior to Admission medications    Medication Sig Start Date End Date Taking? Authorizing Provider   Naftin 2 % gel Use as directed  Indications: athlete's foot 9/15/22  Yes Veronica Alejandre PA-C   cetirizine (ZyrTEC) 10 mg tablet Take 1 Tablet by mouth daily. 9/15/22  Yes Veronica Alejandre PA-C   fluticasone propionate (FLONASE) 50 mcg/actuation nasal spray 2 Sprays by Both Nostrils route daily.  9/15/22  Yes Veronica Alejandre PA-C   hydrOXYzine HCL (ATARAX) 25 mg tablet TAKE 2 TABLETS BY MOUTH EVERY EIGHT (8) HOURS AS NEEDED (URTICARIA). INDICATIONS: HIVES 8/29/22  Yes Veronica Alejandre PA-C   albuterol (PROVENTIL HFA, VENTOLIN HFA, PROAIR HFA) 90 mcg/actuation inhaler INHALE 2 PUFFS BY MOUTH EVERY 4 HOURS AS NEEDED FOR WHEEZING. INDICATIONS: BRONCHOSPASM PREVENTIONM 6/28/22  Yes Veronica Alejandre PA-C   ALPRAZolam (XANAX) 0.5 mg tablet Take 1 Tablet by mouth daily as needed for Anxiety. 6/13/22  Yes Veronica Alejandre PA-C   montelukast (SINGULAIR) 10 mg tablet Take 1 Tablet by mouth daily. 6/13/22  Yes Veronica Alejandre PA-C   azelastine (ASTELIN) 137 mcg (0.1 %) nasal spray 2 Sprays by Both Nostrils route two (2) times a day. Use in each nostril as directed  Indications: seasonal runny nose 6/13/22  Yes Veronica Alejandre PA-C   SUMAtriptan (IMITREX) 50 mg tablet Take 1 tab po at first onset migraine. May repeat in 2 hours if needed. No more than 2 pills in 24 hours. 3/10/22  Yes Veronica Alejandre PA-C   cyclobenzaprine (FLEXERIL) 10 mg tablet TAKE 1 TABLET BY MOUTH 3 TIMES A DAY AS NEEDED FOR MUSCLE SPASMS 1/6/22  Yes Veronica Alejandre PA-C   butalbital-acetaminophen-caffeine (FIORICET, ESGIC) -40 mg per tablet TAKE 1 TABLET BY MOUTH EVERY 6 HOURS AS NEEDED FOR PAIN OR HEADACHE 12/15/21  Yes Veronica Alejandre PA-C   levothyroxine (SYNTHROID) 125 mcg tablet Take 1 Tablet by mouth Daily (before breakfast). 12/3/21  Yes Veronica Alejandre PA-C   doxycycline (VIBRAMYCIN) 50 mg capsule Take 50 mg by mouth daily. Yes Provider, Historical   albuterol (PROVENTIL VENTOLIN) 2.5 mg /3 mL (0.083 %) nebu 3 mL by Nebulization route every four (4) hours as needed for Wheezing or Shortness of Breath. Indications: asthma attack 11/27/20  Yes Otilia Alpers B, NP   albuterol-ipratropium (DUO-NEB) 2.5 mg-0.5 mg/3 ml nebu USE 1 VIAL VIA NEBULLIZER EVERY 6 HOURS AS NEEDED FOR BREATHING 11/20/20  Yes Otilia Alpers B, NP   clobetasol (TEMOVATE) 0.05 % topical cream as needed.  11/4/19  Yes Provider, Historical   omeprazole (PRILOSEC) 40 mg capsule TAKE 1 CAPSULE BY MOUTH EVERY DAY 11/11/19  Yes Billy Gray R, DO   LOCOID 0.1 % lotn  9/24/15  Yes Provider, Historical   valACYclovir (VALTREX) 500 mg tablet 500 mg daily as needed. 6/16/15  Yes Provider, Historical   terconazole (TERAZOL 3) 80 mg vaginal suppository  5/10/15  Yes Provider, Historical   multivitamin (ONE A DAY) tablet Take 1 tablet by mouth daily. Yes Provider, Historical   PROPYLENE GLYCOL//PF (SYSTANE, PF, OP) Apply  to eye four (4) times daily. Yes Provider, Historical   Emollient Combination No.32 emul by Apply Externally route two (2) times a day. From Dr Gilford Baars. For Acne rosacea. Yes Provider, Historical   metroNIDAZOLE (METROGEL) 1 % topical gel Apply  to affected area two (2) times a day. Use a thin layer to affected areas after washing from Dr Gilford Baars   Indications: a skin condition on the cheeks and nose with a reddish rash and acne called acne rosacea   Yes Provider, Historical   latanoprost (XALATAN) 0.005 % ophthalmic solution Administer 1 Drop to both eyes nightly. Yes Provider, Historical   LACTOBACILLUS/FOS/PECTIN (PROBIOTIC COMPLEX PO) Take  by mouth daily. Yes Provider, Historical   Nebulizer & Compressor machine 1 Each by Other route four (4) times daily as needed. 2/21/18   Tonia Phillips R, DO   terconazole (TERAZOL 7) 0.4 % vaginal cream  9/2/15   Provider, Historical     Physical Exam:   Vital Signs: Blood pressure (!) 150/68, pulse 65, temperature 98 °F (36.7 °C), resp. rate 19, height 5' 2\" (1.575 m), weight 146.1 kg (322 lb), SpO2 100 %, not currently breastfeeding.   General: well developed, well nourished   HEENT: unremarkable   Heart: regular rhythm no mumur    Lungs: clear   Abdominal:  benign   Neurological: unremarkable   Extremities: no edema     Findings/Diagnosis: Epigastric AP/FHx colon polyps  Plan of Care/Planned Procedure: EGD/Colon with conscious/deep sedation    Signed:  Cornel Cardenas MD 10/26/2022

## 2022-10-26 NOTE — ROUTINE PROCESS
TRANSFER - IN REPORT:    Verbal report received from Vita(name) on Myra Cruz  being received from endo(unit) for routine progression of care      Report consisted of patients Situation, Background, Assessment and   Recommendations(SBAR). Information from the following report(s) SBAR, Procedure Summary, and MAR was reviewed with the receiving nurse. Opportunity for questions and clarification was provided. Assessment completed upon patients arrival to unit and care assumed.

## 2022-10-26 NOTE — PROCEDURES
NAME:  Se Hermosillo   :   1970   MRN:   [id number]     Date/Time:  10/26/2022 2:07 PM    Esophagogastroduodenoscopy (EGD) Procedure Note    Procedure: Esophagogastroduodenoscopy with biopsy    Indication:  Abdominal pain, epigastric  Pre-operative Diagnosis: see indication above  Post-operative Diagnosis: see findings below  :  Jarett Milton MD  Referring Provider:   --Alida Medel PA-C    Exam:  Airway: clear, no airway problems anticipated  Heart: RRR, without gallops or rubs  Lungs: clear bilaterally without wheezes, crackles, or rhonchi  Abdomen: soft, nontender, nondistended, bowel sounds present  Mental Status: awake, alert and oriented to person, place and time     Anethesia/Sedation:  MAC anesthesia Propofol  Procedure Details   After informed consent was obtained for the procedure, with all risks and benefits of procedure explained the patient was taken to the endoscopy suite and placed in the left lateral decubitus position. Following sequential administration of sedation as per above, the [id number] gastroscope was inserted into the mouth and advanced under direct vision to third portion of the duodenum. A careful inspection was made as the gastroscope was withdrawn, including a retroflexed view of the proximal stomach; findings and interventions are described below. Findings:   Normal proximal and mid esophagus   Z-line regular. Biopsied  Very small, sliding hiatal hernia  Mild, patchy, non-erosive antral gastropathy. Biopsied  Stomach otherwise normal, including retroflexion  Normal duodenal bulb and 2nd/3rd portion of the duodenum. Biopsied    Therapies:  1. Biopsies    Specimens: 1. Duodenum 2. Gastric 3. GE junction    EBL:  None. Complications:   None; patient tolerated the procedure well. Impression:    Normal proximal and mid esophagus   Z-line regular.  Biopsied  Very small, sliding hiatal hernia  Mild, patchy, non-erosive antral gastropathy. Biopsied  Stomach otherwise normal, including retroflexion  Normal duodenal bulb and 2nd/3rd portion of the duodenum. Biopsied    Recommendations:   Follow up pathology    Discharge disposition:  For colonoscopy    Rome Hoff MD

## 2022-10-26 NOTE — PROCEDURES
NAME:  Jcarlos Glez   :   1970   MRN:   [id number]     Date/Time:  10/26/2022 2:25 PM    Colonoscopy Operative Report    Procedure Type:   Colonoscopy with biopsy     Indications:     Family history of coloretal adenoma  (screening only)  Pre-operative Diagnosis: see indication above  Post-operative Diagnosis:  See findings below  :  Rosie Woody MD  Referring Provider: --Eduardo Moran PA-C    Exam:  Airway: clear, no airway problems anticipated  Heart: RRR, without gallops or rubs  Lungs: clear bilaterally without wheezes, crackles, or rhonchi  Abdomen: soft, nontender, nondistended, bowel sounds present  Mental Status: awake, alert and oriented to person, place and time    Sedation:  MAC anesthesia Propofol  Procedure Details:  After informed consent was obtained with all risks and benefits of procedure explained and preoperative exam completed, the patient was taken to the endoscopy suite and placed in the left lateral decubitus position. Upon sequential sedation as per above, a digital rectal exam was performed demonstrating internal hemorrhoids. The Olympus videocolonoscope  was inserted in the rectum and carefully advanced to the cecum, which was identified by the ileocecal valve and appendiceal orifice. The quality of preparation was good. The colonoscope was slowly withdrawn with careful evaluation between folds. Retroflexion in the rectum was completed demonstrating internal hemorrhoids. Findings:   2.5 cm polypoid malignant appearing mass at base of cecum involving appendiceal orifice. Biopsied  Moderate left-sided diverticulosis  Small internal hemorrhoids seen on retroflexion  Otherwise normal colonoscopy through to the cecum    Specimen Removed:  1. Cecal mass  Complications: None. EBL:  None. Impression:    2.5 cm polypoid malignant appearing mass at base of cecum involving appendiceal orifice.  Biopsied  Moderate left-sided diverticulosis  Small internal hemorrhoids seen on retroflexion  Otherwise normal colonoscopy through to the cecum      Recommendations: -  Follow up pathology  CT Chest/Abdomen/Pelvis with IV contrast  CRS referral  Oncology referral  Repeat colonoscopy in 1 year    Discharge Disposition:  Home in the company of a  when able to ambulate.       Martina Dan MD

## 2022-10-26 NOTE — ANESTHESIA POSTPROCEDURE EVALUATION
Procedure(s):  colonoscopy   ESOPHAGOGASTRODUODENOSCOPY (EGD)  ESOPHAGOGASTRODUODENAL (EGD) BIOPSY  COLON BIOPSY. total IV anesthesia    Anesthesia Post Evaluation        Patient location during evaluation: PACU  Note status: Adequate. Level of consciousness: responsive to verbal stimuli and sleepy but conscious  Pain management: satisfactory to patient  Airway patency: patent  Anesthetic complications: no  Cardiovascular status: acceptable  Respiratory status: acceptable  Hydration status: acceptable  Comments: +Post-Anesthesia Evaluation and Assessment    Patient: Eliane Bah MRN: [id number]  SSN: [id number]   YOB: 1970  Age: 45 y.o. Sex: female      Cardiovascular Function/Vital Signs    BP (!) 120/96   Pulse 70   Temp 36.7 °C (98 °F)   Resp 20   Ht 5' 2\" (1.575 m)   Wt 146.1 kg (322 lb)   SpO2 100%   Breastfeeding No   BMI 58.89 kg/m²     Patient is status post Procedure(s):  colonoscopy   ESOPHAGOGASTRODUODENOSCOPY (EGD)  ESOPHAGOGASTRODUODENAL (EGD) BIOPSY  COLON BIOPSY. Nausea/Vomiting: Controlled. Postoperative hydration reviewed and adequate. Pain:  Pain Scale 1: Numeric (0 - 10) (10/26/22 1429)  Pain Intensity 1: 0 (10/26/22 1429)   Managed. Neurological Status: At baseline. Mental Status and Level of Consciousness: Arousable. Pulmonary Status:   O2 Device: None (Room air) (10/26/22 1429)   Adequate oxygenation and airway patent. Complications related to anesthesia: None    Post-anesthesia assessment completed. No concerns. Signed By: Adwoa Juan MD    10/26/2022  Post anesthesia nausea and vomiting:  controlled      INITIAL Post-op Vital signs: No vitals data found for the desired time range.

## 2022-10-26 NOTE — ANESTHESIA PREPROCEDURE EVALUATION
Relevant Problems   RESPIRATORY SYSTEM   (+) Mild intermittent asthma without complication      NEUROLOGY   (+) Migraine without aura and without status migrainosus, not intractable      GASTROINTESTINAL   (+) GERD (gastroesophageal reflux disease)      ENDOCRINE   (+) Hypothyroidism due to acquired atrophy of thyroid   (+) Obesity, morbid (HCC)      HEMATOLOGY   (+) Fe deficiency anemia       Anesthetic History   No history of anesthetic complications            Review of Systems / Medical History  Patient summary reviewed, nursing notes reviewed and pertinent labs reviewed    Pulmonary            Asthma        Neuro/Psych         Headaches (Migraines)    Comments: Sciatica Cardiovascular              Hyperlipidemia    Exercise tolerance: >4 METS  Comments: Hx DVT    ECG (9/21/11):   Normal sinus rhythm   No previous ECGs available   GI/Hepatic/Renal     GERD          Comments: FHx colon polyps  Epigastric pain Endo/Other      Hypothyroidism  Morbid obesity     Other Findings   Comments:            Physical Exam    Airway  Mallampati: II  TM Distance: 4 - 6 cm  Neck ROM: normal range of motion   Mouth opening: Normal     Cardiovascular  Regular rate and rhythm,  S1 and S2 normal,  no murmur, click, rub, or gallop             Dental  No notable dental hx       Pulmonary  Breath sounds clear to auscultation               Abdominal  GI exam deferred       Other Findings            Anesthetic Plan    ASA: 3  Anesthesia type: MAC          Induction: Intravenous  Anesthetic plan and risks discussed with: Patient      Alex

## 2022-10-26 NOTE — DISCHARGE INSTRUCTIONS
Maurice Ventura  [id number]  1970    COLON DISCHARGE INSTRUCTIONS  Discomfort:  Redness at IV site- apply warm compress to area; if redness or soreness persist- contact your physician  There may be a slight amount of blood passed from the rectum  Gaseous discomfort- walking, belching will help relieve any discomfort  You may not operate a vehicle for 12 hours  You may not engage in an occupation involving machinery or appliances for rest of today  You may not drink alcoholic beverages for at least 12 hours  Avoid making any critical decisions for at least 24 hour  DIET:   Regular diet. - however -  remember your colon is empty and a heavy meal will produce gas. Avoid these foods:  vegetables, fried / greasy foods, carbonated drinks for today  MEDICATION:  Per Medication Reconciliation       ACTIVITY:  You may not resume your normal daily activities until tomorrow AM; it is recommended that you spend the remainder of the day resting -  avoid any strenuous activity. CALL M.D. ANY SIGN OF:   Increasing pain, nausea, vomiting  Abdominal distension (swelling)  New increased bleeding (oral or rectal)  Fever (chills)  Pain in chest area  Bloody discharge from nose or mouth  Shortness of breath    You may not  take any Advil, Aspirin, Ibuprofen, Motrin, Aleve, or Goodys for 10 days, ONLY  Tylenol as needed for pain. IMPRESSION:  EGD:  Impression:    Normal proximal and mid esophagus   Z-line regular. Biopsied  Very small, sliding hiatal hernia  Mild, patchy, non-erosive antral gastropathy. Biopsied  Stomach otherwise normal, including retroflexion  Normal duodenal bulb and 2nd/3rd portion of the duodenum. Biopsied     Recommendations: Follow up pathology    Colon:  Impression:    2.5 cm polypoid malignant appearing mass at base of cecum involving appendiceal orifice.  Biopsied  Moderate left-sided diverticulosis  Small internal hemorrhoids seen on retroflexion  Otherwise normal colonoscopy through to the cecum      Recommendations: -  Follow up pathology  CT Chest/Abdomen/Pelvis with IV contrast  CRS referral  Oncology referral  Repeat colonoscopy in 1 year    Follow-up Instructions:   Call Dr. Anca Polanco for the results of procedure / biopsy in 7-10 days  Telephone # [phone]      Isael Deshpande MD     Patient Education on Sedation / Analgesia Administered for Procedure      For 24 hours after general anesthesia or intravenous analgesia / sedation:  Have someone responsible help you with your care  Limit your activities  Do not drive and operate hazardous machinery  Do not make important personal, legal or business decisions  Do not drink alcoholic beverages  If you have not urinated within 8 hours after discharge, please contact your physician  Resume your medications unless otherwise instructed    For 24 hours after general anesthesia or intravenous analgesia / sedation  you may experience:  Drowsiness, dizziness, sleepiness, or confusion  Difficulty remembering or delayed reaction times  Difficulty with your balance, especially while walking, move slowly and carefully, do not make sudden position changes  Difficulty focusing or blurred vision    You may not be aware of slight changes in your behavior and/or your reaction time because of the medication used during and after your procedure.     Report the following to your physician:  Excessive pain, swelling, redness or odor of or around the surgical area  Temperature over 100.5  Nausea and vomiting lasting longer than 4 hours or if unable to take medications  Any signs of decreased circulation or nerve impairment to extremity: change in color, persistent numbness, tingling, coldness or increase pain  Any questions or concerns    IF YOU REPORT TO AN EMERGENCY ROOM, DOCTOR'S OFFICE OR HOSPITAL WITHIN 24 HOURS AFTER YOUR PROCEDURE, BRING THIS SHEET AND YOUR AFTER VISIT SUMMARY WITH YOU AND GIVE IT TO THE PHYSICIAN OR NURSE ATTENDING YOU.

## 2022-10-27 ENCOUNTER — TRANSCRIBE ORDER (OUTPATIENT)
Dept: SCHEDULING | Age: 52
End: 2022-10-27

## 2022-10-27 DIAGNOSIS — K63.89 MASS OF COLON: Primary | ICD-10-CM

## 2022-11-04 ENCOUNTER — HOSPITAL ENCOUNTER (OUTPATIENT)
Dept: CT IMAGING | Age: 52
Discharge: HOME | End: 2022-11-04
Attending: INTERNAL MEDICINE
Payer: COMMERCIAL

## 2022-11-04 DIAGNOSIS — K63.89 MASS OF COLON: ICD-10-CM

## 2022-11-04 PROCEDURE — 74177 CT ABD & PELVIS W/CONTRAST: CPT

## 2022-11-04 PROCEDURE — 71260 CT THORAX DX C+: CPT

## 2022-11-04 PROCEDURE — 74011000636 HC RX REV CODE- 636: Performed by: INTERNAL MEDICINE

## 2022-11-04 PROCEDURE — 74011000250 HC RX REV CODE- 250: Performed by: INTERNAL MEDICINE

## 2022-11-04 RX ORDER — BARIUM SULFATE 20 MG/ML
900 SUSPENSION ORAL
Status: COMPLETED | OUTPATIENT
Start: 2022-11-04 | End: 2022-11-04

## 2022-11-04 RX ADMIN — BARIUM SULFATE 900 ML: 20 SUSPENSION ORAL at 11:26

## 2022-11-04 RX ADMIN — IOPAMIDOL 100 ML: 755 INJECTION, SOLUTION INTRAVENOUS at 11:26

## 2022-11-07 ENCOUNTER — NURSE NAVIGATOR (OUTPATIENT)
Dept: ONCOLOGY | Age: 52
End: 2022-11-07

## 2022-11-07 NOTE — PROGRESS NOTES
[facility]   GI Oncology Nurse Navigator Encounter    Name: Mark Cabezas  Age: 45 y.o.  : 1970    Encounter type:  []Navigator Initiated  [x]Patient Initiated        Narrative (A/P)  Discussed role of nurse navigator  Patient spoke with Dr. Sujey Quinn this morning  I reinforced patient teaching. Reviewed next steps regarding upcoming appointments  Patient scheduled to see Medical Oncology and Surgery this week and next. Patient shared that her mother was treated for lung cancer and she has been thinking about her mother's experience  We reviewed that this is a different type of cancer and although some treatments may be similar some will be different, each cancer is different. Reviewed my contact information and reminded patient to please reach out with additional questions or concerns    Patient's phone was breaking up frequently at the end of the conversation, and we ended the call as it was difficult to understand/hear one another towards the end. We agreed that I would reach out early next week to check on her. The patient was given time to ask questions. All questions answered. The patient communicated an understanding of and agreement with the plan. Type of visit:  [x]Phone Call  []In person        Nya Butts MSN RN OCN ANP-BC  GI Oncology Nurse Navigator and Advanced Clinical Genetics Nurse (ACGN)  [facility], [address] [place] [address], [address]  [place] [address] Bert Mahmood  (Office Phone) [phone]  (Venita Zacarias) [phone]  (FCO) [phone]        Good Help to Those in [facility]

## 2022-11-23 ENCOUNTER — HOSPITAL ENCOUNTER (OUTPATIENT)
Dept: PREADMISSION TESTING | Age: 52
Discharge: HOME | End: 2022-11-23
Attending: SURGERY
Payer: COMMERCIAL

## 2022-11-23 VITALS
WEIGHT: 293 LBS | TEMPERATURE: 98.6 F | HEART RATE: 72 BPM | BODY MASS INDEX: 53.92 KG/M2 | RESPIRATION RATE: 18 BRPM | DIASTOLIC BLOOD PRESSURE: 55 MMHG | OXYGEN SATURATION: 100 % | SYSTOLIC BLOOD PRESSURE: 124 MMHG | HEIGHT: 62 IN

## 2022-11-23 LAB
ABO + RH BLD: NORMAL
ALBUMIN SERPL-MCNC: 3.6 G/DL (ref 3.5–5)
ALBUMIN/GLOB SERPL: 1.1 {RATIO} (ref 1.1–2.2)
ALP SERPL-CCNC: 101 U/L (ref 45–117)
ALT SERPL-CCNC: 23 U/L (ref 12–78)
ANION GAP SERPL CALC-SCNC: 5 MMOL/L (ref 5–15)
APPEARANCE UR: CLEAR
APTT PPP: 27 SEC (ref 22.1–31)
AST SERPL-CCNC: 10 U/L (ref 15–37)
BACTERIA URNS QL MICRO: NEGATIVE /HPF
BILIRUB SERPL-MCNC: 0.6 MG/DL (ref 0.2–1)
BILIRUB UR QL: NEGATIVE
BLOOD GROUP ANTIBODIES SERPL: NORMAL
BUN SERPL-MCNC: 19 MG/DL (ref 6–20)
BUN/CREAT SERPL: 25 (ref 12–20)
CALCIUM SERPL-MCNC: 8.9 MG/DL (ref 8.5–10.1)
CEA SERPL-MCNC: 3.5 NG/ML
CHLORIDE SERPL-SCNC: 108 MMOL/L (ref 97–108)
CO2 SERPL-SCNC: 27 MMOL/L (ref 21–32)
COLOR UR: ABNORMAL
CREAT SERPL-MCNC: 0.75 MG/DL (ref 0.55–1.02)
EPITH CASTS URNS QL MICRO: ABNORMAL /LPF
ERYTHROCYTE [DISTWIDTH] IN BLOOD BY AUTOMATED COUNT: 13.9 % (ref 11.5–14.5)
GLOBULIN SER CALC-MCNC: 3.2 G/DL (ref 2–4)
GLUCOSE SERPL-MCNC: 101 MG/DL (ref 65–100)
GLUCOSE UR STRIP.AUTO-MCNC: NEGATIVE MG/DL
HCT VFR BLD AUTO: 36.4 % (ref 35–47)
HGB BLD-MCNC: 11.5 G/DL (ref 11.5–16)
HGB UR QL STRIP: NEGATIVE
HYALINE CASTS URNS QL MICRO: ABNORMAL /LPF (ref 0–2)
INR PPP: 1 (ref 0.9–1.1)
KETONES UR QL STRIP.AUTO: NEGATIVE MG/DL
LEUKOCYTE ESTERASE UR QL STRIP.AUTO: NEGATIVE
MAGNESIUM SERPL-MCNC: 2 MG/DL (ref 1.6–2.4)
MCH RBC QN AUTO: 27.1 PG (ref 26–34)
MCHC RBC AUTO-ENTMCNC: 31.6 G/DL (ref 30–36.5)
MCV RBC AUTO: 85.6 FL (ref 80–99)
NITRITE UR QL STRIP.AUTO: NEGATIVE
NRBC # BLD: 0 K/UL (ref 0–0.01)
NRBC BLD-RTO: 0 PER 100 WBC
PH UR STRIP: 5 [PH] (ref 5–8)
PHOSPHATE SERPL-MCNC: 3.2 MG/DL (ref 2.6–4.7)
PLATELET # BLD AUTO: 243 K/UL (ref 150–400)
PMV BLD AUTO: 11 FL (ref 8.9–12.9)
POTASSIUM SERPL-SCNC: 4 MMOL/L (ref 3.5–5.1)
PROT SERPL-MCNC: 6.8 G/DL (ref 6.4–8.2)
PROT UR STRIP-MCNC: NEGATIVE MG/DL
PROTHROMBIN TIME: 10.1 SEC (ref 9–11.1)
RBC # BLD AUTO: 4.25 M/UL (ref 3.8–5.2)
RBC #/AREA URNS HPF: ABNORMAL /HPF (ref 0–5)
SODIUM SERPL-SCNC: 140 MMOL/L (ref 136–145)
SP GR UR REFRACTOMETRY: 1.03
SPECIMEN EXP DATE BLD: NORMAL
THERAPEUTIC RANGE,PTTT: NORMAL SECS (ref 58–77)
UA: UC IF INDICATED,UAUC: ABNORMAL
UROBILINOGEN UR QL STRIP.AUTO: 0.2 EU/DL (ref 0.2–1)
WBC # BLD AUTO: 8.3 K/UL (ref 3.6–11)
WBC URNS QL MICRO: ABNORMAL /HPF (ref 0–4)

## 2022-11-23 PROCEDURE — 85730 THROMBOPLASTIN TIME PARTIAL: CPT

## 2022-11-23 PROCEDURE — 83735 ASSAY OF MAGNESIUM: CPT

## 2022-11-23 PROCEDURE — 93005 ELECTROCARDIOGRAM TRACING: CPT

## 2022-11-23 PROCEDURE — 86900 BLOOD TYPING SEROLOGIC ABO: CPT

## 2022-11-23 PROCEDURE — 85027 COMPLETE CBC AUTOMATED: CPT

## 2022-11-23 PROCEDURE — 36415 COLL VENOUS BLD VENIPUNCTURE: CPT

## 2022-11-23 PROCEDURE — 82378 CARCINOEMBRYONIC ANTIGEN: CPT

## 2022-11-23 PROCEDURE — 81001 URINALYSIS AUTO W/SCOPE: CPT

## 2022-11-23 PROCEDURE — 84100 ASSAY OF PHOSPHORUS: CPT

## 2022-11-23 PROCEDURE — 80053 COMPREHEN METABOLIC PANEL: CPT

## 2022-11-23 PROCEDURE — 85610 PROTHROMBIN TIME: CPT

## 2022-11-23 RX ORDER — NEOMYCIN SULFATE 500 MG/1
500 TABLET ORAL DAILY
Status: ON HOLD | COMMUNITY

## 2022-11-23 RX ORDER — FAMOTIDINE 40 MG/1
40 TABLET, FILM COATED ORAL DAILY
Status: ON HOLD | COMMUNITY

## 2022-11-23 RX ORDER — DOXYCYCLINE 40 MG/1
40 CAPSULE ORAL
Status: ON HOLD | COMMUNITY

## 2022-11-23 RX ORDER — SODIUM PICOSULFATE, MAGNESIUM OXIDE, AND ANHYDROUS CITRIC ACID 10; 3.5; 12 MG/160ML; G/160ML; G/160ML
LIQUID ORAL
Status: ON HOLD | COMMUNITY

## 2022-11-23 NOTE — PERIOP NOTES
Hibiclens/Chlorhexidine    Preventing Infections Before and After - Your Surgery    IMPORTANT INSTRUCTIONS    Please read and follow these instructions carefully. If you are unable to comply with the below instructions your procedure will be cancelled. Every Night for Three (3) nights before your surgery:  Shower with an antibacterial soap, such as Dial, or the soap provided at your preassessment appointment. A shower is better than a bath for cleaning your skin. If needed, ask someone to help you reach all areas of your body. Dont forget to clean your belly button with every shower. The night before your surgery: If you lose your Hibiclens/chlorhexidine please contact surgery center or you can purchase it at a local pharmacy  On the night before your surgery, shower with an antibacterial soap, such as Dial, or the soap provided at your preassessment appointment. With one packet of Hibiclens/Chlorhexidine in hand, turn water off. Apply Hibiclens antiseptic skin cleanser with a clean, freshly washed washcloth. Gently apply to your body from chin to toes (except the genital area) and especially the area(s) where your incision(s) will be. Leave Hibiclens/Chlorhexidine on your skin for at least 20 seconds. CAUTION: If needed, Hibiclens/chlorhexidine may be used to clean the folds of skin of the legs (such as in the area of the groin) and on your buttocks and hips. However, do not use Hibiclens/Chlorhexidine above the neck or in the genital area (your bottom) or put inside any area of your body. Turn the water back on and rinse. Dry gently with a clean, freshly washed towel. After your shower, do not use any powder, deodorant, perfumes or lotion. Use clean, freshly washed towels and washcloths every time you shower. Wear clean, freshly washed pajamas to bed the night before surgery. Sleep on clean, freshly washed sheets. Do not allow pets to sleep in your bed with you.         The Morning of your surgery:  Shower again thoroughly with an antibacterial soap, such as Dial or the soap provided at your preassessment appointment. If needed, ask someone for help to reach all areas of your body. Dont forget to clean your belly button! Rinse. Dry gently with a clean, freshly washed towel. After your shower, do not use any powder, deodorant, perfumes or lotion prior to surgery. Put on clean, freshly washed clothing. Tips to help prevent infections after your surgery:  Protect your surgical wound from germs:  Hand washing is the most important thing you and your caregivers can do to prevent infections. Keep your bandage clean and dry! Do not touch your surgical wound. Use clean, freshly washed towels and washcloths every time you shower; do not share bath linens with others. Until your surgical wound is healed, wear clothing and sleep on bed linens each day that are clean and freshly washed. Do not allow pets to sleep in your bed with you or touch your surgical wound. Do not smoke - smoking delays wound healing. This may be a good time to stop smoking. If you have diabetes, it is important for you to manage your blood sugar levels properly before your surgery as well as after your surgery. Poorly managed blood sugar levels slow down wound healing and prevent you from healing completely. If you lose your Hibiclens/chlorhexidine, please call the [facility], or it is available for purchase at your pharmacy.                ___________________      ___________________      ________________  (Signature of Patient)          (Witness)                   (Date and Time)

## 2022-11-23 NOTE — PERIOP NOTES

## 2022-11-23 NOTE — PERIOP NOTES
Reviewed ERAS book, drinks given and questions answered. Pt also has bowel prep medications already filled.

## 2022-11-23 NOTE — PERIOP NOTES
Instructions on When You Can   Eat or Drink Before Surgery      You have been provided 2 pre-surgery drinks received at your pre-admission testing appointment. Night before surgery: You should drink one bottle of the  pre-surgery drink at bedtime. No food after midnight! Day of Surgery:  Complete 2nd bottle of the pre-surgery drink 1 hour prior to arrival at hospital.  For questions call Pre-Admission Testing at [phone]. They are available from 8:00am-5:00pm, Monday through Friday.

## 2022-11-23 NOTE — PERIOP NOTES
[facility]  Preoperative Instructions        Surgery Date 12/1/22          Time of Arrival 1000 [phone]    1. On the day of your surgery, please report to the Surgical Services Registration Desk and sign in at your designated time. The Surgery Center is located to the right of the Emergency Room. 2. You must have someone with you to drive you home. You should not drive a car for 24 hours following surgery. Please make arrangements for a friend or family member to stay with you for the first 24 hours after your surgery. 3. Refer to your handbook for instructions on drinking liquids prior to surgery. 4. We recommend you do not drink any alcoholic beverages for 24 hours before and after your surgery. 5. Contact your surgeons office for instructions on the following medications: non-steroidal anti-inflammatory drugs (i.e. Advil, Aleve), vitamins, and supplements. (Some surgeons will want you to stop these medications prior to surgery and others may allow you to take them)  **If you are currently taking Plavix, Coumadin, Aspirin and/or other blood-thinning agents, contact your surgeon for instructions. ** Your surgeon will partner with the physician prescribing these medications to determine if it is safe to stop or if you need to continue taking. Please do not stop taking these medications without instructions from your surgeon    6. Wear comfortable clothes. Wear glasses instead of contacts. Do not bring any money or jewelry. Please bring picture ID, insurance card, and any prearranged co-payment or hospital payment. Do not wear make-up, particularly mascara the morning of your surgery. Do not wear nail polish, particularly if you are having foot /hand surgery. Wear your hair loose or down, no ponytails, buns, nicholas pins or clips. All body piercings must be removed.   Please shower with antibacterial soap for three consecutive days before and on the morning of surgery, but do not apply any lotions, powders or deodorants after the shower on the day of surgery. Please use a fresh towels after each shower. Please sleep in clean clothes and change bed linens the night before surgery. Please do not shave for 48 hours prior to surgery. Shaving of the face is acceptable. 7. You should understand that if you do not follow these instructions your surgery may be cancelled. If your physical condition changes (I.e. fever, cold or flu) please contact your surgeon as soon as possible. 8. It is important that you be on time. If a situation occurs where you may be late, please call [phone] (OR Holding Area). 9. If you have any questions and or problems, please call [phone] (Pre-admission Testing). 10. Your surgery time may be subject to change. You will receive a phone call the evening prior with your arrival time. 11.  If having outpatient surgery, you must have someone to drive you here, stay with you during the duration of your stay, and to drive you home at time of discharge. ** Make sure to do your Bowel prep of metronidazole, neomycin, metoclopramide, clenpiq    Special Instructions: TAKE ALL MEDICATIONS THE DAY OF SURGERY EXCEPT: no vitamins,       I understand a pre-operative phone call will be made to verify my surgery time. In the event that I am not available, I give permission for a message to be left on my answering service and/or with another person?   yes         ___________________      __________   _________    (Signature of Patient)             (Witness)                (Date and Time)

## 2022-11-24 LAB
ATRIAL RATE: 72 BPM
BACTERIA SPEC CULT: NORMAL
BACTERIA SPEC CULT: NORMAL
CALCULATED P AXIS, ECG09: 35 DEGREES
CALCULATED R AXIS, ECG10: 3 DEGREES
CALCULATED T AXIS, ECG11: 10 DEGREES
DIAGNOSIS, 93000: NORMAL
P-R INTERVAL, ECG05: 164 MS
Q-T INTERVAL, ECG07: 388 MS
QRS DURATION, ECG06: 88 MS
QTC CALCULATION (BEZET), ECG08: 424 MS
SERVICE CMNT-IMP: NORMAL
VENTRICULAR RATE, ECG03: 72 BPM

## 2022-12-01 ENCOUNTER — ANESTHESIA (OUTPATIENT)
Dept: SURGERY | Age: 52
DRG: 330 | End: 2022-12-01
Payer: COMMERCIAL

## 2022-12-01 ENCOUNTER — HOSPITAL ENCOUNTER (INPATIENT)
Age: 52
LOS: 4 days | Discharge: HOME | DRG: 330 | End: 2022-12-05
Attending: SURGERY | Admitting: SURGERY
Payer: COMMERCIAL

## 2022-12-01 ENCOUNTER — ANESTHESIA EVENT (OUTPATIENT)
Dept: SURGERY | Age: 52
DRG: 330 | End: 2022-12-01
Payer: COMMERCIAL

## 2022-12-01 DIAGNOSIS — C18.9 MALIGNANT NEOPLASM OF COLON, UNSPECIFIED PART OF COLON (HCC): Primary | ICD-10-CM

## 2022-12-01 PROCEDURE — 74011000250 HC RX REV CODE- 250: Performed by: NURSE ANESTHETIST, CERTIFIED REGISTERED

## 2022-12-01 PROCEDURE — 65270000029 HC RM PRIVATE

## 2022-12-01 PROCEDURE — 77030013079 HC BLNKT BAIR HGGR 3M -A: Performed by: NURSE ANESTHETIST, CERTIFIED REGISTERED

## 2022-12-01 PROCEDURE — 88309 TISSUE EXAM BY PATHOLOGIST: CPT

## 2022-12-01 PROCEDURE — 2709999900 HC NON-CHARGEABLE SUPPLY: Performed by: SURGERY

## 2022-12-01 PROCEDURE — 77030011808 HC STPLR ENDOSCOPIC J&J -D: Performed by: SURGERY

## 2022-12-01 PROCEDURE — 77030002966 HC SUT PDS J&J -A: Performed by: SURGERY

## 2022-12-01 PROCEDURE — 74011250637 HC RX REV CODE- 250/637: Performed by: SURGERY

## 2022-12-01 PROCEDURE — 77030014086 HC TRCR ENDOSC AMR -B: Performed by: SURGERY

## 2022-12-01 PROCEDURE — 76210000001 HC OR PH I REC 2.5 TO 3 HR: Performed by: SURGERY

## 2022-12-01 PROCEDURE — 74011250636 HC RX REV CODE- 250/636: Performed by: NURSE ANESTHETIST, CERTIFIED REGISTERED

## 2022-12-01 PROCEDURE — 77030036731 HC STPLR ENDOSC J&J -F: Performed by: SURGERY

## 2022-12-01 PROCEDURE — 74011250636 HC RX REV CODE- 250/636: Performed by: ANESTHESIOLOGY

## 2022-12-01 PROCEDURE — 77030020061 HC IV BLD WRMR ADMIN SET 3M -B: Performed by: NURSE ANESTHETIST, CERTIFIED REGISTERED

## 2022-12-01 PROCEDURE — 76010000132 HC OR TIME 2.5 TO 3 HR: Performed by: SURGERY

## 2022-12-01 PROCEDURE — 77030031139 HC SUT VCRL2 J&J -A: Performed by: SURGERY

## 2022-12-01 PROCEDURE — 77030002901 HC SUT DEV MCLOSE MPSA - B: Performed by: SURGERY

## 2022-12-01 PROCEDURE — 77030026438 HC STYL ET INTUB CARD -A: Performed by: NURSE ANESTHETIST, CERTIFIED REGISTERED

## 2022-12-01 PROCEDURE — 74011000250 HC RX REV CODE- 250: Performed by: SURGERY

## 2022-12-01 PROCEDURE — 77030008606 HC TRCR ENDOSC KII AMR -B: Performed by: SURGERY

## 2022-12-01 PROCEDURE — 2709999900 HC NON-CHARGEABLE SUPPLY

## 2022-12-01 PROCEDURE — 77030011265 HC ELECTRD BLD HEX COVD -A: Performed by: SURGERY

## 2022-12-01 PROCEDURE — 77030009527 HC GEL PRT SYS AMR -E: Performed by: SURGERY

## 2022-12-01 PROCEDURE — 77030002996 HC SUT SLK J&J -A: Performed by: SURGERY

## 2022-12-01 PROCEDURE — 74011250636 HC RX REV CODE- 250/636

## 2022-12-01 PROCEDURE — 77030010507 HC ADH SKN DERMBND J&J -B: Performed by: SURGERY

## 2022-12-01 PROCEDURE — 77030002933 HC SUT MCRYL J&J -A: Performed by: SURGERY

## 2022-12-01 PROCEDURE — 74011250636 HC RX REV CODE- 250/636: Performed by: SURGERY

## 2022-12-01 PROCEDURE — 77030018684: Performed by: SURGERY

## 2022-12-01 PROCEDURE — 77030018673: Performed by: SURGERY

## 2022-12-01 PROCEDURE — 76060000036 HC ANESTHESIA 2.5 TO 3 HR: Performed by: SURGERY

## 2022-12-01 PROCEDURE — 0DTF4ZZ RESECTION OF RIGHT LARGE INTESTINE, PERCUTANEOUS ENDOSCOPIC APPROACH: ICD-10-PCS | Performed by: SURGERY

## 2022-12-01 PROCEDURE — 77030008684 HC TU ET CUF COVD -B: Performed by: NURSE ANESTHETIST, CERTIFIED REGISTERED

## 2022-12-01 PROCEDURE — 77030016151 HC PROTCTR LNS DFOG COVD -B: Performed by: SURGERY

## 2022-12-01 PROCEDURE — 77030012799 HC TRCR GELPRT BLN AMR -B: Performed by: SURGERY

## 2022-12-01 PROCEDURE — 77030034628 HC LIGASURE LAP SEAL DIV MD COVD -F: Performed by: SURGERY

## 2022-12-01 PROCEDURE — 77030005513 HC CATH URETH FOL11 MDII -B: Performed by: SURGERY

## 2022-12-01 RX ORDER — CETIRIZINE HYDROCHLORIDE 10 MG/1
10 TABLET ORAL DAILY
Status: DISCONTINUED | OUTPATIENT
Start: 2022-12-02 | End: 2022-12-05 | Stop reason: HOSPADM

## 2022-12-01 RX ORDER — FENTANYL CITRATE 50 UG/ML
25 INJECTION, SOLUTION INTRAMUSCULAR; INTRAVENOUS
Status: COMPLETED | OUTPATIENT
Start: 2022-12-01 | End: 2022-12-01

## 2022-12-01 RX ORDER — GABAPENTIN 300 MG/1
600 CAPSULE ORAL ONCE
Status: COMPLETED | OUTPATIENT
Start: 2022-12-01 | End: 2022-12-01

## 2022-12-01 RX ORDER — ONDANSETRON 4 MG/1
4 TABLET, ORALLY DISINTEGRATING ORAL
Status: DISCONTINUED | OUTPATIENT
Start: 2022-12-01 | End: 2022-12-05 | Stop reason: HOSPADM

## 2022-12-01 RX ORDER — ONDANSETRON 2 MG/ML
4 INJECTION INTRAMUSCULAR; INTRAVENOUS
Status: DISCONTINUED | OUTPATIENT
Start: 2022-12-01 | End: 2022-12-05 | Stop reason: HOSPADM

## 2022-12-01 RX ORDER — ALBUTEROL SULFATE 0.83 MG/ML
2.5 SOLUTION RESPIRATORY (INHALATION)
Status: DISCONTINUED | OUTPATIENT
Start: 2022-12-01 | End: 2022-12-05 | Stop reason: HOSPADM

## 2022-12-01 RX ORDER — LIDOCAINE HYDROCHLORIDE ANHYDROUS AND DEXTROSE MONOHYDRATE .8; 5 G/100ML; G/100ML
INJECTION, SOLUTION INTRAVENOUS
Status: DISCONTINUED | OUTPATIENT
Start: 2022-12-01 | End: 2022-12-01 | Stop reason: HOSPADM

## 2022-12-01 RX ORDER — FENTANYL CITRATE 50 UG/ML
25 INJECTION, SOLUTION INTRAMUSCULAR; INTRAVENOUS ONCE
Status: COMPLETED | OUTPATIENT
Start: 2022-12-01 | End: 2022-12-01

## 2022-12-01 RX ORDER — MAGNESIUM SULFATE HEPTAHYDRATE 40 MG/ML
INJECTION, SOLUTION INTRAVENOUS AS NEEDED
Status: DISCONTINUED | OUTPATIENT
Start: 2022-12-01 | End: 2022-12-01 | Stop reason: HOSPADM

## 2022-12-01 RX ORDER — MONTELUKAST SODIUM 10 MG/1
10 TABLET ORAL DAILY
Status: DISCONTINUED | OUTPATIENT
Start: 2022-12-02 | End: 2022-12-05 | Stop reason: HOSPADM

## 2022-12-01 RX ORDER — IPRATROPIUM BROMIDE AND ALBUTEROL SULFATE 2.5; .5 MG/3ML; MG/3ML
3 SOLUTION RESPIRATORY (INHALATION)
Status: DISCONTINUED | OUTPATIENT
Start: 2022-12-01 | End: 2022-12-05 | Stop reason: HOSPADM

## 2022-12-01 RX ORDER — ACETAMINOPHEN 325 MG/1
650 TABLET ORAL EVERY 6 HOURS
Status: DISCONTINUED | OUTPATIENT
Start: 2022-12-01 | End: 2022-12-05 | Stop reason: HOSPADM

## 2022-12-01 RX ORDER — SODIUM CHLORIDE 0.9 % (FLUSH) 0.9 %
5-40 SYRINGE (ML) INJECTION EVERY 8 HOURS
Status: DISCONTINUED | OUTPATIENT
Start: 2022-12-01 | End: 2022-12-05 | Stop reason: HOSPADM

## 2022-12-01 RX ORDER — FLUTICASONE PROPIONATE 50 MCG
2 SPRAY, SUSPENSION (ML) NASAL DAILY
Status: DISCONTINUED | OUTPATIENT
Start: 2022-12-02 | End: 2022-12-05 | Stop reason: HOSPADM

## 2022-12-01 RX ORDER — SODIUM CHLORIDE 0.9 % (FLUSH) 0.9 %
5-40 SYRINGE (ML) INJECTION AS NEEDED
Status: DISCONTINUED | OUTPATIENT
Start: 2022-12-01 | End: 2022-12-05 | Stop reason: HOSPADM

## 2022-12-01 RX ORDER — KETAMINE HYDROCHLORIDE 10 MG/ML
INJECTION, SOLUTION INTRAMUSCULAR; INTRAVENOUS AS NEEDED
Status: DISCONTINUED | OUTPATIENT
Start: 2022-12-01 | End: 2022-12-01 | Stop reason: HOSPADM

## 2022-12-01 RX ORDER — SODIUM CHLORIDE, SODIUM LACTATE, POTASSIUM CHLORIDE, CALCIUM CHLORIDE 600; 310; 30; 20 MG/100ML; MG/100ML; MG/100ML; MG/100ML
75 INJECTION, SOLUTION INTRAVENOUS CONTINUOUS
Status: DISCONTINUED | OUTPATIENT
Start: 2022-12-01 | End: 2022-12-02

## 2022-12-01 RX ORDER — FENTANYL CITRATE 50 UG/ML
INJECTION, SOLUTION INTRAMUSCULAR; INTRAVENOUS AS NEEDED
Status: DISCONTINUED | OUTPATIENT
Start: 2022-12-01 | End: 2022-12-01 | Stop reason: HOSPADM

## 2022-12-01 RX ORDER — BUPIVACAINE HYDROCHLORIDE AND EPINEPHRINE 2.5; 5 MG/ML; UG/ML
INJECTION, SOLUTION EPIDURAL; INFILTRATION; INTRACAUDAL; PERINEURAL AS NEEDED
Status: DISCONTINUED | OUTPATIENT
Start: 2022-12-01 | End: 2022-12-01 | Stop reason: HOSPADM

## 2022-12-01 RX ORDER — ONDANSETRON 2 MG/ML
4 INJECTION INTRAMUSCULAR; INTRAVENOUS AS NEEDED
Status: DISCONTINUED | OUTPATIENT
Start: 2022-12-01 | End: 2022-12-01 | Stop reason: HOSPADM

## 2022-12-01 RX ORDER — ONDANSETRON 2 MG/ML
INJECTION INTRAMUSCULAR; INTRAVENOUS AS NEEDED
Status: DISCONTINUED | OUTPATIENT
Start: 2022-12-01 | End: 2022-12-01 | Stop reason: HOSPADM

## 2022-12-01 RX ORDER — LEVOFLOXACIN 5 MG/ML
500 INJECTION, SOLUTION INTRAVENOUS ONCE
Status: COMPLETED | OUTPATIENT
Start: 2022-12-01 | End: 2022-12-01

## 2022-12-01 RX ORDER — ALBUTEROL SULFATE 90 UG/1
2 AEROSOL, METERED RESPIRATORY (INHALATION)
Status: DISCONTINUED | OUTPATIENT
Start: 2022-12-01 | End: 2022-12-05 | Stop reason: HOSPADM

## 2022-12-01 RX ORDER — MIDAZOLAM HYDROCHLORIDE 1 MG/ML
INJECTION, SOLUTION INTRAMUSCULAR; INTRAVENOUS AS NEEDED
Status: DISCONTINUED | OUTPATIENT
Start: 2022-12-01 | End: 2022-12-01 | Stop reason: HOSPADM

## 2022-12-01 RX ORDER — DOXYCYCLINE HYCLATE 100 MG
100 TABLET ORAL 2 TIMES DAILY
Status: DISCONTINUED | OUTPATIENT
Start: 2022-12-02 | End: 2022-12-05 | Stop reason: HOSPADM

## 2022-12-01 RX ORDER — ACETAMINOPHEN 500 MG
1000 TABLET ORAL ONCE
Status: COMPLETED | OUTPATIENT
Start: 2022-12-01 | End: 2022-12-01

## 2022-12-01 RX ORDER — OXYCODONE HYDROCHLORIDE 5 MG/1
5 TABLET ORAL
Status: DISCONTINUED | OUTPATIENT
Start: 2022-12-01 | End: 2022-12-05 | Stop reason: HOSPADM

## 2022-12-01 RX ORDER — LIDOCAINE HYDROCHLORIDE ANHYDROUS AND DEXTROSE MONOHYDRATE .8; 5 G/100ML; G/100ML
1 INJECTION, SOLUTION INTRAVENOUS CONTINUOUS
Status: DISCONTINUED | OUTPATIENT
Start: 2022-12-01 | End: 2022-12-02

## 2022-12-01 RX ORDER — HYDROMORPHONE HYDROCHLORIDE 1 MG/ML
0.2 INJECTION, SOLUTION INTRAMUSCULAR; INTRAVENOUS; SUBCUTANEOUS ONCE
Status: COMPLETED | OUTPATIENT
Start: 2022-12-01 | End: 2022-12-01

## 2022-12-01 RX ORDER — HYDROMORPHONE HYDROCHLORIDE 1 MG/ML
0.25 INJECTION, SOLUTION INTRAMUSCULAR; INTRAVENOUS; SUBCUTANEOUS
Status: DISCONTINUED | OUTPATIENT
Start: 2022-12-01 | End: 2022-12-05 | Stop reason: HOSPADM

## 2022-12-01 RX ORDER — ALPRAZOLAM 0.5 MG/1
0.5 TABLET ORAL
Status: DISCONTINUED | OUTPATIENT
Start: 2022-12-01 | End: 2022-12-05 | Stop reason: HOSPADM

## 2022-12-01 RX ORDER — HYDROXYZINE 25 MG/1
50 TABLET, FILM COATED ORAL
Status: DISCONTINUED | OUTPATIENT
Start: 2022-12-01 | End: 2022-12-05 | Stop reason: HOSPADM

## 2022-12-01 RX ORDER — OXYCODONE HYDROCHLORIDE 5 MG/1
10 TABLET ORAL
Status: DISCONTINUED | OUTPATIENT
Start: 2022-12-01 | End: 2022-12-05 | Stop reason: HOSPADM

## 2022-12-01 RX ORDER — METRONIDAZOLE 500 MG/100ML
500 INJECTION, SOLUTION INTRAVENOUS ONCE
Status: COMPLETED | OUTPATIENT
Start: 2022-12-01 | End: 2022-12-01

## 2022-12-01 RX ORDER — FENTANYL CITRATE 50 UG/ML
INJECTION, SOLUTION INTRAMUSCULAR; INTRAVENOUS
Status: COMPLETED
Start: 2022-12-01 | End: 2022-12-01

## 2022-12-01 RX ORDER — CYCLOBENZAPRINE HCL 10 MG
10 TABLET ORAL
Status: DISCONTINUED | OUTPATIENT
Start: 2022-12-01 | End: 2022-12-05 | Stop reason: HOSPADM

## 2022-12-01 RX ORDER — LEVOTHYROXINE SODIUM 125 UG/1
125 TABLET ORAL
Status: DISCONTINUED | OUTPATIENT
Start: 2022-12-02 | End: 2022-12-05 | Stop reason: HOSPADM

## 2022-12-01 RX ORDER — AZELASTINE 1 MG/ML
2 SPRAY, METERED NASAL 2 TIMES DAILY
Status: DISCONTINUED | OUTPATIENT
Start: 2022-12-01 | End: 2022-12-05 | Stop reason: HOSPADM

## 2022-12-01 RX ORDER — SODIUM CHLORIDE, SODIUM LACTATE, POTASSIUM CHLORIDE, CALCIUM CHLORIDE 600; 310; 30; 20 MG/100ML; MG/100ML; MG/100ML; MG/100ML
75 INJECTION, SOLUTION INTRAVENOUS CONTINUOUS
Status: DISCONTINUED | OUTPATIENT
Start: 2022-12-01 | End: 2022-12-01

## 2022-12-01 RX ORDER — HYDROMORPHONE HYDROCHLORIDE 1 MG/ML
0.3 INJECTION, SOLUTION INTRAMUSCULAR; INTRAVENOUS; SUBCUTANEOUS ONCE
Status: COMPLETED | OUTPATIENT
Start: 2022-12-01 | End: 2022-12-01

## 2022-12-01 RX ORDER — ENOXAPARIN SODIUM 100 MG/ML
40 INJECTION SUBCUTANEOUS DAILY
Status: DISCONTINUED | OUTPATIENT
Start: 2022-12-02 | End: 2022-12-05 | Stop reason: HOSPADM

## 2022-12-01 RX ORDER — ROCURONIUM BROMIDE 10 MG/ML
INJECTION, SOLUTION INTRAVENOUS AS NEEDED
Status: DISCONTINUED | OUTPATIENT
Start: 2022-12-01 | End: 2022-12-01 | Stop reason: HOSPADM

## 2022-12-01 RX ORDER — BUTALBITAL, ACETAMINOPHEN AND CAFFEINE 50; 325; 40 MG/1; MG/1; MG/1
1 TABLET ORAL
Status: DISCONTINUED | OUTPATIENT
Start: 2022-12-01 | End: 2022-12-05 | Stop reason: HOSPADM

## 2022-12-01 RX ORDER — MIDAZOLAM HYDROCHLORIDE 1 MG/ML
1 INJECTION, SOLUTION INTRAMUSCULAR; INTRAVENOUS AS NEEDED
Status: DISCONTINUED | OUTPATIENT
Start: 2022-12-01 | End: 2022-12-01 | Stop reason: HOSPADM

## 2022-12-01 RX ORDER — SODIUM CHLORIDE, SODIUM LACTATE, POTASSIUM CHLORIDE, CALCIUM CHLORIDE 600; 310; 30; 20 MG/100ML; MG/100ML; MG/100ML; MG/100ML
INJECTION, SOLUTION INTRAVENOUS
Status: DISCONTINUED | OUTPATIENT
Start: 2022-12-01 | End: 2022-12-01 | Stop reason: HOSPADM

## 2022-12-01 RX ORDER — SUCCINYLCHOLINE CHLORIDE 20 MG/ML
INJECTION INTRAMUSCULAR; INTRAVENOUS AS NEEDED
Status: DISCONTINUED | OUTPATIENT
Start: 2022-12-01 | End: 2022-12-01 | Stop reason: HOSPADM

## 2022-12-01 RX ORDER — PROPOFOL 10 MG/ML
INJECTION, EMULSION INTRAVENOUS AS NEEDED
Status: DISCONTINUED | OUTPATIENT
Start: 2022-12-01 | End: 2022-12-01 | Stop reason: HOSPADM

## 2022-12-01 RX ORDER — LIDOCAINE HYDROCHLORIDE 20 MG/ML
INJECTION, SOLUTION EPIDURAL; INFILTRATION; INTRACAUDAL; PERINEURAL AS NEEDED
Status: DISCONTINUED | OUTPATIENT
Start: 2022-12-01 | End: 2022-12-01 | Stop reason: HOSPADM

## 2022-12-01 RX ORDER — PHENYLEPHRINE HCL IN 0.9% NACL 0.4MG/10ML
SYRINGE (ML) INTRAVENOUS AS NEEDED
Status: DISCONTINUED | OUTPATIENT
Start: 2022-12-01 | End: 2022-12-01 | Stop reason: HOSPADM

## 2022-12-01 RX ORDER — HYDROMORPHONE HYDROCHLORIDE 1 MG/ML
0.5 INJECTION, SOLUTION INTRAMUSCULAR; INTRAVENOUS; SUBCUTANEOUS
Status: DISCONTINUED | OUTPATIENT
Start: 2022-12-01 | End: 2022-12-05 | Stop reason: HOSPADM

## 2022-12-01 RX ADMIN — Medication 2 G: at 08:50

## 2022-12-01 RX ADMIN — OXYCODONE 10 MG: 5 TABLET ORAL at 19:56

## 2022-12-01 RX ADMIN — KETAMINE HYDROCHLORIDE 50 MG: 10 INJECTION, SOLUTION INTRAMUSCULAR; INTRAVENOUS at 08:53

## 2022-12-01 RX ADMIN — LEVOFLOXACIN 500 MG: 5 INJECTION, SOLUTION INTRAVENOUS at 08:55

## 2022-12-01 RX ADMIN — FENTANYL CITRATE 25 MCG: 50 INJECTION, SOLUTION INTRAMUSCULAR; INTRAVENOUS at 11:55

## 2022-12-01 RX ADMIN — Medication 3 AMPULE: at 08:24

## 2022-12-01 RX ADMIN — SODIUM CHLORIDE, PRESERVATIVE FREE 10 ML: 5 INJECTION INTRAVENOUS at 22:06

## 2022-12-01 RX ADMIN — GABAPENTIN 600 MG: 300 CAPSULE ORAL at 08:24

## 2022-12-01 RX ADMIN — HYDROMORPHONE HYDROCHLORIDE 0.2 MG: 1 INJECTION, SOLUTION INTRAMUSCULAR; INTRAVENOUS; SUBCUTANEOUS at 12:06

## 2022-12-01 RX ADMIN — SUCCINYLCHOLINE CHLORIDE 200 MG: 20 INJECTION, SOLUTION INTRAMUSCULAR; INTRAVENOUS at 08:50

## 2022-12-01 RX ADMIN — Medication 80 MCG: at 10:22

## 2022-12-01 RX ADMIN — SODIUM CHLORIDE, POTASSIUM CHLORIDE, SODIUM LACTATE AND CALCIUM CHLORIDE 75 ML/HR: 600; 310; 30; 20 INJECTION, SOLUTION INTRAVENOUS at 12:57

## 2022-12-01 RX ADMIN — FENTANYL CITRATE 25 MCG: 50 INJECTION, SOLUTION INTRAMUSCULAR; INTRAVENOUS at 11:39

## 2022-12-01 RX ADMIN — FENTANYL CITRATE 25 MCG: 50 INJECTION, SOLUTION INTRAMUSCULAR; INTRAVENOUS at 12:42

## 2022-12-01 RX ADMIN — LIDOCAINE HYDROCHLORIDE 2 MG/KG/HR: 8 INJECTION, SOLUTION INTRAVENOUS at 08:50

## 2022-12-01 RX ADMIN — ROCURONIUM BROMIDE 5 MG: 10 INJECTION INTRAVENOUS at 08:50

## 2022-12-01 RX ADMIN — ROCURONIUM BROMIDE 10 MG: 10 INJECTION INTRAVENOUS at 10:24

## 2022-12-01 RX ADMIN — FENTANYL CITRATE 50 MCG: 50 INJECTION, SOLUTION INTRAMUSCULAR; INTRAVENOUS at 08:50

## 2022-12-01 RX ADMIN — LIDOCAINE HYDROCHLORIDE ANHYDROUS AND DEXTROSE MONOHYDRATE 1 MG/KG/HR: .8; 5 INJECTION, SOLUTION INTRAVENOUS at 13:01

## 2022-12-01 RX ADMIN — ONDANSETRON HYDROCHLORIDE 4 MG: 2 INJECTION, SOLUTION INTRAMUSCULAR; INTRAVENOUS at 11:03

## 2022-12-01 RX ADMIN — SODIUM CHLORIDE, POTASSIUM CHLORIDE, SODIUM LACTATE AND CALCIUM CHLORIDE 75 ML/HR: 600; 310; 30; 20 INJECTION, SOLUTION INTRAVENOUS at 15:01

## 2022-12-01 RX ADMIN — ACETAMINOPHEN 650 MG: 325 TABLET ORAL at 14:59

## 2022-12-01 RX ADMIN — METRONIDAZOLE 500 MG: 500 INJECTION, SOLUTION INTRAVENOUS at 08:42

## 2022-12-01 RX ADMIN — ROCURONIUM BROMIDE 45 MG: 10 INJECTION INTRAVENOUS at 08:55

## 2022-12-01 RX ADMIN — Medication 120 MCG: at 10:28

## 2022-12-01 RX ADMIN — FENTANYL CITRATE 25 MCG: 50 INJECTION, SOLUTION INTRAMUSCULAR; INTRAVENOUS at 12:46

## 2022-12-01 RX ADMIN — FENTANYL CITRATE 25 MCG: 50 INJECTION, SOLUTION INTRAMUSCULAR; INTRAVENOUS at 12:00

## 2022-12-01 RX ADMIN — SODIUM CHLORIDE, POTASSIUM CHLORIDE, SODIUM LACTATE AND CALCIUM CHLORIDE 75 ML/HR: 600; 310; 30; 20 INJECTION, SOLUTION INTRAVENOUS at 08:36

## 2022-12-01 RX ADMIN — ACETAMINOPHEN 650 MG: 325 TABLET ORAL at 19:56

## 2022-12-01 RX ADMIN — PROPOFOL 150 MG: 10 INJECTION, EMULSION INTRAVENOUS at 08:50

## 2022-12-01 RX ADMIN — SUGAMMADEX 300 MG: 100 INJECTION, SOLUTION INTRAVENOUS at 11:03

## 2022-12-01 RX ADMIN — OXYCODONE 10 MG: 5 TABLET ORAL at 15:49

## 2022-12-01 RX ADMIN — HYDROMORPHONE HYDROCHLORIDE 0.3 MG: 1 INJECTION, SOLUTION INTRAMUSCULAR; INTRAVENOUS; SUBCUTANEOUS at 12:24

## 2022-12-01 RX ADMIN — Medication 80 MCG: at 09:23

## 2022-12-01 RX ADMIN — SODIUM CHLORIDE, SODIUM LACTATE, POTASSIUM CHLORIDE, AND CALCIUM CHLORIDE: 600; 310; 30; 20 INJECTION, SOLUTION INTRAVENOUS at 08:50

## 2022-12-01 RX ADMIN — Medication 1000 MG: at 08:24

## 2022-12-01 RX ADMIN — Medication 80 MCG: at 10:25

## 2022-12-01 RX ADMIN — LIDOCAINE HYDROCHLORIDE 75 MG: 20 INJECTION, SOLUTION EPIDURAL; INFILTRATION; INTRACAUDAL; PERINEURAL at 08:50

## 2022-12-01 RX ADMIN — FENTANYL CITRATE 25 MCG: 50 INJECTION, SOLUTION INTRAMUSCULAR; INTRAVENOUS at 11:47

## 2022-12-01 RX ADMIN — MIDAZOLAM HYDROCHLORIDE 2 MG: 1 INJECTION, SOLUTION INTRAMUSCULAR; INTRAVENOUS at 08:42

## 2022-12-01 RX ADMIN — AZELASTINE HYDROCHLORIDE 2 SPRAY: 137 SPRAY, METERED NASAL at 22:06

## 2022-12-01 RX ADMIN — Medication 80 MCG: at 09:05

## 2022-12-01 RX ADMIN — FENTANYL CITRATE 50 MCG: 50 INJECTION, SOLUTION INTRAMUSCULAR; INTRAVENOUS at 09:12

## 2022-12-01 NOTE — PERIOP NOTES
Handoff Report from Operating Room to PACU    Report received from [facility] and [employer] CRNA regarding Marilu Barrera. Surgeon(s):  Boogie Mulligan MD  And Procedure(s) (LRB):  LAPAROSCOPIC RIGHT COLECTOMY  (Right)  confirmed   with allergies and dressings discussed. Anesthesia type, drugs, patient history, complications, estimated blood loss, vital signs, intake and output, and last pain medication, lines, and reversal medications were reviewed.     1310 patient's sister Debo Longoria updated

## 2022-12-01 NOTE — PROGRESS NOTES
Care Management:    Transition of Care Plan:     RUR: 6%  Disposition: home  Transportation: sister  Referral to pastoral care for AMD pending. Met with patient in room. Demographics confirmed: yes  Living Situation: Patient lives alone in a 2 level home. There are 2 steps to enter main level. The only time she goes down stairs is to do laundry. Her sister-Riri Reyes stays with her at times. She sometimes stays with her boyfriend. DME: none  ADLs: independent, drives  Pharmacy: [facility] in [place]  Previous HH/SNF/rehab: none  Insurance: [employer]: self, sister    Reason for Admission:  colon cancer, lap right cholectomy                   RUR Score:        6%             Plan for utilizing home health:      none    PCP: First and Last name:  Marino Pineda PA-C     Name of Practice:  [facility] [place]   Are you a current patient: Yes/No: yes   Approximate date of last visit: 9-15-22   Can you participate in a virtual visit with your PCP: unknown                    Current Advanced Directive/Advance Care Plan: Full Code  Patient would like to complete an AMD while she is here. She said she looked at one to complete in her My Chart, but had questions. Consult placed to /Pastoral Care today for completion of AMD.    Healthcare Decision Maker:              Primary Decision Maker: Sonam Moreno [facility] - [phone]                                    Care Management Interventions  PCP Verified by CM: Yes (Ryan Devine PA-C)  Last Visit to PCP: 09/15/22  Palliative Care Criteria Met (RRAT>21 & CHF Dx)?: No  Mode of Transport at Discharge:  Other (see comment)  Transition of Care Consult (CM Consult): Discharge Planning  Discharge Durable Medical Equipment: No  Physical Therapy Consult: No  Occupational Therapy Consult: No  Speech Therapy Consult: No  Support Systems: Other Family Member(s), Friend/Neighbor  Confirm Follow Up Transport: Family  [employer] Information Provided?: No  Discharge Location  Patient Expects to be Discharged to[de-identified] [facility], [facility]

## 2022-12-01 NOTE — PERIOP NOTES
Mepilex to sacrum, no redness, tenderness or signs of skin breakdown. remained alert throughout preop care

## 2022-12-01 NOTE — ANESTHESIA PREPROCEDURE EVALUATION
Relevant Problems   RESPIRATORY SYSTEM   (+) Mild intermittent asthma without complication      NEUROLOGY   (+) Migraine without aura and without status migrainosus, not intractable      GASTROINTESTINAL   (+) GERD (gastroesophageal reflux disease)      ENDOCRINE   (+) Hypothyroidism due to acquired atrophy of thyroid   (+) Obesity, morbid (HCC)      HEMATOLOGY   (+) Fe deficiency anemia       Anesthetic History   No history of anesthetic complications            Review of Systems / Medical History  Patient summary reviewed, nursing notes reviewed and pertinent labs reviewed    Pulmonary            Asthma        Neuro/Psych         Headaches (Migraines)    Comments: Sciatica Cardiovascular              Hyperlipidemia    Exercise tolerance: <4 METS  Comments: Hx DVT    ECG (9/21/11):   Normal sinus rhythm   No previous ECGs available   GI/Hepatic/Renal     GERD          Comments: FHx colon polyps  Epigastric pain Endo/Other      Hypothyroidism  Morbid obesity and cancer (colon)     Other Findings   Comments:   Chronic back pain         Physical Exam    Airway  Mallampati: III  TM Distance: < 4 cm  Neck ROM: normal range of motion, short neck   Mouth opening: Normal     Cardiovascular  Regular rate and rhythm,  S1 and S2 normal,  no murmur, click, rub, or gallop             Dental  No notable dental hx       Pulmonary  Breath sounds clear to auscultation               Abdominal  GI exam deferred       Other Findings            Anesthetic Plan    ASA: 3  Anesthesia type: general          Induction: Intravenous  Anesthetic plan and risks discussed with: Patient      Have glidescope or similar available

## 2022-12-01 NOTE — ANESTHESIA POSTPROCEDURE EVALUATION
Procedure(s):  LAPAROSCOPIC RIGHT COLECTOMY . general    Anesthesia Post Evaluation        Patient location during evaluation: PACU  Note status: Adequate. Level of consciousness: responsive to verbal stimuli and sleepy but conscious  Pain management: satisfactory to patient  Airway patency: patent  Anesthetic complications: no  Cardiovascular status: acceptable  Respiratory status: acceptable  Hydration status: acceptable  Comments: +Post-Anesthesia Evaluation and Assessment    Patient: Dinah Bowden MRN: [id number]  SSN: [id number]   YOB: 1970  Age: 45 y.o. Sex: female      Cardiovascular Function/Vital Signs    BP (!) 110/56   Pulse 61   Temp 36.4 °C (97.6 °F)   Resp 13   Ht 5' 2\" (1.575 m)   Wt 150 kg (330 lb 11 oz)   SpO2 98%   BMI 60.48 kg/m²     Patient is status post Procedure(s) with comments:  LAPAROSCOPIC RIGHT COLECTOMY  - (E R A S). Nausea/Vomiting: Controlled. Postoperative hydration reviewed and adequate. Pain:  Pain Scale 1: Numeric (0 - 10) (12/01/22 1255)  Pain Intensity 1: 4 (12/01/22 1255)   Managed. Neurological Status:   Neuro (WDL): Within Defined Limits (12/01/22 0803)   At baseline. Mental Status and Level of Consciousness: Arousable. Pulmonary Status:   O2 Device: Nasal cannula (12/01/22 1224)   Adequate oxygenation and airway patent. Complications related to anesthesia: None    Post-anesthesia assessment completed. No concerns. Signed By: Jose Carlton MD    12/1/2022  Post anesthesia nausea and vomiting:  controlled      INITIAL Post-op Vital signs:   Vitals Value Taken Time   /53 12/01/22 1300   Temp 36.4 °C (97.6 °F) 12/01/22 1200   Pulse 61 12/01/22 1314   Resp 15 12/01/22 1314   SpO2 99 % 12/01/22 1314   Vitals shown include unvalidated device data.

## 2022-12-01 NOTE — PROGRESS NOTES
Nutrition Note    Chart reviewed; RD provided a 5-day supply of Ensure Surgery/Immunonutrition (10 bottles) to the bedside per ERAS protocol and discussed the importance of adequate nutrition/supplement compliance in effort to optimize wound healing and recovery from surgery. Pt agreeable and reports that she enjoyed the Ensure Surgery pre-op. RD anticipates good compliance. Pt also requested postop diet education. Provided written and verbal diet education re: Low Fiber diet. All questions were answered and contact info provided. Thank you!       Electronically signed by Torrey Johnson RD, [address] [place]  on 12/1/2022 at 3:52 PM    Contact: ext [phone]

## 2022-12-01 NOTE — BRIEF OP NOTE
Brief Postoperative Note    Patient: [facility]  YOB: 1970  MRN: [id number]    Date of Procedure: 12/1/2022     Pre-Op Diagnosis: COLON CANCER    Post-Op Diagnosis: Same as preoperative diagnosis.       Procedure(s):  LAPAROSCOPIC RIGHT COLECTOMY     Surgeon(s):  Khushi Cassidy MD    Surgical Assistant: Surg Asst-1: Christine Fajardo    Anesthesia: General     Estimated Blood Loss (mL): less than 50     Complications: None    Specimens:   ID Type Source Tests Collected by Time Destination   1 : Right Colon Preservative Colon  Khushi Cassidy MD 12/1/2022 1030 Pathology        Implants: * No implants in log *    Drains: * No LDAs found *    Findings: Mass in the cecum    Electronically Signed by Elva Bliss MD on 12/1/2022 at 11:29 AM

## 2022-12-02 LAB
ANION GAP SERPL CALC-SCNC: 4 MMOL/L (ref 5–15)
BASOPHILS # BLD: 0 K/UL (ref 0–0.1)
BASOPHILS NFR BLD: 0 % (ref 0–1)
BUN SERPL-MCNC: 15 MG/DL (ref 6–20)
BUN/CREAT SERPL: 21 (ref 12–20)
CALCIUM SERPL-MCNC: 8.4 MG/DL (ref 8.5–10.1)
CHLORIDE SERPL-SCNC: 108 MMOL/L (ref 97–108)
CO2 SERPL-SCNC: 25 MMOL/L (ref 21–32)
CREAT SERPL-MCNC: 0.72 MG/DL (ref 0.55–1.02)
DIFFERENTIAL METHOD BLD: ABNORMAL
EOSINOPHIL # BLD: 0 K/UL (ref 0–0.4)
EOSINOPHIL NFR BLD: 0 % (ref 0–7)
ERYTHROCYTE [DISTWIDTH] IN BLOOD BY AUTOMATED COUNT: 14.2 % (ref 11.5–14.5)
GLUCOSE SERPL-MCNC: 160 MG/DL (ref 65–100)
HCT VFR BLD AUTO: 32.7 % (ref 35–47)
HGB BLD-MCNC: 10.3 G/DL (ref 11.5–16)
IMM GRANULOCYTES # BLD AUTO: 0.1 K/UL (ref 0–0.04)
IMM GRANULOCYTES NFR BLD AUTO: 1 % (ref 0–0.5)
LYMPHOCYTES # BLD: 1.4 K/UL (ref 0.8–3.5)
LYMPHOCYTES NFR BLD: 13 % (ref 12–49)
MCH RBC QN AUTO: 27.5 PG (ref 26–34)
MCHC RBC AUTO-ENTMCNC: 31.5 G/DL (ref 30–36.5)
MCV RBC AUTO: 87.4 FL (ref 80–99)
MONOCYTES # BLD: 1 K/UL (ref 0–1)
MONOCYTES NFR BLD: 10 % (ref 5–13)
NEUTS SEG # BLD: 8.2 K/UL (ref 1.8–8)
NEUTS SEG NFR BLD: 76 % (ref 32–75)
NRBC # BLD: 0 K/UL (ref 0–0.01)
NRBC BLD-RTO: 0 PER 100 WBC
PLATELET # BLD AUTO: 241 K/UL (ref 150–400)
PMV BLD AUTO: 11.2 FL (ref 8.9–12.9)
POTASSIUM SERPL-SCNC: 4.2 MMOL/L (ref 3.5–5.1)
RBC # BLD AUTO: 3.74 M/UL (ref 3.8–5.2)
SODIUM SERPL-SCNC: 137 MMOL/L (ref 136–145)
WBC # BLD AUTO: 10.8 K/UL (ref 3.6–11)

## 2022-12-02 PROCEDURE — 36415 COLL VENOUS BLD VENIPUNCTURE: CPT

## 2022-12-02 PROCEDURE — 65270000029 HC RM PRIVATE

## 2022-12-02 PROCEDURE — 80048 BASIC METABOLIC PNL TOTAL CA: CPT

## 2022-12-02 PROCEDURE — 85025 COMPLETE CBC W/AUTO DIFF WBC: CPT

## 2022-12-02 PROCEDURE — 74011000250 HC RX REV CODE- 250: Performed by: SURGERY

## 2022-12-02 PROCEDURE — 74011250636 HC RX REV CODE- 250/636: Performed by: SURGERY

## 2022-12-02 PROCEDURE — 74011250637 HC RX REV CODE- 250/637: Performed by: SURGERY

## 2022-12-02 RX ORDER — ACETAMINOPHEN 325 MG/1
650 TABLET ORAL EVERY 6 HOURS
Qty: 56 TABLET | Refills: 0 | Status: SHIPPED | OUTPATIENT
Start: 2022-12-02 | End: 2022-12-09

## 2022-12-02 RX ORDER — OXYCODONE HYDROCHLORIDE 5 MG/1
5 TABLET ORAL
Qty: 10 TABLET | Refills: 0 | Status: SHIPPED | OUTPATIENT
Start: 2022-12-02 | End: 2022-12-05

## 2022-12-02 RX ADMIN — HYDROMORPHONE HYDROCHLORIDE 0.5 MG: 1 INJECTION, SOLUTION INTRAMUSCULAR; INTRAVENOUS; SUBCUTANEOUS at 11:30

## 2022-12-02 RX ADMIN — HYDROMORPHONE HYDROCHLORIDE 0.5 MG: 1 INJECTION, SOLUTION INTRAMUSCULAR; INTRAVENOUS; SUBCUTANEOUS at 18:16

## 2022-12-02 RX ADMIN — DOXYCYCLINE HYCLATE 100 MG: 100 TABLET, COATED ORAL at 12:32

## 2022-12-02 RX ADMIN — MULTIPLE VITAMINS W/ MINERALS TAB 1 TABLET: TAB at 09:00

## 2022-12-02 RX ADMIN — FLUTICASONE PROPIONATE 2 SPRAY: 50 SPRAY, METERED NASAL at 09:00

## 2022-12-02 RX ADMIN — OXYCODONE 10 MG: 5 TABLET ORAL at 03:04

## 2022-12-02 RX ADMIN — SODIUM CHLORIDE, POTASSIUM CHLORIDE, SODIUM LACTATE AND CALCIUM CHLORIDE 75 ML/HR: 600; 310; 30; 20 INJECTION, SOLUTION INTRAVENOUS at 01:37

## 2022-12-02 RX ADMIN — ENOXAPARIN SODIUM 40 MG: 100 INJECTION SUBCUTANEOUS at 08:34

## 2022-12-02 RX ADMIN — AZELASTINE HYDROCHLORIDE 2 SPRAY: 137 SPRAY, METERED NASAL at 20:09

## 2022-12-02 RX ADMIN — DOXYCYCLINE HYCLATE 100 MG: 100 TABLET, COATED ORAL at 18:20

## 2022-12-02 RX ADMIN — OXYCODONE 10 MG: 5 TABLET ORAL at 07:15

## 2022-12-02 RX ADMIN — CETIRIZINE HYDROCHLORIDE 10 MG: 10 TABLET, FILM COATED ORAL at 08:35

## 2022-12-02 RX ADMIN — LEVOTHYROXINE SODIUM 125 MCG: 0.12 TABLET ORAL at 07:15

## 2022-12-02 RX ADMIN — MONTELUKAST 10 MG: 10 TABLET, FILM COATED ORAL at 08:35

## 2022-12-02 RX ADMIN — ACETAMINOPHEN 650 MG: 325 TABLET ORAL at 18:16

## 2022-12-02 RX ADMIN — ACETAMINOPHEN 650 MG: 325 TABLET ORAL at 07:14

## 2022-12-02 RX ADMIN — SODIUM CHLORIDE, PRESERVATIVE FREE 10 ML: 5 INJECTION INTRAVENOUS at 18:17

## 2022-12-02 RX ADMIN — HYDROMORPHONE HYDROCHLORIDE 0.5 MG: 1 INJECTION, SOLUTION INTRAMUSCULAR; INTRAVENOUS; SUBCUTANEOUS at 04:08

## 2022-12-02 RX ADMIN — SODIUM CHLORIDE, PRESERVATIVE FREE 10 ML: 5 INJECTION INTRAVENOUS at 14:00

## 2022-12-02 RX ADMIN — SODIUM CHLORIDE, PRESERVATIVE FREE 10 ML: 5 INJECTION INTRAVENOUS at 21:00

## 2022-12-02 RX ADMIN — ACETAMINOPHEN 650 MG: 325 TABLET ORAL at 13:55

## 2022-12-02 RX ADMIN — SODIUM CHLORIDE, PRESERVATIVE FREE 10 ML: 5 INJECTION INTRAVENOUS at 06:19

## 2022-12-02 RX ADMIN — AZELASTINE HYDROCHLORIDE 2 SPRAY: 137 SPRAY, METERED NASAL at 08:40

## 2022-12-02 RX ADMIN — OXYCODONE 10 MG: 5 TABLET ORAL at 13:55

## 2022-12-02 NOTE — PROGRESS NOTES
Spiritual Care Assessment/Progress Note  [facility]      NAME: Gely Burrell      MRN: [id number]  AGE: 45 y.o.  SEX: female  Rastafari Affiliation: Ramandeepi   Language: English     12/2/2022     Total Time (in minutes): 29     Spiritual Assessment begun in [facility] 3 SURG TELE through conversation with:         [x]Patient        [] Family    [] Friend(s)        Reason for Consult: Advance medical directive consult     Spiritual beliefs: (Please include comment if needed)     [x] Identifies with a lg tradition:         [] Supported by a lg community:            [] Claims no spiritual orientation:           [] Seeking spiritual identity:                [] Adheres to an individual form of spirituality:           [] Not able to assess:                           Identified resources for coping:      [x] Prayer                               [] Music                  [] Guided Imagery     [x] Family/friends                 [] Pet visits     [] Devotional reading                         [] Unknown     [] Other:                                               Interventions offered during this visit: (See comments for more details)    Patient Interventions: Advance medical directive consult, Affirmation of emotions/emotional suffering, Initial/Spiritual assessment, patient floor, Normalization of emotional/spiritual concerns, Prayer (assurance of), Prayer (actual), Affirmation of lg           Plan of Care:     [x] Support spiritual and/or cultural needs    [x] Support AMD and/or advance care planning process      [] Support grieving process   [] Coordinate Rites and/or Rituals    [] Coordination with community clergy   [] No spiritual needs identified at this time   [] Detailed Plan of Care below (See Comments)  [] Make referral to Music Therapy  [] Make referral to Pet Therapy     [] Make referral to Addiction services  [] Make referral to [facility]  [] Make referral to Spiritual Care Partner  [] No future visits requested        [x] Contact Spiritual Care for further referrals     Comments:  reviewed the chart prior to the visit.  received a consult order to provided an Advance Medical Directive (AMD) for Ms. Trey More.  coordinated services with Veronica STEARNS her nurse. She stated Ms. Trey More has the cognitive ability to complete the AMD process.  listened to the patient share her concerns in reference to her health. She was given  a safe space to share her concerns and ask questions in reference to her Advance Medical Directive.  provided clarity on page 2 of the forms as requested. In addition Ms. Trey More requested prayer. She was in pain and requested her nurse for medication. Veronica STEARNS stated thanked the  for the information in reference to her pain. Ms. Melendrez Beverage the  for the visit.  provided a calming presence, prayer, encouragement and empathetic listening. Ms. Trey More did not complete the Advance Medical Directive (AMD) at this time. She will call the  when she is ready to complete the AMD process.  services are available 24 hours a day as requested. Rev. JOCELYN Santana.  [address]   Paging Service [phone]

## 2022-12-02 NOTE — ACP (ADVANCE CARE PLANNING)
Advance Care Planning   Advance Care Planning Inpatient Note  [address]  Spiritual Care Department    Today's Date: 12/2/2022  Unit: [facility] 3 SURG TELE    Received request from Health Care provider. Upon review of chart and communication with care team, patient's decision making abilities are not in question. Patient was/were present in the room during visit. Goals of ACP Conversation:  Discuss Advance Care planning documents  Facilitate a discussion related to patient's goals of care as they align with the patient's values and beliefs      Health Care Decision Makers:      Primary Decision Maker: Earle Flores [facility] - [phone]  Patient did not complete the Advance Medical Directive at this time. Summary:  No Decision Maker named by patient at this time    Advance Care Planning Documents (Patient Wishes) on file:  Living Will/ Advance Directive     Assessment:     reviewed the chart prior to the visit.  received a consult order to provided an Advance Medical Directive (AMD) for Ms. Charley Marr.  coordinated services with Veronica STEARNS her nurse. She stated Ms. Charley Marr has the cognitive ability to complete the AMD process.  listened to the patient share her concerns in reference to her health. She was given  a safe space to share her concerns and ask questions in reference to her Advance Medical Directive.  provided clarity on page 2 of the forms as requested. In addition Ms. Charley Marr requested prayer. She was in pain and requested her nurse for medication. Veronica STEARNS stated thanked the  for the information in reference to her pain. Ms. Gómez Lade the  for the visit.  provided a calming presence, prayer, encouragement and empathetic listening. Ms. Charley Marr did not complete the Advance Medical Directive (AMD) at this time. She will call the  when she is ready to complete the AMD process.       services are available 24 hours a day as requested.    Interventions:  Provided education on documents for clarity and greater understanding  Discussed and provided education on state decision maker hierarchy  Encouraged ongoing ACP conversation with future decision makers and loved ones  Reviewed but did not complete ACP document  Deferred conversation as patient not interested in completing an advance directive at this time      Care Preferences Communicated:  No    Outcomes/Plan:  ACP Discussion [address] on 12/2/2022 at 11:22 AM

## 2022-12-02 NOTE — PROGRESS NOTES
End of Shift Note    Bedside shift change report given to Rika Weems RN (oncoming nurse) by Paris Mckeon RN (offgoing nurse). Report included the following information SBAR, Kardex, Intake/Output, MAR, and Recent Results    Shift worked:  7a-7p     Shift summary and any significant changes:     Pt admitted to floor from PACU. VSS. Denies nausea. Medicated for incisional pain x1, now at tolerable level. Abdominal incisions dry and surgical glue intact. Incentive spirometry encouraged. Concerns for physician to address:  None at this time. Zone phone for oncoming shift:   [phone]       Activity:  Activity Level: Up with Assistance  Number times ambulated in hallways past shift: 0  Number of times OOB to chair past shift: 0    Cardiac:   Cardiac Monitoring: No      Cardiac Rhythm: Sinus Rhythm    Access:  Current line(s): PIV     Genitourinary:   Urinary status: lynn    Respiratory:   O2 Device: None (Room air)  Chronic home O2 use?: NO  Incentive spirometer at bedside: YES       GI:  Last Bowel Movement Date: 11/30/22  Current diet:  ADULT DIET Regular; Low Fiber  ADULT ORAL NUTRITION SUPPLEMENT Breakfast, Dinner; Other Supplement; ERAS dropped off, 5 days supply  Passing flatus: NO  Tolerating current diet: YES       Pain Management:   Patient states pain is manageable on current regimen: YES    Skin:  Danilo Score: 21  Interventions: increase time out of bed    Patient Safety:  Fall Score:  Total Score: 3  Interventions: gripper socks and pt to call before getting OOB  High Fall Risk: Yes    Length of Stay:  Expected LOS: - - -  Actual LOS: 0      Paris Mckeon RN

## 2022-12-02 NOTE — PROGRESS NOTES
Attempted to schedule PCP hospital follow up appointment. Unable to reach anyone, unable to leave voicemail. Pending patient discharge.  Nadine Mujica, Care Management Assistant

## 2022-12-02 NOTE — OP NOTES
[facility]  OPERATIVE REPORT    Name:  Clary Soulier  MR#:  [id number]  :  1970  ACCOUNT #:  [de-identified]  DATE OF SERVICE:  2022    PREOPERATIVE DIAGNOSIS:  Colon cancer. POSTOPERATIVE DIAGNOSIS:  Colon cancer. PROCEDURE PERFORMED:  Laparoscopic right colectomy. SURGEON:  Vicente Liao MD    ASSISTANT:  Ping Manriquez. ANESTHESIA:  General.    COMPLICATIONS:  None. SPECIMENS REMOVED:  Right colon. IMPLANTS:  None. ESTIMATED BLOOD LOSS:  Less than 50 mL. DRAINS:  None. FINDINGS:  Mass in the cecum. INDICATIONS:  This is a 78-year-old female who was found to have a mass at the base of the cecum which was confirmed as adenocarcinoma. CT scan showed no evidence of metastatic disease. She presents today for definitive resection. I explained the risks and benefits including, but not limited to, bleeding, infection, anastomotic leak, damage to surrounding structures, and need for a colostomy. She understood the risks and has elected to proceed. DESCRIPTION OF THE PROCEDURE:  The patient was brought into the operating suite, placed in the supine position, general endotracheal anesthesia was induced. Venodyne stockings were placed as well as a Castellanos catheter. Her abdomen was prepped and draped in the usual sterile fashion. A time-out was performed indicating the correct patient and procedure to be performed as per hospital protocol. A vertical midline incision was made above the umbilicus. The incision was taken down to the fascia. The fascia was incised and the abdomen was entered. I had to get into the mesh in order to get into the abdominal cavity. After gaining access into the abdominal cavity, a HandPort was placed in this location with wound protector.   A 12-mm trocar was placed in the left lower quadrant, a 5-mm trocar was placed in the suprapubic region, it was through these 3 incisions that the entirety of the dissection was completed. The patient was placed in the slight Trendelenburg position, I attempted to place the patient in right side up tilt, but unfortunately due to her morbid obesity it was not possible to do this without her sliding in the bed. The cecum was elevated identifying the ileocolic pedicle, this was scored along the medial edge. The ileocolic pedicle was divided by cauterizing twice proximally, once distally, and dividing in between using the LigaSure. I gained access into the avascular plane between the right colon mesentery and retroperitoneum, this was developed in the medial to lateral plane to the right abdominal side wall. The lateral peritoneal reflection was taken down along the white line of Toldt freeing this up to the hepatic flexure. The hepatic flexure was taken down in its entirety taking down the hepatocolic and renocolic attachments. The liver appeared fatty but without evidence of metastatic disease, no masses were seen or identified. A proximal resection margin was identified at the terminal ileum and the distal ascending colon. Mesentery was divided at both of these levels. The transverse colon was mobilized and freed up from the omentum. Once the ascending colon was completely mobilized, I delivered this through the abdominal wall along the wound protector from the midline incision. The proximal and distal resection margins were divided using a 75-mm blue load MEHUL stapler. The specimen was removed and sent to pathology for further examination. An colotomy and enterotomy were made and a side-to-side functional end-to-end anastomosis was created using a 75-mm blue load MEHUL stapler. The common enterotomy was closed with a TX 60 stapler. The transverse staple line was reinforced with running 3-0 Vicryl suture. The anastomosis was dropped back in the abdominal cavity. Hemostasis was achieved.   I reinsufflated the abdominal cavity to make sure there was no evidence of bleeding or leakage. Everything appeared normal.  The left lower quadrant incision was closed using a M-Close device and a 0 Vicryl suture. The upper midline incision was closed using a running 0-looped PDS suture. All the skin incisions were closed with 4-0 Monocryl, followed by Dermabond. A 30 mL of 0.25% Marcaine with epinephrine was injected subcutaneously. The patient was awakened, extubated, and taken to the recovery room in stable condition.       Hugo Machado MD      [id number]  D:  12/01/2022 11:39  T:  12/01/2022 19:09  JOB #:  [id number]

## 2022-12-02 NOTE — PROGRESS NOTES
Surgery NP Progress Note    Joanna Gill  [id number]  female  45 y.o.  1970    s/p LAPAROSCOPIC RIGHT COLECTOMY  on 2022   Pt seen with Dr. Bello Owusu      Assessment:   Active Problems:    Colon cancer ([address] (2022)        Expected post-op progress. Plan/Recommendations/Medical Decision Making:     - Mobilize with nursing and OOB to chair for meals  - Continue diet  - Pain management- Continue current pain control methods.   - VTE Prophylaxis: Lovenox   - Home likely tomorrow    Subjective:     Patient had a bowel movement and her pain is well controlled. No nausea. Objective:     Blood pressure (!) 126/90, pulse 72, temperature 97.3 °F (36.3 °C), resp. rate 18, height 5' 2\" (1.575 m), weight 150 kg (330 lb 11 oz), last menstrual period 2018, SpO2 100 %. Temp (24hrs), Av °F (36.7 °C), Min:97.3 °F (36.3 °C), Max:99.3 °F (37.4 °C)      Pt resting in chair. NAD   Incisions CDI. Abd round, soft and appropriately tender. SCDs for mechanical DVT proph while in bed     Body mass index is 60.48 kg/m². Reference: BMI greater than 30 is classified as obesity and greater than 40 is classified as morbid obesity.      Last 3 Recorded Weights in this Encounter    22 0747   Weight: 150 kg (330 lb 11 oz)         Jere Wallace NP   MSN, APRN, FNP-C, [place], AMY    22

## 2022-12-02 NOTE — DISCHARGE INSTRUCTIONS
Disha Floyd MD, FACS  Ki Dyer. MD Eze Arceo MD Dietrich Comes, MD Cardell Quarto, MD    [facility] Discharge Instructions for Colon Surgery Patients    Diagnosis: Right colectomy  Low fiber diet. Do not drive while taking narcotic pain medications. Leave surgical glue on incision. It may fall off on it's own. May take a shower. No lifting any objects weighing more than 15 pounds. Do not do any housework, such as vacuuming, scrubbing, etc for at least a month. When you get tired during the day, take naps, as you need your rest.  Multiple bowel movements are normal each day for a while. May walk as desired. May go up and down stairs. Take pain medication as prescribed: (NO DRIVING WHILE ON PAIN MEDICATIONS). Oxycodone EVERY 4-6 HOURS AS NEEDED. See me in the office in 10-14 days. Call as soon as discharged for an appointment 21 [phone]. IF SURGERY INVOLVED AN OSTOMY BAG, PLEASE BRING YOUR SUPPLIES TO YOUR 1ST VISIT! Call the Exchange [phone], if you have any questions or problems after office hours.             Jarret Romero [address]  [facility]  [phone] · Fax [phone] [address] [facility], [facility] [address]  [phone] · Fax [phone] [place], [address]  [place], [address]  [phone] · Fax [phone]

## 2022-12-03 LAB
ANION GAP SERPL CALC-SCNC: 6 MMOL/L (ref 5–15)
BASOPHILS # BLD: 0.1 K/UL (ref 0–0.1)
BASOPHILS NFR BLD: 1 % (ref 0–1)
BUN SERPL-MCNC: 17 MG/DL (ref 6–20)
BUN/CREAT SERPL: 23 (ref 12–20)
CALCIUM SERPL-MCNC: 8.5 MG/DL (ref 8.5–10.1)
CHLORIDE SERPL-SCNC: 105 MMOL/L (ref 97–108)
CO2 SERPL-SCNC: 27 MMOL/L (ref 21–32)
CREAT SERPL-MCNC: 0.73 MG/DL (ref 0.55–1.02)
DIFFERENTIAL METHOD BLD: ABNORMAL
EOSINOPHIL # BLD: 0.2 K/UL (ref 0–0.4)
EOSINOPHIL NFR BLD: 2 % (ref 0–7)
ERYTHROCYTE [DISTWIDTH] IN BLOOD BY AUTOMATED COUNT: 14.3 % (ref 11.5–14.5)
GLUCOSE SERPL-MCNC: 142 MG/DL (ref 65–100)
HCT VFR BLD AUTO: 32.2 % (ref 35–47)
HGB BLD-MCNC: 10.1 G/DL (ref 11.5–16)
IMM GRANULOCYTES # BLD AUTO: 0.1 K/UL (ref 0–0.04)
IMM GRANULOCYTES NFR BLD AUTO: 1 % (ref 0–0.5)
LYMPHOCYTES # BLD: 2.7 K/UL (ref 0.8–3.5)
LYMPHOCYTES NFR BLD: 23 % (ref 12–49)
MCH RBC QN AUTO: 27.7 PG (ref 26–34)
MCHC RBC AUTO-ENTMCNC: 31.4 G/DL (ref 30–36.5)
MCV RBC AUTO: 88.5 FL (ref 80–99)
MONOCYTES # BLD: 1.2 K/UL (ref 0–1)
MONOCYTES NFR BLD: 10 % (ref 5–13)
NEUTS SEG # BLD: 7.6 K/UL (ref 1.8–8)
NEUTS SEG NFR BLD: 63 % (ref 32–75)
NRBC # BLD: 0 K/UL (ref 0–0.01)
NRBC BLD-RTO: 0 PER 100 WBC
PLATELET # BLD AUTO: 217 K/UL (ref 150–400)
PMV BLD AUTO: 11 FL (ref 8.9–12.9)
POTASSIUM SERPL-SCNC: 3.9 MMOL/L (ref 3.5–5.1)
RBC # BLD AUTO: 3.64 M/UL (ref 3.8–5.2)
SODIUM SERPL-SCNC: 138 MMOL/L (ref 136–145)
WBC # BLD AUTO: 11.8 K/UL (ref 3.6–11)

## 2022-12-03 PROCEDURE — 74011250637 HC RX REV CODE- 250/637: Performed by: SURGERY

## 2022-12-03 PROCEDURE — 74011250636 HC RX REV CODE- 250/636: Performed by: SURGERY

## 2022-12-03 PROCEDURE — 65270000029 HC RM PRIVATE

## 2022-12-03 PROCEDURE — 85025 COMPLETE CBC W/AUTO DIFF WBC: CPT

## 2022-12-03 PROCEDURE — 80048 BASIC METABOLIC PNL TOTAL CA: CPT

## 2022-12-03 PROCEDURE — 36415 COLL VENOUS BLD VENIPUNCTURE: CPT

## 2022-12-03 PROCEDURE — 74011250636 HC RX REV CODE- 250/636: Performed by: COLON & RECTAL SURGERY

## 2022-12-03 PROCEDURE — 74011000250 HC RX REV CODE- 250: Performed by: SURGERY

## 2022-12-03 RX ORDER — PROCHLORPERAZINE EDISYLATE 5 MG/ML
10 INJECTION INTRAMUSCULAR; INTRAVENOUS
Status: DISCONTINUED | OUTPATIENT
Start: 2022-12-03 | End: 2022-12-05 | Stop reason: HOSPADM

## 2022-12-03 RX ORDER — PROCHLORPERAZINE EDISYLATE 5 MG/ML
10 INJECTION INTRAMUSCULAR; INTRAVENOUS
Status: COMPLETED | OUTPATIENT
Start: 2022-12-03 | End: 2022-12-03

## 2022-12-03 RX ADMIN — CETIRIZINE HYDROCHLORIDE 10 MG: 10 TABLET, FILM COATED ORAL at 08:00

## 2022-12-03 RX ADMIN — PROCHLORPERAZINE EDISYLATE 10 MG: 5 INJECTION INTRAMUSCULAR; INTRAVENOUS at 21:08

## 2022-12-03 RX ADMIN — DOXYCYCLINE HYCLATE 100 MG: 100 TABLET, COATED ORAL at 08:00

## 2022-12-03 RX ADMIN — OXYCODONE 5 MG: 5 TABLET ORAL at 18:09

## 2022-12-03 RX ADMIN — SODIUM CHLORIDE, PRESERVATIVE FREE 10 ML: 5 INJECTION INTRAVENOUS at 13:04

## 2022-12-03 RX ADMIN — LEVOTHYROXINE SODIUM 125 MCG: 0.12 TABLET ORAL at 06:41

## 2022-12-03 RX ADMIN — MONTELUKAST 10 MG: 10 TABLET, FILM COATED ORAL at 08:00

## 2022-12-03 RX ADMIN — AZELASTINE HYDROCHLORIDE 2 SPRAY: 137 SPRAY, METERED NASAL at 21:23

## 2022-12-03 RX ADMIN — ACETAMINOPHEN 650 MG: 325 TABLET ORAL at 00:59

## 2022-12-03 RX ADMIN — DOXYCYCLINE HYCLATE 100 MG: 100 TABLET, COATED ORAL at 18:09

## 2022-12-03 RX ADMIN — FLUTICASONE PROPIONATE 2 SPRAY: 50 SPRAY, METERED NASAL at 08:02

## 2022-12-03 RX ADMIN — ONDANSETRON 4 MG: 2 INJECTION INTRAMUSCULAR; INTRAVENOUS at 10:31

## 2022-12-03 RX ADMIN — ACETAMINOPHEN 650 MG: 325 TABLET ORAL at 18:09

## 2022-12-03 RX ADMIN — SODIUM CHLORIDE, PRESERVATIVE FREE 10 ML: 5 INJECTION INTRAVENOUS at 06:41

## 2022-12-03 RX ADMIN — ACETAMINOPHEN 650 MG: 325 TABLET ORAL at 06:40

## 2022-12-03 RX ADMIN — OXYCODONE 5 MG: 5 TABLET ORAL at 00:59

## 2022-12-03 RX ADMIN — AZELASTINE HYDROCHLORIDE 2 SPRAY: 137 SPRAY, METERED NASAL at 08:01

## 2022-12-03 RX ADMIN — HYDROMORPHONE HYDROCHLORIDE 0.5 MG: 1 INJECTION, SOLUTION INTRAMUSCULAR; INTRAVENOUS; SUBCUTANEOUS at 21:09

## 2022-12-03 RX ADMIN — ENOXAPARIN SODIUM 40 MG: 100 INJECTION SUBCUTANEOUS at 08:01

## 2022-12-03 RX ADMIN — PROCHLORPERAZINE EDISYLATE 10 MG: 5 INJECTION INTRAMUSCULAR; INTRAVENOUS at 15:46

## 2022-12-03 RX ADMIN — MULTIPLE VITAMINS W/ MINERALS TAB 1 TABLET: TAB at 08:00

## 2022-12-03 RX ADMIN — SODIUM CHLORIDE, PRESERVATIVE FREE 10 ML: 5 INJECTION INTRAVENOUS at 21:22

## 2022-12-03 RX ADMIN — OXYCODONE 5 MG: 5 TABLET ORAL at 08:00

## 2022-12-03 NOTE — PROGRESS NOTES
Problem: Falls - Risk of  Goal: *Absence of Falls  Description: Document Dioni Beasley Fall Risk and appropriate interventions in the flowsheet.   Outcome: Progressing Towards Goal  Note: Fall Risk Interventions:  Mobility Interventions: Patient to call before getting OOB         Medication Interventions: Evaluate medications/consider consulting pharmacy, Patient to call before getting OOB, Teach patient to arise slowly    Elimination Interventions: Call light in reach              Problem: Pain  Goal: *Control of Pain  Outcome: Progressing Towards Goal

## 2022-12-03 NOTE — PROGRESS NOTES
End of Shift Note    Bedside shift change report given to JINNY Oh (oncoming nurse) by Radha Humphries RN (offgoing nurse). Report included the following information SBAR, Kardex, Intake/Output, MAR, and Recent Results    Shift worked:  7a-7p     Shift summary and any significant changes:     Mostly uneventful shift. Pt OOB ambulating, tolerating diet. Continues to require pain medication for c/o abdominal incision pain. Voiding w/o difficulty. Concerns for physician to address:  None at this time. Zone phone for oncoming shift:   1350       Activity:  Activity Level: Up with Assistance  Number times ambulated in hallways past shift: 1  Number of times OOB to chair past shift: 2    Cardiac:   Cardiac Monitoring: No      Cardiac Rhythm: Sinus Rhythm    Access:  Current line(s): PIV     Genitourinary:   Urinary status: voiding    Respiratory:   O2 Device: None (Room air)  Chronic home O2 use?: NO  Incentive spirometer at bedside: YES       GI:  Last Bowel Movement Date: 12/02/22  Current diet:  ADULT DIET Regular; Low Fiber  ADULT ORAL NUTRITION SUPPLEMENT Breakfast, Dinner; Other Supplement; ERAS dropped off, 5 days supply  Passing flatus: NO  Tolerating current diet: YES       Pain Management:   Patient states pain is manageable on current regimen: YES    Skin:  Danilo Score: 21  Interventions: increase time out of bed    Patient Safety:  Fall Score:  Total Score: 1  Interventions: assistive device (walker, cane, etc)  High Fall Risk: Yes    Length of Stay:  Expected LOS: - - -  Actual LOS: 1      Veronica Fletcher RN

## 2022-12-03 NOTE — PROGRESS NOTES
Pt looks good. no flatus or BM yet. No n/v/f/c  vss afebrile    Chest: clear. No rub, gallop  Lung: clear no wheeze  Abd: soft, non tender, + bs, mildly distended, inc c/i    Lab na    A/P POD # 2, Looks good for now,   2. Will hold off on d/c until some bowel function, ambulate today  3.  Home tomorrow if all is well

## 2022-12-03 NOTE — PROGRESS NOTES
Paging Dr. Eder Vinson regarding pt vomiting small emesis x2; pt thought that earlier her nausea was contributed to eating sausage this morning. Instructed pt to not eat or drink any more until we receive direction by physician. Pt had 1 medium BM this morning, but not passing gas since this AM. Prn Zofran administered for nausea/vomiting with minimal relief. Waiting for call back. 18 - Dr. Eder Vinson returned call; made him aware of the above. Received orders for compazine 10mg IV now and then every 6 hours as needed for breakthrough n/v. Made pt aware. 1546 - Pt expressing that she feels 'flush.' T 98.1; no fever. Administered one time order of IV compazine. Pt requesting to take a nap; allowed time to rest.   1645 - Pt expressing feeling relief from IV compazine; states that she has been passing gas. Encouraged pt to ambulate and get out of bed when she feels better. Pt out of bed to chair and will ambulate again in the hallway later today.

## 2022-12-04 LAB
ANION GAP SERPL CALC-SCNC: 5 MMOL/L (ref 5–15)
BASOPHILS # BLD: 0 K/UL (ref 0–0.1)
BASOPHILS NFR BLD: 0 % (ref 0–1)
BUN SERPL-MCNC: 12 MG/DL (ref 6–20)
BUN/CREAT SERPL: 18 (ref 12–20)
CALCIUM SERPL-MCNC: 8.9 MG/DL (ref 8.5–10.1)
CHLORIDE SERPL-SCNC: 105 MMOL/L (ref 97–108)
CO2 SERPL-SCNC: 28 MMOL/L (ref 21–32)
CREAT SERPL-MCNC: 0.67 MG/DL (ref 0.55–1.02)
DIFFERENTIAL METHOD BLD: ABNORMAL
EOSINOPHIL # BLD: 0.2 K/UL (ref 0–0.4)
EOSINOPHIL NFR BLD: 2 % (ref 0–7)
ERYTHROCYTE [DISTWIDTH] IN BLOOD BY AUTOMATED COUNT: 14.4 % (ref 11.5–14.5)
GLUCOSE SERPL-MCNC: 142 MG/DL (ref 65–100)
HCT VFR BLD AUTO: 36.8 % (ref 35–47)
HGB BLD-MCNC: 11.7 G/DL (ref 11.5–16)
IMM GRANULOCYTES # BLD AUTO: 0.1 K/UL (ref 0–0.04)
IMM GRANULOCYTES NFR BLD AUTO: 1 % (ref 0–0.5)
LYMPHOCYTES # BLD: 2.5 K/UL (ref 0.8–3.5)
LYMPHOCYTES NFR BLD: 19 % (ref 12–49)
MCH RBC QN AUTO: 27.7 PG (ref 26–34)
MCHC RBC AUTO-ENTMCNC: 31.8 G/DL (ref 30–36.5)
MCV RBC AUTO: 87.2 FL (ref 80–99)
MONOCYTES # BLD: 1.5 K/UL (ref 0–1)
MONOCYTES NFR BLD: 11 % (ref 5–13)
NEUTS SEG # BLD: 9.1 K/UL (ref 1.8–8)
NEUTS SEG NFR BLD: 67 % (ref 32–75)
NRBC # BLD: 0 K/UL (ref 0–0.01)
NRBC BLD-RTO: 0 PER 100 WBC
PLATELET # BLD AUTO: 278 K/UL (ref 150–400)
PMV BLD AUTO: 11.3 FL (ref 8.9–12.9)
POTASSIUM SERPL-SCNC: 4 MMOL/L (ref 3.5–5.1)
RBC # BLD AUTO: 4.22 M/UL (ref 3.8–5.2)
SODIUM SERPL-SCNC: 138 MMOL/L (ref 136–145)
WBC # BLD AUTO: 13.5 K/UL (ref 3.6–11)

## 2022-12-04 PROCEDURE — 74011250637 HC RX REV CODE- 250/637: Performed by: SURGERY

## 2022-12-04 PROCEDURE — 80048 BASIC METABOLIC PNL TOTAL CA: CPT

## 2022-12-04 PROCEDURE — 85025 COMPLETE CBC W/AUTO DIFF WBC: CPT

## 2022-12-04 PROCEDURE — 74011250636 HC RX REV CODE- 250/636: Performed by: COLON & RECTAL SURGERY

## 2022-12-04 PROCEDURE — 36415 COLL VENOUS BLD VENIPUNCTURE: CPT

## 2022-12-04 PROCEDURE — 65270000029 HC RM PRIVATE

## 2022-12-04 PROCEDURE — 74011000250 HC RX REV CODE- 250: Performed by: SURGERY

## 2022-12-04 PROCEDURE — 74011250636 HC RX REV CODE- 250/636: Performed by: SURGERY

## 2022-12-04 PROCEDURE — 74011250637 HC RX REV CODE- 250/637: Performed by: COLON & RECTAL SURGERY

## 2022-12-04 RX ORDER — PANTOPRAZOLE SODIUM 40 MG/1
40 TABLET, DELAYED RELEASE ORAL
Status: DISCONTINUED | OUTPATIENT
Start: 2022-12-04 | End: 2022-12-05 | Stop reason: HOSPADM

## 2022-12-04 RX ADMIN — PANTOPRAZOLE SODIUM 40 MG: 40 TABLET, DELAYED RELEASE ORAL at 15:12

## 2022-12-04 RX ADMIN — MULTIPLE VITAMINS W/ MINERALS TAB 1 TABLET: TAB at 10:15

## 2022-12-04 RX ADMIN — CETIRIZINE HYDROCHLORIDE 10 MG: 10 TABLET, FILM COATED ORAL at 10:14

## 2022-12-04 RX ADMIN — AZELASTINE HYDROCHLORIDE 2 SPRAY: 137 SPRAY, METERED NASAL at 19:59

## 2022-12-04 RX ADMIN — SODIUM CHLORIDE, PRESERVATIVE FREE 10 ML: 5 INJECTION INTRAVENOUS at 04:29

## 2022-12-04 RX ADMIN — DOXYCYCLINE HYCLATE 100 MG: 100 TABLET, COATED ORAL at 10:14

## 2022-12-04 RX ADMIN — ACETAMINOPHEN 650 MG: 325 TABLET ORAL at 19:55

## 2022-12-04 RX ADMIN — DOXYCYCLINE HYCLATE 100 MG: 100 TABLET, COATED ORAL at 19:55

## 2022-12-04 RX ADMIN — ONDANSETRON 4 MG: 4 TABLET, ORALLY DISINTEGRATING ORAL at 20:03

## 2022-12-04 RX ADMIN — ENOXAPARIN SODIUM 40 MG: 100 INJECTION SUBCUTANEOUS at 10:14

## 2022-12-04 RX ADMIN — SODIUM CHLORIDE, PRESERVATIVE FREE 10 ML: 5 INJECTION INTRAVENOUS at 15:13

## 2022-12-04 RX ADMIN — LEVOTHYROXINE SODIUM 125 MCG: 0.12 TABLET ORAL at 06:30

## 2022-12-04 RX ADMIN — ACETAMINOPHEN 650 MG: 325 TABLET ORAL at 01:10

## 2022-12-04 RX ADMIN — PROCHLORPERAZINE EDISYLATE 10 MG: 5 INJECTION INTRAMUSCULAR; INTRAVENOUS at 15:13

## 2022-12-04 RX ADMIN — OXYCODONE 5 MG: 5 TABLET ORAL at 10:14

## 2022-12-04 RX ADMIN — FLUTICASONE PROPIONATE 2 SPRAY: 50 SPRAY, METERED NASAL at 10:15

## 2022-12-04 RX ADMIN — PROCHLORPERAZINE EDISYLATE 10 MG: 5 INJECTION INTRAMUSCULAR; INTRAVENOUS at 10:14

## 2022-12-04 RX ADMIN — MONTELUKAST 10 MG: 10 TABLET, FILM COATED ORAL at 10:14

## 2022-12-04 RX ADMIN — SODIUM CHLORIDE, PRESERVATIVE FREE 10 ML: 5 INJECTION INTRAVENOUS at 20:06

## 2022-12-04 RX ADMIN — ACETAMINOPHEN 650 MG: 325 TABLET ORAL at 15:12

## 2022-12-04 RX ADMIN — ACETAMINOPHEN 650 MG: 325 TABLET ORAL at 06:29

## 2022-12-04 RX ADMIN — ALPRAZOLAM 0.5 MG: 0.5 TABLET ORAL at 20:03

## 2022-12-04 RX ADMIN — OXYCODONE 5 MG: 5 TABLET ORAL at 20:03

## 2022-12-04 RX ADMIN — AZELASTINE HYDROCHLORIDE 2 SPRAY: 137 SPRAY, METERED NASAL at 10:15

## 2022-12-04 RX ADMIN — OXYCODONE 5 MG: 5 TABLET ORAL at 15:12

## 2022-12-04 NOTE — PROGRESS NOTES
End of Shift Note    Bedside shift change report given to JNINY Smith (oncoming nurse) by Melinda Patel RN (offgoing nurse). Report included the following information SBAR, MAR, and Recent Results    Shift worked:  1537-5819     Shift summary and any significant changes:     Pt cont with some nausea and belching without emesis overnight. Hypoactive bowel sound but passing gas. Incision sites with dermabond stable. Small open area note d sub umbilicus with etiology unknown. Cleansed and small gauze applied for scant drainage. New IV started. Pt. Is stable on room air and treated x1 for pain.      Concerns for physician to address:       Zone phone for oncoming shift:            Length of Stay:  Expected LOS: - - -  Actual LOS: 2      Melinda Patel RN

## 2022-12-04 NOTE — PROGRESS NOTES
Pt looks fair. Had nausea yesterday and given compazine with some relief   Finally passed some gas, small stool  vss afebrile    Chest: clear. No rub, gallop  Lung: clear no wheeze  Abd: soft, non tender, + bs, mildly distended, inc c/i    Lab WBC 13 chem 7 okay    A/P POD # 3, Looks good for now,   2. Will hold off on d/c until some bowel function, and no nausea  3.  Home tomorrow if all is well

## 2022-12-05 VITALS
SYSTOLIC BLOOD PRESSURE: 128 MMHG | RESPIRATION RATE: 20 BRPM | DIASTOLIC BLOOD PRESSURE: 64 MMHG | OXYGEN SATURATION: 96 % | HEART RATE: 86 BPM | TEMPERATURE: 98.4 F | WEIGHT: 293 LBS | BODY MASS INDEX: 53.92 KG/M2 | HEIGHT: 62 IN

## 2022-12-05 LAB
ERYTHROCYTE [DISTWIDTH] IN BLOOD BY AUTOMATED COUNT: 14 % (ref 11.5–14.5)
HCT VFR BLD AUTO: 32.3 % (ref 35–47)
HGB BLD-MCNC: 10.2 G/DL (ref 11.5–16)
MCH RBC QN AUTO: 27.6 PG (ref 26–34)
MCHC RBC AUTO-ENTMCNC: 31.6 G/DL (ref 30–36.5)
MCV RBC AUTO: 87.3 FL (ref 80–99)
NRBC # BLD: 0 K/UL (ref 0–0.01)
NRBC BLD-RTO: 0 PER 100 WBC
PLATELET # BLD AUTO: 230 K/UL (ref 150–400)
PMV BLD AUTO: 11.3 FL (ref 8.9–12.9)
RBC # BLD AUTO: 3.7 M/UL (ref 3.8–5.2)
WBC # BLD AUTO: 12.8 K/UL (ref 3.6–11)

## 2022-12-05 PROCEDURE — 74011250637 HC RX REV CODE- 250/637: Performed by: SURGERY

## 2022-12-05 PROCEDURE — 74011250636 HC RX REV CODE- 250/636: Performed by: SURGERY

## 2022-12-05 PROCEDURE — 36415 COLL VENOUS BLD VENIPUNCTURE: CPT

## 2022-12-05 PROCEDURE — 85027 COMPLETE CBC AUTOMATED: CPT

## 2022-12-05 PROCEDURE — 74011000250 HC RX REV CODE- 250: Performed by: SURGERY

## 2022-12-05 PROCEDURE — 74011250637 HC RX REV CODE- 250/637: Performed by: COLON & RECTAL SURGERY

## 2022-12-05 PROCEDURE — 74011250637 HC RX REV CODE- 250/637: Performed by: NURSE PRACTITIONER

## 2022-12-05 RX ORDER — NYSTATIN 100000 [USP'U]/ML
500000 SUSPENSION ORAL 4 TIMES DAILY
Status: DISCONTINUED | OUTPATIENT
Start: 2022-12-05 | End: 2022-12-05 | Stop reason: HOSPADM

## 2022-12-05 RX ORDER — NYSTATIN 100000 [USP'U]/ML
500000 SUSPENSION ORAL 4 TIMES DAILY
Qty: 140 ML | Refills: 0 | Status: SHIPPED | OUTPATIENT
Start: 2022-12-05 | End: 2022-12-12

## 2022-12-05 RX ORDER — ONDANSETRON 4 MG/1
4 TABLET, ORALLY DISINTEGRATING ORAL
Qty: 20 TABLET | Refills: 0 | Status: SHIPPED | OUTPATIENT
Start: 2022-12-05

## 2022-12-05 RX ADMIN — ONDANSETRON 4 MG: 4 TABLET, ORALLY DISINTEGRATING ORAL at 08:47

## 2022-12-05 RX ADMIN — DOXYCYCLINE HYCLATE 100 MG: 100 TABLET, COATED ORAL at 08:44

## 2022-12-05 RX ADMIN — ENOXAPARIN SODIUM 40 MG: 100 INJECTION SUBCUTANEOUS at 08:44

## 2022-12-05 RX ADMIN — NYSTATIN 500000 UNITS: 100000 SUSPENSION ORAL at 10:51

## 2022-12-05 RX ADMIN — LEVOTHYROXINE SODIUM 125 MCG: 0.12 TABLET ORAL at 08:44

## 2022-12-05 RX ADMIN — ACETAMINOPHEN 650 MG: 325 TABLET ORAL at 02:25

## 2022-12-05 RX ADMIN — CETIRIZINE HYDROCHLORIDE 10 MG: 10 TABLET, FILM COATED ORAL at 08:44

## 2022-12-05 RX ADMIN — PANTOPRAZOLE SODIUM 40 MG: 40 TABLET, DELAYED RELEASE ORAL at 08:44

## 2022-12-05 RX ADMIN — MONTELUKAST 10 MG: 10 TABLET, FILM COATED ORAL at 08:44

## 2022-12-05 RX ADMIN — AZELASTINE HYDROCHLORIDE 2 SPRAY: 137 SPRAY, METERED NASAL at 08:00

## 2022-12-05 RX ADMIN — ACETAMINOPHEN 650 MG: 325 TABLET ORAL at 08:44

## 2022-12-05 RX ADMIN — MULTIPLE VITAMINS W/ MINERALS TAB 1 TABLET: TAB at 08:44

## 2022-12-05 RX ADMIN — SODIUM CHLORIDE, PRESERVATIVE FREE 10 ML: 5 INJECTION INTRAVENOUS at 02:26

## 2022-12-05 NOTE — DISCHARGE SUMMARY
Post- Surgical Discharge Summary    Patient ID:  Eliane Bah  [id number]  female  45 y.o.  1970    Admit date: 12/1/2022    Discharge date: 12/05/22     Admitting Physician: Mariluz Alarcon MD     Consulting Physician(s):   Treatment Team: Attending Provider: Rebecca Henriquez MD; Care Manager: MARY Land; Utilization Review: Gloriajean Mohs, RN; Primary Nurse: Real Govea RN; Primary Nurse: Maribeth Jett RN; Staff Nurse: Olga Lidia Guerra RN    Date of Surgery:   12/1/2022     Preoperative Diagnosis:  COLON CANCER    Postoperative Diagnosis:   COLON CANCER    Procedure(s):  LAPAROSCOPIC RIGHT COLECTOMY      Anesthesia Type:   General     Surgeon: Rebecca Henriquez MD                            HPI:  Pt is a 45 y.o. female who has a history of [address] who presents at this time for a R. Colectomy following the failure of conservative management. Problem List:   Problem List as of 12/5/2022 Date Reviewed: 10/26/2022            Codes Class Noted - Resolved    Colon cancer ([facility] ICD-10-CM: C18.9  ICD-9-CM: 153.9  12/1/2022 - Present        Prediabetes ICD-10-CM: R73.03  ICD-9-CM: 790.29  11/17/2020 - Present        Deep vein thrombosis (DVT) ([facility] ICD-10-CM: I82.409  ICD-9-CM: 453.40  8/13/2020 - Present        History of DVT of lower extremity ICD-10-CM: Q84.353  ICD-9-CM: V12.51  8/13/2020 - Present        Chronic otitis media ICD-10-CM: H66.90  ICD-9-CM: 382.9  Unknown - Present    Overview Signed 3/18/2019  2:24 PM by DO Dr. Dk Childers             DVT of deep femoral vein [facility]) ICD-10-CM: I82.419  ICD-9-CM: 453.41  12/7/2018 - Present    Overview Signed 12/17/2018 12:01 PM by Alina Busch DO     Acute partially occlusive DVT of L Common Fem Vein and L Prox Fem Vein and Acute occlusive DVT of L deep femoral vein. Txd with Xarelto x 3 months. due to BCP.              Family history of lung cancer ICD-10-CM: Z80.1  ICD-9-CM: V16.1  11/12/2018 - Present Allergy to sulfa drugs ICD-10-CM: Z88.2  ICD-9-CM: V14.2  12/29/2017 - Present        Penicillin allergy ICD-10-CM: Z88.0  ICD-9-CM: V14.0  12/29/2017 - Present        Adverse effect of anabolic steroid UKT-88-DT: N66.8H7J  ICD-9-CM: E932.1  12/29/2017 - Present    Overview Signed 12/29/2017 12:58 PM by Anu Lucas DO     flushing and leathery skin after taking oral steroids             Obesity, morbid ([facility] [address] ICD-10-CM: E66.01  ICD-9-CM: 278.01  12/22/2017 - Present        Hyperglycemia ICD-10-CM: R73.9  ICD-9-CM: 790.29  6/29/2017 - Present        Hypothyroidism due to acquired atrophy of thyroid ICD-10-CM: E03.4  ICD-9-CM: 244.8, 246.8  11/7/2016 - Present        Chronic low back pain with right-sided sciatica ICD-10-CM: M54.41, G89.29  ICD-9-CM: 724.2, 724.3, 338.29  Unknown - Present    Overview Signed 11/7/2016 10:20 AM by Anu Lucas DO     and SI joint dysfunction. Dr. Ara Gauthier.              Mild intermittent asthma without complication XQE-80-UB: S43.86  ICD-9-CM: 493.90  11/7/2016 - Present        Hearing loss ICD-10-CM: H91.90  ICD-9-CM: 389.9  8/22/2016 - Present        Dry eyes ICD-10-CM: Z07.482  ICD-9-CM: 375.15  11/3/2015 - Present        Plantar fasciitis, bilateral ICD-10-CM: M72.2  ICD-9-CM: 728.71  Unknown - Present    Overview Signed 11/3/2015 10:51 AM by Roselinda Beery, DO Dr. Claudetta Pitch             Family history of breast cancer ICD-10-CM: Z80.3  ICD-9-CM: V16.3  11/3/2015 - Present        Family history of stroke ICD-10-CM: Z82.3  ICD-9-CM: V17.1  11/3/2015 - Present        Family history of diabetes mellitus (DM) ICD-10-CM: Z83.3  ICD-9-CM: V18.0  11/3/2015 - Present        Family history of heart attack ICD-10-CM: Z82.49  ICD-9-CM: V17.3  11/3/2015 - Present        Family history of colonic polyps ICD-10-CM: Z83.71  ICD-9-CM: V18.51  11/3/2015 - Present        Family history of melanoma ICD-10-CM: Z80.8  ICD-9-CM: V16.8  11/3/2015 - Present Migraine without aura and without status migrainosus, not intractable ICD-10-CM: G43.009  ICD-9-CM: 346.10  11/3/2015 - Present        Dyslipidemia ICD-10-CM: E78.5  ICD-9-CM: 272.4  6/23/2015 - Present        Tinnitus of right ear ICD-10-CM: H93.11  ICD-9-CM: 388.30  Unknown - Present    Overview Signed 6/23/2015  5:06 PM by DO Dr. Elisha Hunter             Right ankle sprain ICD-10-CM: M37.093T  ICD-9-CM: 845.00  11/27/2012 - Present        Vitamin D deficiency ICD-10-CM: E55.9  ICD-9-CM: 268.9  11/14/2012 - Present        Knee pain, left ICD-10-CM: M25.562  ICD-9-CM: 719.46  5/30/2012 - Present        Acne rosacea ICD-10-CM: L71.9  ICD-9-CM: 695.3  11/21/2011 - Present        Intraocular pressure increase ICD-10-CM: H40.059  ICD-9-CM: 365.00  Unknown - Present        Allergic rhinitis ICD-10-CM: J30.9  ICD-9-CM: 477.9  7/31/2009 - Present        Chronic ear infection ICD-10-CM: H66.90  ICD-9-CM: 381.3  7/31/2009 - Present    Overview Signed 7/31/2009  2:24 PM by Deidra Jade     R>L             GERD (gastroesophageal reflux disease) ICD-10-CM: K21.9  ICD-9-CM: 530.81  7/31/2009 - Present        Fe deficiency anemia ICD-10-CM: D50.9  ICD-9-CM: 280.9  7/31/2009 - Present    Overview Signed 7/31/2009  2:27 PM by Deidra Jade     Borderline             RESOLVED: Obesity, morbid ([address] ICD-10-CM: E66.01  ICD-9-CM: 278.01  11/27/2017 - 12/22/2017        RESOLVED: Elevated IOP ICD-10-CM: H40.059  ICD-9-CM: 365.00  11/3/2015 - 8/22/2016        RESOLVED: Hearing loss ICD-10-CM: H91.90  ICD-9-CM: 389.9  11/3/2015 - 8/22/2016    Overview Signed 11/3/2015 10:47 AM by DO dylon Hunter             RESOLVED: Dry eye syndrome ICD-10-CM: E89.613  ICD-9-CM: 375.15  Unknown - 11/3/2015    Overview Signed 8/27/2013 12:27 PM by Alysa Bhandari.              RESOLVED: Migraine ICD-10-CM: N81.295  ICD-9-CM: 346.90  5/30/2012 - 11/7/2016    Overview Signed 5/30/2012 10:29 AM by Juan Francisco Velasco DO     Right             RESOLVED: Recurrent incisional hernia with incarceration ICD-10-CM: K43.0  ICD-9-CM: 552.21  9/24/2011 - 9/22/2014        RESOLVED: Recurrent umbilical hernia YGX-19-EO: K42.9  ICD-9-CM: 553.1  9/13/2011 - 9/22/2014        RESOLVED: Umbilical hernia, incarcerated ICD-10-CM: K42.0  ICD-9-CM: 552.1  9/13/2011 - 9/22/2014        RESOLVED: Hypothyroidism ICD-10-CM: E03.9  ICD-9-CM: 244.9  6/1/2010 - 11/7/2016    Overview Signed 11/2/2010  9:00 AM by DO Dr. Tova Isaacs: Hearing loss ICD-10-CM: 389  ICD-9-CM: 389  Unknown - 11/3/2015        RESOLVED: Other and unspecified hyperlipidemia ICD-10-CM: E78.5  ICD-9-CM: 272.4  7/31/2009 - 6/23/2015        RESOLVED: Childhood asthma ICD-10-CM: J45.909  ICD-9-CM: 493.00  7/31/2009 - 6/29/2017            Hospital Course: The patient underwent surgery. Intra-operative complications: None; patient tolerated the procedure well. Was taken to the PACU in stable condition and then transferred to the surgical floor. Pathology is pending. Surgeon will follow results as outpatient. Perioperative Antibiotics: Cefoxitin    Postoperative Pain Management:  Oxycodone    Postoperative transfusions:    Number of units banked PRBCs =   none     Post Op complications: Nausea and poor diet tolerance, resolved at the time of discharge. Incisions  - clean, dry and intact. No significant erythema or swelling. Wound(s) appear to be healing without any evidence of infection. Patient mobilized with nursing and was found to be safe and steady with ambulation.      Discharged to: Home     Condition on Discharge: Stable     Discharge instructions:    - Take pain medications as prescribed  - Diet Regular  - Discharge activity:    - Activity as tolerated    - Ambulate several times a day   - No heavy lifting for 4 weeks   - Do not drive for two weeks or while on opioid pain medications  - Wound Care: Keep wound(s) clean and dry. See discharge instruction sheet. Allergies: Allergies   Allergen Reactions    Latex Rash    Junel 1.5/30 (21) [Norethindrone Ac-Eth Estradiol] Swelling     L FOOT DUE TO NEW DVT    Other Medication Hives and Rash     SUN    Pcn [Penicillins] Hives    Prednisone Hives    Sulfa (Sulfonamide Antibiotics) Hives    Garamycin [Gentamicin] Other (comments)     Itchy eyes due to sulfate    Metformin Diarrhea              -DISCHARGE MEDICATION LIST     Current Discharge Medication List        START taking these medications    Details   nystatin (MYCOSTATIN) 100,000 unit/mL suspension Take 5 mL by mouth four (4) times daily for 7 days. swish and spit  Qty: 140 mL, Refills: 0  Start date: 12/5/2022, End date: 12/12/2022      ondansetron (ZOFRAN ODT) 4 mg disintegrating tablet Take 1 Tablet by mouth every eight (8) hours as needed for Nausea. Qty: 20 Tablet, Refills: 0  Start date: 12/5/2022      acetaminophen (TYLENOL) 325 mg tablet Take 2 Tablets by mouth every six (6) hours for 7 days. Qty: 56 Tablet, Refills: 0  Start date: 12/2/2022, End date: 12/9/2022      oxyCODONE IR (ROXICODONE) 5 mg immediate release tablet Take 1 Tablet by mouth every six (6) hours as needed (Pain Moderate (4-6)) for up to 3 days. Max Daily Amount: 20 mg.  Qty: 10 Tablet, Refills: 0  Start date: 12/2/2022, End date: 12/5/2022    Associated Diagnoses: Malignant neoplasm of colon, unspecified part of colon ([facility] [address]           CONTINUE these medications which have NOT CHANGED    Details   butalbital-acetaminophen-caffeine (FIORICET, ESGIC) -40 mg per tablet Take 1 Tablet by mouth every six (6) hours as needed for Headache.   Qty: 30 Tablet, Refills: 0    Associated Diagnoses: Migraine without aura and without status migrainosus, not intractable      cyclobenzaprine (FLEXERIL) 10 mg tablet TAKE 1 TABLET BY MOUTH 3 TIMES A DAY AS NEEDED FOR MUSCLE SPASMS  Qty: 90 Tablet, Refills: 0    Associated Diagnoses: Chronic bilateral low back pain without sciatica      montelukast (SINGULAIR) 10 mg tablet TAKE 1 TABLET BY MOUTH EVERY DAY  Qty: 90 Tablet, Refills: 0    Associated Diagnoses: Mild intermittent asthma without complication      levothyroxine (SYNTHROID) 125 mcg tablet TAKE 1 TABLET BY MOUTH EVERY DAY BEFORE BREAKFAST  Qty: 90 Tablet, Refills: 0    Associated Diagnoses: Hypothyroidism due to acquired atrophy of thyroid      Iron BisGl &PS Sxk-L-H70-FA-Ca 828-41-98-7 mg-mg-mcg-mg cap Take  by mouth. doxycycline monohydrate 40 mg capsule Take 40 mg by mouth every morning. sod picosulf-mag ox-citric ac (Clenpiq) 10 mg-3.5 gram -12 gram/160 mL soln Take  by mouth. Naftin 2 % gel Use as directed  Indications: athlete's foot  Qty: 60 g, Refills: 2    Associated Diagnoses: Tinea pedis of both feet      cetirizine (ZyrTEC) 10 mg tablet Take 1 Tablet by mouth daily. Qty: 90 Tablet, Refills: 3    Associated Diagnoses: Allergic conjunctivitis and rhinitis, bilateral      fluticasone propionate (FLONASE) 50 mcg/actuation nasal spray 2 Sprays by Both Nostrils route daily. Qty: 3 Each, Refills: 3    Associated Diagnoses: Allergic conjunctivitis and rhinitis, bilateral      hydrOXYzine HCL (ATARAX) 25 mg tablet TAKE 2 TABLETS BY MOUTH EVERY EIGHT (8) HOURS AS NEEDED (URTICARIA). INDICATIONS: HIVES  Qty: 540 Tablet, Refills: 3    Associated Diagnoses: Pruritic dermatitis; Insomnia, unspecified type      albuterol (PROVENTIL HFA, VENTOLIN HFA, PROAIR HFA) 90 mcg/actuation inhaler INHALE 2 PUFFS BY MOUTH EVERY 4 HOURS AS NEEDED FOR WHEEZING. INDICATIONS: BRONCHOSPASM PREVENTIONM  Qty: 6.7 Each, Refills: 1    Associated Diagnoses: Mild intermittent asthma without complication      ALPRAZolam (XANAX) 0.5 mg tablet Take 1 Tablet by mouth daily as needed for Anxiety.   Qty: 30 Tablet, Refills: 0    Associated Diagnoses: Caregiver stress      azelastine (ASTELIN) 137 mcg (0.1 %) nasal spray 2 Sprays by Both Nostrils route two (2) times a day. Use in each nostril as directed  Indications: seasonal runny nose  Qty: 3 Each, Refills: 1    Associated Diagnoses: Allergic conjunctivitis and rhinitis, bilateral      multivitamin (ONE A DAY) tablet Take 1 tablet by mouth daily. PROPYLENE GLYCOL//PF (SYSTANE, PF, OP) Apply  to eye four (4) times daily. Emollient Combination No.32 emul by Apply Externally route two (2) times a day. From Dr Juliette Aguilar. For Acne rosacea. metroNIDAZOLE (METROGEL) 1 % topical gel Apply  to affected area two (2) times a day. Use a thin layer to affected areas after washing from Dr Juliette Aguilar   Indications: a skin condition on the cheeks and nose with a reddish rash and acne called acne rosacea      latanoprost (XALATAN) 0.005 % ophthalmic solution Administer 1 Drop to both eyes nightly. LACTOBACILLUS/FOS/PECTIN (PROBIOTIC COMPLEX PO) Take  by mouth daily. SUMAtriptan (IMITREX) 50 mg tablet Take 1 tab po at first onset migraine. May repeat in 2 hours if needed. No more than 2 pills in 24 hours. Qty: 12 Tablet, Refills: 5    Associated Diagnoses: Migraine without aura and without status migrainosus, not intractable      albuterol (PROVENTIL VENTOLIN) 2.5 mg /3 mL (0.083 %) nebu 3 mL by Nebulization route every four (4) hours as needed for Wheezing or Shortness of Breath. Indications: asthma attack  Qty: 30 Each, Refills: 1    Associated Diagnoses: Moderate persistent asthma with acute exacerbation      albuterol-ipratropium (DUO-NEB) 2.5 mg-0.5 mg/3 ml nebu USE 1 VIAL VIA NEBULLIZER EVERY 6 HOURS AS NEEDED FOR BREATHING  Qty: 90 mL, Refills: 1    Associated Diagnoses: Mild intermittent asthma without complication      clobetasol (TEMOVATE) 0.05 % topical cream as needed. Refills: 1      Nebulizer & Compressor machine 1 Each by Other route four (4) times daily as needed. Qty: 1 Each, Refills: 0    Associated Diagnoses:  Moderate persistent asthma with acute exacerbation      terconazole (TERAZOL 7) 0.4 % vaginal cream Refills: 6      LOCOID 0.1 % lotn Refills: 1      valACYclovir (VALTREX) 500 mg tablet 500 mg daily as needed. Refills: 1           STOP taking these medications       neomycin (MYCIFRADIN) 500 mg tablet Comments:   Reason for Stopping:         famotidine (PEPCID) 40 mg tablet Comments:   Reason for Stopping:         terconazole (TERAZOL 3) 80 mg vaginal suppository Comments:   Reason for Stopping:            per medical continuation form      -Follow up in office in 2 weeks      Signed:  Amy Joseph.  Alia Byrd  MSN, APRN, FNP-C, [facility] [place]  Surgical Nurse Practitioner    12/5/2022  8:56 AM

## 2022-12-05 NOTE — PROGRESS NOTES
End of Shift Note    Bedside shift change report given to Paige De Guzman RN (oncoming nurse) by Monserrat Morris RN (offgoing nurse). Report included the following information SBAR, Kardex, Intake/Output, MAR, and Recent Results    Shift worked:  [id number]     Shift summary and any significant changes:     Pt ambulating in the hallway; up to chair for all meals. Pt c/o some nausea this AM, but much improved after new order for protonix was administered. Pt unable to eat regular diet, but is tolerating chicken broth and crackers. Pain managed well for pain. Changed dressing to lower abdomen; small pinhole that had scant amount of drainage and Dr. Leti lFoyd is aware. Concerns for physician to address:  Discharge planning     Zone phone for oncoming shift:          Activity:  Activity Level: Up ad mateo  Number times ambulated in hallways past shift: 2  Number of times OOB to chair past shift: 3    Cardiac:   Cardiac Monitoring: No      Cardiac Rhythm: Sinus Rhythm    Access:  Current line(s): PIV     Genitourinary:   Urinary status: voiding    Respiratory:   O2 Device: None (Room air)  Chronic home O2 use?: N/A  Incentive spirometer at bedside: YES  Actual Volume (ml): 500 ml    GI:  Last Bowel Movement Date: 12/03/22  Current diet:  ADULT ORAL NUTRITION SUPPLEMENT Breakfast, Dinner; Other Supplement; ERAS dropped off, 5 days supply  ADULT DIET Regular; Low Fiber; No Sausage  Passing flatus: YES  Tolerating current diet: YES       Pain Management:   Patient states pain is manageable on current regimen: YES    Skin:  Danilo Score: 19  Interventions: float heels and increase time out of bed    Patient Safety:  Fall Score:  Total Score: 1  Interventions: gripper socks and pt to call before getting OOB  High Fall Risk: Yes    Length of Stay:  Expected LOS: - - -  Actual LOS: 3      Monserrat Morris RN

## 2022-12-05 NOTE — PROGRESS NOTES
[id number] - PCP hospital follow-up transitional care appointment has been scheduled with Dr. Eze Melaraon 12/12/22 at [phone]. Listed PCP is now PRN status. Patient will need to establish care with an available PCP at practice for future care. Pending patient discharge. Randolph Zheng, Care Management Assistant     Attempted to schedule hospital follow up for PCP appointment. Sent a message to PCP office to find patient an appointment. Awaiting callback from PCP office.  Lui Aparicio

## 2022-12-05 NOTE — PROGRESS NOTES
End of Shift Note    Bedside shift change report given to Florecita Flowers (oncoming nurse) by Estevan Paula RN (offgoing nurse). Report included the following information Kardex, MAR, and Recent Results    Shift worked:  1707-2884     Shift summary and any significant changes:     Pt.neuro intact. VS stable this AM. Nausea improved,  with some belching. Voiding without issue. Pain well managed. Small BM last night.      Concerns for physician to address:       Zone phone for oncoming shift:              Length of Stay:  Expected LOS: - - -  Actual LOS: 3      Estevan Paula, RN

## 2022-12-05 NOTE — PROGRESS NOTES
DISCHARGE NOTE FROM [facility] NURSE    Patient determined to be stable for discharge by attending provider. I have reviewed the discharge instructions and follow-up appointments with the patient. They verbalized understanding and all questions were answered to their satisfaction. No complaints or further questions were expressed. Medications sent to pharmacy. Appropriate educational materials and medication side effect teaching were provided. PIV were removed prior to discharge. Patient did not discharge with any line, lynn, or drain. All personal items collected during admission were returned to the patient prior to discharge. Post-op patient: Yes- Patient given post-op discharge kit and instructed on use.        Lukasz Campbell RN

## 2022-12-11 DIAGNOSIS — H10.13 ALLERGIC CONJUNCTIVITIS AND RHINITIS, BILATERAL: ICD-10-CM

## 2022-12-11 DIAGNOSIS — J30.9 ALLERGIC CONJUNCTIVITIS AND RHINITIS, BILATERAL: ICD-10-CM

## 2022-12-11 PROBLEM — R73.9 HYPERGLYCEMIA: Status: RESOLVED | Noted: 2017-06-29 | Resolved: 2022-12-11

## 2022-12-11 PROBLEM — C18.9 ADENOCARCINOMA OF COLON (HCC): Status: ACTIVE | Noted: 2022-12-01

## 2022-12-11 PROBLEM — I82.409 DEEP VEIN THROMBOSIS (DVT) (HCC): Status: RESOLVED | Noted: 2020-08-13 | Resolved: 2022-12-11

## 2022-12-11 PROBLEM — D50.0 ANEMIA, BLOOD LOSS: Status: ACTIVE | Noted: 2022-12-11

## 2022-12-12 ENCOUNTER — OFFICE VISIT (OUTPATIENT)
Dept: INTERNAL MEDICINE CLINIC | Age: 52
End: 2022-12-12
Payer: COMMERCIAL

## 2022-12-12 VITALS
DIASTOLIC BLOOD PRESSURE: 80 MMHG | SYSTOLIC BLOOD PRESSURE: 132 MMHG | BODY MASS INDEX: 53.92 KG/M2 | OXYGEN SATURATION: 99 % | HEART RATE: 66 BPM | HEIGHT: 62 IN | WEIGHT: 293 LBS | TEMPERATURE: 99 F

## 2022-12-12 DIAGNOSIS — C18.9 ADENOCARCINOMA OF COLON (HCC): ICD-10-CM

## 2022-12-12 DIAGNOSIS — D50.0 ANEMIA, BLOOD LOSS: ICD-10-CM

## 2022-12-12 DIAGNOSIS — R73.03 PREDIABETES: ICD-10-CM

## 2022-12-12 DIAGNOSIS — E03.9 ACQUIRED HYPOTHYROIDISM: ICD-10-CM

## 2022-12-12 DIAGNOSIS — E03.4 HYPOTHYROIDISM DUE TO ACQUIRED ATROPHY OF THYROID: ICD-10-CM

## 2022-12-12 DIAGNOSIS — Z63.6 CAREGIVER STRESS: ICD-10-CM

## 2022-12-12 DIAGNOSIS — J45.20 MILD INTERMITTENT ASTHMA WITHOUT COMPLICATION: ICD-10-CM

## 2022-12-12 DIAGNOSIS — K21.9 GASTROESOPHAGEAL REFLUX DISEASE, UNSPECIFIED WHETHER ESOPHAGITIS PRESENT: ICD-10-CM

## 2022-12-12 DIAGNOSIS — E66.01 OBESITY, MORBID (HCC): Primary | ICD-10-CM

## 2022-12-12 PROCEDURE — 99215 OFFICE O/P EST HI 40 MIN: CPT | Performed by: INTERNAL MEDICINE

## 2022-12-12 RX ORDER — AZELASTINE 1 MG/ML
SPRAY, METERED NASAL
Qty: 90 EACH | Refills: 1 | Status: SHIPPED | OUTPATIENT
Start: 2022-12-12

## 2022-12-12 RX ORDER — ONDANSETRON 4 MG/1
4 TABLET, ORALLY DISINTEGRATING ORAL
Qty: 20 TABLET | Refills: 0 | Status: SHIPPED | OUTPATIENT
Start: 2022-12-12

## 2022-12-12 RX ORDER — ALPRAZOLAM 0.5 MG/1
0.5 TABLET ORAL
Qty: 50 TABLET | Refills: 0 | Status: SHIPPED | OUTPATIENT
Start: 2022-12-12

## 2022-12-12 RX ORDER — LEVOTHYROXINE SODIUM 125 UG/1
125 TABLET ORAL
Qty: 90 TABLET | Refills: 1 | Status: SHIPPED | OUTPATIENT
Start: 2022-12-12

## 2022-12-12 SDOH — SOCIAL STABILITY - SOCIAL INSECURITY: DEPENDENT RELATIVE NEEDING CARE AT HOME: Z63.6

## 2022-12-12 NOTE — PROGRESS NOTES
Transitions Of Care (Discharged from [facility] 12-5-222)       HPI:  Katerina Gray is a 45y.o. year old female who is here for a follow up visit for hospitalization transition of care. She was last seen by this office on 9/15/2022 and never before seen by me    Discharged on: 12/5/2022    Diagnosis in hospital:  1. Metastatic adenocarcinoma of colon with extension to appendix, adjacent small bowel 2 positive lymph nodes  2. Morbid obesity  3. Asthma   4. Anemia   5. Hypertension    Complications in hospital: Had significant postop nausea/vomiting but not truly a complication    Medication changes: Start nystatin swish and swallow 5 cc 4 times daily for 7 days, oxycodone as needed for pain #10 given, Tylenol as needed for pain and Zofran as needed for nausea. Discontinue Terazol, and Pepcid. Discharge Summary reviewed. Today 12/12/2022      She reports the following: She notes today that she has developed a little bit of right lower quadrant abdominal pain that is more prominent today than it had been up until today. She is noted little bit of a low-grade fever at 99 which she says she normally runs 97-98. She has no nausea vomiting. She has been eating and drinking Ensure. She notes no current urinary complaints. There is no drainage from the incisions. She denies any chest pain, shortness of breath, palpitations, PND, orthopnea or other cardiac or respiratory complaints. She notes no other GI or  complaints. She has no headaches, dizziness or neurologic complaints. She has no current active arthritic complaints and there are no other complaints on complete review of systems.           Visit Vitals  /80 (BP 1 Location: Left upper arm, BP Patient Position: Sitting, BP Cuff Size: Adult)   Pulse 66   Temp 99 °F (37.2 °C)   Ht 5' 2\" (1.575 m)   Wt 319 lb 4.8 oz (144.8 kg)   LMP 12/18/2018 (Within Weeks)   SpO2 99%   BMI 58.40 kg/m²       Historical Data    Past Medical History:   Diagnosis Date    Acne rosacea     Dr. Juliette Aguilar. Allergy, unspecified not elsewhere classified childhood     Txd with immunotherapy. Dr. Vania Azevedo    Anemia NEC     borderline    Ankle sprain 2007    Right. Dr. Renata Sanford    Ankle sprain 11/2012    Right. Dr. Costa Browne. Asthma childhood    Cancer ([facility] [address]     colon CA    Chickenpox childhood    Chronic low back pain with right-sided sciatica     and SI joint dysfunction. Dr. Yoana Castellanos. Chronic otitis media     Dr. Vania Azevedo    Colon cancer [facility]) 10/28/2022    Dry eye syndrome 2013    Dr. Peg Martínez. DVT of deep femoral vein ([facility] [address] 12/07/2018    Acute occlusive DVT of L deep femoral vein and Acute partially occlusive DVT of L Common Fem Vein and L Prox Fem Vein. Txd with Xarelto x 3 months. due to BCP. Dr. Radha Dewey    EBV infection 06/27/2011    Hearing loss     Mild high frequency sensorineuronal hearing loss. Dr. Amanda Pack Dr. Collis Eisenmenger murmur     Hernia of abdominal wall 09/2003, 22/5583    umbilical.  Dr. Belinda Hayes. Dr. Marian Gunter    Hyperglycemia 2013    Hypothyroidism 06/2010    Dr. Devlin Party    Ill-defined condition     vitamin D deficiency    Intraocular pressure increase 12/2011    Dr. Thomas Starks. Dr. Peg Martínez. Dr. Guillen. Knee pain, left 04/2012    Left. Dr. Buddy Navas    Measles childhood    Migraine 2017, 07/2019    w aura and facial paresthesia. Dr. Coy Reyes    Mumps childhood    Plantar fasciitis, bilateral 2010    Dr. Gabriel Leyden    Sciatica 2008    Right.  with OA. Dr. Amado Serrano    Tinnitus of right ear 2012    Dr. Luiz Godoy       Past Surgical History:   Procedure Laterality Date    BIOPSY VAGINAL  01/01/2011    Dr. Mercedes Sadler.     COLONOSCOPY N/A 10/26/2022    colonoscopy  performed by Terrell Vinson MD at [facility] ENDOSCOPY    HAND/FINGER SURGERY UNLISTED  09/01/2003    Right Index finger repair due to cut    [address] [place] [address] 64/80/2785    Umbilical.  Dr. Trent Prado. HX HERNIA REPAIR  09/23/2011    recurrent umbilical.  Laparoscopy incisional.  Dr. Beth Wallace. HX PARTIAL COLECTOMY Right 12/01/2022    Dr. Andie Mauro  childhood    [place] LAP,CHOLECYSTECTOMY  07/01/2001       Outpatient Encounter Medications as of 12/12/2022   Medication Sig Dispense Refill    ALPRAZolam (XANAX) 0.5 mg tablet Take 1 Tablet by mouth two (2) times daily as needed for Anxiety. Max Daily Amount: 1 mg. 50 Tablet 0    levothyroxine (SYNTHROID) 125 mcg tablet Take 1 Tablet by mouth Daily (before breakfast). 90 Tablet 1    ondansetron (ZOFRAN ODT) 4 mg disintegrating tablet Take 1 Tablet by mouth every eight (8) hours as needed for Nausea. 20 Tablet 0    butalbital-acetaminophen-caffeine (FIORICET, ESGIC) -40 mg per tablet Take 1 Tablet by mouth every six (6) hours as needed for Headache. 30 Tablet 0    cyclobenzaprine (FLEXERIL) 10 mg tablet TAKE 1 TABLET BY MOUTH 3 TIMES A DAY AS NEEDED FOR MUSCLE SPASMS 90 Tablet 0    montelukast (SINGULAIR) 10 mg tablet TAKE 1 TABLET BY MOUTH EVERY DAY 90 Tablet 0    Iron BisGl &PS [id number] [id number] mg-mg-mcg-mg cap Take  by mouth. doxycycline monohydrate 40 mg capsule Take 40 mg by mouth every morning. Naftin 2 % gel Use as directed  Indications: athlete's foot 60 g 2    cetirizine (ZyrTEC) 10 mg tablet Take 1 Tablet by mouth daily. 90 Tablet 3    fluticasone propionate (FLONASE) 50 mcg/actuation nasal spray 2 Sprays by Both Nostrils route daily. 3 Each 3    hydrOXYzine HCL (ATARAX) 25 mg tablet TAKE 2 TABLETS BY MOUTH EVERY EIGHT (8) HOURS AS NEEDED (URTICARIA). INDICATIONS: HIVES 540 Tablet 3    albuterol (PROVENTIL HFA, VENTOLIN HFA, PROAIR HFA) 90 mcg/actuation inhaler INHALE 2 PUFFS BY MOUTH EVERY 4 HOURS AS NEEDED FOR WHEEZING.  INDICATIONS: BRONCHOSPASM PREVENTIONM 6.7 Each 1    azelastine (ASTELIN) 137 mcg (0.1 %) nasal spray 2 Sprays by Both Nostrils route two (2) times a day. Use in each nostril as directed  Indications: seasonal runny nose 3 Each 1    SUMAtriptan (IMITREX) 50 mg tablet Take 1 tab po at first onset migraine. May repeat in 2 hours if needed. No more than 2 pills in 24 hours. 12 Tablet 5    albuterol (PROVENTIL VENTOLIN) 2.5 mg /3 mL (0.083 %) nebu 3 mL by Nebulization route every four (4) hours as needed for Wheezing or Shortness of Breath. Indications: asthma attack 30 Each 1    albuterol-ipratropium (DUO-NEB) 2.5 mg-0.5 mg/3 ml nebu USE 1 VIAL VIA NEBULLIZER EVERY 6 HOURS AS NEEDED FOR BREATHING 90 mL 1    clobetasol (TEMOVATE) 0.05 % topical cream as needed. 1    Nebulizer & Compressor machine 1 Each by Other route four (4) times daily as needed. 1 Each 0    LOCOID 0.1 % lotn   1    valACYclovir (VALTREX) 500 mg tablet 500 mg daily as needed. 1    multivitamin (ONE A DAY) tablet Take 1 tablet by mouth daily. Emollient Combination No.32 emul by Apply Externally route two (2) times a day. From Dr Nicholas Vargas. For Acne rosacea. metroNIDAZOLE (METROGEL) 1 % topical gel Apply  to affected area two (2) times a day. Use a thin layer to affected areas after washing from Dr Nicholas Vargas   Indications: a skin condition on the cheeks and nose with a reddish rash and acne called acne rosacea      latanoprost (XALATAN) 0.005 % ophthalmic solution Administer 1 Drop to both eyes nightly. LACTOBACILLUS/FOS/PECTIN (PROBIOTIC COMPLEX PO) Take  by mouth daily. [DISCONTINUED] nystatin (MYCOSTATIN) 100,000 unit/mL suspension Take 5 mL by mouth four (4) times daily for 7 days. swish and spit (Patient not taking: Reported on 12/12/2022) 140 mL 0    [DISCONTINUED] ondansetron (ZOFRAN ODT) 4 mg disintegrating tablet Take 1 Tablet by mouth every eight (8) hours as needed for Nausea.  20 Tablet 0    [DISCONTINUED] levothyroxine (SYNTHROID) 125 mcg tablet TAKE 1 TABLET BY MOUTH EVERY DAY BEFORE BREAKFAST 90 Tablet 0    [DISCONTINUED] sod picosulf-mag ox-citric ac (Clenpiq) 10 mg-3.5 gram -12 gram/160 mL soln Take  by mouth. (Patient not taking: Reported on 12/12/2022)      [DISCONTINUED] ALPRAZolam (XANAX) 0.5 mg tablet Take 1 Tablet by mouth daily as needed for Anxiety. 30 Tablet 0    [DISCONTINUED] terconazole (TERAZOL 7) 0.4 % vaginal cream  (Patient not taking: Reported on 12/1/2022)  6    [DISCONTINUED] PROPYLENE GLYCOL//PF (SYSTANE, PF, OP) Apply  to eye four (4) times daily. (Patient not taking: Reported on 12/12/2022)       No facility-administered encounter medications on file as of 12/12/2022. Allergies   Allergen Reactions    Latex Rash    Junel 1.5/30 (21) [Norethindrone Ac-Eth Estradiol] Swelling     L FOOT DUE TO NEW DVT    Other Medication Hives and Rash     SUN    Pcn [Penicillins] Hives    Prednisone Hives    Sulfa (Sulfonamide Antibiotics) Hives    Garamycin [Gentamicin] Other (comments)     Itchy eyes due to sulfate    Metformin Diarrhea        Social History     Socioeconomic History    Marital status: SINGLE     Spouse name: Not on file    Number of children: Not on file    Years of education: Not on file    Highest education level: Not on file   Occupational History    Not on file   Tobacco Use    Smoking status: Never     Passive exposure: Yes    Smokeless tobacco: Never    Tobacco comments:     lived with smoker dad and mom then step mom x 20 yrs   Vaping Use    Vaping Use: Never used   Substance and Sexual Activity    Alcohol use:  Yes     Alcohol/week: 1.0 standard drink     Types: 1 Glasses of wine per week     Comment: RARE    Drug use: No    Sexual activity: Yes     Partners: Male     Birth control/protection: Condom   Other Topics Concern    Not on file   Social History Narrative    Not on file     Social Determinants of Health     Financial Resource Strain: Low Risk     Difficulty of Paying Living Expenses: Not hard at all   Food Insecurity: No Food Insecurity    Worried About [address] in the Last Year: Never true    Ran Out of Food in the Last Year: Never true   Transportation Needs: Not on file   Physical Activity: Not on file   Stress: Not on file   Social Connections: Not on file   Intimate Partner Violence: Not on file   Housing Stability: Not on file      REVIEW OF SYSTEMS:  General: negative for - chills or fever  ENT: negative for - headaches, nasal congestion or tinnitus  Eyes: no blurred or visual changes  Neck: No stiffness or swollen nodes  Respiratory: negative for - cough, hemoptysis, shortness of breath or wheezing  Cardiovascular : negative for - chest pain, edema, palpitations or shortness of breath  Gastrointestinal: negative for - abdominal pain except a little more prominent today in the right lower quadrant, blood in stools, heartburn or nausea/vomiting  Genito-Urinary: no dysuria, trouble voiding, or hematuria  Musculoskeletal: negative for - gait disturbance, joint pain, joint stiffness or joint swelling  Neurological: no TIA or stroke symptoms  Hematologic: no bruises, no bleeding  Lymphatic: no swollen glands  Integument: no lumps, mole changes, nail changes or rash  Endocrine:no malaise/lethargy poly uria or polydipsia or unexpected weight changes      Visit Vitals  /80 (BP 1 Location: Left upper arm, BP Patient Position: Sitting, BP Cuff Size: Adult)   Pulse 66   Temp 99 °F (37.2 °C)   Ht 5' 2\" (1.575 m)   Wt 319 lb 4.8 oz (144.8 kg)   LMP 12/18/2018 (Within Weeks)   SpO2 99%   BMI 58.40 kg/m²     CONSTITUTIONAL: well , well nourished, appears age appropriate  HEAD: normocephalic, atraumatic  EYES: sclera anicteric, PERRL, EOMI  ENMT:moist mucous membranes, pharynx clear          Nares: w/o erythema or edema  NECK: supple.  Thyroid normal, No JVD or bruits  RESPIRATORY: Chest: clear to ascultation and percussion   CARDIOVASCULAR: Heart: regular rate and rhythm no murmurs, rubs or gallops, PMI not displaced, no thrill  GASTROINTESTINAL: Abdomen: Obese, non distended, soft, non-tender although mild discomfort to palpation in the left lower quadrant, bowel sounds normal  HEMATOLOGIC: no petechiae or purpura  LYMPHATIC: no lymphadenopathy  MUSCULOSKELETAL: Extremities: no edema or active synovitis, pulse 1+   BACK; no point or CVAT  INTEGUMENT: No unusual rashes or suspicious skin lesions noted. Nails appear normal.  NEUROLOGIC: non-focal exam   MENTAL STATUS: alert and oriented, appropriate affect   PSYCHIATRIC: normal affect    ASSESSMENT:   1. Obesity, morbid ([address]    2. Adenocarcinoma of colon ([address]    3. Mild intermittent asthma without complication    4. Prediabetes    5. Anemia, blood loss    6. Gastroesophageal reflux disease, unspecified whether esophagitis present    7. Caregiver stress    8. Hypothyroidism due to acquired atrophy of thyroid      Impression  1. Adenocarcinoma of the colon status post right colectomy and lymph node resection as noted with pathology showing 2 positive lymph nodes  2. Morbid obesity which encouraged weight reduction  3. Asthma that seems to be stable no wheezes on exam  4. Prediabetes we will see what the blood sugar looks like today  5. Anemia repeat hemoglobin pending  6. GERD that seems to be stable  7. Hypothyroid stable on last check  8. Anxiety I renewed her Xanax increased in number to 50 to take twice daily as needed  50 minutes spent in direct care of this patient they were greater than 50% in counseling and coordination of care. Follow-up with Amado Bowie NP in about a 3 to 4-week. She will keep her follow-up with Dr. Cresencio [place] her surgeon and Dr. Colby Bañuelos her oncologist    PLAN:  .  Orders Placed This Encounter    METABOLIC PANEL, COMPREHENSIVE    CBC WITH AUTOMATED DIFF    ALPRAZolam (XANAX) 0.5 mg tablet    levothyroxine (SYNTHROID) 125 mcg tablet    ondansetron (ZOFRAN ODT) 4 mg disintegrating tablet         ATTENTION:   This medical record was transcribed using an electronic medical records system.   Although proofread, it may and can contain electronic and spelling errors. Other human spelling and other errors may be present. Corrections may be executed at a later time. Please feel free to contact us for any clarifications as needed. Paz Chopra MD     Orders Placed This Encounter    METABOLIC PANEL, COMPREHENSIVE     Standing Status:   Future     Standing Expiration Date:   12/11/2023    CBC WITH AUTOMATED DIFF     Standing Status:   Future     Standing Expiration Date:   12/11/2023    ALPRAZolam (XANAX) 0.5 mg tablet     Sig: Take 1 Tablet by mouth two (2) times daily as needed for Anxiety. Max Daily Amount: 1 mg. Dispense:  50 Tablet     Refill:  0    levothyroxine (SYNTHROID) 125 mcg tablet     Sig: Take 1 Tablet by mouth Daily (before breakfast). Dispense:  90 Tablet     Refill:  1    ondansetron (ZOFRAN ODT) 4 mg disintegrating tablet     Sig: Take 1 Tablet by mouth every eight (8) hours as needed for Nausea. Dispense:  20 Tablet     Refill:  0          No results found for any visits on 12/12/22. I have reviewed the patient's medical history in detail and updated the computerized patient record. We had a prolonged discussion about these complex clinical issues and went over the various important aspects to consider. All questions were answered. Advised her to call back or return to office if symptoms do not improve, change in nature, or persist.    She was given an after visit summary or informed of [employer] which includes patient instructions, diagnoses, current medications, & vitals. She expressed understanding with the diagnosis and plan.

## 2022-12-12 NOTE — PROGRESS NOTES
Chief Complaint   Patient presents with    Transitions Of Care     Discharged from [address] [place] [address] 12-5-222      1. Have you been to the ER, urgent care clinic since your last visit? Hospitalized since your last visit? Admitted to [address] [place] [address] 12-1-2022. Discharged after surgery 12-5-2022.    2. Have you seen or consulted any other health care providers outside of the [address] [place] [address] since your last visit? Include any pap smears or colon screening.  No

## 2022-12-13 LAB
ALBUMIN SERPL-MCNC: 3.8 G/DL (ref 3.5–5)
ALBUMIN/GLOB SERPL: 1.2 {RATIO} (ref 1.1–2.2)
ALP SERPL-CCNC: 98 U/L (ref 45–117)
ALT SERPL-CCNC: 53 U/L (ref 12–78)
ANION GAP SERPL CALC-SCNC: 4 MMOL/L (ref 5–15)
AST SERPL-CCNC: 14 U/L (ref 15–37)
BASOPHILS # BLD: 0.1 K/UL (ref 0–0.1)
BASOPHILS NFR BLD: 1 % (ref 0–1)
BILIRUB SERPL-MCNC: 0.2 MG/DL (ref 0.2–1)
BUN SERPL-MCNC: 14 MG/DL (ref 6–20)
BUN/CREAT SERPL: 17 (ref 12–20)
CALCIUM SERPL-MCNC: 9.3 MG/DL (ref 8.5–10.1)
CHLORIDE SERPL-SCNC: 105 MMOL/L (ref 97–108)
CO2 SERPL-SCNC: 30 MMOL/L (ref 21–32)
CREAT SERPL-MCNC: 0.82 MG/DL (ref 0.55–1.02)
DIFFERENTIAL METHOD BLD: ABNORMAL
EOSINOPHIL # BLD: 0.4 K/UL (ref 0–0.4)
EOSINOPHIL NFR BLD: 4 % (ref 0–7)
ERYTHROCYTE [DISTWIDTH] IN BLOOD BY AUTOMATED COUNT: 14.2 % (ref 11.5–14.5)
GLOBULIN SER CALC-MCNC: 3.2 G/DL (ref 2–4)
GLUCOSE SERPL-MCNC: 92 MG/DL (ref 65–100)
HCT VFR BLD AUTO: 39.2 % (ref 35–47)
HGB BLD-MCNC: 11.7 G/DL (ref 11.5–16)
IMM GRANULOCYTES # BLD AUTO: 0.1 K/UL (ref 0–0.04)
IMM GRANULOCYTES NFR BLD AUTO: 1 % (ref 0–0.5)
LYMPHOCYTES # BLD: 3.6 K/UL (ref 0.8–3.5)
LYMPHOCYTES NFR BLD: 33 % (ref 12–49)
MCH RBC QN AUTO: 27.4 PG (ref 26–34)
MCHC RBC AUTO-ENTMCNC: 29.8 G/DL (ref 30–36.5)
MCV RBC AUTO: 91.8 FL (ref 80–99)
MONOCYTES # BLD: 0.7 K/UL (ref 0–1)
MONOCYTES NFR BLD: 7 % (ref 5–13)
NEUTS SEG # BLD: 6 K/UL (ref 1.8–8)
NEUTS SEG NFR BLD: 54 % (ref 32–75)
NRBC # BLD: 0 K/UL (ref 0–0.01)
NRBC BLD-RTO: 0 PER 100 WBC
PLATELET # BLD AUTO: 393 K/UL (ref 150–400)
PMV BLD AUTO: 11.4 FL (ref 8.9–12.9)
POTASSIUM SERPL-SCNC: 4.8 MMOL/L (ref 3.5–5.1)
PROT SERPL-MCNC: 7 G/DL (ref 6.4–8.2)
RBC # BLD AUTO: 4.27 M/UL (ref 3.8–5.2)
SODIUM SERPL-SCNC: 139 MMOL/L (ref 136–145)
WBC # BLD AUTO: 11 K/UL (ref 3.6–11)

## 2023-01-16 ENCOUNTER — OFFICE VISIT (OUTPATIENT)
Dept: INTERNAL MEDICINE CLINIC | Age: 53
End: 2023-01-16
Payer: COMMERCIAL

## 2023-01-16 VITALS
WEIGHT: 293 LBS | TEMPERATURE: 98.3 F | BODY MASS INDEX: 53.92 KG/M2 | HEART RATE: 75 BPM | DIASTOLIC BLOOD PRESSURE: 80 MMHG | OXYGEN SATURATION: 99 % | RESPIRATION RATE: 18 BRPM | HEIGHT: 62 IN | SYSTOLIC BLOOD PRESSURE: 140 MMHG

## 2023-01-16 DIAGNOSIS — M54.41 CHRONIC RIGHT-SIDED LOW BACK PAIN WITH RIGHT-SIDED SCIATICA: ICD-10-CM

## 2023-01-16 DIAGNOSIS — K21.9 GASTROESOPHAGEAL REFLUX DISEASE WITHOUT ESOPHAGITIS: ICD-10-CM

## 2023-01-16 DIAGNOSIS — C18.9 ADENOCARCINOMA OF COLON (HCC): ICD-10-CM

## 2023-01-16 DIAGNOSIS — Z63.6 CAREGIVER STRESS: ICD-10-CM

## 2023-01-16 DIAGNOSIS — E55.9 VITAMIN D DEFICIENCY: ICD-10-CM

## 2023-01-16 DIAGNOSIS — E03.9 ACQUIRED HYPOTHYROIDISM: ICD-10-CM

## 2023-01-16 DIAGNOSIS — Z86.718 HISTORY OF DVT (DEEP VEIN THROMBOSIS): ICD-10-CM

## 2023-01-16 DIAGNOSIS — L71.9 ACNE ROSACEA: ICD-10-CM

## 2023-01-16 DIAGNOSIS — G43.009 MIGRAINE WITHOUT AURA AND WITHOUT STATUS MIGRAINOSUS, NOT INTRACTABLE: Primary | ICD-10-CM

## 2023-01-16 DIAGNOSIS — R73.03 PREDIABETES: ICD-10-CM

## 2023-01-16 DIAGNOSIS — R01.1 HEART MURMUR: ICD-10-CM

## 2023-01-16 DIAGNOSIS — G89.29 CHRONIC RIGHT-SIDED LOW BACK PAIN WITH RIGHT-SIDED SCIATICA: ICD-10-CM

## 2023-01-16 PROCEDURE — 99214 OFFICE O/P EST MOD 30 MIN: CPT | Performed by: NURSE PRACTITIONER

## 2023-01-16 RX ORDER — ALPRAZOLAM 0.5 MG/1
0.5 TABLET ORAL
Qty: 60 TABLET | Refills: 2 | Status: SHIPPED | OUTPATIENT
Start: 2023-01-16

## 2023-01-16 SDOH — SOCIAL STABILITY - SOCIAL INSECURITY: DEPENDENT RELATIVE NEEDING CARE AT HOME: Z63.6

## 2023-01-16 NOTE — PROGRESS NOTES
Liz Conrad (: 1970) is a 45 y.o. female here for evaluation of the following chief complaint(s):  Establish Care, GERD, and Cholesterol Problem         SUBJECTIVE/OBJECTIVE:  HPI-Ms. Kevin Gooden here today to establish with new provider. She was recently diagnosed with colon cancer after having a colonoscopy in 2022. Starting next week she will be getting chemo therapy oral and infusion for 6 months. Her oncologist is Dr. Sai Nichols. She has no new concerns today except for managing her new diagnosis of colon cancer. She denies chest pain, heart palpitations, edema, dyspnea. Allergies   Allergen Reactions    Latex Rash    Junel 1.5/30 (21) [Norethindrone Ac-Eth Estradiol] Swelling     L FOOT DUE TO NEW DVT    Other Medication Hives and Rash     SUN    Pcn [Penicillins] Hives    Prednisone Hives    Sulfa (Sulfonamide Antibiotics) Hives    Garamycin [Gentamicin] Other (comments)     Itchy eyes due to sulfate    Metformin Diarrhea       Current Outpatient Medications   Medication Sig Dispense Refill    ALPRAZolam (XANAX) 0.5 mg tablet Take 1 Tablet by mouth two (2) times daily as needed for Anxiety. Max Daily Amount: 1 mg. Indications: for anxiety-  queried-  no concerns prescribing 60 Tablet 2    azelastine (ASTELIN) 137 mcg (0.1 %) nasal spray ADMINISTER 2 SPRAYS INTO EACH NOSTRIL TWICE DAILY 90 Each 1    levothyroxine (SYNTHROID) 125 mcg tablet Take 1 Tablet by mouth Daily (before breakfast). 90 Tablet 1    ondansetron (ZOFRAN ODT) 4 mg disintegrating tablet Take 1 Tablet by mouth every eight (8) hours as needed for Nausea. 20 Tablet 0    butalbital-acetaminophen-caffeine (FIORICET, ESGIC) -40 mg per tablet Take 1 Tablet by mouth every six (6) hours as needed for Headache.  30 Tablet 0    cyclobenzaprine (FLEXERIL) 10 mg tablet TAKE 1 TABLET BY MOUTH 3 TIMES A DAY AS NEEDED FOR MUSCLE SPASMS 90 Tablet 0    montelukast (SINGULAIR) 10 mg tablet TAKE 1 TABLET BY MOUTH EVERY DAY 90 Tablet 0 Iron BisGl &PS [id number] 271-62-44-1 mg-mg-mcg-mg cap Take  by mouth. doxycycline monohydrate 40 mg capsule Take 40 mg by mouth every morning. Naftin 2 % gel Use as directed  Indications: athlete's foot 60 g 2    cetirizine (ZyrTEC) 10 mg tablet Take 1 Tablet by mouth daily. 90 Tablet 3    fluticasone propionate (FLONASE) 50 mcg/actuation nasal spray 2 Sprays by Both Nostrils route daily. 3 Each 3    hydrOXYzine HCL (ATARAX) 25 mg tablet TAKE 2 TABLETS BY MOUTH EVERY EIGHT (8) HOURS AS NEEDED (URTICARIA). INDICATIONS: HIVES 540 Tablet 3    albuterol (PROVENTIL HFA, VENTOLIN HFA, PROAIR HFA) 90 mcg/actuation inhaler INHALE 2 PUFFS BY MOUTH EVERY 4 HOURS AS NEEDED FOR WHEEZING. INDICATIONS: BRONCHOSPASM PREVENTIONM 6.7 Each 1    SUMAtriptan (IMITREX) 50 mg tablet Take 1 tab po at first onset migraine. May repeat in 2 hours if needed. No more than 2 pills in 24 hours. 12 Tablet 5    albuterol (PROVENTIL VENTOLIN) 2.5 mg /3 mL (0.083 %) nebu 3 mL by Nebulization route every four (4) hours as needed for Wheezing or Shortness of Breath. Indications: asthma attack 30 Each 1    albuterol-ipratropium (DUO-NEB) 2.5 mg-0.5 mg/3 ml nebu USE 1 VIAL VIA NEBULLIZER EVERY 6 HOURS AS NEEDED FOR BREATHING 90 mL 1    clobetasol (TEMOVATE) 0.05 % topical cream as needed. 1    Nebulizer & Compressor machine 1 Each by Other route four (4) times daily as needed. 1 Each 0    LOCOID 0.1 % lotn   1    valACYclovir (VALTREX) 500 mg tablet 500 mg daily as needed. 1    multivitamin (ONE A DAY) tablet Take 1 tablet by mouth daily. Emollient Combination No.32 emul by Apply Externally route two (2) times a day. From Dr Denny Trejo. For Acne rosacea. metroNIDAZOLE (METROGEL) 1 % topical gel Apply  to affected area two (2) times a day.  Use a thin layer to affected areas after washing from Dr Denny Trejo   Indications: a skin condition on the cheeks and nose with a reddish rash and acne called acne rosacea latanoprost (XALATAN) 0.005 % ophthalmic solution Administer 1 Drop to both eyes nightly. LACTOBACILLUS/FOS/PECTIN (PROBIOTIC COMPLEX PO) Take  by mouth daily. Past Medical History:   Diagnosis Date    Acne rosacea     Dr. Erwin Lambert. Allergy, unspecified not elsewhere classified childhood     Txd with immunotherapy. Dr. Irvin Chopra    Anemia NEC     borderline    Ankle sprain 2007    Right. Dr. Teresa Mitchell    Ankle sprain 2012    Right. Dr. Lewis . Asthma childhood    Cancer ([facility] [address]     colon CA    Chickenpox childhood    Chronic low back pain with right-sided sciatica     and SI joint dysfunction. Dr. Dc Kearns. Chronic otitis media     Dr. Irvin Chopra    Colon cancer [facility]) 10/28/2022    Dry eye syndrome     Dr. Frances Hopkins. [facility]    Acute occlusive DVT of L deep femoral vein and Acute partially occlusive DVT of L Common Fem Vein and L Prox Fem Vein. Txd with Xarelto x 3 months. due to BCP. Dr. Justice Manriquez    EBV infection 2011    Hearing loss     Mild high frequency sensorineuronal hearing loss. Dr. Tico Olvera murmur     Hernia of abdominal wall 2003,     umbilical.  Dr. Cintron [facility]. Dr. Janene White    Hyperglycemia 2013    Hypothyroidism 2010    Dr. Dewey Mac    Ill-defined condition     vitamin D deficiency    Intraocular pressure increase 2011    Dr. Francene Apgar. Dr. Frances Hopkins. Dr. Klarissa Caba. Knee pain, left 2012    Left. Dr. Socrates Jackman    Measles childhood    Migraine , 2019    w aura and facial paresthesia. Dr. Mary Vyas    Mumps childhood    Plantar fasciitis, bilateral     Dr. Debi Grewal    Sciatica     Right.  with OA. Dr. Maya Coulter    Tinnitus of right ear     Dr. Abigail Henderson     Past Surgical History:   Procedure Laterality Date    BIOPSY VAGINAL  2011    Dr. Wilmer Moyer.     COLONOSCOPY N/A 10/26/2022    colonoscopy  performed by Matt Rico MD at \[facility]\"" ENDOSCOPY    HX DILATION AND CURETTAGE      HX HERNIA REPAIR  44/61/0942    Umbilical.  Dr. Kevin Roberts. HX HERNIA REPAIR  09/23/2011    recurrent umbilical.  Laparoscopy incisional.  Dr. Abimbola Grimm. HX PARTIAL COLECTOMY Right 12/01/2022    Dr. Barney Delacruz  childhood    HI LAPAROSCOPY SURG CHOLECYSTECTOMY  07/01/2001    HI UNLISTED PROCEDURE HANDS/FINGERS  09/01/2003    Right Index finger repair due to cut     Social History     Socioeconomic History    Marital status: SINGLE     Spouse name: Not on file    Number of children: Not on file    Years of education: Not on file    Highest education level: Not on file   Occupational History    Not on file   Tobacco Use    Smoking status: Never     Passive exposure: Yes    Smokeless tobacco: Never    Tobacco comments:     lived with smoker dad and mom then step mom x 20 yrs   Vaping Use    Vaping Use: Never used   Substance and Sexual Activity    Alcohol use:  Yes     Alcohol/week: 1.0 standard drink     Types: 1 Glasses of wine per week     Comment: RARE    Drug use: No    Sexual activity: Yes     Partners: Male     Birth control/protection: Condom   Other Topics Concern    Not on file   Social History Narrative    Not on file     Social Determinants of Health     Financial Resource Strain: Low Risk     Difficulty of Paying Living Expenses: Not hard at all   Food Insecurity: No Food Insecurity    Worried About Running Out of Food in the Last Year: Never true    Ran Out of Food in the Last Year: Never true   Transportation Needs: Not on file   Physical Activity: Not on file   Stress: Not on file   Social Connections: Not on file   Intimate Partner Violence: Not on file   Housing Stability: Not on file      Family History   Problem Relation Age of Onset    Hypertension Mother     Cancer Mother         small cell lung with bone mets to spine/MELANOMA    Cancer Father melanoma    OSTEOARTHRITIS Father     Colon Polyps Father     Depression Sister     Diabetes Maternal Grandmother     Stroke Maternal Grandmother     Seizures Maternal Grandmother     Colon Polyps Maternal Grandmother     Breast Cancer Maternal Grandmother     Other Maternal Grandfather         Desirae Go Ds    Heart Disease Paternal Grandmother         heart failure    Heart Attack Paternal Grandmother     Asthma Paternal Grandmother        Review of Systems   Constitutional:  Negative for activity change, appetite change, diaphoresis, fatigue, fever and unexpected weight change. HENT:  Negative for congestion, ear pain, facial swelling, hearing loss, postnasal drip, rhinorrhea, sinus pressure, sinus pain, sneezing, sore throat, tinnitus, trouble swallowing and voice change. Eyes:  Negative for photophobia, pain, redness and visual disturbance. Respiratory:  Negative for apnea, cough, chest tightness, shortness of breath and wheezing. Cardiovascular:  Negative for chest pain, palpitations and leg swelling. Gastrointestinal:  Negative for abdominal distention, abdominal pain, blood in stool, constipation, diarrhea, nausea, rectal pain and vomiting. Endocrine: Negative for cold intolerance, heat intolerance, polydipsia, polyphagia and polyuria. Genitourinary:  Negative for decreased urine volume, difficulty urinating, dyspareunia, dysuria, flank pain, frequency, hematuria, menstrual problem, pelvic pain, urgency, vaginal bleeding, vaginal discharge and vaginal pain. Musculoskeletal:  Negative for arthralgias, back pain, joint swelling, myalgias, neck pain and neck stiffness. Skin:  Negative for color change and rash. Allergic/Immunologic: Negative for environmental allergies and food allergies. Neurological:  Negative for dizziness, tremors, syncope, weakness, light-headedness, numbness and headaches. Hematological:  Negative for adenopathy. Does not bruise/bleed easily. Psychiatric/Behavioral:  Negative for confusion, hallucinations, self-injury, sleep disturbance and suicidal ideas. The patient is nervous/anxious. BP (!) 140/80   Pulse 75   Temp 98.3 °F (36.8 °C) (Oral)   Resp 18   Ht 5' 2\" (1.575 m)   Wt 323 lb 6.4 oz (146.7 kg)   LMP 12/18/2018 (Within Weeks)   SpO2 99%   BMI 59.15 kg/m²    Patient's last menstrual period was 12/18/2018 (within weeks). Physical Exam  Constitutional:       Appearance: Normal appearance. She is obese. HENT:      Head: Normocephalic and atraumatic. Nose: Nose normal.      Mouth/Throat:      Mouth: Mucous membranes are moist.      Pharynx: Oropharynx is clear. Eyes:      Extraocular Movements: Extraocular movements intact. Conjunctiva/sclera: Conjunctivae normal.      Pupils: Pupils are equal, round, and reactive to light. Cardiovascular:      Rate and Rhythm: Normal rate and regular rhythm. Pulses: Normal pulses. Heart sounds: Murmur heard. Pulmonary:      Effort: Pulmonary effort is normal.      Breath sounds: Normal breath sounds. Abdominal:      General: Abdomen is flat. Bowel sounds are normal.      Palpations: Abdomen is soft. Musculoskeletal:         General: Normal range of motion. Cervical back: Normal range of motion and neck supple. Skin:     General: Skin is warm and dry. Capillary Refill: Capillary refill takes less than 2 seconds. Neurological:      General: No focal deficit present. Mental Status: She is alert and oriented to person, place, and time. Mental status is at baseline. Psychiatric:         Mood and Affect: Mood normal.         Behavior: Behavior normal.         Thought Content: Thought content normal.         Judgment: Judgment normal.       ASSESSMENT/PLAN:  Below is the assessment and plan developed based on review of pertinent history, physical exam, labs, studies, and medications.     1. Migraine without aura and without status migrainosus, not intractable  2. Gastroesophageal reflux disease without esophagitis  -     CBC WITH AUTOMATED DIFF; Future  3. Adenocarcinoma of colon ([facility]  4. Acquired hypothyroidism  -     METABOLIC PANEL, COMPREHENSIVE; Future  -     TSH 3RD GENERATION; Future  5. Chronic right-sided low back pain with right-sided sciatica  6. Vitamin D deficiency  7. Prediabetes  -     LIPID PANEL; Future  -     URINALYSIS W/ RFLX MICROSCOPIC; Future  -     HEMOGLOBIN A1C WITH EAG; Future  8. Acne rosacea  9. Body mass index (BMI) of 50.0 to 59.9 in adult ([facility]  10. Caregiver stress  -     ALPRAZolam (XANAX) 0.5 mg tablet; Take 1 Tablet by mouth two (2) times daily as needed for Anxiety. Max Daily Amount: 1 mg. Indications: for anxiety-  queried-  no concerns prescribing, Normal, Disp-60 Tablet, R-2  11. History of DVT (deep vein thrombosis)  12. Heart murmur    1. Currently controlled she does admit that lack of sleep does trigger her migraine activity. 2.  No intervention today  3. Will be getting chemo therapy treatment for 6 months diagnosed October 2022.  4.  Get lab work today  5. No intervention  6. Get lab work today  7. Lab work today  8. No intervention today  9. Discussed healthy eating, weight loss, exercise, goal is 15 pounds in 3 months. 10.   reviewed no concerns refilling medication  11. Left leg from OCP?  12.  Heart murmur heard on exam she denies increased dyspnea reports remote history of echocardiogram.    No results found for this visit on 01/16/23. Return in about 3 months (around 4/16/2023) for follow up. An electronic signature was used to authenticate this note.   -- ЕЛЕНА Basilio

## 2023-01-16 NOTE — LETTER
Name:El Jaquez   :1970   MR #:[id number]   Provider ЕЛЕНА Lambert   *[id number]*  [facility]-491 ()  Page 1 of 5 Initial [facility]    CONTROLLED SUBSTANCE AGREEMENT    I may be prescribed medications that are controlled substances as part  of my treatment plan for management of my medical condition(s). The goal of my treatment plan is to maintain and/or improve my health and wellbeing. Because controlled substances have an increased risk of abuse or harm, continual re-evaluation is needed determine if the goals of my treatment plan are being met for my safety and the safety of others. Brian Cho  am entering into this Controlled Substance Agreement with my provider, Terese Hernandez, [facility] at [address] [place] [address] . I understand that successful treatment requires mutual trust and honesty between me and my provider. I understand that there are state and federal laws and regulations which apply to the medications that my provider may prescribe that must be followed. I understand there are risks and benefits ts of taking the medicines that my provider may prescribe. I understand and agree that following this Agreement is necessary in continuing my provider-patient relationship and success of my treatment plan. As a part of my treatment plan, I agree to the following:    COMMUNICATION:    1. I will communicate fully with my provider about my medical condition(s), including the effect on my daily life and how well my medications are helping. I will tell my provider all of the medications that I take for any reason, including medications I receive from another health care provider, and will notify my provider about all issues, problems or concerns, including any side effects, which may be related to my medications. I understand that this information allows my provider to adjust my treatment plan to help manage my medical condition.  I understand that this information will become part of my permanent medical record. 2. I will notify my provider if I have a history of alcohol/drug misuse/addiction or if I have had treatment for alcohol/drug addiction in the past, or if I have a new problem with or concern about alcohol/drug use/addiction, because this increases the likelihood of high risk behaviors and may lead to serious medical conditions. 3. Females Only: I will notify my provider if I am or become pregnant, or if I intend to become pregnant, or if I intend to breastfeed. I understand that communication of these issues with my provider is important, due to possible effects my medication could have on an unborn fetus or breastfeeding child. Name:Isabelle Alexandre   SDT:9/[birth year]/5709   MR #:[id number]   Provider Name:Jasiel Stewart   *[id number]*  [facility]-491 (5/16)  Page 2 of 5 Initial [facility]      MISUSE OF MEDICATIONS / DRUGS:    1. I agree to take all controlled substances as prescribed, and will not misuse or abuse any controlled substances prescribed by my provider. For my safety, I will not increase the amount of medicine I take without first talking with and getting permission from my provider. 2. If I have a medical emergency, another health care provider may prescribe me medication. If I seek emergency treatment, I will notify my provider within seventy-two (72) hours. 3. I understand that my provider may discuss my use and/or possible misuse/abuse of controlled substances and alcohol, as appropriate, with any health care provider involved in my care, pharmacist or legal authority. ILLEGAL DRUGS:    1. I will not use illegal drugs of any kind, including but not limited to marijuana, heroin, cocaine, or any prescription drug which is not prescribed to me. DRUG DIVERSION / PRESCRIPTION FRAUD:    1. I will not share, sell, trade, give away, or otherwise misuse my prescriptions or medications.     2. I will not alter any prescriptions provided to me by my provider. SINGLE PROVIDER:    1. I agree that all controlled substances that I take will be prescribed only by my provider (or his/her covering provider) under this Agreement. This agreement does not prevent me from seeking emergency medical treatment or receiving pain management related to a surgery. PROTECTING MEDICATIONS:    1. I am responsible for keeping my prescriptions and medications in a safe and secure place including safeguarding them from loss or theft. I understand that lost, stolen or damaged/destroyed prescriptions or medications will not be replaced. Name:. Blair Precise   EYZ:[birth year]   MR #:[id number]   Provider ЕЛЕНА Reyes   *[id number]*  [facility]-491 (5/16)  Page 3 of 5 Initial [facility]  PRESCRIPTION RENEWALS/REFILLS:    1. I will follow my controlled substance medication schedule as prescribed by my provider. 2. I understand and agree that I will make any requests for renewals or refills of my prescriptions only at the time of an office visit or during my providers regular office hours subject to the prescription refill requirements of the individual practice. 3. I understand that my provider may not call in prescriptions for controlled substances to my pharmacy. 4. I understand that my provider may adjust or discontinue these medications as deemed appropriate for my medical treatment plan. This Agreement does not guarantee the prescription of controlled medications. 5. I agree that if my medications are adjusted or discontinued, I will properly dispose of any remaining medications. I understand that I will be required to dispose of any remaining controlled medications prior to being provided with any prescriptions for other controlled medications.     6. I understand that the renewal of my prescription depends on my medical condition, my consistent participation, and my adherence with my treatment plan and this Agreement. 7. I understand that if I do not keep an appointment with my provider, I may not receive a renewal or refill for my controlled substance medication. PRESCRIPTION MONITORING / DRUG TESTIN. I understand that my provider may require me to provide urine, saliva or blood for testing at any time. I understand that this testing will be used to monitor for safety and adherence with my treatment plan and this Agreement. 2. I understand that my provider may ask me to provide an observed urine specimen, which means that a nurse or other health care provider may watch me provide urine, and I agree to cooperate if I am asked to provide an observed specimen. 3. I understand that if I do not provide urine, saliva or blood samples within two (2) hours of my providers request, or other timeframe decided by my provider, my treatment plan could be changed, or my prescriptions and medications may be changed or ended. 4. I understand that urine, saliva and blood test results will be a part of my permanent medical record. Name:Isabelle Henriquez   CZP:   MR #:[id number]   Provider Name:Lord Kimani Stewart   *[id number]*  [facility]-491 ()  Page 4 of 5 Initial [facility]    5. I understand that my provider is required to obtain a copy of my State Prescription Monitoring Program () Report at any time in order to safely prescribe medications. 6. I will bring all of my prescribed controlled substance medications in their original bottles to all of my scheduled appointments. 7. I understand that my provider may ask me to come to the practice with all of my prescribed medications for a random pill count at any time. I agree to cooperate if I am asked to come in for a random pill count. I understand that if I do not arrive in the timeframe decided by my provider, my treatment plan could be changed, or my prescriptions and medications may be changed or ended.     COOPERATION WITH INVESTIGATIONS:    1. I authorize my provider and my pharmacy to cooperate fully with any local, state, or federal law enforcement agency in the investigation of any possible misuse, sale, or other diversion of my controlled substance prescriptions or medications. RISKS:    1. I understand that my level of consciousness may be affected from the use of controlled substances, and I understand that there are risks, benefits, effects and potential alternatives (including no treatment) to the medications that my provider has prescribed. 2. I understand that I may become drowsy, tired, dizzy, constipated, and sick to my stomach, or have changes in my mood or in my sleep while taking my medications. I have talked with my provider about these possible side effects, risks, benefits, and alternative treatments, and my provider has answered all of my questions. 3. I understand that I should not suddenly stop taking my medications without first speaking with my provider. I understand that if I suddenly stop taking my medications, I may experience nausea, vomiting, sweating,anxiety, sleeplessness, itching or other uncomfortable feelings. 4. I will not take my medications with alcohol of any kind, including beer, wine or liquor. I understand that drinking alcohol with my medications increases the chances of side effects, including breathing problems or even death. 5. I understand that if I have a history of alcoholism or other drug addiction I may be at increased risk of addiction to my medications. Signs of addiction might include craving, compulsive use, and continued use despite harm. Since addiction is a disease, I understand my provider may decide to change my medications and refer me to appropriate treatment services. I understand that this information would become part of my permanent medical record. Name:. Tristen Irwin   U:[birth year]88   MR #:[id number]   Provider George Arce *[id number]*  MG-491 ()  Page 5 of 5 Initial [facility]      6. Females only: Children born to women who regularly take controlled substances are likely to have physical problems and suffer withdrawal symptoms at birth. If I am of child-bearing age, I understand that I should use safe and effective birth control while taking any controlled substances to avoid the impact of medications on an unborn fetus or  child. I agree to notify my provider immediately if I should become pregnant so that my treatment plan can be adjusted. 7. Males only: I understand that chronic use of controlled substances has been associated with low testosterone levels in males which may affect my mood, stamina, sexual desire, and general health. I understand that my provider may order the appropriate laboratory test to determine my testosterone level,and I agree to this testing. ADHERENCE:    1. I understand that if I do not adhere to this Agreement in any way, my provider may change my prescriptions, stop prescribing controlled substances or end our provider-patient relationship. 2. If my provider decides to stop prescribing medication, or decides to end our provider-patient relationship,my provider may require that I taper my medications slowly. If necessary, my provider may also provide a prescription for other medications to treat my withdrawal symptoms. UNDERSTANDING THIS AGREEMENT:    I understand that my provider may adjust or stop my prescriptions for controlled substances based on my medical condition and my treatment plan. I understand that this Agreement does not guarantee that I will be prescribed medications or controlled substances. I understand that controlled substances may be just one part  of my treatment plan.     My initial on each page and my signature below shows that I have read each page of this Agreement, I have had an opportunity to ask questions, and all of my questions have been answered to my satisfaction by my provider.     By signing below, I agree to comply with this Agreement, and I understand that if I do not follow the Agreements listed above, my provider may stop        _________________________________________  Date/Time 1/16/2023 2:47 PM                 (Patient Signature)

## 2023-01-17 LAB
ALBUMIN SERPL-MCNC: 3.9 G/DL (ref 3.5–5)
ALBUMIN/GLOB SERPL: 1.2 (ref 1.1–2.2)
ALP SERPL-CCNC: 118 U/L (ref 45–117)
ALT SERPL-CCNC: 30 U/L (ref 12–78)
ANION GAP SERPL CALC-SCNC: 4 MMOL/L (ref 5–15)
APPEARANCE UR: ABNORMAL
AST SERPL-CCNC: 15 U/L (ref 15–37)
BASOPHILS # BLD: 0.1 K/UL (ref 0–0.1)
BASOPHILS NFR BLD: 1 % (ref 0–1)
BILIRUB SERPL-MCNC: 0.2 MG/DL (ref 0.2–1)
BILIRUB UR QL: NEGATIVE
BUN SERPL-MCNC: 17 MG/DL (ref 6–20)
BUN/CREAT SERPL: 22 (ref 12–20)
CALCIUM SERPL-MCNC: 10 MG/DL (ref 8.5–10.1)
CHLORIDE SERPL-SCNC: 108 MMOL/L (ref 97–108)
CHOLEST SERPL-MCNC: 210 MG/DL
CO2 SERPL-SCNC: 29 MMOL/L (ref 21–32)
COLOR UR: ABNORMAL
CREAT SERPL-MCNC: 0.77 MG/DL (ref 0.55–1.02)
DIFFERENTIAL METHOD BLD: ABNORMAL
EOSINOPHIL # BLD: 0.2 K/UL (ref 0–0.4)
EOSINOPHIL NFR BLD: 3 % (ref 0–7)
ERYTHROCYTE [DISTWIDTH] IN BLOOD BY AUTOMATED COUNT: 14.8 % (ref 11.5–14.5)
EST. AVERAGE GLUCOSE BLD GHB EST-MCNC: 128 MG/DL
GLOBULIN SER CALC-MCNC: 3.2 G/DL (ref 2–4)
GLUCOSE SERPL-MCNC: 98 MG/DL (ref 65–100)
GLUCOSE UR STRIP.AUTO-MCNC: NEGATIVE MG/DL
HBA1C MFR BLD: 6.1 % (ref 4–5.6)
HCT VFR BLD AUTO: 41 % (ref 35–47)
HDLC SERPL-MCNC: 74 MG/DL
HDLC SERPL: 2.8 (ref 0–5)
HGB BLD-MCNC: 12.2 G/DL (ref 11.5–16)
HGB UR QL STRIP: NEGATIVE
IMM GRANULOCYTES # BLD AUTO: 0 K/UL (ref 0–0.04)
IMM GRANULOCYTES NFR BLD AUTO: 0 % (ref 0–0.5)
KETONES UR QL STRIP.AUTO: NEGATIVE MG/DL
LDLC SERPL CALC-MCNC: 97 MG/DL (ref 0–100)
LEUKOCYTE ESTERASE UR QL STRIP.AUTO: NEGATIVE
LYMPHOCYTES # BLD: 3.7 K/UL (ref 0.8–3.5)
LYMPHOCYTES NFR BLD: 38 % (ref 12–49)
MCH RBC QN AUTO: 26.6 PG (ref 26–34)
MCHC RBC AUTO-ENTMCNC: 29.8 G/DL (ref 30–36.5)
MCV RBC AUTO: 89.3 FL (ref 80–99)
MONOCYTES # BLD: 0.7 K/UL (ref 0–1)
MONOCYTES NFR BLD: 7 % (ref 5–13)
NEUTS SEG # BLD: 5.1 K/UL (ref 1.8–8)
NEUTS SEG NFR BLD: 51 % (ref 32–75)
NITRITE UR QL STRIP.AUTO: NEGATIVE
NRBC # BLD: 0 K/UL (ref 0–0.01)
NRBC BLD-RTO: 0 PER 100 WBC
PH UR STRIP: 5.5 (ref 5–8)
PLATELET # BLD AUTO: 281 K/UL (ref 150–400)
PMV BLD AUTO: 11.7 FL (ref 8.9–12.9)
POTASSIUM SERPL-SCNC: 4.9 MMOL/L (ref 3.5–5.1)
PROT SERPL-MCNC: 7.1 G/DL (ref 6.4–8.2)
PROT UR STRIP-MCNC: NEGATIVE MG/DL
RBC # BLD AUTO: 4.59 M/UL (ref 3.8–5.2)
SODIUM SERPL-SCNC: 141 MMOL/L (ref 136–145)
SP GR UR REFRACTOMETRY: 1.02 (ref 1–1.03)
TRIGL SERPL-MCNC: 195 MG/DL (ref ?–150)
TSH SERPL DL<=0.05 MIU/L-ACNC: 2.04 UIU/ML (ref 0.36–3.74)
UROBILINOGEN UR QL STRIP.AUTO: 0.2 EU/DL (ref 0.2–1)
VLDLC SERPL CALC-MCNC: 39 MG/DL
WBC # BLD AUTO: 9.8 K/UL (ref 3.6–11)

## 2023-01-17 NOTE — PROGRESS NOTES
TSH is in normal range  Overall labwork looks okay  Elevated triglycerides-  will keep an eye on this  Repeat lipid panel in 3 months fasting  Continue to monitor carbohydrate intake  Please let patient know-  thank you

## 2023-04-21 DIAGNOSIS — Z83.71 FAMILY HISTORY OF COLONIC POLYPS: Primary | ICD-10-CM

## 2023-04-21 DIAGNOSIS — R10.13 EPIGASTRIC PAIN: ICD-10-CM

## 2023-04-21 DIAGNOSIS — Z12.11 SCREEN FOR COLON CANCER: ICD-10-CM

## 2023-05-01 ENCOUNTER — OFFICE VISIT (OUTPATIENT)
Dept: INTERNAL MEDICINE CLINIC | Age: 53
End: 2023-05-01
Payer: COMMERCIAL

## 2023-05-01 VITALS
SYSTOLIC BLOOD PRESSURE: 144 MMHG | BODY MASS INDEX: 53.92 KG/M2 | WEIGHT: 293 LBS | DIASTOLIC BLOOD PRESSURE: 98 MMHG | OXYGEN SATURATION: 98 % | TEMPERATURE: 98.1 F | HEIGHT: 62 IN | RESPIRATION RATE: 17 BRPM | HEART RATE: 65 BPM

## 2023-05-01 DIAGNOSIS — F41.9 ANXIETY: ICD-10-CM

## 2023-05-01 DIAGNOSIS — E03.9 ACQUIRED HYPOTHYROIDISM: Primary | ICD-10-CM

## 2023-05-01 DIAGNOSIS — Z63.6 CAREGIVER STRESS: ICD-10-CM

## 2023-05-01 DIAGNOSIS — E03.4 HYPOTHYROIDISM DUE TO ACQUIRED ATROPHY OF THYROID: ICD-10-CM

## 2023-05-01 DIAGNOSIS — C18.9 ADENOCARCINOMA OF COLON (HCC): ICD-10-CM

## 2023-05-01 PROCEDURE — 99213 OFFICE O/P EST LOW 20 MIN: CPT | Performed by: INTERNAL MEDICINE

## 2023-05-01 RX ORDER — LEVOTHYROXINE SODIUM 125 UG/1
125 TABLET ORAL
Qty: 90 TABLET | Refills: 1 | Status: SHIPPED | OUTPATIENT
Start: 2023-05-01

## 2023-05-01 RX ORDER — CAPECITABINE 500 MG/1
1000 TABLET, FILM COATED ORAL DAILY
COMMUNITY
Start: 2023-03-13

## 2023-05-01 RX ORDER — ALPRAZOLAM 0.5 MG/1
0.5 TABLET ORAL
Qty: 60 TABLET | Refills: 2 | Status: SHIPPED | OUTPATIENT
Start: 2023-05-01

## 2023-05-01 SDOH — SOCIAL STABILITY - SOCIAL INSECURITY: DEPENDENT RELATIVE NEEDING CARE AT HOME: Z63.6

## 2023-05-01 NOTE — PROGRESS NOTES
Chief Complaint   Patient presents with    Hypothyroidism     3 month follow up    GERD     Visit Vitals  BP (!) 144/98 (BP 1 Location: Left upper arm, BP Patient Position: Sitting, BP Cuff Size: Large adult)   Pulse 65   Temp 98.1 °F (36.7 °C) (Oral)   Resp 17   Ht 5' 2\" (1.575 m)   Wt 309 lb 9.6 oz (140.4 kg)   LMP 12/18/2018 (Within Weeks)   SpO2 98%   BMI 56.63 kg/m²   1. Have you been to the ER, urgent care clinic since your last visit? Hospitalized since your last visit? No    2. Have you seen or consulted any other health care providers outside of the [address] [place] [address] since your last visit? Include any pap smears or colon screening.  Dr. Prerna Sevilla

## 2023-05-01 NOTE — PROGRESS NOTES
Zachary Masters (: 1970) is a 45 y.o. female here for evaluation of the following chief complaint(s):  Hypothyroidism (3 month follow up) and GERD         SUBJECTIVE/OBJECTIVE:  HPI    Ms. Duran Johnson here today for follow-up. She has been receiving chemo infusions every 21 days. Reports has 3 sessions left. She reports doing well overall. Is experiencing some abdominal upset, fatigue and is losing her hair. She has no new concerns today. Does not need refills on medication. Allergies   Allergen Reactions    Latex Rash    Junel 1.5/30 (21) [Norethindrone Ac-Eth Estradiol] Swelling     L FOOT DUE TO NEW DVT    Other Medication Hives and Rash     SUN    Pcn [Penicillins] Hives    Prednisone Hives    Sulfa (Sulfonamide Antibiotics) Hives    Garamycin [Gentamicin] Other (comments)     Itchy eyes due to sulfate    Metformin Diarrhea       Current Outpatient Medications   Medication Sig Dispense Refill    capecitabine (XELODA) 500 mg tablet 1,000 mg/m2 daily. ALPRAZolam (XANAX) 0.5 mg tablet Take 1 Tablet by mouth two (2) times daily as needed for Anxiety. Max Daily Amount: 1 mg. Indications: for anxiety-  queried-  no concerns prescribing 60 Tablet 2    levothyroxine (SYNTHROID) 125 mcg tablet Take 1 Tablet by mouth Daily (before breakfast). 90 Tablet 1    azelastine (ASTELIN) 137 mcg (0.1 %) nasal spray ADMINISTER 2 SPRAYS INTO EACH NOSTRIL TWICE DAILY 90 Each 1    ondansetron (ZOFRAN ODT) 4 mg disintegrating tablet Take 1 Tablet by mouth every eight (8) hours as needed for Nausea. 20 Tablet 0    butalbital-acetaminophen-caffeine (FIORICET, ESGIC) -40 mg per tablet Take 1 Tablet by mouth every six (6) hours as needed for Headache.  30 Tablet 0    cyclobenzaprine (FLEXERIL) 10 mg tablet TAKE 1 TABLET BY MOUTH 3 TIMES A DAY AS NEEDED FOR MUSCLE SPASMS 90 Tablet 0    montelukast (SINGULAIR) 10 mg tablet TAKE 1 TABLET BY MOUTH EVERY DAY 90 Tablet 0    Iron BisGl &PS [id number] mg-mg-mcg-mg cap Take  by mouth. doxycycline monohydrate 40 mg capsule Take 40 mg by mouth every morning. Naftin 2 % gel Use as directed  Indications: athlete's foot 60 g 2    cetirizine (ZyrTEC) 10 mg tablet Take 1 Tablet by mouth daily. 90 Tablet 3    fluticasone propionate (FLONASE) 50 mcg/actuation nasal spray 2 Sprays by Both Nostrils route daily. 3 Each 3    hydrOXYzine HCL (ATARAX) 25 mg tablet TAKE 2 TABLETS BY MOUTH EVERY EIGHT (8) HOURS AS NEEDED (URTICARIA). INDICATIONS: HIVES 540 Tablet 3    albuterol (PROVENTIL HFA, VENTOLIN HFA, PROAIR HFA) 90 mcg/actuation inhaler INHALE 2 PUFFS BY MOUTH EVERY 4 HOURS AS NEEDED FOR WHEEZING. INDICATIONS: BRONCHOSPASM PREVENTIONM 6.7 Each 1    SUMAtriptan (IMITREX) 50 mg tablet Take 1 tab po at first onset migraine. May repeat in 2 hours if needed. No more than 2 pills in 24 hours. 12 Tablet 5    albuterol (PROVENTIL VENTOLIN) 2.5 mg /3 mL (0.083 %) nebu 3 mL by Nebulization route every four (4) hours as needed for Wheezing or Shortness of Breath. Indications: asthma attack 30 Each 1    clobetasol (TEMOVATE) 0.05 % topical cream as needed. 1    Nebulizer & Compressor machine 1 Each by Other route four (4) times daily as needed. 1 Each 0    LOCOID 0.1 % lotn   1    valACYclovir (VALTREX) 500 mg tablet 1 Tablet daily as needed. 1    multivitamin (ONE A DAY) tablet Take 1 Tablet by mouth daily. Emollient Combination No.32 emul by Apply Externally route two (2) times a day. From Dr Roxy Greene. For Acne rosacea. metroNIDAZOLE (METROGEL) 1 % topical gel Apply  to affected area two (2) times a day. Use a thin layer to affected areas after washing from Dr Roxy Greene   Indications: a skin condition on the cheeks and nose with a reddish rash and acne called acne rosacea      latanoprost (XALATAN) 0.005 % ophthalmic solution Administer 1 Drop to both eyes nightly. LACTOBACILLUS/FOS/PECTIN (PROBIOTIC COMPLEX PO) Take  by mouth daily. Past Medical History:   Diagnosis Date    Acne rosacea     Dr. Radha Hill. Allergy, unspecified not elsewhere classified childhood     Txd with immunotherapy. Dr. Yuliana Dior    Anemia NEC     borderline    Ankle sprain 2007    Right. Dr. Negrita Edwards    Ankle sprain 11/2012    Right. Dr. Katlyn Pereira. Asthma childhood    Cancer ([facility] [address]     colon CA    Chickenpox childhood    Chronic low back pain with right-sided sciatica     and SI joint dysfunction. Dr. Earl Mccain. Chronic otitis media     Dr. Yuliana Dior    Colon cancer [facility]) 10/28/2022    Dry eye syndrome 2013    Dr. Kwame Stephenson. DVT of deep femoral vein ([facility] [address] 12/07/2018    Acute occlusive DVT of L deep femoral vein and Acute partially occlusive DVT of L Common Fem Vein and L Prox Fem Vein. Txd with Xarelto x 3 months. due to BCP. Dr. Christopher Willson    EBV infection 06/27/2011    Hearing loss     Mild high frequency sensorineuronal hearing loss. Dr. Nery Giang Knife murmur     Hernia of abdominal wall 09/2003, 07/4426    umbilical.  Dr. Alicia Gomez. Dr. Salina Massey    Hyperglycemia 2013    Hypothyroidism 06/2010    Dr. Kingsley Timmons    Ill-defined condition     vitamin D deficiency    Intraocular pressure increase 12/2011    Dr. Kelly Sandoval. Dr. Kwame Stephenson. Dr. Leelee Valdez. Knee pain, left 04/2012    Left. Dr. Batista Spray    Measles childhood    Migraine 2017, 07/2019    w aura and facial paresthesia. Dr. Rashard Sims    Mumps childhood    Plantar fasciitis, bilateral 2010    Dr. Arjun Asher    Sciatica 2008    Right.  with OA. Dr. Toshia Grimes    Tinnitus of right ear 2012    Dr. Heidi Arciniega     Past Surgical History:   Procedure Laterality Date    BIOPSY VAGINAL  01/01/2011    Dr. Ariana Gamboa. COLONOSCOPY N/A 10/26/2022    colonoscopy  performed by Jyoti Cerrato MD at [facility] ENDOSCOPY    HX DILATION AND CURETTAGE      HX HERNIA REPAIR  79/39/2136    Umbilical.  Dr. Alicia Gomez. HX HERNIA REPAIR  09/23/2011    recurrent umbilical.  Laparoscopy incisional.  Dr. Syeda Emery. HX PARTIAL COLECTOMY Right 12/01/2022    Dr. Susan Jimenez  childhood    [place] LAPAROSCOPY SURG CHOLECYSTECTOMY  07/01/2001    [place] UNLISTED PROCEDURE HANDS/FINGERS  09/01/2003    Right Index finger repair due to cut     Social History     Socioeconomic History    Marital status: SINGLE     Spouse name: Not on file    Number of children: Not on file    Years of education: Not on file    Highest education level: Not on file   Occupational History    Not on file   Tobacco Use    Smoking status: Never     Passive exposure: Yes    Smokeless tobacco: Never    Tobacco comments:     lived with smoker dad and mom then step mom x 20 yrs   Vaping Use    Vaping Use: Never used   Substance and Sexual Activity    Alcohol use:  Yes     Alcohol/week: 1.0 standard drink     Types: 1 Glasses of wine per week     Comment: RARE    Drug use: No    Sexual activity: Yes     Partners: Male     Birth control/protection: Condom   Other Topics Concern    Not on file   Social History Narrative    Not on file     Social Determinants of Health     Financial Resource Strain: Low Risk     Difficulty of Paying Living Expenses: Not hard at all   Food Insecurity: No Food Insecurity    Worried About Running Out of Food in the Last Year: Never true    Ran Out of Food in the Last Year: Never true   Transportation Needs: Not on file   Physical Activity: Not on file   Stress: Not on file   Social Connections: Not on file   Intimate Partner Violence: Not on file   Housing Stability: Not on file      Family History   Problem Relation Age of Onset    Hypertension Mother     Cancer Mother         small cell lung with bone mets to spine/MELANOMA    Cancer Father         melanoma    OSTEOARTHRITIS Father     Colon Polyps Father     Depression Sister     Diabetes Maternal Grandmother     Stroke Maternal Grandmother     Seizures Maternal Grandmother Colon Polyps Maternal Grandmother     Breast Cancer Maternal Grandmother     Other Maternal Grandfather         Giancarlo Bergman Ds    Heart Disease Paternal Grandmother         heart failure    Heart Attack Paternal Grandmother     Asthma Paternal Grandmother        Review of Systems   Constitutional:  Negative for activity change, appetite change, diaphoresis, fatigue, fever and unexpected weight change. HENT:  Negative for congestion, ear pain, facial swelling, hearing loss, postnasal drip, rhinorrhea, sinus pressure, sinus pain, sneezing, sore throat, tinnitus, trouble swallowing and voice change. Eyes:  Negative for photophobia, pain, redness and visual disturbance. Respiratory:  Negative for apnea, cough, chest tightness, shortness of breath and wheezing. Cardiovascular:  Negative for chest pain, palpitations and leg swelling. Gastrointestinal:  Negative for abdominal distention, abdominal pain, blood in stool, constipation, diarrhea, nausea, rectal pain and vomiting. Endocrine: Negative for cold intolerance, heat intolerance, polydipsia, polyphagia and polyuria. Genitourinary:  Negative for decreased urine volume, difficulty urinating, dyspareunia, dysuria, flank pain, frequency, hematuria, menstrual problem, pelvic pain, urgency, vaginal bleeding, vaginal discharge and vaginal pain. Musculoskeletal:  Negative for arthralgias, back pain, joint swelling, myalgias, neck pain and neck stiffness. Skin:  Negative for color change and rash. Allergic/Immunologic: Negative for environmental allergies and food allergies. Neurological:  Negative for dizziness, tremors, syncope, weakness, light-headedness, numbness and headaches. Hematological:  Negative for adenopathy. Does not bruise/bleed easily. Psychiatric/Behavioral:  Negative for confusion, hallucinations, self-injury, sleep disturbance and suicidal ideas. The patient is not nervous/anxious.       BP (!) 144/98 (BP 1 Location: Left upper arm, BP Patient Position: Sitting, BP Cuff Size: Large adult)   Pulse 65   Temp 98.1 °F (36.7 °C) (Oral)   Resp 17   Ht 5' 2\" (1.575 m)   Wt 309 lb 9.6 oz (140.4 kg)   LMP 12/18/2018 (Within Weeks)   SpO2 98%   BMI 56.63 kg/m²    Patient's last menstrual period was 12/18/2018 (within weeks). Physical Exam  Constitutional:       Appearance: Normal appearance. She is obese. HENT:      Head: Normocephalic and atraumatic. Nose: Nose normal.      Mouth/Throat:      Mouth: Mucous membranes are moist.      Pharynx: Oropharynx is clear. Eyes:      Extraocular Movements: Extraocular movements intact. Conjunctiva/sclera: Conjunctivae normal.      Pupils: Pupils are equal, round, and reactive to light. Cardiovascular:      Rate and Rhythm: Normal rate and regular rhythm. Pulses: Normal pulses. Heart sounds: Normal heart sounds. Pulmonary:      Effort: Pulmonary effort is normal.      Breath sounds: Normal breath sounds. Abdominal:      General: Abdomen is flat. Bowel sounds are normal.      Palpations: Abdomen is soft. Musculoskeletal:         General: Normal range of motion. Cervical back: Normal range of motion and neck supple. Skin:     General: Skin is warm and dry. Capillary Refill: Capillary refill takes less than 2 seconds. Neurological:      General: No focal deficit present. Mental Status: She is alert and oriented to person, place, and time. Mental status is at baseline. Psychiatric:         Mood and Affect: Mood normal.         Behavior: Behavior normal.         Thought Content: Thought content normal.         Judgment: Judgment normal.       ASSESSMENT/PLAN:  Below is the assessment and plan developed based on review of pertinent history, physical exam, labs, studies, and medications. 1. Acquired hypothyroidism  2. Adenocarcinoma of colon ([facility] [address]  3. Anxiety  4. Caregiver stress  -     ALPRAZolam (XANAX) 0.5 mg tablet;  Take 1 Tablet by mouth two (2) times daily as needed for Anxiety. Max Daily Amount: 1 mg. Indications: for anxiety-  queried-  no concerns prescribing, Normal, Disp-60 Tablet, R-2  5. Hypothyroidism due to acquired atrophy of thyroid  Comments:  stable  Orders:  -     levothyroxine (SYNTHROID) 125 mcg tablet; Take 1 Tablet by mouth Daily (before breakfast). , Normal, Disp-90 Tablet, R-1    RTO in 3 months, sooner if needed. No results found for this visit on 05/01/23. No follow-ups on file. An electronic signature was used to authenticate this note.   -- ЕЛЕНА Perdomo

## 2023-06-12 DIAGNOSIS — H10.13 ACUTE ATOPIC CONJUNCTIVITIS, BILATERAL: ICD-10-CM

## 2023-06-12 NOTE — TELEPHONE ENCOUNTER
Last Refill: 12-12-22  Last Visit: 5-1-23  Next Visit: 8/29/2023     Requested Prescriptions     Pending Prescriptions Disp Refills    Azelastine HCl 137 MCG/SPRAY SOLN [Pharmacy Med Name: AZELASTINE 0.1% (137 MCG) SPRY] 30 mL 0     Sig: ADMINISTER 2 SPRAYS INTO EACH NOSTRIL TWICE A DAY

## 2023-06-14 RX ORDER — AZELASTINE HYDROCHLORIDE 137 UG/1
SPRAY, METERED NASAL
Qty: 90 ML | Refills: 1 | Status: SHIPPED | OUTPATIENT
Start: 2023-06-14

## 2023-07-17 ENCOUNTER — OFFICE VISIT (OUTPATIENT)
Facility: CLINIC | Age: 53
End: 2023-07-17
Payer: COMMERCIAL

## 2023-07-17 VITALS
HEIGHT: 62 IN | RESPIRATION RATE: 17 BRPM | SYSTOLIC BLOOD PRESSURE: 120 MMHG | TEMPERATURE: 98 F | BODY MASS INDEX: 53.92 KG/M2 | DIASTOLIC BLOOD PRESSURE: 80 MMHG | OXYGEN SATURATION: 98 % | HEART RATE: 67 BPM | WEIGHT: 293 LBS

## 2023-07-17 DIAGNOSIS — F41.9 ANXIETY: Primary | ICD-10-CM

## 2023-07-17 DIAGNOSIS — R11.0 NAUSEA: ICD-10-CM

## 2023-07-17 DIAGNOSIS — G62.0 NEUROPATHY DUE TO CHEMOTHERAPEUTIC DRUG (HCC): ICD-10-CM

## 2023-07-17 DIAGNOSIS — C18.9 ADENOCARCINOMA OF COLON (HCC): ICD-10-CM

## 2023-07-17 DIAGNOSIS — T45.1X5A NEUROPATHY DUE TO CHEMOTHERAPEUTIC DRUG (HCC): ICD-10-CM

## 2023-07-17 PROCEDURE — 99213 OFFICE O/P EST LOW 20 MIN: CPT | Performed by: NURSE PRACTITIONER

## 2023-07-17 RX ORDER — PROCHLORPERAZINE MALEATE 10 MG
TABLET ORAL
Qty: 120 TABLET | Refills: 1 | Status: SHIPPED | OUTPATIENT
Start: 2023-07-17

## 2023-07-17 RX ORDER — ALPRAZOLAM 0.5 MG/1
0.5 TABLET ORAL 2 TIMES DAILY PRN
Qty: 60 TABLET | Refills: 3 | Status: SHIPPED | OUTPATIENT
Start: 2023-07-17 | End: 2023-11-14

## 2023-07-17 RX ORDER — ONDANSETRON 4 MG/1
4 TABLET, ORALLY DISINTEGRATING ORAL EVERY 8 HOURS PRN
Qty: 90 TABLET | Refills: 1 | Status: SHIPPED | OUTPATIENT
Start: 2023-07-17

## 2023-07-17 RX ORDER — PROCHLORPERAZINE MALEATE 10 MG
TABLET ORAL
COMMUNITY
Start: 2023-05-16 | End: 2023-07-17 | Stop reason: SDUPTHER

## 2023-07-17 ASSESSMENT — ENCOUNTER SYMPTOMS
TROUBLE SWALLOWING: 0
SINUS PAIN: 0
NAUSEA: 1
COLOR CHANGE: 0
EYE REDNESS: 0
BLOOD IN STOOL: 0
EYE PAIN: 0
CONSTIPATION: 0
ABDOMINAL PAIN: 0
VOMITING: 0
SINUS PRESSURE: 0
ANAL BLEEDING: 0
BACK PAIN: 0
PHOTOPHOBIA: 0
EYE DISCHARGE: 0
CHOKING: 0
WHEEZING: 0
FACIAL SWELLING: 0
COUGH: 0
RECTAL PAIN: 0
SHORTNESS OF BREATH: 0
DIARRHEA: 0
SORE THROAT: 0
APNEA: 0

## 2023-07-17 NOTE — PROGRESS NOTES
Chief Complaint   Patient presents with    Anxiety     Fill out paperwork, discuss increased anxiety due to chemo     1. Have you been to the ER, urgent care clinic since your last visit? Hospitalized since your last visit? No    2. Have you seen or consulted any other health care providers outside of the [address] [place] [address] since your last visit? Include any pap smears or colon screening.  No
appearance. She is obese. HENT:      Head: Normocephalic and atraumatic. Nose: Nose normal.      Mouth/Throat:      Mouth: Mucous membranes are moist.      Pharynx: Oropharynx is clear. Eyes:      Extraocular Movements: Extraocular movements intact. Conjunctiva/sclera: Conjunctivae normal.      Pupils: Pupils are equal, round, and reactive to light. Cardiovascular:      Rate and Rhythm: Normal rate and regular rhythm. Pulses: Normal pulses. Heart sounds: Normal heart sounds. Pulmonary:      Effort: Pulmonary effort is normal.      Breath sounds: Normal breath sounds. Abdominal:      General: Bowel sounds are normal.      Palpations: Abdomen is soft. Musculoskeletal:         General: Normal range of motion. Cervical back: Normal range of motion. Skin:     General: Skin is warm. Capillary Refill: Capillary refill takes less than 2 seconds. Neurological:      General: No focal deficit present. Mental Status: She is alert and oriented to person, place, and time. Psychiatric:         Mood and Affect: Mood normal.         Behavior: Behavior normal.         Thought Content: Thought content normal.         Judgment: Judgment normal.           ASSESSMENT/PLAN:  Below is the assessment and plan developed based on review of pertinent history, physical exam, labs, studies, and medications. 1. Anxiety  -     ALPRAZolam (XANAX) 0.5 MG tablet; Take 1 tablet by mouth 2 times daily as needed for Anxiety for up to 120 days. Max Daily Amount: 1 mg, Disp-60 tablet, R-3Normal  2. Nausea  -     ondansetron (ZOFRAN-ODT) 4 MG disintegrating tablet; Take 1 tablet by mouth every 8 hours as needed for Nausea, Disp-90 tablet, R-1Normal  -     prochlorperazine (COMPAZINE) 10 MG tablet; TAKE ONE TABLET BY MOUTH EVERY 4 TO 6 HOURS AS NEEDED FOR NAUSEA, Disp-120 tablet, R-1Normal  3. Adenocarcinoma of colon ([address])  4.  Neuropathy due to chemotherapeutic drug [facility])    She is very anxious

## 2023-08-24 DIAGNOSIS — L29.9 PRURITUS, UNSPECIFIED: ICD-10-CM

## 2023-08-24 DIAGNOSIS — G47.00 INSOMNIA, UNSPECIFIED: ICD-10-CM

## 2023-08-24 NOTE — TELEPHONE ENCOUNTER
PCP: Heidi Duverney, APRN - NP    Last appt:7/17/23  Future Appointments   Date Time Provider [address]   11/17/2023  8:00 AM LAB ONLY [facility]   11/21/2023  3:00 PM Heidi Duverney, APRN - NP [facility]       Last refilled:8/29/22    Requested Prescriptions     Pending Prescriptions Disp Refills    hydrOXYzine HCl (ATARAX) 25 MG tablet [Pharmacy Med Name: HYDROXYZINE HCL 25 MG TABLET] 540 tablet 3     Sig: TAKE 2 TABLETS BY MOUTH EVERY EIGHT (8) HOURS AS NEEDED (URTICARIA).  INDICATIONS: HIVES

## 2023-08-25 RX ORDER — ALBUTEROL SULFATE 90 UG/1
AEROSOL, METERED RESPIRATORY (INHALATION)
Qty: 18 G | Refills: 1 | Status: SHIPPED | OUTPATIENT
Start: 2023-08-25

## 2023-08-25 RX ORDER — HYDROXYZINE HYDROCHLORIDE 25 MG/1
TABLET, FILM COATED ORAL
Qty: 240 TABLET | Refills: 3 | Status: SHIPPED | OUTPATIENT
Start: 2023-08-25 | End: 2023-10-19 | Stop reason: SDUPTHER

## 2023-08-25 NOTE — TELEPHONE ENCOUNTER
PCP: JANNETH Nails NP    Last appt: 7/17/23  Future Appointments   Date Time Provider [address]   11/17/2023  8:00 AM LAB ONLY [facility]   11/21/2023  3:00 PM JANNETH Nails NP [facility]       Last refilled:6/28/22    Requested Prescriptions     Pending Prescriptions Disp Refills    albuterol sulfate HFA (PROVENTIL;VENTOLIN;PROAIR) 108 (90 Base) MCG/ACT inhaler 18 g 1     Sig: INHALE 2 PUFFS BY MOUTH EVERY 4 HOURS AS NEEDED FOR WHEEZING.  INDICATIONS: BRONCHOSPASM PREVENTIONM

## 2023-08-28 ENCOUNTER — OFFICE VISIT (OUTPATIENT)
Age: 53
End: 2023-08-28

## 2023-08-28 VITALS
HEART RATE: 80 BPM | BODY MASS INDEX: 57.61 KG/M2 | DIASTOLIC BLOOD PRESSURE: 78 MMHG | OXYGEN SATURATION: 98 % | WEIGHT: 293 LBS | SYSTOLIC BLOOD PRESSURE: 125 MMHG | TEMPERATURE: 98.5 F

## 2023-08-28 DIAGNOSIS — N39.0 URINARY TRACT INFECTION WITHOUT HEMATURIA, SITE UNSPECIFIED: Primary | ICD-10-CM

## 2023-08-28 LAB
BILIRUBIN, URINE, POC: NEGATIVE
BLOOD URINE, POC: NEGATIVE
GLUCOSE URINE, POC: NEGATIVE
KETONES, URINE, POC: NEGATIVE
LEUKOCYTE ESTERASE, URINE, POC: NORMAL
NITRITE, URINE, POC: NEGATIVE
PH, URINE, POC: 5.5 (ref 4.6–8)
PROTEIN,URINE, POC: NEGATIVE
SPECIFIC GRAVITY, URINE, POC: 1.01 (ref 1–1.03)
URINALYSIS CLARITY, POC: CLEAR
URINALYSIS COLOR, POC: YELLOW
UROBILINOGEN, POC: NORMAL

## 2023-08-28 RX ORDER — PHENAZOPYRIDINE HYDROCHLORIDE 200 MG/1
200 TABLET, FILM COATED ORAL 3 TIMES DAILY PRN
Qty: 10 TABLET | Refills: 0 | Status: SHIPPED | OUTPATIENT
Start: 2023-08-28 | End: 2023-08-31

## 2023-08-28 RX ORDER — NITROFURANTOIN 25; 75 MG/1; MG/1
100 CAPSULE ORAL 2 TIMES DAILY
Qty: 14 CAPSULE | Refills: 0 | Status: SHIPPED | OUTPATIENT
Start: 2023-08-28 | End: 2023-09-04

## 2023-08-28 NOTE — PATIENT INSTRUCTIONS
Results for orders placed or performed in visit on 08/28/23   AMB POC URINALYSIS DIP STICK AUTO W/ MICRO   Result Value Ref Range    Color (UA POC) Yellow     Clarity (UA POC) Clear     Glucose, Urine, POC Negative Negative    Bilirubin, Urine, POC Negative Negative    Ketones, Urine, POC Negative Negative    Specific Gravity, Urine, POC 1.015 1.001 - 1.035    Blood (UA POC) Negative Negative    pH, Urine, POC 5.5 4.6 - 8.0    Protein, Urine, POC Negative Negative    Urobilinogen, POC Normal     Nitrite, Urine, POC Negative Negative    Leukocyte Esterase, Urine, POC 1+ Negative

## 2023-08-28 NOTE — PROGRESS NOTES
by mouth 2 times daily for 7 days, Disp-14 capsule, R-0Normal  -     phenazopyridine (PYRIDIUM) 200 MG tablet; Take 1 tablet by mouth 3 times daily as needed for Pain, Disp-10 tablet, R-0Normal       Results for orders placed or performed in visit on 08/28/23   AMB POC URINALYSIS DIP STICK AUTO W/ MICRO   Result Value Ref Range    Color (UA POC) Yellow     Clarity (UA POC) Clear     Glucose, Urine, POC Negative Negative    Bilirubin, Urine, POC Negative Negative    Ketones, Urine, POC Negative Negative    Specific Gravity, Urine, POC 1.015 1.001 - 1.035    Blood (UA POC) Negative Negative    pH, Urine, POC 5.5 4.6 - 8.0    Protein, Urine, POC Negative Negative    Urobilinogen, POC Normal     Nitrite, Urine, POC Negative Negative    Leukocyte Esterase, Urine, POC 1+ Negative     The patients condition was discussed with the patient and they understand. The patient is to follow up with primary care doctor. If signs and symptoms become worse the pt is to go to the ER. The patient is to take medications as prescribed.

## 2023-09-19 RX ORDER — CYCLOBENZAPRINE HCL 10 MG
TABLET ORAL
Qty: 90 TABLET | Refills: 0 | Status: SHIPPED | OUTPATIENT
Start: 2023-09-19

## 2023-09-19 NOTE — TELEPHONE ENCOUNTER
RX refill request from the patient/pharmacy. Patient last seen 07- with labs, and next appt. scheduled for 11-  Requested Prescriptions     Pending Prescriptions Disp Refills    cyclobenzaprine (FLEXERIL) 10 MG tablet [Pharmacy Med Name: CYCLOBENZAPRINE 10 MG TABLET] 90 tablet 0     Sig: TAKE 1 TABLET BY MOUTH THREE TIMES A DAY AS NEEDED FOR MUSCLE SPASM    .

## 2023-10-12 RX ORDER — CYCLOBENZAPRINE HCL 10 MG
TABLET ORAL
Qty: 90 TABLET | Refills: 0 | Status: SHIPPED | OUTPATIENT
Start: 2023-10-12

## 2023-10-12 NOTE — TELEPHONE ENCOUNTER
PCP: JANNETH Jacinto NP    Last appt: 12/12/2022    Future Appointments   Date Time Provider [facility] [address]   11/17/2023  8:00 AM LAB ONLY [facility]       Requested Prescriptions     Pending Prescriptions Disp Refills    cyclobenzaprine (FLEXERIL) 10 MG tablet [Pharmacy Med Name: CYCLOBENZAPRINE 10 MG TABLET] 90 tablet 0     Sig: TAKE 1 TABLET BY MOUTH THREE TIMES A DAY AS NEEDED FOR MUSCLE SPASM

## 2023-11-14 DIAGNOSIS — Z23 ENCOUNTER FOR IMMUNIZATION: Primary | ICD-10-CM

## 2023-11-17 ENCOUNTER — NURSE ONLY (OUTPATIENT)
Facility: CLINIC | Age: 53
End: 2023-11-17

## 2023-11-17 VITALS
DIASTOLIC BLOOD PRESSURE: 80 MMHG | BODY MASS INDEX: 53.92 KG/M2 | SYSTOLIC BLOOD PRESSURE: 130 MMHG | RESPIRATION RATE: 16 BRPM | HEART RATE: 66 BPM | TEMPERATURE: 98.1 F | WEIGHT: 293 LBS | OXYGEN SATURATION: 99 % | HEIGHT: 62 IN

## 2023-11-17 DIAGNOSIS — E03.9 ACQUIRED HYPOTHYROIDISM: Primary | ICD-10-CM

## 2023-11-17 DIAGNOSIS — E78.5 DYSLIPIDEMIA: ICD-10-CM

## 2023-11-17 DIAGNOSIS — Z23 ENCOUNTER FOR IMMUNIZATION: Primary | ICD-10-CM

## 2023-11-17 DIAGNOSIS — R73.03 PREDIABETES: ICD-10-CM

## 2023-11-17 DIAGNOSIS — E03.9 ACQUIRED HYPOTHYROIDISM: ICD-10-CM

## 2023-11-17 LAB
ALBUMIN SERPL-MCNC: 3.8 G/DL (ref 3.5–5)
ALBUMIN/GLOB SERPL: 1.4 (ref 1.1–2.2)
ALP SERPL-CCNC: 123 U/L (ref 45–117)
ALT SERPL-CCNC: 28 U/L (ref 12–78)
ANION GAP SERPL CALC-SCNC: 4 MMOL/L (ref 5–15)
APPEARANCE UR: CLEAR
AST SERPL-CCNC: 12 U/L (ref 15–37)
BACTERIA URNS QL MICRO: NEGATIVE /HPF
BASOPHILS # BLD: 0.1 K/UL (ref 0–0.1)
BASOPHILS NFR BLD: 1 % (ref 0–1)
BILIRUB SERPL-MCNC: 0.3 MG/DL (ref 0.2–1)
BILIRUB UR QL: NEGATIVE
BUN SERPL-MCNC: 16 MG/DL (ref 6–20)
BUN/CREAT SERPL: 22 (ref 12–20)
CALCIUM SERPL-MCNC: 9.1 MG/DL (ref 8.5–10.1)
CHLORIDE SERPL-SCNC: 105 MMOL/L (ref 97–108)
CHOLEST SERPL-MCNC: 194 MG/DL
CO2 SERPL-SCNC: 29 MMOL/L (ref 21–32)
COLOR UR: ABNORMAL
CREAT SERPL-MCNC: 0.73 MG/DL (ref 0.55–1.02)
DIFFERENTIAL METHOD BLD: NORMAL
EOSINOPHIL # BLD: 0.2 K/UL (ref 0–0.4)
EOSINOPHIL NFR BLD: 2 % (ref 0–7)
EPITH CASTS URNS QL MICRO: ABNORMAL /LPF
ERYTHROCYTE [DISTWIDTH] IN BLOOD BY AUTOMATED COUNT: 13.2 % (ref 11.5–14.5)
EST. AVERAGE GLUCOSE BLD GHB EST-MCNC: 114 MG/DL
GLOBULIN SER CALC-MCNC: 2.8 G/DL (ref 2–4)
GLUCOSE SERPL-MCNC: 99 MG/DL (ref 65–100)
GLUCOSE UR STRIP.AUTO-MCNC: NEGATIVE MG/DL
HBA1C MFR BLD: 5.6 % (ref 4–5.6)
HCT VFR BLD AUTO: 37.9 % (ref 35–47)
HDLC SERPL-MCNC: 82 MG/DL
HDLC SERPL: 2.4 (ref 0–5)
HGB BLD-MCNC: 11.9 G/DL (ref 11.5–16)
HGB UR QL STRIP: NEGATIVE
HYALINE CASTS URNS QL MICRO: ABNORMAL /LPF (ref 0–5)
IMM GRANULOCYTES # BLD AUTO: 0 K/UL (ref 0–0.04)
IMM GRANULOCYTES NFR BLD AUTO: 0 % (ref 0–0.5)
KETONES UR QL STRIP.AUTO: NEGATIVE MG/DL
LDLC SERPL CALC-MCNC: 97.2 MG/DL (ref 0–100)
LEUKOCYTE ESTERASE UR QL STRIP.AUTO: ABNORMAL
LYMPHOCYTES # BLD: 2.5 K/UL (ref 0.8–3.5)
LYMPHOCYTES NFR BLD: 35 % (ref 12–49)
MCH RBC QN AUTO: 29 PG (ref 26–34)
MCHC RBC AUTO-ENTMCNC: 31.4 G/DL (ref 30–36.5)
MCV RBC AUTO: 92.2 FL (ref 80–99)
MONOCYTES # BLD: 0.6 K/UL (ref 0–1)
MONOCYTES NFR BLD: 8 % (ref 5–13)
NEUTS SEG # BLD: 3.7 K/UL (ref 1.8–8)
NEUTS SEG NFR BLD: 54 % (ref 32–75)
NITRITE UR QL STRIP.AUTO: NEGATIVE
NRBC # BLD: 0 K/UL (ref 0–0.01)
NRBC BLD-RTO: 0 PER 100 WBC
PH UR STRIP: 6.5 (ref 5–8)
PLATELET # BLD AUTO: 223 K/UL (ref 150–400)
PMV BLD AUTO: 11.2 FL (ref 8.9–12.9)
POTASSIUM SERPL-SCNC: 4.7 MMOL/L (ref 3.5–5.1)
PROT SERPL-MCNC: 6.6 G/DL (ref 6.4–8.2)
PROT UR STRIP-MCNC: NEGATIVE MG/DL
RBC # BLD AUTO: 4.11 M/UL (ref 3.8–5.2)
RBC #/AREA URNS HPF: ABNORMAL /HPF (ref 0–5)
SODIUM SERPL-SCNC: 138 MMOL/L (ref 136–145)
SP GR UR REFRACTOMETRY: 1.01 (ref 1–1.03)
TRIGL SERPL-MCNC: 74 MG/DL
TSH SERPL DL<=0.05 MIU/L-ACNC: 3.69 UIU/ML (ref 0.36–3.74)
URINE CULTURE IF INDICATED: ABNORMAL
UROBILINOGEN UR QL STRIP.AUTO: 0.2 EU/DL (ref 0.2–1)
VLDLC SERPL CALC-MCNC: 14.8 MG/DL
WBC # BLD AUTO: 7 K/UL (ref 3.6–11)
WBC URNS QL MICRO: ABNORMAL /HPF (ref 0–4)

## 2023-11-17 NOTE — PROGRESS NOTES
After obtaining consent, and per orders of Nestor Anna,NP injection of Flu shot given in Left deltoid by Muriel James MA. Patient instructed to remain in clinic for 20 minutes afterwards, and to report any adverse reaction to me immediately.

## 2023-12-04 RX ORDER — ALBUTEROL SULFATE 90 UG/1
AEROSOL, METERED RESPIRATORY (INHALATION)
Qty: 8.5 EACH | Refills: 1 | Status: SHIPPED | OUTPATIENT
Start: 2023-12-04

## 2023-12-04 NOTE — TELEPHONE ENCOUNTER
PCP: JANNETH Nails NP    Last appt: 7/17/2023  Future Appointments   Date Time Provider [address]   12/11/2023  2:00 PM JANNETH Nails NP [facility]       Requested Prescriptions     Pending Prescriptions Disp Refills    albuterol sulfate HFA (PROVENTIL;VENTOLIN;PROAIR) 108 (90 Base) MCG/ACT inhaler [Pharmacy Med Name: ALBUTEROL HFA (PROAIR) INHALER] 8.5 each 1     Sig: INHALE 2 PUFFS BY MOUTH EVERY 4 HOURS AS NEEDED FOR WHEEZING.  INDICATIONS: BRONCHOSPASM PREVENTIONM       Prior labs and Blood pressures:  BP Readings from Last 3 Encounters:   11/17/23 130/80   08/28/23 125/78   07/17/23 120/80     Lab Results   Component Value Date/Time     11/17/2023 08:21 AM    K 4.7 11/17/2023 08:21 AM     11/17/2023 08:21 AM    CO2 29 11/17/2023 08:21 AM    BUN 16 11/17/2023 08:21 AM    GFRAA >60 12/02/2021 12:04 PM     No results found for: \"HBA1C\", \"OTA1VMGV\"  Lab Results   Component Value Date/Time    CHOL 194 11/17/2023 08:21 AM    HDL 82 11/17/2023 08:21 AM     No results found for: \"VITD3\", \"VD3RIA\"        Lab Results   Component Value Date/Time    TSH 2.04 01/16/2023 03:14 PM

## 2023-12-11 ENCOUNTER — OFFICE VISIT (OUTPATIENT)
Facility: CLINIC | Age: 53
End: 2023-12-11
Payer: COMMERCIAL

## 2023-12-11 VITALS
HEART RATE: 60 BPM | RESPIRATION RATE: 16 BRPM | TEMPERATURE: 97.8 F | WEIGHT: 293 LBS | DIASTOLIC BLOOD PRESSURE: 80 MMHG | BODY MASS INDEX: 53.92 KG/M2 | HEIGHT: 62 IN | OXYGEN SATURATION: 99 % | SYSTOLIC BLOOD PRESSURE: 120 MMHG

## 2023-12-11 DIAGNOSIS — C18.9 ADENOCARCINOMA OF COLON (HCC): ICD-10-CM

## 2023-12-11 DIAGNOSIS — K42.9 UMBILICAL HERNIA WITHOUT OBSTRUCTION AND WITHOUT GANGRENE: ICD-10-CM

## 2023-12-11 DIAGNOSIS — R11.0 NAUSEA: ICD-10-CM

## 2023-12-11 DIAGNOSIS — E78.5 DYSLIPIDEMIA: ICD-10-CM

## 2023-12-11 DIAGNOSIS — E03.9 ACQUIRED HYPOTHYROIDISM: ICD-10-CM

## 2023-12-11 DIAGNOSIS — R73.03 PREDIABETES: ICD-10-CM

## 2023-12-11 DIAGNOSIS — J45.20 MILD INTERMITTENT ASTHMA WITHOUT COMPLICATION: Primary | ICD-10-CM

## 2023-12-11 PROCEDURE — 1036F TOBACCO NON-USER: CPT | Performed by: NURSE PRACTITIONER

## 2023-12-11 PROCEDURE — 99213 OFFICE O/P EST LOW 20 MIN: CPT | Performed by: NURSE PRACTITIONER

## 2023-12-11 PROCEDURE — G8482 FLU IMMUNIZE ORDER/ADMIN: HCPCS | Performed by: NURSE PRACTITIONER

## 2023-12-11 PROCEDURE — G8417 CALC BMI ABV UP PARAM F/U: HCPCS | Performed by: NURSE PRACTITIONER

## 2023-12-11 PROCEDURE — 3017F COLORECTAL CA SCREEN DOC REV: CPT | Performed by: NURSE PRACTITIONER

## 2023-12-11 PROCEDURE — G8427 DOCREV CUR MEDS BY ELIG CLIN: HCPCS | Performed by: NURSE PRACTITIONER

## 2023-12-11 RX ORDER — LEVOTHYROXINE SODIUM 0.12 MG/1
125 TABLET ORAL
Qty: 90 TABLET | Refills: 1 | Status: SHIPPED | OUTPATIENT
Start: 2023-12-11

## 2023-12-11 RX ORDER — PROCHLORPERAZINE MALEATE 10 MG
TABLET ORAL
Qty: 120 TABLET | Refills: 1 | Status: SHIPPED | OUTPATIENT
Start: 2023-12-11

## 2023-12-11 RX ORDER — ONDANSETRON 4 MG/1
4 TABLET, ORALLY DISINTEGRATING ORAL EVERY 8 HOURS PRN
Qty: 90 TABLET | Refills: 1 | Status: SHIPPED | OUTPATIENT
Start: 2023-12-11

## 2023-12-11 RX ORDER — ALBUTEROL SULFATE 90 UG/1
AEROSOL, METERED RESPIRATORY (INHALATION)
Qty: 18 EACH | Refills: 3 | Status: SHIPPED | OUTPATIENT
Start: 2023-12-11

## 2023-12-11 RX ORDER — ALPRAZOLAM 0.25 MG/1
0.25 TABLET ORAL NIGHTLY PRN
COMMUNITY

## 2023-12-11 ASSESSMENT — ENCOUNTER SYMPTOMS
PHOTOPHOBIA: 0
SINUS PRESSURE: 0
SHORTNESS OF BREATH: 0
COLOR CHANGE: 0
EYE PAIN: 0
CONSTIPATION: 0
BACK PAIN: 0
EYE REDNESS: 0
ABDOMINAL PAIN: 0
COUGH: 0
RECTAL PAIN: 0
EYE DISCHARGE: 0
DIARRHEA: 0
ANAL BLEEDING: 0
SINUS PAIN: 0
NAUSEA: 0
WHEEZING: 0
FACIAL SWELLING: 0
TROUBLE SWALLOWING: 0
APNEA: 0
CHOKING: 0
VOMITING: 0
SORE THROAT: 0
BLOOD IN STOOL: 0

## 2023-12-11 NOTE — PROGRESS NOTES
Rochelle Gomez (: 1970) is a 52 y.o. female here for evaluation of the following chief complaint(s):  Follow-up (Follow up on bloodwork)         SUBJECTIVE/OBJECTIVE:  HPI    Ms. Maryclare Dubin, 53 yo female, here today for general follow-up. We reviewed  lab work. She is followed closely by oncology for colon cancer. She has results of lab work and CT of abdomen/pelvis on her phone:  CT if abdomen pelvis on phone - midline anterior abdominal wall laxity verses hernia with slight protrusion of the transverse colon-  lung nodules stable, CEA was undetectable     Today she needs refills on medications- has concerns of exposure to [place]-  Exposed to [place] has been having nasal congestion and headache. She reports home COVID tests have negative. Has been taking Mucinex and symptoms are improving. She also has concerns of laxity vs hernia seen on CT. She does have bulge upper left quadrant of abdomen. She reports during colon surgery, surgeon had to go through old hernia repair mesh which was located around where the current area is. She denies pain, bowel movements are diarrhea. She has a colonscopy scheduled at end of this month with Dr. Alberto Herndon. Allergies   Allergen Reactions    Latex Rash    Penicillins Hives    Prednisone Hives    Sulfa Antibiotics Hives    Gentamicin Other (See Comments)     Itchy eyes due to sulfate    Metformin Diarrhea       Current Outpatient Medications   Medication Sig Dispense Refill    albuterol sulfate HFA (PROVENTIL;VENTOLIN;PROAIR) 108 (90 Base) MCG/ACT inhaler Inhale two puffs every 6 hours prn for wheezing 18 each 3    ALPRAZolam (XANAX) 0.25 MG tablet Take 1 tablet by mouth nightly as needed for Sleep.  Max Daily Amount: 0.25 mg      levothyroxine (SYNTHROID) 125 MCG tablet Take 1 tablet by mouth every morning (before breakfast) 90 tablet 1    ondansetron (ZOFRAN-ODT) 4 MG disintegrating tablet Take 1 tablet by mouth every 8 hours as needed for Nausea 90

## 2023-12-26 DIAGNOSIS — L29.9 PRURITUS, UNSPECIFIED: ICD-10-CM

## 2023-12-26 DIAGNOSIS — G47.00 INSOMNIA, UNSPECIFIED: ICD-10-CM

## 2023-12-26 NOTE — TELEPHONE ENCOUNTER
Requested Prescriptions     Pending Prescriptions Disp Refills    hydrOXYzine HCl (ATARAX) 25 MG tablet 120 tablet 3     Sig: TAKE 2 TABLETS BY MOUTH EVERY EIGHT (8) HOURS AS NEEDED (URTICARIA). INDICATIONS: HIVES       RX refill request from the patient/pharmacy. Patient last seen 12/11/23 without labs, and next appt. scheduled for 6/14/23.

## 2023-12-27 RX ORDER — HYDROXYZINE HYDROCHLORIDE 25 MG/1
TABLET, FILM COATED ORAL
Qty: 120 TABLET | Refills: 3 | Status: SHIPPED | OUTPATIENT
Start: 2023-12-27

## 2023-12-28 DIAGNOSIS — J45.20 MILD INTERMITTENT ASTHMA, UNCOMPLICATED: ICD-10-CM

## 2023-12-28 DIAGNOSIS — G43.009 MIGRAINE WITHOUT AURA, NOT INTRACTABLE, WITHOUT STATUS MIGRAINOSUS: ICD-10-CM

## 2023-12-28 RX ORDER — MONTELUKAST SODIUM 10 MG/1
TABLET ORAL
Qty: 90 TABLET | Refills: 1 | Status: SHIPPED | OUTPATIENT
Start: 2023-12-28

## 2023-12-28 RX ORDER — BUTALBITAL, ACETAMINOPHEN AND CAFFEINE 50; 325; 40 MG/1; MG/1; MG/1
TABLET ORAL
Qty: 30 TABLET | Refills: 0 | Status: SHIPPED | OUTPATIENT
Start: 2023-12-28

## 2023-12-28 NOTE — TELEPHONE ENCOUNTER
PCP: JANNETH Bailey NP    Last appt: 12/11/2023    Future Appointments   Date Time Provider [address]   6/11/2024  8:00 AM LAB ONLY [facility]   6/14/2024  8:00 AM JANNETH Bailey NP AMB       Requested Prescriptions     Pending Prescriptions Disp Refills    montelukast (SINGULAIR) 10 MG tablet [Pharmacy Med Name: MONTELUKAST SOD 10 MG TABLET] 90 tablet 1     Sig: TAKE 1 TABLET BY MOUTH EVERY DAY    butalbital-acetaminophen-caffeine (FIORICET, ESGIC) -40 MG per tablet [Pharmacy Med Name: ADVMEL-YRLHXAYK-QPYB -40] 30 tablet 0     Sig: TAKE 1 TABLET BY MOUTH EVERY SIX (6) HOURS AS NEEDED FOR HEADACHE.

## 2023-12-29 ENCOUNTER — HOSPITAL ENCOUNTER (OUTPATIENT)
Facility: HOSPITAL | Age: 53
Setting detail: OUTPATIENT SURGERY
Discharge: HOME | End: 2023-12-29
Attending: SURGERY | Admitting: SURGERY
Payer: COMMERCIAL

## 2023-12-29 ENCOUNTER — ANESTHESIA EVENT (OUTPATIENT)
Facility: HOSPITAL | Age: 53
End: 2023-12-29
Payer: COMMERCIAL

## 2023-12-29 ENCOUNTER — ANESTHESIA (OUTPATIENT)
Facility: HOSPITAL | Age: 53
End: 2023-12-29
Payer: COMMERCIAL

## 2023-12-29 VITALS
SYSTOLIC BLOOD PRESSURE: 131 MMHG | RESPIRATION RATE: 17 BRPM | HEIGHT: 62 IN | OXYGEN SATURATION: 99 % | DIASTOLIC BLOOD PRESSURE: 80 MMHG | WEIGHT: 293 LBS | HEART RATE: 64 BPM | BODY MASS INDEX: 53.92 KG/M2 | TEMPERATURE: 98 F

## 2023-12-29 PROCEDURE — 6360000002 HC RX W HCPCS: Performed by: ANESTHESIOLOGY

## 2023-12-29 PROCEDURE — 3600007512: Performed by: SURGERY

## 2023-12-29 PROCEDURE — 2709999900 HC NON-CHARGEABLE SUPPLY: Performed by: SURGERY

## 2023-12-29 PROCEDURE — 2500000003 HC RX 250 WO HCPCS: Performed by: ANESTHESIOLOGY

## 2023-12-29 PROCEDURE — 7100000011 HC PHASE II RECOVERY - ADDTL 15 MIN: Performed by: SURGERY

## 2023-12-29 PROCEDURE — 3700000000 HC ANESTHESIA ATTENDED CARE: Performed by: SURGERY

## 2023-12-29 PROCEDURE — 3600007502: Performed by: SURGERY

## 2023-12-29 PROCEDURE — 7100000010 HC PHASE II RECOVERY - FIRST 15 MIN: Performed by: SURGERY

## 2023-12-29 PROCEDURE — 2580000003 HC RX 258: Performed by: SURGERY

## 2023-12-29 PROCEDURE — 2720000010 HC SURG SUPPLY STERILE: Performed by: SURGERY

## 2023-12-29 PROCEDURE — 3700000001 HC ADD 15 MINUTES (ANESTHESIA): Performed by: SURGERY

## 2023-12-29 RX ORDER — LIDOCAINE HYDROCHLORIDE 20 MG/ML
INJECTION, SOLUTION EPIDURAL; INFILTRATION; INTRACAUDAL; PERINEURAL PRN
Status: DISCONTINUED | OUTPATIENT
Start: 2023-12-29 | End: 2023-12-29 | Stop reason: SDUPTHER

## 2023-12-29 RX ORDER — SODIUM CHLORIDE 9 MG/ML
25 INJECTION, SOLUTION INTRAVENOUS PRN
Status: DISCONTINUED | OUTPATIENT
Start: 2023-12-29 | End: 2023-12-29 | Stop reason: HOSPADM

## 2023-12-29 RX ORDER — SODIUM CHLORIDE 9 MG/ML
INJECTION, SOLUTION INTRAVENOUS CONTINUOUS PRN
Status: COMPLETED | OUTPATIENT
Start: 2023-12-29 | End: 2023-12-29

## 2023-12-29 RX ORDER — SODIUM CHLORIDE 0.9 % (FLUSH) 0.9 %
5-40 SYRINGE (ML) INJECTION PRN
Status: DISCONTINUED | OUTPATIENT
Start: 2023-12-29 | End: 2023-12-29 | Stop reason: HOSPADM

## 2023-12-29 RX ORDER — SODIUM CHLORIDE 0.9 % (FLUSH) 0.9 %
5-40 SYRINGE (ML) INJECTION EVERY 12 HOURS SCHEDULED
Status: DISCONTINUED | OUTPATIENT
Start: 2023-12-29 | End: 2023-12-29 | Stop reason: HOSPADM

## 2023-12-29 RX ADMIN — LIDOCAINE HYDROCHLORIDE 40 MG: 20 INJECTION, SOLUTION EPIDURAL; INFILTRATION; INTRACAUDAL; PERINEURAL at 13:32

## 2023-12-29 NOTE — PROGRESS NOTES
ARRIVAL INFORMATION:  Verified patient name and date of birth, scheduled procedure, and informed consent. : Vicky Pereyra (friend) contact number: [phone]  Physician and staff can share information with the . Belongings with patient include:  Clothing,None    GI FOCUSED ASSESSMENT:  Neuro: Awake, alert, oriented x4  Respiratory: even and unlabored   GI: soft and non-distended  EKG Rhythm: normal sinus rhythm    Education:Reviewed general discharge instructions and  information.

## 2023-12-29 NOTE — PROGRESS NOTES
TRANSFER - IN REPORT:    Verbal report received from Basia on Kit Ventura  being received from [facility]   for routine progression of patient care      Report consisted of patient's Situation, Background, Assessment and   Recommendations(SBAR). Information from the following report(s) Nurse Handoff Report was reviewed with the receiving nurse. Opportunity for questions and clarification was provided. Assessment completed upon patient's arrival to unit and care assumed.

## 2023-12-29 NOTE — PROCEDURES
Colonoscopy Procedure Note    Maricarmen Robb  1970  [id number]    Indications:    Personal history of colon cancer (screening only)     Pre-operative Diagnosis: History of colon cancer    Post-operative Diagnosis: No polyps. Diverticulosis    : Oc Ballard MD    Referring Provider: JANNETH Bailey NP    Sedation:  MAC anesthesia Propofol    Procedure Details:    After informed consent was obtained with all risks and benefits of procedure explained and preoperative exam completed, the patient was taken to the endoscopy suite and placed in the left lateral decubitus position. Upon sequential sedation as per above, a digital rectal exam was performed  And was normal.  The Olympus videocolonoscope  was inserted in the rectum and carefully advanced to the surgical anastomosis . The quality of preparation was good. The colonoscope was slowly withdrawn with careful evaluation between folds. Retroflexion in the rectum was performed and was normal..     Findings:   Rectum: normal  Sigmoid: Extensive diverticulosis  Descending Colon: Extensive diverticulosis  Transverse Colon: Diverticulosis  Ascending Colon: surgically absent  Cecum: surgically absent  Terminal Ileum: normal    Therapies:  none    Specimen:  none    Complications: None. EBL:  None    Recommendations: -Repeat colonoscopy in 3 years.       Oc Ballard MD  12/29/2023  1:46 PM

## 2023-12-29 NOTE — H&P
No rashes or lesions   Lymph nodes: Cervical, supraclavicular, and axillary nodes normal.   Neurologic: CNII-XII intact. Normal strength, sensation and reflexes throughout.        Assessment:         Plan:     Colonoscopy    Signed By: Mustapha Carreon MD     December 29, 2023

## 2023-12-29 NOTE — PROGRESS NOTES
Endoscopy Case End Note:     Procedure scope was pre-cleaned, per protocol, at bedside by AWILDA Sewell. Report received from anesthesia. See anesthesia flowsheet for intra-procedure vital signs and events. Belongings remain under stretcher with patient.

## 2024-01-08 NOTE — ANESTHESIA POSTPROCEDURE EVALUATION
Department of Anesthesiology  Postprocedure Note    Patient: Rosario Luis  MRN: [id number]  YOB: 1970  Date of evaluation: 1/8/2024    Procedure Summary       Date: 12/29/23 Room / Location: [facility] ENDO 04 / [facility] ENDOSCOPY    Anesthesia Start: 1329 Anesthesia Stop: 1353    Procedure: COLONOSCOPY (Lower GI Region) Diagnosis:       Family history of colon cancer      (Family history of colon cancer [Z80.0])    Surgeons: Kit Brady II, MD Responsible Provider: Pam Anderson DO    Anesthesia Type: TIVA ASA Status: 3            Anesthesia Type: TIVA    Rosa Phase I: Rosa Score: 10    Rosa Phase II:      Anesthesia Post Evaluation    Patient location during evaluation: PACU  Patient participation: complete - patient participated  Level of consciousness: awake  Airway patency: patent  Nausea & Vomiting: no vomiting and no nausea  Cardiovascular status: hemodynamically stable  Respiratory status: acceptable  Hydration status: euvolemic    No notable events documented.

## 2024-01-10 ENCOUNTER — TELEPHONE (OUTPATIENT)
Age: 54
End: 2024-01-10

## 2024-01-15 ENCOUNTER — OFFICE VISIT (OUTPATIENT)
Age: 54
End: 2024-01-15
Payer: COMMERCIAL

## 2024-01-15 ENCOUNTER — TELEPHONE (OUTPATIENT)
Age: 54
End: 2024-01-15

## 2024-01-15 VITALS
HEIGHT: 62 IN | TEMPERATURE: 97.7 F | SYSTOLIC BLOOD PRESSURE: 138 MMHG | RESPIRATION RATE: 20 BRPM | BODY MASS INDEX: 53.92 KG/M2 | HEART RATE: 65 BPM | DIASTOLIC BLOOD PRESSURE: 78 MMHG | OXYGEN SATURATION: 98 % | WEIGHT: 293 LBS

## 2024-01-15 DIAGNOSIS — K43.9 HERNIA OF ANTERIOR ABDOMINAL WALL: Primary | ICD-10-CM

## 2024-01-15 DIAGNOSIS — E66.01 OBESITY, MORBID, BMI 50 OR HIGHER (HCC): ICD-10-CM

## 2024-01-15 PROCEDURE — 1036F TOBACCO NON-USER: CPT | Performed by: SURGERY

## 2024-01-15 PROCEDURE — G8417 CALC BMI ABV UP PARAM F/U: HCPCS | Performed by: SURGERY

## 2024-01-15 PROCEDURE — 3017F COLORECTAL CA SCREEN DOC REV: CPT | Performed by: SURGERY

## 2024-01-15 PROCEDURE — 99205 OFFICE O/P NEW HI 60 MIN: CPT | Performed by: SURGERY

## 2024-01-15 PROCEDURE — G8427 DOCREV CUR MEDS BY ELIG CLIN: HCPCS | Performed by: SURGERY

## 2024-01-15 PROCEDURE — G8482 FLU IMMUNIZE ORDER/ADMIN: HCPCS | Performed by: SURGERY

## 2024-01-15 RX ORDER — ALPRAZOLAM 0.5 MG/1
0.5 TABLET ORAL 2 TIMES DAILY
COMMUNITY
Start: 2023-12-11

## 2024-01-15 RX ORDER — FAMOTIDINE 40 MG/1
40 TABLET, FILM COATED ORAL 2 TIMES DAILY
COMMUNITY
Start: 2023-12-25

## 2024-01-15 RX ORDER — LIDOCAINE AND PRILOCAINE 25; 25 MG/G; MG/G
1 CREAM TOPICAL PRN
COMMUNITY
Start: 2023-10-24

## 2024-01-15 ASSESSMENT — ENCOUNTER SYMPTOMS
BLOOD IN STOOL: 0
SHORTNESS OF BREATH: 0
EYE PAIN: 0

## 2024-01-15 NOTE — TELEPHONE ENCOUNTER
Radiology called to let nurse know cd with imaging is loaded in system and is ready for pickup tomorrow, was dropped off and they wanted cd back

## 2024-01-15 NOTE — PROGRESS NOTES
Identified pt with two pt identifiers(name and ). Reviewed record in preparation for visit and have obtained necessary documentation. All patient medications has been reviewed.    Chief Complaint   Patient presents with    Possible hernia     Seen at the request of Dr PREETI Brady for evaluation of possible incisional hernia       Health Maintenance Due   Topic    Hepatitis B vaccine (1 of 3 - 3-dose series)    Pneumococcal 0-64 years Vaccine (1 - PCV)    HIV screen     Shingles vaccine (1 of 2)    COVID-19 Vaccine (3 -  season)    Depression Screen        Vitals:    01/15/24 1406   BP: 138/78   Site: Left Lower Arm   Position: Sitting   Pulse: 65   Resp: 20   Temp: 97.7 °F (36.5 °C)   TempSrc: Oral   SpO2: 98%   Weight: (!) 145.2 kg (320 lb)   Height: 1.575 m (5' 2\")         4.Have you been to the ER, urgent care clinic since your last visit?  Hospitalized since your last visit? No      5. Have you seen or consulted any other health care providers outside of the [facility] since your last visit?  Include any pap smears or colon screening. No      Patient is accompanied by self I have received verbal consent from Rosario Luis to discuss any/all medical information while they are present in the room.   
Bowel sounds are normal. There is no distension.      Palpations: Abdomen is soft. There is no mass.      Tenderness: There is no abdominal tenderness. There is no guarding or rebound.      Hernia: A hernia is present.          Comments: Hernia associated with a healed scar and diastases.  Soft.  No acute skin changes.   Musculoskeletal:         General: No tenderness. Normal range of motion.      Cervical back: Normal range of motion and neck supple.   Lymphadenopathy:      Cervical: No cervical adenopathy.   Skin:     General: Skin is warm.      Findings: No erythema or rash.   Neurological:      Mental Status: She is alert and oriented to person, place, and time.   Psychiatric:         Behavior: Behavior normal.                I had an extensive and thorough discussion with Rosario Luis regarding current diagnosis and treatment recommendations. Total time spend with her was 60 minutes.  This included the following:  preparing to see the patient (reviewing prior records and tests),  performing a medically appropriate examination and/or evaluation,  counseling and educating the patient/family/caregiver,  documenting clinical information in the electronic or other health record,  independently interpreting results and communicating results to the patient/family/caregiver,  referring and communicating with other health care professionals,  care coordination       --Thaddeus Chu MD   
Ruminations/Suicidality

## 2024-01-23 DIAGNOSIS — R11.0 NAUSEA: ICD-10-CM

## 2024-01-25 RX ORDER — PROCHLORPERAZINE MALEATE 10 MG
TABLET ORAL
Qty: 120 TABLET | Refills: 1 | Status: SHIPPED | OUTPATIENT
Start: 2024-01-25

## 2024-01-25 NOTE — TELEPHONE ENCOUNTER
PCP: Nestor Anna APRN - NP    Last appt: 12/11/2023    Future Appointments   Date Time Provider Department Center   6/11/2024  8:00 AM LAB ONLY [facility] AMB   6/14/2024  8:00 AM Nestor Anna APRN - NP [facility] AMB   7/15/2024  2:10 PM Thaddeus Chu MD [facility] BS AMB       Requested Prescriptions     Pending Prescriptions Disp Refills    prochlorperazine (COMPAZINE) 10 MG tablet [Pharmacy Med Name: PROCHLORPERAZINE 10 MG TAB] 120 tablet 1     Sig: TAKE ONE TABLET BY MOUTH EVERY 4 TO 6 HOURS AS NEEDED FOR NAUSEA

## 2024-01-28 DIAGNOSIS — G47.00 INSOMNIA, UNSPECIFIED: ICD-10-CM

## 2024-01-28 DIAGNOSIS — L29.9 PRURITUS, UNSPECIFIED: ICD-10-CM

## 2024-01-29 NOTE — TELEPHONE ENCOUNTER
Chief Complaint   Patient presents with    Medication Refill       Requested Prescriptions     Pending Prescriptions Disp Refills    hydrOXYzine HCl (ATARAX) 25 MG tablet [Pharmacy Med Name: HYDROXYZINE HCL 25 MG TABLET] 180 tablet 5     Sig: TAKE 2 TABLETS BY MOUTH EVERY 8 HOURS AS NEEDED (URTICARIA). INDICATIONS: HIVES       Allergies:  Allergies   Allergen Reactions    Latex Rash    Penicillins Hives    Prednisone Hives    Sulfa Antibiotics Hives    Gentamicin Other (See Comments)     Itchy eyes due to sulfate    Metformin Diarrhea       Last visit with clinic:  12/11/2023   Next visit with clinic: 6/11/2024     Last visit with this provider: 12/11/2023   Next Visit with this provider: 6/14/2024    Signed by Wesley CARTER  01/29/24  11:53 AM

## 2024-02-01 RX ORDER — HYDROXYZINE HYDROCHLORIDE 25 MG/1
TABLET, FILM COATED ORAL
Qty: 180 TABLET | Refills: 5 | Status: SHIPPED | OUTPATIENT
Start: 2024-02-01

## 2024-02-28 LAB — MAMMOGRAPHY, EXTERNAL: NORMAL

## 2024-03-03 ENCOUNTER — OFFICE VISIT (OUTPATIENT)
Age: 54
End: 2024-03-03

## 2024-03-03 VITALS
SYSTOLIC BLOOD PRESSURE: 137 MMHG | TEMPERATURE: 98 F | HEIGHT: 62 IN | BODY MASS INDEX: 53.92 KG/M2 | RESPIRATION RATE: 16 BRPM | DIASTOLIC BLOOD PRESSURE: 65 MMHG | WEIGHT: 293 LBS | OXYGEN SATURATION: 100 % | HEART RATE: 70 BPM

## 2024-03-03 DIAGNOSIS — H66.001 NON-RECURRENT ACUTE SUPPURATIVE OTITIS MEDIA OF RIGHT EAR WITHOUT SPONTANEOUS RUPTURE OF TYMPANIC MEMBRANE: Primary | ICD-10-CM

## 2024-03-03 DIAGNOSIS — H10.9 BACTERIAL CONJUNCTIVITIS OF RIGHT EYE: ICD-10-CM

## 2024-03-03 RX ORDER — POLYMYXIN B SULFATE AND TRIMETHOPRIM 1; 10000 MG/ML; [USP'U]/ML
1 SOLUTION OPHTHALMIC EVERY 4 HOURS
Qty: 3 ML | Refills: 0 | Status: SHIPPED | OUTPATIENT
Start: 2024-03-03 | End: 2024-03-13

## 2024-03-03 RX ORDER — DOXYCYCLINE HYCLATE 100 MG
100 TABLET ORAL 2 TIMES DAILY
Qty: 20 TABLET | Refills: 0 | Status: SHIPPED | OUTPATIENT
Start: 2024-03-03 | End: 2024-03-13

## 2024-03-22 ENCOUNTER — OFFICE VISIT (OUTPATIENT)
Age: 54
End: 2024-03-22

## 2024-03-22 VITALS
RESPIRATION RATE: 18 BRPM | DIASTOLIC BLOOD PRESSURE: 77 MMHG | HEIGHT: 62 IN | OXYGEN SATURATION: 96 % | SYSTOLIC BLOOD PRESSURE: 130 MMHG | HEART RATE: 75 BPM | BODY MASS INDEX: 53.92 KG/M2 | TEMPERATURE: 100.4 F | WEIGHT: 293 LBS

## 2024-03-22 DIAGNOSIS — J01.90 ACUTE NON-RECURRENT SINUSITIS, UNSPECIFIED LOCATION: ICD-10-CM

## 2024-03-22 DIAGNOSIS — B37.31 CANDIDAL VULVOVAGINITIS: Primary | ICD-10-CM

## 2024-03-22 RX ORDER — FLUCONAZOLE 150 MG/1
150 TABLET ORAL ONCE
Qty: 1 TABLET | Refills: 0 | Status: SHIPPED | OUTPATIENT
Start: 2024-03-22 | End: 2024-03-22

## 2024-03-22 NOTE — PATIENT INSTRUCTIONS
Please follow up with your primary care provider if your symptoms last more than 10 days or worsen.    Please go immediately to the Emergency Department if you develop:  -Fever higher than 102F (38.9C)  -Sudden and severe pain in the face and head  -Trouble seeing or seeing double  -Trouble thinking clearly  -Swelling or redness around one or both eyes  -A stiff neck    Sinusitis Symptomatic Relief  Nasal Congestion:  Flonase (over the counter) nasal spray, once a day  Saline irrigation kits help wash out sinuses 1-2 times a day  Normal saline nasal spray  Afrin nasal spray for no longer than 3 days    Congestion:  For thick mucus, take Mucinex (with Guafenesin only) to help thin the mucus.  Follow instructions on the box.  You will need to drink plenty of water with this medication.    Sore Throat:  Lozenges, as needed. Cepacol lozenges will help numb the throat  Chloraseptic spray also helps to numb throat pain  Salt water gargles to soothe throat pain    Sinus pain/pressure:  Warm, wet towel on face to help with facial sinus pain/pressure    Headache/Pain Fever/Body Aches:  If you can take NSAIDs, take Ibuprofen 400-800mg every 8 hours as needed  If you cannot take NSAIDs, take Tylenol 325-500mg every 6 hours as needed    Miscellanous:  Zyrtec/Xyzal/Allegra/Claritin during the day or Benadryl at night may help with allergies.  You may use the decongestant version of these medications as well.  Simple foods like chicken noodle soup, smoothies, hot tea with lemon and honey may also help  Cool mist humidifier

## 2024-03-22 NOTE — PROGRESS NOTES
Stroke Father     Neuropathy Father     Depression Sister     Breast Cancer Maternal Grandmother     Colon Polyps Maternal Grandmother     Seizures Maternal Grandmother     Stroke Maternal Grandmother     Diabetes Maternal Grandmother     Other Maternal Grandfather         Alia Herr Ds    Heart Attack Paternal Grandmother     Heart Disease Paternal Grandmother         heart failure    Asthma Paternal Grandmother        Allergies   Allergen Reactions    Latex Rash    Penicillins Hives    Prednisone Hives    Sulfa Antibiotics Hives    Gentamicin Other (See Comments)     Itchy eyes due to sulfate    Metformin Diarrhea       Social History     Tobacco Use    Smoking status: Never    Smokeless tobacco: Never   Vaping Use    Vaping Use: Never used   Substance Use Topics    Alcohol use: Yes     Alcohol/week: 1.0 standard drink of alcohol     Comment: occasional    Drug use: No       Objective   Vitals:    03/22/24 1324   BP: 130/77   Pulse: 75   Resp: 18   Temp: 100.4 °F (38 °C)   SpO2: 96%     Physical Exam  Constitutional:       General: She is not in acute distress.     Appearance: Normal appearance. She is not ill-appearing.   HENT:      Head: Normocephalic and atraumatic.      Right Ear: Tympanic membrane, ear canal and external ear normal.      Left Ear: Tympanic membrane, ear canal and external ear normal.      Nose: Nose normal.      Mouth/Throat:      Mouth: Mucous membranes are moist.      Pharynx: No posterior oropharyngeal erythema.   Eyes:      Extraocular Movements: Extraocular movements intact.      Conjunctiva/sclera: Conjunctivae normal.      Pupils: Pupils are equal, round, and reactive to light.   Cardiovascular:      Rate and Rhythm: Normal rate.      Pulses: Normal pulses.   Pulmonary:      Effort: Pulmonary effort is normal.   Musculoskeletal:      Cervical back: Normal range of motion.   Skin:     General: Skin is warm and dry.   Neurological:      Mental Status: She is alert and oriented to

## 2024-03-25 DIAGNOSIS — L29.9 PRURITUS, UNSPECIFIED: ICD-10-CM

## 2024-03-25 DIAGNOSIS — G47.00 INSOMNIA, UNSPECIFIED: ICD-10-CM

## 2024-03-25 RX ORDER — HYDROXYZINE HYDROCHLORIDE 25 MG/1
TABLET, FILM COATED ORAL
Qty: 180 TABLET | Refills: 0 | Status: SHIPPED | OUTPATIENT
Start: 2024-03-25

## 2024-03-25 NOTE — TELEPHONE ENCOUNTER
PCP: Nestor Anna, APRN - NP    Last appt: 12/11/2023    Future Appointments   Date Time Provider Department Center   7/15/2024  2:10 PM Thaddeus Chu MD [facility] BS AMB       Requested Prescriptions     Pending Prescriptions Disp Refills    hydrOXYzine HCl (ATARAX) 25 MG tablet 180 tablet 0     Sig: TAKE 2 TABLETS BY MOUTH EVERY 8 HOURS AS NEEDED (URTICARIA). INDICATIONS: HIVES

## 2024-03-28 ENCOUNTER — HOSPITAL ENCOUNTER (OUTPATIENT)
Facility: HOSPITAL | Age: 54
Discharge: HOME | End: 2024-03-30
Attending: INTERNAL MEDICINE
Payer: COMMERCIAL

## 2024-03-28 ENCOUNTER — OFFICE VISIT (OUTPATIENT)
Facility: CLINIC | Age: 54
End: 2024-03-28
Payer: COMMERCIAL

## 2024-03-28 VITALS
WEIGHT: 293 LBS | TEMPERATURE: 98.2 F | HEART RATE: 62 BPM | SYSTOLIC BLOOD PRESSURE: 136 MMHG | BODY MASS INDEX: 53.92 KG/M2 | HEIGHT: 62 IN | OXYGEN SATURATION: 100 % | DIASTOLIC BLOOD PRESSURE: 80 MMHG | RESPIRATION RATE: 16 BRPM

## 2024-03-28 DIAGNOSIS — M79.605 LEFT LEG PAIN: ICD-10-CM

## 2024-03-28 DIAGNOSIS — Z86.718 HX OF DEEP VENOUS THROMBOSIS: ICD-10-CM

## 2024-03-28 DIAGNOSIS — M79.605 LEFT LEG PAIN: Primary | ICD-10-CM

## 2024-03-28 DIAGNOSIS — D50.0 IRON DEFICIENCY ANEMIA SECONDARY TO BLOOD LOSS (CHRONIC): ICD-10-CM

## 2024-03-28 LAB
ALBUMIN SERPL-MCNC: 4 G/DL (ref 3.5–5)
ALBUMIN/GLOB SERPL: 1.2 (ref 1.1–2.2)
ALP SERPL-CCNC: 128 U/L (ref 45–117)
ALT SERPL-CCNC: 33 U/L (ref 12–78)
ANION GAP SERPL CALC-SCNC: 6 MMOL/L (ref 5–15)
AST SERPL-CCNC: 15 U/L (ref 15–37)
BASOPHILS # BLD: 0.1 K/UL (ref 0–0.1)
BASOPHILS NFR BLD: 1 % (ref 0–1)
BILIRUB SERPL-MCNC: 0.4 MG/DL (ref 0.2–1)
BUN SERPL-MCNC: 17 MG/DL (ref 6–20)
BUN/CREAT SERPL: 20 (ref 12–20)
CALCIUM SERPL-MCNC: 9.7 MG/DL (ref 8.5–10.1)
CHLORIDE SERPL-SCNC: 106 MMOL/L (ref 97–108)
CO2 SERPL-SCNC: 29 MMOL/L (ref 21–32)
CREAT SERPL-MCNC: 0.87 MG/DL (ref 0.55–1.02)
DIFFERENTIAL METHOD BLD: NORMAL
EOSINOPHIL # BLD: 0.1 K/UL (ref 0–0.4)
EOSINOPHIL NFR BLD: 2 % (ref 0–7)
ERYTHROCYTE [DISTWIDTH] IN BLOOD BY AUTOMATED COUNT: 13.9 % (ref 11.5–14.5)
GLOBULIN SER CALC-MCNC: 3.3 G/DL (ref 2–4)
GLUCOSE SERPL-MCNC: 105 MG/DL (ref 65–100)
HCT VFR BLD AUTO: 40.3 % (ref 35–47)
HGB BLD-MCNC: 13.2 G/DL (ref 11.5–16)
IMM GRANULOCYTES # BLD AUTO: 0 K/UL (ref 0–0.04)
IMM GRANULOCYTES NFR BLD AUTO: 0 % (ref 0–0.5)
IRON SATN MFR SERPL: 21 % (ref 20–50)
IRON SERPL-MCNC: 89 UG/DL (ref 35–150)
LYMPHOCYTES # BLD: 3 K/UL (ref 0.8–3.5)
LYMPHOCYTES NFR BLD: 37 % (ref 12–49)
MAGNESIUM SERPL-MCNC: 2.4 MG/DL (ref 1.6–2.4)
MCH RBC QN AUTO: 29.2 PG (ref 26–34)
MCHC RBC AUTO-ENTMCNC: 32.8 G/DL (ref 30–36.5)
MCV RBC AUTO: 89.2 FL (ref 80–99)
MONOCYTES # BLD: 0.7 K/UL (ref 0–1)
MONOCYTES NFR BLD: 9 % (ref 5–13)
NEUTS SEG # BLD: 4.2 K/UL (ref 1.8–8)
NEUTS SEG NFR BLD: 51 % (ref 32–75)
NRBC # BLD: 0 K/UL (ref 0–0.01)
NRBC BLD-RTO: 0 PER 100 WBC
PLATELET # BLD AUTO: 241 K/UL (ref 150–400)
PMV BLD AUTO: 11.1 FL (ref 8.9–12.9)
POTASSIUM SERPL-SCNC: 4.5 MMOL/L (ref 3.5–5.1)
PROT SERPL-MCNC: 7.3 G/DL (ref 6.4–8.2)
RBC # BLD AUTO: 4.52 M/UL (ref 3.8–5.2)
SODIUM SERPL-SCNC: 141 MMOL/L (ref 136–145)
TIBC SERPL-MCNC: 427 UG/DL (ref 250–450)
WBC # BLD AUTO: 8.1 K/UL (ref 3.6–11)

## 2024-03-28 PROCEDURE — 3017F COLORECTAL CA SCREEN DOC REV: CPT | Performed by: INTERNAL MEDICINE

## 2024-03-28 PROCEDURE — 99214 OFFICE O/P EST MOD 30 MIN: CPT | Performed by: INTERNAL MEDICINE

## 2024-03-28 PROCEDURE — G8427 DOCREV CUR MEDS BY ELIG CLIN: HCPCS | Performed by: INTERNAL MEDICINE

## 2024-03-28 PROCEDURE — G8417 CALC BMI ABV UP PARAM F/U: HCPCS | Performed by: INTERNAL MEDICINE

## 2024-03-28 PROCEDURE — 1036F TOBACCO NON-USER: CPT | Performed by: INTERNAL MEDICINE

## 2024-03-28 PROCEDURE — G8482 FLU IMMUNIZE ORDER/ADMIN: HCPCS | Performed by: INTERNAL MEDICINE

## 2024-03-28 PROCEDURE — 93971 EXTREMITY STUDY: CPT

## 2024-03-28 NOTE — PROGRESS NOTES
Rosario Luis is a 53 y.o. female presenting for Leg Pain (L)  .     \"Have you been to the ER, urgent care clinic since your last visit?  Hospitalized since your last visit?\"    Urgent Care 3-3-24 and 3-22-24 for sinus infection/ ear infection    “Have you seen or consulted any other health care providers outside of [facility] since your last visit?”    NO                                   
   Left.  Dr. Packer    Measles childhood    Migraine 2017, 07/2019    w aura and facial paresthesia.  Dr. Stefanie Griffin    Mumps childhood    Neuropathy     Plantar fasciitis, bilateral 2010    Dr. Colleen Carl    Sciatica 2008    Right.  with OA.  Dr. Rogel    Tinnitus of right ear 2012    Dr. Peters     Past Surgical History:   Procedure Laterality Date    BIOPSY VAGINAL  01/01/2011    Dr. Jolly.    COLONOSCOPY N/A 10/26/2022    colonoscopy  performed by Percy Lin MD at [facility] ENDOSCOPY    COLONOSCOPY N/A 12/29/2023    COLONOSCOPY performed by Kit Brady II, MD at [facility] ENDOSCOPY    DILATION AND CURETTAGE OF UTERUS      HAND/FINGER SURGERY UNLISTED  09/01/2003    Right Index finger repair due to cut    HERNIA REPAIR  09/01/2003    Umbilical.  Dr. Blue Menendez.    HERNIA REPAIR  09/23/2011    recurrent umbilical.  Laparoscopy incisional.  Dr. Tank Molina.    HX PARTIAL COLECTOMY Right 12/01/2022    Dr. Brady    LAP,CHOLECYSTECTOMY  07/01/2001    TONSILLECTOMY  childhood     Allergies   Allergen Reactions    Latex Rash    Penicillins Hives    Prednisone Hives    Sulfa Antibiotics Hives    Gentamicin Other (See Comments)     Itchy eyes due to sulfate    Metformin Diarrhea     Current Outpatient Medications   Medication Sig Dispense Refill    hydrOXYzine HCl (ATARAX) 25 MG tablet TAKE 2 TABLETS BY MOUTH EVERY 8 HOURS AS NEEDED (URTICARIA). INDICATIONS: HIVES 180 tablet 0    prochlorperazine (COMPAZINE) 10 MG tablet TAKE ONE TABLET BY MOUTH EVERY 4 TO 6 HOURS AS NEEDED FOR NAUSEA 120 tablet 1    LACTOBACILLUS PO Take 1 tablet by mouth daily      Multiple Vitamin (MULTIVITAMIN ADULT PO) Take 1 tablet by mouth daily      famotidine (PEPCID) 40 MG tablet Take 1 tablet by mouth 2 times daily      FERROUS BISGLYCINATE CHELATE PO Take 1 tablet by mouth every other day      lidocaine-prilocaine (EMLA) 2.5-2.5 % cream Apply 1 Application topically as needed      ALPRAZolam (XANAX) 0.5 MG tablet Take 1

## 2024-03-29 LAB — ECHO BSA: 2.52 M2

## 2024-05-06 RX ORDER — CYCLOBENZAPRINE HCL 10 MG
TABLET ORAL
Qty: 90 TABLET | Refills: 0 | OUTPATIENT
Start: 2024-05-06

## 2024-05-09 RX ORDER — CYCLOBENZAPRINE HCL 10 MG
TABLET ORAL
Qty: 90 TABLET | Refills: 0 | OUTPATIENT
Start: 2024-05-09

## 2024-05-30 ENCOUNTER — PATIENT MESSAGE (OUTPATIENT)
Facility: CLINIC | Age: 54
End: 2024-05-30

## 2024-05-30 RX ORDER — CYCLOBENZAPRINE HCL 10 MG
TABLET ORAL
Qty: 90 TABLET | Refills: 0 | Status: SHIPPED | OUTPATIENT
Start: 2024-05-30

## 2024-05-30 NOTE — TELEPHONE ENCOUNTER
PCP: Nestor Anna, APRN - NP    Last appt: 3/28/2024    Future Appointments   Date Time Provider Department Center   7/15/2024  2:10 PM Thaddeus Chu MD [facility] BS AMB       Requested Prescriptions     Pending Prescriptions Disp Refills    cyclobenzaprine (FLEXERIL) 10 MG tablet 90 tablet 0     Sig: TAKE 1 TABLET BY MOUTH THREE TIMES A DAY AS NEEDED FOR MUSCLE SPASM

## 2024-05-30 NOTE — TELEPHONE ENCOUNTER
From: Rosario Luis  To: Dr. BLAIR Paul  Sent: 5/30/2024 12:42 PM EDT  Subject: Refill request Flexeril and Fioricet    Good morning,    I would like to request a refill for my Flexeril - Cyclobenzaprine 10 MG tablet, for my knee and leg pain and Fioricet for my migraines.     [facility]   [phone]    Thank you   Rosario Luis  [phone]

## 2024-05-31 DIAGNOSIS — G43.009 MIGRAINE WITHOUT AURA, NOT INTRACTABLE, WITHOUT STATUS MIGRAINOSUS: ICD-10-CM

## 2024-05-31 RX ORDER — BUTALBITAL, ACETAMINOPHEN AND CAFFEINE 50; 325; 40 MG/1; MG/1; MG/1
TABLET ORAL
Qty: 30 TABLET | Refills: 0 | Status: SHIPPED | OUTPATIENT
Start: 2024-05-31

## 2024-05-31 NOTE — TELEPHONE ENCOUNTER
PCP: Nestor Anna, APRN - NP    Last appt: 12/12/2022    Future Appointments   Date Time Provider Department Center   7/15/2024  2:10 PM Thaddeus Chu MD [facility] BS AMB       Requested Prescriptions     Pending Prescriptions Disp Refills    butalbital-acetaminophen-caffeine (FIORICET, ESGIC) -40 MG per tablet [Pharmacy Med Name: NCCNEU-YFRGHAOE-EHWA -40] 30 tablet 0     Sig: TAKE 1 TABLET BY MOUTH EVERY SIX (6) HOURS AS NEEDED FOR HEADACHE.

## 2024-06-04 ENCOUNTER — TELEPHONE (OUTPATIENT)
Facility: CLINIC | Age: 54
End: 2024-06-04

## 2024-06-04 DIAGNOSIS — E03.9 ACQUIRED HYPOTHYROIDISM: ICD-10-CM

## 2024-06-04 NOTE — TELEPHONE ENCOUNTER
PCP: Nestor Anna, APRN - NP    Last appt: 12/11/2023    Future Appointments   Date Time Provider Department Center   7/15/2024  2:10 PM Thaddeus Chu MD [facility] BS AMB       Requested Prescriptions     Pending Prescriptions Disp Refills    levothyroxine (SYNTHROID) 125 MCG tablet 90 tablet 1     Sig: Take 1 tablet by mouth every morning (before breakfast)

## 2024-06-05 RX ORDER — LEVOTHYROXINE SODIUM 0.12 MG/1
125 TABLET ORAL
Qty: 90 TABLET | Refills: 0 | Status: SHIPPED | OUTPATIENT
Start: 2024-06-05

## 2024-06-05 RX ORDER — LEVOTHYROXINE SODIUM 0.12 MG/1
125 TABLET ORAL
Qty: 90 TABLET | Refills: 1 | OUTPATIENT
Start: 2024-06-05

## 2024-06-10 ENCOUNTER — NURSE ONLY (OUTPATIENT)
Facility: CLINIC | Age: 54
End: 2024-06-10

## 2024-06-10 DIAGNOSIS — R73.03 PREDIABETES: Primary | ICD-10-CM

## 2024-06-10 DIAGNOSIS — E03.9 ACQUIRED HYPOTHYROIDISM: ICD-10-CM

## 2024-06-10 DIAGNOSIS — E78.5 DYSLIPIDEMIA: ICD-10-CM

## 2024-06-10 LAB
ALBUMIN SERPL-MCNC: 3.6 G/DL (ref 3.5–5)
ALBUMIN/GLOB SERPL: 1.2 (ref 1.1–2.2)
ALP SERPL-CCNC: 113 U/L (ref 45–117)
ALT SERPL-CCNC: 33 U/L (ref 12–78)
ANION GAP SERPL CALC-SCNC: 1 MMOL/L (ref 5–15)
AST SERPL-CCNC: 19 U/L (ref 15–37)
BASOPHILS # BLD: 0.1 K/UL (ref 0–0.1)
BASOPHILS NFR BLD: 1 % (ref 0–1)
BILIRUB SERPL-MCNC: 0.4 MG/DL (ref 0.2–1)
BUN SERPL-MCNC: 18 MG/DL (ref 6–20)
BUN/CREAT SERPL: 21 (ref 12–20)
CALCIUM SERPL-MCNC: 8.7 MG/DL (ref 8.5–10.1)
CHLORIDE SERPL-SCNC: 111 MMOL/L (ref 97–108)
CHOLEST SERPL-MCNC: 209 MG/DL
CO2 SERPL-SCNC: 28 MMOL/L (ref 21–32)
CREAT SERPL-MCNC: 0.84 MG/DL (ref 0.55–1.02)
DIFFERENTIAL METHOD BLD: ABNORMAL
EOSINOPHIL # BLD: 0.2 K/UL (ref 0–0.4)
EOSINOPHIL NFR BLD: 2 % (ref 0–7)
ERYTHROCYTE [DISTWIDTH] IN BLOOD BY AUTOMATED COUNT: 15.3 % (ref 11.5–14.5)
EST. AVERAGE GLUCOSE BLD GHB EST-MCNC: 117 MG/DL
GLOBULIN SER CALC-MCNC: 2.9 G/DL (ref 2–4)
GLUCOSE SERPL-MCNC: 93 MG/DL (ref 65–100)
HBA1C MFR BLD: 5.7 % (ref 4–5.6)
HCT VFR BLD AUTO: 38.1 % (ref 35–47)
HDLC SERPL-MCNC: 71 MG/DL
HDLC SERPL: 2.9 (ref 0–5)
HGB BLD-MCNC: 11.7 G/DL (ref 11.5–16)
IMM GRANULOCYTES # BLD AUTO: 0.1 K/UL (ref 0–0.04)
IMM GRANULOCYTES NFR BLD AUTO: 1 % (ref 0–0.5)
LDLC SERPL CALC-MCNC: 123 MG/DL (ref 0–100)
LYMPHOCYTES # BLD: 3.3 K/UL (ref 0.8–3.5)
LYMPHOCYTES NFR BLD: 42 % (ref 12–49)
MCH RBC QN AUTO: 29.3 PG (ref 26–34)
MCHC RBC AUTO-ENTMCNC: 30.7 G/DL (ref 30–36.5)
MCV RBC AUTO: 95.5 FL (ref 80–99)
MONOCYTES # BLD: 0.8 K/UL (ref 0–1)
MONOCYTES NFR BLD: 10 % (ref 5–13)
NEUTS SEG # BLD: 3.6 K/UL (ref 1.8–8)
NEUTS SEG NFR BLD: 44 % (ref 32–75)
NRBC # BLD: 0 K/UL (ref 0–0.01)
NRBC BLD-RTO: 0 PER 100 WBC
PLATELET # BLD AUTO: 212 K/UL (ref 150–400)
PMV BLD AUTO: 11.3 FL (ref 8.9–12.9)
POTASSIUM SERPL-SCNC: 4.6 MMOL/L (ref 3.5–5.1)
PROT SERPL-MCNC: 6.5 G/DL (ref 6.4–8.2)
RBC # BLD AUTO: 3.99 M/UL (ref 3.8–5.2)
SODIUM SERPL-SCNC: 140 MMOL/L (ref 136–145)
T4 FREE SERPL-MCNC: 1.3 NG/DL (ref 0.8–1.5)
TRIGL SERPL-MCNC: 75 MG/DL
TSH SERPL DL<=0.05 MIU/L-ACNC: 5.69 UIU/ML (ref 0.36–3.74)
VLDLC SERPL CALC-MCNC: 15 MG/DL
WBC # BLD AUTO: 7.9 K/UL (ref 3.6–11)

## 2024-06-11 DIAGNOSIS — E03.9 ACQUIRED HYPOTHYROIDISM: Primary | ICD-10-CM

## 2024-06-11 RX ORDER — LEVOTHYROXINE SODIUM 0.15 MG/1
150 TABLET ORAL DAILY
Qty: 90 TABLET | Refills: 1 | Status: SHIPPED | OUTPATIENT
Start: 2024-06-11

## 2024-06-13 DIAGNOSIS — E03.9 ACQUIRED HYPOTHYROIDISM: Primary | ICD-10-CM

## 2024-06-24 NOTE — ADDENDUM NOTE
Addended by: Jose Rafael Garcia on: 5/28/2019 07:28 PM     Modules accepted: Level of Service ED HPI GENERAL MEDICAL PROBLEM





- General


Stated Complaint: ER


Time Seen by Provider: 06/01/20 10:35


Source of Information: Reports: Patient


History Limitations: Reports: No Limitations





- History of Present Illness


INITIAL COMMENTS - FREE TEXT/NARRATIVE: 





PT presents to the ER with ongoing right-sided knee pain status post 3 weeks 

ago he had an injury after stepping in a ditch and twisted his knee.  He states 

pain is normally about a 7 out of 10 unless he takes ibuprofen and goes not 

about 5 out of 10 which he does that today he said at the dull sharp mostly on 

the outside of the right knee.  He has noticed that it seems to be popping and 

giving out maybe a little bit more.  He states occasionally it does lock up he 

has been able to bear weight on it although some days hurt worse than others.  

He says Saturday night he believes he may have injured it again after stepping 

off a step wrong and has been using crutches since intermittently through the 

day but he is able to walk and bear weight on it.





He denies any fever or heat to the area or redness no numbness or tingling no 

excessive swelling


Location: Reports: Lower Extremity, Right


Quality: Reports: Ache, Dull


Improves with: Reports: Medication


Associated Symptoms: Reports: No Other Symptoms


Treatments PTA: Reports: NSAIDS





Review of Systems





- Review of Systems


Review Of Systems: See Below


Constitutional: Reports: No Symptoms


Musculoskeletal: Reports: Leg Pain, Joint Pain


Skin: Reports: No Symptoms


Neurological: Reports: No Symptoms


Psychiatric: Reports: No Symptoms





ED EXAM, GENERAL





- Physical Exam


Exam: See Below


Exam Limited By: No Limitations


General Appearance: Alert, WD/WN, No Apparent Distress


Extremities: Normal Inspection, No Pedal Edema, Normal Capillary Refill, Other (

Exam to the right knee patient has mild decreased range of motion with 

extension normal flexion there is mild noted generalized edema but no effusion 

or ballottement he has no joint line tenderness he has normal varus valgus 

normal anterior posterior drawer's mild pain crepitus noted with Shorty's he 

is neurovascular intact with a positive dorsalis pedis posterior tibialis and 

has normal flexion of the foot he is able to bear weight with no issues noted).

  No: Normal Range of Motion, Non-Tender


Neurological: Alert, Oriented, CN II-XII Intact, Normal Cognition, Normal 

Reflexes, No Motor/Sensory Deficits


Psychiatric: Normal Affect, Normal Mood


Skin Exam: Warm, Dry, Intact, Normal Color, No Rash





Course





- Vital Signs


Text/Narrative:: 





Patient was instructed to apply ice wear the knee brace as directed and [facility]


He was given Ultram 50 mg #15 1 p.o. every 4-6 hours as needed





Departure





- Departure


Time of Disposition: 10:50


Disposition: Home, Self-Care 01


Condition: Good


Clinical Impression: 


 Knee pain, right








- Discharge Information


*PRESCRIPTION DRUG MONITORING PROGRAM REVIEWED*: No


*COPY OF PRESCRIPTION DRUG MONITORING REPORT IN PATIENT VINEET: No


Instructions:  Acute Knee Pain, Adult


Additional Instructions: 


Apply ice to the area is much as possible usually 1 hour on 1 hour off for the 

next 3 days





Wear your knee brace as directed





Take your medication Ultram 50 mg 1 every 4-6 hours as needed for pain





Continue to take Tylenol 500 mg every 4-6 hours as needed


Continue to take ibuprofen 800 mg every 8 hours as needed





Follow-up with your primary care provider or [facility] provider in the next 2 to 3 

days to set up an appointment and schedule an MRI





Return to the emergency room if anything changes or gets worse





- Problem List & Annotations


(1) Knee pain, right


SNOMED Code(s): 76519853


   Code(s): M25.561 - PAIN IN RIGHT KNEE   Status: Acute Negative Screen

## 2024-07-15 ENCOUNTER — LAB (OUTPATIENT)
Facility: CLINIC | Age: 54
End: 2024-07-15

## 2024-07-15 DIAGNOSIS — E03.9 ACQUIRED HYPOTHYROIDISM: ICD-10-CM

## 2024-07-15 LAB
T4 FREE SERPL-MCNC: 1.5 NG/DL (ref 0.8–1.5)
TSH SERPL DL<=0.05 MIU/L-ACNC: 1.71 UIU/ML (ref 0.36–3.74)

## 2024-07-30 DIAGNOSIS — R11.0 NAUSEA: ICD-10-CM

## 2024-07-30 DIAGNOSIS — J45.20 MILD INTERMITTENT ASTHMA, UNCOMPLICATED: ICD-10-CM

## 2024-07-30 RX ORDER — MONTELUKAST SODIUM 10 MG/1
10 TABLET ORAL DAILY
Qty: 90 TABLET | Refills: 0 | Status: SHIPPED | OUTPATIENT
Start: 2024-07-30

## 2024-07-30 RX ORDER — PROCHLORPERAZINE MALEATE 10 MG
TABLET ORAL
Qty: 120 TABLET | Refills: 0 | Status: SHIPPED | OUTPATIENT
Start: 2024-07-30

## 2024-07-30 NOTE — TELEPHONE ENCOUNTER
RX refill request from the patient/pharmacy. Patient last seen 03- with labs, and is on the list to see Dr. Packer.  Requested Prescriptions     Pending Prescriptions Disp Refills    prochlorperazine (COMPAZINE) 10 MG tablet 120 tablet 0     Sig: Take one tablet every 4 to 6 hours as needed for nausea.    montelukast (SINGULAIR) 10 MG tablet 90 tablet 0     Sig: Take 1 tablet by mouth daily

## 2024-08-18 DIAGNOSIS — R11.0 NAUSEA: ICD-10-CM

## 2024-08-19 NOTE — TELEPHONE ENCOUNTER
PCP: Nestor Anna, APRN - NP    Last appt: Visit date not found    Future Appointments   Date Time Provider Department Center   8/26/2024  2:10 PM Thaddeus Chu MD [facility] BS AMB       Requested Prescriptions     Pending Prescriptions Disp Refills    cyclobenzaprine (FLEXERIL) 10 MG tablet [Pharmacy Med Name: CYCLOBENZAPRINE 10 MG TABLET] 90 tablet 0     Sig: TAKE 1 TABLET BY MOUTH THREE TIMES A DAY AS NEEDED FOR MUSCLE SPASM    prochlorperazine (COMPAZINE) 10 MG tablet [Pharmacy Med Name: PROCHLORPERAZINE 10 MG TAB] 120 tablet 0     Sig: TAKE ONE TABLET EVERY 4 TO 6 HOURS AS NEEDED FOR NAUSEA.

## 2024-08-20 RX ORDER — PROCHLORPERAZINE MALEATE 10 MG
TABLET ORAL
Qty: 120 TABLET | Refills: 0 | Status: SHIPPED | OUTPATIENT
Start: 2024-08-20

## 2024-08-20 RX ORDER — CYCLOBENZAPRINE HCL 10 MG
TABLET ORAL
Qty: 90 TABLET | Refills: 0 | Status: SHIPPED | OUTPATIENT
Start: 2024-08-20

## 2024-08-21 ENCOUNTER — TELEPHONE (OUTPATIENT)
Age: 54
End: 2024-08-21

## 2024-08-21 NOTE — TELEPHONE ENCOUNTER
Call out to patient to see if she went to a weight loss management program. There was no answer and I was unable to leave a message d/t mailbox being full

## 2024-08-31 ENCOUNTER — OFFICE VISIT (OUTPATIENT)
Age: 54
End: 2024-08-31

## 2024-08-31 VITALS
RESPIRATION RATE: 18 BRPM | WEIGHT: 293 LBS | HEART RATE: 76 BPM | BODY MASS INDEX: 53.92 KG/M2 | HEIGHT: 62 IN | OXYGEN SATURATION: 98 % | SYSTOLIC BLOOD PRESSURE: 118 MMHG | TEMPERATURE: 98.2 F | DIASTOLIC BLOOD PRESSURE: 78 MMHG

## 2024-08-31 DIAGNOSIS — M79.10 MUSCULAR PAIN: Primary | ICD-10-CM

## 2024-08-31 RX ORDER — DICLOFENAC SODIUM 75 MG/1
75 TABLET, DELAYED RELEASE ORAL 2 TIMES DAILY PRN
Qty: 30 TABLET | Refills: 0 | Status: SHIPPED | OUTPATIENT
Start: 2024-08-31

## 2024-08-31 ASSESSMENT — ENCOUNTER SYMPTOMS
NAUSEA: 0
COUGH: 0
VOMITING: 0
SHORTNESS OF BREATH: 0

## 2024-08-31 NOTE — PROGRESS NOTES
Subjective     Chief Complaint   Patient presents with    Shoulder Pain     Patient is having right shoulder pain. Patient is aware we do not have xray. The pain started a week ago. Patient also fell on Tuesday which has made pain worse. Movement makes pain worse. The pain is a burning sensation.         Shoulder Pain   Pertinent negatives include no fever.    54-year-old female presents for right shoulder pain going on for the past week.  During this week she had fallen as well which made the pain worse.  Patient has been icing it locally resting it and putting it in the sling.  She has not taken any medicines for the pain    Past Medical History:   Diagnosis Date    Acne rosacea     Dr. Ellen Betancourt.      Allergy, unspecified not elsewhere classified childhood     Txd with immunotherapy.  Dr. Woody Peters    Ankle sprain 11/2012    Right.  Dr. Carl.    Ankle sprain 2007    Right.  Dr. Deandre Ortiz    Asthma childhood    Cancer ([facility])     colon CA    Chickenpox childhood    Chronic low back pain with right-sided sciatica     and SI joint dysfunction.  Dr. Ace Cook.    Chronic otitis media     Dr. Woody Peters    Colon cancer ([facility]) 10/28/2022    Diverticulitis     Dry eye syndrome 2013    Dr. Peterson.    DVT of deep femoral vein ([facility]) 12/07/2018    Acute occlusive DVT of L deep femoral vein and Acute partially occlusive DVT of L Common Fem Vein and L Prox Fem Vein.  Txd with Xarelto x 3 months.  due to BCP.  Dr. Denisa Thomason    EBV infection 06/27/2011    Fatty liver     GERD (gastroesophageal reflux disease)     Hearing loss     Mild high frequency sensorineuronal hearing loss.  Dr. Peters    Heart burn     Dr. Torres Rogers    Heart murmur     Hernia of abdominal wall 09/2003, 09/2011    umbilical.  Dr. Blue Menendez.  Dr. Tank Molina    Hyperglycemia 2013    Hypothyroidism 06/2010    Dr. Aroldo Feldman    Ill-defined condition     vitamin D deficiency    Intraocular pressure increase 12/2011

## 2024-09-26 DIAGNOSIS — L29.9 PRURITUS, UNSPECIFIED: ICD-10-CM

## 2024-09-26 DIAGNOSIS — G47.00 INSOMNIA, UNSPECIFIED: ICD-10-CM

## 2024-09-26 RX ORDER — HYDROXYZINE HYDROCHLORIDE 25 MG/1
TABLET, FILM COATED ORAL
Qty: 180 TABLET | Refills: 0 | Status: SHIPPED | OUTPATIENT
Start: 2024-09-26

## 2024-09-29 DIAGNOSIS — E03.9 ACQUIRED HYPOTHYROIDISM: ICD-10-CM

## 2024-09-29 DIAGNOSIS — J45.20 MILD INTERMITTENT ASTHMA, UNCOMPLICATED: ICD-10-CM

## 2024-09-30 RX ORDER — MONTELUKAST SODIUM 10 MG/1
10 TABLET ORAL DAILY
Qty: 90 TABLET | Refills: 0 | Status: SHIPPED | OUTPATIENT
Start: 2024-09-30

## 2024-09-30 RX ORDER — LEVOTHYROXINE SODIUM 150 UG/1
150 TABLET ORAL DAILY
Qty: 90 TABLET | Refills: 1 | Status: SHIPPED | OUTPATIENT
Start: 2024-09-30

## 2024-09-30 NOTE — TELEPHONE ENCOUNTER
Patient is on the list for Dr. Packer.     PCP: Nestor Anna, APRN - NP    Last appt: Visit date not found  Future Appointments   Date Time Provider Department Center   11/7/2024  1:10 PM Thaddeus Chu MD [facility] BS AMB       Requested Prescriptions     Pending Prescriptions Disp Refills    levothyroxine (SYNTHROID) 150 MCG tablet [Pharmacy Med Name: LEVOTHYROXINE 150 MCG TABLET] 90 tablet 1     Sig: TAKE 1 TABLET BY MOUTH EVERY DAY    montelukast (SINGULAIR) 10 MG tablet [Pharmacy Med Name: MONTELUKAST SOD 10 MG TABLET] 90 tablet 0     Sig: TAKE 1 TABLET BY MOUTH EVERY DAY       Prior labs and Blood pressures:  BP Readings from Last 3 Encounters:   08/31/24 118/78   03/28/24 136/80   03/22/24 130/77     Lab Results   Component Value Date/Time     06/10/2024 08:23 AM    K 4.6 06/10/2024 08:23 AM     06/10/2024 08:23 AM    CO2 28 06/10/2024 08:23 AM    BUN 18 06/10/2024 08:23 AM    GFRAA >60 12/02/2021 12:04 PM     No results found for: \"HBA1C\", \"[id number]\"  Lab Results   Component Value Date/Time    CHOL 209 06/10/2024 08:23 AM    HDL 71 06/10/2024 08:23 AM     06/10/2024 08:23 AM    LDL 97.2 11/17/2023 08:21 AM    VLDL 15 06/10/2024 08:23 AM     No results found for: \"VITD3\"        Lab Results   Component Value Date/Time    TSH 1.71 07/15/2024 08:21 AM

## 2024-10-20 NOTE — PROGRESS NOTES
Rosario Luis a 54 y.o. female with history of vit D def, prediabetes, plantar fasciitis bilateral, migraine, asthma, GERD, hx of DVT, anxiety, anemia, allergic rhinitis,  presents to office today for hypothyroidism follow up.     Subjective:    Acquired hypothyroidism  - labs: TSH, 1.71, 7/15/24  - meds: levothyroxine 150mcg   - patient reports that her dose was increased from 100mcg to 150mcg during last office visit  - Denies issues with thyroid, no changes in skin, hair or nails, denies unintentional weight loss or gain      Oral Thrush  - currently symptomatically stable  - request refills on her magic mouthwash to keep on hand in the event she has symptoms    Tenia Pedis, bilaterally  - notes that she wears compression stockings and sneakers which can cause moisture buildup, request refills on her Naftin    - currently symptomatically stable     Asthma  - meds: albuterol inhaler and nebulizer solution, duoneb  - symptomatically stable, keeps her rescue inhaler on hand   - has not felt the need to use her nebulizer lately       Labs completed 6/10/24    Health Maintenance   - Screening Labs (Hep C, HIV): completed   - Depression screening: mood stable   - Breast cancer screenin2024  - Cervical cancer screenin2024  - Osteoporosis screening: n/a   - Colon cancer screening: next due 24  Hx of Adenocarcinoma tx R colectomy 2022 with invasion adherent small   bowel, 2 + LN     - Smoking hx:   Tobacco Use      Smoking status: Never      Smokeless tobacco: Never      Past Medical History:   Diagnosis Date    Acne rosacea     Dr. Ellen Betancourt.      Allergy, unspecified not elsewhere classified childhood     Txd with immunotherapy.  Dr. Woody Peters    Ankle sprain 2012    Right.  Dr. Carl.    Ankle sprain     Right.  Dr. Deandre Ortiz    Asthma childhood    Cancer (HCC)     colon CA    Chickenpox childhood    Chronic low back pain with right-sided sciatica     and SI joint

## 2024-10-21 ENCOUNTER — OFFICE VISIT (OUTPATIENT)
Facility: CLINIC | Age: 54
End: 2024-10-21
Payer: COMMERCIAL

## 2024-10-21 VITALS
BODY MASS INDEX: 53.92 KG/M2 | SYSTOLIC BLOOD PRESSURE: 170 MMHG | OXYGEN SATURATION: 98 % | RESPIRATION RATE: 18 BRPM | TEMPERATURE: 97 F | DIASTOLIC BLOOD PRESSURE: 88 MMHG | WEIGHT: 293 LBS | HEIGHT: 62 IN | HEART RATE: 61 BPM

## 2024-10-21 DIAGNOSIS — J45.20 MILD INTERMITTENT ASTHMA WITHOUT COMPLICATION: ICD-10-CM

## 2024-10-21 DIAGNOSIS — G43.009 MIGRAINE WITHOUT AURA AND WITHOUT STATUS MIGRAINOSUS, NOT INTRACTABLE: ICD-10-CM

## 2024-10-21 DIAGNOSIS — B37.0 ORAL THRUSH: ICD-10-CM

## 2024-10-21 DIAGNOSIS — B35.3 TINEA PEDIS, RECURRENT: ICD-10-CM

## 2024-10-21 DIAGNOSIS — G43.009 MIGRAINE WITHOUT AURA, NOT INTRACTABLE, WITHOUT STATUS MIGRAINOSUS: ICD-10-CM

## 2024-10-21 DIAGNOSIS — Z23 IMMUNIZATION DUE: ICD-10-CM

## 2024-10-21 DIAGNOSIS — G47.00 INSOMNIA, UNSPECIFIED: ICD-10-CM

## 2024-10-21 DIAGNOSIS — E66.01 OBESITY, MORBID: ICD-10-CM

## 2024-10-21 DIAGNOSIS — E03.9 ACQUIRED HYPOTHYROIDISM: Primary | ICD-10-CM

## 2024-10-21 DIAGNOSIS — R11.0 CHRONIC NAUSEA: ICD-10-CM

## 2024-10-21 DIAGNOSIS — L29.9 PRURITUS, UNSPECIFIED: ICD-10-CM

## 2024-10-21 PROCEDURE — 99214 OFFICE O/P EST MOD 30 MIN: CPT | Performed by: STUDENT IN AN ORGANIZED HEALTH CARE EDUCATION/TRAINING PROGRAM

## 2024-10-21 PROCEDURE — 90471 IMMUNIZATION ADMIN: CPT | Performed by: STUDENT IN AN ORGANIZED HEALTH CARE EDUCATION/TRAINING PROGRAM

## 2024-10-21 PROCEDURE — 90661 CCIIV3 VAC ABX FR 0.5 ML IM: CPT | Performed by: STUDENT IN AN ORGANIZED HEALTH CARE EDUCATION/TRAINING PROGRAM

## 2024-10-21 RX ORDER — LEVOTHYROXINE SODIUM 150 UG/1
150 TABLET ORAL DAILY
Qty: 90 TABLET | Refills: 1 | Status: SHIPPED | OUTPATIENT
Start: 2024-10-21

## 2024-10-21 RX ORDER — NAFTIFINE HYDROCHLORIDE 20 MG/G
45 GEL TOPICAL 2 TIMES DAILY PRN
Qty: 45 G | Refills: 0 | Status: SHIPPED | OUTPATIENT
Start: 2024-10-21

## 2024-10-21 RX ORDER — CYCLOBENZAPRINE HCL 10 MG
TABLET ORAL
Qty: 90 TABLET | Refills: 0 | Status: CANCELLED | OUTPATIENT
Start: 2024-10-21

## 2024-10-21 RX ORDER — ALPRAZOLAM 0.5 MG
0.5 TABLET ORAL 2 TIMES DAILY
Status: CANCELLED | OUTPATIENT
Start: 2024-10-21

## 2024-10-21 RX ORDER — ALBUTEROL SULFATE 90 UG/1
INHALANT RESPIRATORY (INHALATION)
Qty: 18 EACH | Refills: 3 | Status: SHIPPED | OUTPATIENT
Start: 2024-10-21

## 2024-10-21 RX ORDER — ONDANSETRON 4 MG/1
4 TABLET, ORALLY DISINTEGRATING ORAL EVERY 8 HOURS PRN
Qty: 90 TABLET | Refills: 1 | Status: SHIPPED | OUTPATIENT
Start: 2024-10-21

## 2024-10-21 RX ORDER — BUTALBITAL, ACETAMINOPHEN AND CAFFEINE 50; 325; 40 MG/1; MG/1; MG/1
TABLET ORAL
Qty: 30 TABLET | Refills: 0 | Status: CANCELLED | OUTPATIENT
Start: 2024-10-21

## 2024-10-21 SDOH — ECONOMIC STABILITY: FOOD INSECURITY: WITHIN THE PAST 12 MONTHS, YOU WORRIED THAT YOUR FOOD WOULD RUN OUT BEFORE YOU GOT MONEY TO BUY MORE.: NEVER TRUE

## 2024-10-21 SDOH — ECONOMIC STABILITY: INCOME INSECURITY: HOW HARD IS IT FOR YOU TO PAY FOR THE VERY BASICS LIKE FOOD, HOUSING, MEDICAL CARE, AND HEATING?: NOT HARD AT ALL

## 2024-10-21 SDOH — ECONOMIC STABILITY: FOOD INSECURITY: WITHIN THE PAST 12 MONTHS, THE FOOD YOU BOUGHT JUST DIDN'T LAST AND YOU DIDN'T HAVE MONEY TO GET MORE.: NEVER TRUE

## 2024-10-21 ASSESSMENT — PATIENT HEALTH QUESTIONNAIRE - PHQ9
SUM OF ALL RESPONSES TO PHQ QUESTIONS 1-9: 0
SUM OF ALL RESPONSES TO PHQ QUESTIONS 1-9: 0
1. LITTLE INTEREST OR PLEASURE IN DOING THINGS: NOT AT ALL
SUM OF ALL RESPONSES TO PHQ9 QUESTIONS 1 & 2: 0
SUM OF ALL RESPONSES TO PHQ QUESTIONS 1-9: 0
SUM OF ALL RESPONSES TO PHQ QUESTIONS 1-9: 0
2. FEELING DOWN, DEPRESSED OR HOPELESS: NOT AT ALL

## 2024-10-21 NOTE — ASSESSMENT & PLAN NOTE
BMI in office today 59.74. I encouraged the patient on establishing healthier eating habits and regular exercise routine. Educational Material provided. Will revisit topic during next office visit, can also consider referral to dietician and/or surgical weight loss programs.

## 2024-10-21 NOTE — ASSESSMENT & PLAN NOTE
Per patient, she wears compression stockings and sneakers daily which can cause moisture buildup.  She keeps Naftin on hand in the event she has a flareup, request refills today, will send to pharmacy.

## 2024-10-21 NOTE — ASSESSMENT & PLAN NOTE
Patient noted to have chronic nausea, she keeps Zofran on hand in the event she needs it.  Will send refills to pharmacy.  In the future, we will discuss alternative treatment options.

## 2024-10-21 NOTE — ASSESSMENT & PLAN NOTE
Levothyroxine dose was readjusted during last office visit from 100mcg to 150mcg. Thyroid cascade collected today.

## 2024-10-21 NOTE — ASSESSMENT & PLAN NOTE
Patient has history of intermittent oral thrush.  She is currently symptomatically stable.  She derives good benefit with Magic mouthwash, refills sent to pharmacy.   Palliative Care Inpatient Consult    NAME:  Jeremy Herrera  MEDICAL RECORD NUMBER:  [id number]  AGE: 53 y.o. GENDER: female  : 1977  TODAY'S DATE:  2022    Reasons for Consultation:    Goals of care   Code status discussion     Members of PC team contributing to this consultation are :  Alfredito Monreal CNP   Palliative Care     Plan      Palliative Interaction:  I called and spoke with Denia patient sister and introduced myself and my palliative care role. Patient is single and has 1 biological child Jada Swan. She has completed Healthcare POA and Denia patient sister is POA #1. Patient lives with her son Jada Swan and his family. Patient healthcare POA is in the EMR. We discussed code status and I explained each level completely to patient sister. She states that her and her family work together to make decisions for her sister even though she is a POA. Denia states they want patient to be a full code status. Patient is currently on ventilator with Fentanyl and Propofol for sedation and Nimbex for paralytic. Patient is on Trilogy prior to admission for respiratory support. Palliative care will continue to follow patient and reach out to patient and family to provide emotional support and updates as needed. Education/support to family  Discharge planning/helping to coordinate care  Communications with primary service  Pharmacologic pain management  Providing support for coping/adaptation/distress of family  Discussing meaning/purpose   Caregiver support/education  Continue with current plan of care  Code status clarified: Full Code  Code status clarified: [facility]  Code status clarified: DNRCCA      History of Present Illness     The patient is a 53 y.o.   Non- / non  female who presents with Respiratory Distress      Referred to Palliative Care by   [x] Physician   [] Nursing  [] Family Request   [] Other:       She was admitted to the ICU service for Respiratory distress [R06.03]  Acute on chronic diastolic congestive heart failure ([facility] [I50.33]. Her hospital course has been associated with Acute respiratory failure with hypoxia and hypercapnia ([facility] The patient has a complicated medical history and has been hospitalized since 6/8/2022  6:12 Audrey Hernandez is a 53year old female with medical history of CHF, Morbid obesity,CHF, HIEN, Asthma, COPD, Pulmonary hypertension, DM, Smoker, HLD, Hypertension. Most recent echocardiogram completed in March 18, 2022 significant for severe pulmonary hypertension with right ventricular systolic pressure of 88 mmHg. Patient has a trilogy respirator at home, normally saturates between 88 and 92%. Patient came to ER with complaints of dyspnea and tested positive for Covid. She was saturating in the low 80's. She required maximum Bipap settings at 100% FIO2. Patient is on ventilator with propofol and fentanyl for sedation and Nimbex paralytic. Patient is also on Levophed for hypotension. Patient labs from today include magnesium 1.7, methemoglobin 0.5, wbc 26.1, rbc 4.57, HGB 12.4, HCT 40.0, , GLUC 245, VUN 40, CR 1.44, CA 8.9, , K 3.5, CHL 91. Patient had the following scans during hospitalization Chest xray, Abdomen xray, and Venous doppler lower extremities and results listed below. Patient has the following consults critical care COVID-positive BiPAP, ID COVID positive, internal med respiratory distress, palliative care goals of care and CODE STATUS discussion. Palliative care will continue to follow patient and reach out to patient and family to provide emotional support and updates as needed. Chest xray  There is increasing bilateral airspace disease.  Posteriorly layering pleural   fluid may contribute to the increasing opacity.       No pneumothorax.       Tubes and lines remain in good position.        Abdomen xray   1.  Satisfactory position of support devices.  Stable multifocal   consolidation could represent edema or pneumonia. 2. Enteric tube terminates in the gastric body.  No bowel obstruction.           Venous dopplers lower extremities   Summary  Bilateral:  Technically limited study as stated above however in the visualized areas no superficial or deep vein thrombosis was identified.   Active Hospital Problems    Diagnosis Date Noted    COVID-19 virus infection [U07.1] 06/08/2022     Priority: High    Acute on chronic diastolic congestive heart failure (HCC) [I50.33] 06/08/2022     Priority: Medium    Respiratory distress [R06.03]      Priority: Medium    Prediabetes [R73.03] 03/19/2022    Type 2 diabetes mellitus ([email or web address] [E11.9] 05/06/2019    Pulmonary hypertension, moderate to severe (HCC) [I27.20] 06/09/2018    Morbid obesity with BMI of 50.0-59.9, adult ([email or web address] [E66.01, Z68.43]     STEPH (obstructive sleep apnea) [G47.33]     Obesity hypoventilation syndrome (HCC) [E66.2]     COPD exacerbation (HCC) [J44.1]     Acute respiratory failure with hypoxia and hypercapnia (HCC) [J96.01, J96.02] 01/27/2018    Morbid obesity ([facility] [E66.01] 01/27/2018       PAST MEDICAL HISTORY      Diagnosis Date    Acute on chronic diastolic CHF (congestive heart failure) ([facility] 09/15/2018    HIEN (acute kidney injury) ([email or web address] 05/06/2019    HIEN (acute kidney injury) ([email or web address] 11/20/2018    Asthma     CHF     Chronic obstructive lung disease (HCC)     Chronic respiratory failure with hypoxia ([facility]     on home O2 therapy    COPD     COVID-19 virus infection 6/8/2022    Diabetes mellitus, new onset ([facility] 05/06/2019    Former smoker     quit smoking about 17 months ago/ She has a 22.00 pack-year smoking history    HTN     Hyperglycemia     Hyperlipidemia     Hypertension     Morbid obesity with BMI of 60.0-69.9, adult ([facility]     Neuromuscular disorder (HCC)     Neuropathy Right hand    STEPH on CPAP     DME per Dr. Teodoro Zambrano for CPAP of 18 cmh2o    Oxygen dependent     DME 06/2018 for home oxgyen at 2.5-3 lpm ATC  Pedal edema     Pneumonia     Pulmonary hypertension, moderate to severe ([facility] 2018    Torn meniscus     Wears glasses        PAST SURGICAL HISTORY  Past Surgical History:   Procedure Laterality Date    ABDOMEN SURGERY      Patient had a PEG tube placed 2018, removed 2018    [address]    CYSTOSCOPY  2019    CYSTOSCOPY N/A 2019    [facility]COPY [facility] at [facility]  2018    Removed in 2018    [facility] CATH POWER PICC TRIPLE  2018         JOINT REPLACEMENT      IN OFFICE/OUTPT VISIT,PROCEDURE ONLY N/A 2018    TRACHEOTOMY performed by Bryan Amaya MD at [address]  2018    TRACHEOTOMY  2018       SOCIAL HISTORY  Social History     Tobacco Use    Smoking status: Former Smoker     Packs/day: 1.00     Years: 22.00     Pack years: 22.00     Types: Cigarettes     Start date:      Quit date: 1/15/2018     Years since quittin.4    Smokeless tobacco: Never Used   Vaping Use    Vaping Use: Never used   Substance Use Topics    Alcohol use: No    Drug use: No       ALLERGIES  Allergies   Allergen Reactions    Bee Venom Anaphylaxis     Last bee sting when patient was at 9years of age   Randolph Beta Adrenergic Blockers Other (See Comments)     UNKNOWN      Sulfa Antibiotics Anaphylaxis    Asa [Aspirin]     Aspirin Other (See Comments)     HEART PALPATATIONS      Beta Adrenergic Blockers Other (See Comments)     Asystole and Bradycardia on admission  2018-2018 at [facility]    Sulfa Antibiotics          MEDICATIONS  Current Medications    insulin lispro  0-18 Units SubCUTAneous Q4H    midodrine  5 mg Oral TID WC    bumetanide  2 mg IntraVENous BID    [Held by provider] docusate sodium  100 mg Oral Daily    gabapentin  400 mg Oral TID    dexamethasone  6 mg IntraVENous Q24H    famotidine  20 mg Oral BID    cetirizine  10 mg Oral Daily    fluticasone  1 spray Nasal Daily    sodium chloride flush  5-40 mL IntraVENous 2 times per day    ipratropium-albuterol  1 ampule Inhalation 4x daily    heparin (porcine)  5,000 Units SubCUTAneous 3 times per day    [Held by provider] guaiFENesin  600 mg Oral BID     albuterol, artificial tears, bisacodyl, oxyCODONE-acetaminophen **AND** oxyCODONE, sodium chloride flush, sodium chloride, ondansetron **OR** ondansetron, polyethylene glycol, acetaminophen **OR** acetaminophen, potassium chloride **OR** potassium alternative oral replacement **OR** potassium chloride, potassium chloride, magnesium sulfate, glucose, dextrose bolus **OR** dextrose bolus, glucagon (rDNA), dextrose  IV Drips/Infusions   fentaNYL 50 mcg/mL 50 mcg/hr (06/13/22 0914)    propofol 25 mcg/kg/min (06/13/22 0914)    norepinephrine 8 mcg/min (06/13/22 0914)    cisatracurium (NIMBEX) infusion 1.5 mcg/kg/min (06/13/22 0914)    sodium chloride      dextrose       Home Medications  No current facility-administered medications on file prior to encounter. Current Outpatient Medications on File Prior to Encounter   Medication Sig Dispense Refill    oxyCODONE-acetaminophen (PERCOCET)  MG per tablet Take 1 tablet by mouth every 6 hours as needed for Pain. 3-4x daily [facility]      fluticasone-vilanterol (BREO ELLIPTA) 100-25 MCG/INH AEPB inhaler Inhale 2 puffs into the lungs daily      gabapentin (NEURONTIN) 300 MG capsule Take 1 capsule by mouth 3 times daily for 10 days. (Patient taking differently: Take 400 mg by mouth 3 times daily. ) 30 capsule 0    amLODIPine (NORVASC) 5 MG tablet Take 1 tablet by mouth daily 30 tablet 3    diazePAM (VALIUM) 5 MG tablet Take 5 mg by mouth every 12 hours as needed for Anxiety.       folic acid (FOLVITE) 1 MG tablet Take 1 mg by mouth daily      vitamin B-12 (CYANOCOBALAMIN) 1000 MCG tablet Take 1,000 mcg by mouth daily      Dulaglutide (TRULICITY) 5.27 SO/7.5YP SOPN Inject 0.75 mg into the skin once a week      metOLazone (ZAROXOLYN) 2.5 MG tablet Take 2.5 mg by mouth every other day      glipiZIDE (GLUCOTROL XL) 2.5 MG extended release tablet Take 2.5 mg by mouth (Patient not taking: Reported on 4/9/2022)      SM MUCUS RELIEF 600 MG extended release tablet   0    nystatin (MYCOSTATIN) 140937 UNIT/GM cream Indications: Powder   5    Ergocalciferol (VITAMIN D2 PO) Take 50,000 Units by mouth once a week (Patient not taking: Reported on 3/18/2022)      potassium chloride (KLOR-CON M) 10 MEQ extended release tablet Take 1 tablet by mouth daily 180 tablet 2    bumetanide (BUMEX) 1 MG tablet Take 2 mg by mouth 2 times daily      JANUVIA 50 MG tablet Take 100 mg by mouth daily   5    glucose monitoring kit (FREESTYLE) monitoring kit 1 kit by Does not apply route daily 1 kit 0    blood glucose monitor strips Test three  times a day & as needed for symptoms of irregular blood glucose.  50 strip 0    Lancets MISC 1 each by Does not apply route daily 100 each 0    pantoprazole sodium (PROTONIX) 40 MG PACK packet Take 40 mg by mouth every morning (before breakfast)      spironolactone (ALDACTONE) 25 MG tablet Take 1 tablet by mouth daily 30 tablet 3    isosorbide dinitrate (ISORDIL) 20 MG tablet Take 1 tablet by mouth 3 times daily 90 tablet 3    hydrALAZINE (APRESOLINE) 25 MG tablet Take 1 tablet by mouth every 8 hours 90 tablet 3    Blood Pressure KIT 1 Device by Does not apply route 2 times daily 1 kit 0    ferrous sulfate 325 (65 Fe) MG EC tablet Take 325 g by mouth 2 times daily (with meals)      loratadine (CLARITIN) 10 MG tablet Take 10 mg by mouth daily      tiotropium (SPIRIVA) 18 MCG inhalation capsule Inhale 1 capsule into the lungs       sertraline (ZOLOFT) 100 MG tablet Take 100 mg by mouth daily      atorvastatin (LIPITOR) 40 MG tablet Take 1 tablet by mouth nightly 30 tablet 3    albuterol sulfate  (90 Base) MCG/ACT inhaler Inhale 2 puffs into the lungs every 6 hours as needed for Wheezing      fluticasone (FLONASE) 50 MCG/ACT nasal spray 1 spray by Nasal route daily       albuterol (PROVENTIL) (2.5 MG/3ML) 0.083% nebulizer solution Take 3 mLs by nebulization every 6 hours as needed for Wheezing. 120 each 3    budesonide-formoterol (SYMBICORT) 160-4.5 MCG/ACT AERO Inhale 2 puffs into the lungs 2 times daily. 1 Inhaler 3       Data         /71   Pulse 84   Temp 99 °F (37.2 °C) (Core)   Resp 24   Ht 5' (1.524 m)   Wt (!) 334 lb 6.4 oz (151.7 kg)   SpO2 94%   BMI 65.31 kg/m²     Wt Readings from Last 3 Encounters:   06/10/22 (!) 334 lb 6.4 oz (151.7 kg)   04/20/22 (!) 334 lb 3.5 oz (151.6 kg)   03/19/22 (!) 355 lb (161 kg)        Code Status: Full Code     ADVANCED CARE PLANNING:  Patient has capacity for medical decisions: yes  Health Care Power of : yes  Living Will: no     Personal, Social, and Family History  Marital Status: single  Living situation: alone  Importance of garret/Jewish/spiritual beliefs: [] Very [] Somewhat [] Not   Psychological Distress: moderate  Does patient understand diagnosis/treatment? yes  Does caregiver understand diagnosis/treatment?  yes    Past Medical History:   Diagnosis Date    Acute on chronic diastolic CHF (congestive heart failure) ([facility] 09/15/2018    [facility]    [facility] 11/20/2018    Asthma     CHF     Chronic obstructive lung disease (HCC)     Chronic respiratory failure with hypoxia ([facility]     on home O2 therapy    COPD     COVID-19 virus infection 6/8/2022    Diabetes mellitus, [facility] 05/06/2019    Former smoker     quit smoking about 17 months ago/ She has a 22.00 pack-year smoking history    HTN     Hyperglycemia     Hyperlipidemia     Hypertension     Morbid obesity with BMI of 60.0-69.9, adult ([facility]     Neuromuscular disorder (HCC)     Neuropathy Right hand    STEPH on CPAP     DME per Dr. Alex Dao for CPAP of 18 cmh2o    Oxygen dependent     DME 06/2018 for home oxgyen at 2.5-3 lpm ATC    Pedal edema     Pneumonia     Pulmonary hypertension, moderate to severe ([facility] 2018    Torn meniscus     Wears glasses          Family History   Problem Relation Age of Onset    Emphysema Mother     Alzheimer's Disease Mother     Other Mother         Blood disorder    High Blood Pressure Father     Arthritis Father     Diabetes Sister     Heart Failure Sister     Kidney Disease Sister     High Blood Pressure Brother     Dementia Maternal Grandmother     High Blood Pressure Maternal Grandmother     Dementia Maternal Grandfather     High Blood Pressure Maternal Grandfather     Cancer Paternal Grandmother     Heart Disease Paternal Grandfather     Diabetes Sister     Heart Failure Sister     Other Sister         Intestinal problems    No Known Problems Sister     No Known Problems Son        Social History     Tobacco Use    Smoking status: Former Smoker     Packs/day: 1.00     Years: 22.00     Pack years: 22.00     Types: Cigarettes     Start date:      Quit date: 1/15/2018     Years since quittin.4    Smokeless tobacco: Never Used   Vaping Use    Vaping Use: Never used   Substance Use Topics    Alcohol use: No    Drug use: No           Assessment        REVIEW OF SYSTEMS  Patient is on ventilator, sedation and Paralytic. Patient is unresponsive. Unable to assess ROS. PHYSICAL ASSESSMENT:  Constitutional: Patient on ventilator with sedation. Patient is also on paralytic. Head: Normocephalic and atraumatic. Eyes: patient eyes are closed   Neck: Normal range of motion. Neck supple. No tracheal deviation present. Cardiovascular: Normal rate and regular rhythm, S1, S2, no murmur. Patient on levophed for hypotension  Pulmonary/Chest: Patient on ventilator with sedation and paralytic. Abdomen: Soft. No tenderness, not distended, no ascites, no organomegaly.   Musculoskeletal: generalized weakness   Neurological: Patient on ventilator with sedation. Patient is also on paralytic. Skin: Normal turgor, no bleeding, no bruising. Palliative Performance Scale:  ___60%  Ambulation reduced; Significant disease; Can't do hobbies/housework; intake normal or reduced; occasional assist; LOC full/confusion  ___50%  Mainly sit/lie; Extensive disease; Can't do any work; Considerable assist; intake normal or reduced; LOC full/confusion  _x__40%  Mainly in bed; Extensive disease; Mainly assist; intake normal or reduced; LOC full/confusion   ___30%  Bed Bound; Extensive disease; Total care; intake reduced; LOC full/confusion  ___20%  Bed Bound; Extensive disease; Total care; intake minimal; Drowsy/coma  ___10%  Bed Bound; Extensive disease; Total care; Mouth care only; Drowsy/coma  ___0       Death      Principle Problem/Diagnosis:  Acute respiratory failure with hypoxia and hypercapnia (HCC)    Additional Assessments:   Principal Problem:    Acute respiratory failure with hypoxia and hypercapnia (HCC)  Active Problems:    COVID-19 virus infection    Acute on chronic diastolic congestive heart failure (HCC)    Respiratory distress    Morbid obesity (HCC)    COPD exacerbation (HCC)    Pulmonary hypertension, moderate to severe (HCC)    Morbid obesity with BMI of 50.0-59.9, adult (HCC)    Obesity hypoventilation syndrome (HCC)    STEPH (obstructive sleep apnea)    [facility]    Prediabetes  Resolved Problems:    * No resolved hospital problems.  *    1- Symptom management/ pain control     Pain Assessment: Fentanyl, percocet and Roxicodone                 Anxiety:  none                          Dyspnea:  patient on bipap                          Fatigue:  generalized weakness     Other:      2- Goals of care evaluation   The patient goals of care are improve or maintain function/quality of life   Goals of care discussed with:    [] Patient independently    [] Patient and Family    [x] Family or Healthcare DPOA independently    [] Unable to discuss with patient, family/DPOA not present    3- Code Status  Full Code    4- Other recommendations   - We will continue to provide comfort and support to the patient and the family  Palliative Care will continue to follow Ms. Velásquez's care as needed. Thank you for allowing Palliative Care to participate in the care of Ms. Velásquez . This note has been dictated by dragon, typing errors may be a possibility. The total time I spent in seeing the patient, discussing goals of care, advanced directives, code status, greater than 50% time in counseling and other major issues was more than 60  minutes      Electronically signed by   TOLU Sánchez NP  Palliative Care Team  on 6/13/2022 at 9:20 AM    Palliative care office: [phone]    Please call with any palliative questions or concerns. Palliative Care Team is available via perfect serve or via phone.

## 2024-10-21 NOTE — PROGRESS NOTES
\"Have you been to the ER, urgent care clinic since your last visit?  Hospitalized since your last visit?\"    NO    “Have you seen or consulted any other health care providers outside our system since your last visit?”    NO    Pap smear? Jan 2024 - in chart      Mammogram? In chart - 2/204

## 2024-10-22 LAB — TSH SERPL DL<=0.05 MIU/L-ACNC: 1.83 UIU/ML (ref 0.45–4.5)

## 2024-10-22 NOTE — TELEPHONE ENCOUNTER
PCP: Valerie Packer MD    Last appt: 10/21/24    Future Appointments   Date Time Provider Department Center   11/7/2024  1:10 PM Thaddeus Chu MD [facility]   11/20/2024  9:20 AM NURSE VISIT [facility] DEP       Requested Prescriptions     Pending Prescriptions Disp Refills    hydrOXYzine HCl (ATARAX) 25 MG tablet [Pharmacy Med Name: HYDROXYZINE HCL 25 MG TABLET] 540 tablet 1     Sig: TAKE 2 TABLETS BY MOUTH EVERY 8 HOURS AS NEEDED (URTICARIA). INDICATIONS: HIVES

## 2024-10-24 RX ORDER — HYDROXYZINE HYDROCHLORIDE 25 MG/1
TABLET, FILM COATED ORAL
Qty: 30 TABLET | Refills: 0 | Status: SHIPPED | OUTPATIENT
Start: 2024-10-24

## 2024-11-07 ENCOUNTER — OFFICE VISIT (OUTPATIENT)
Age: 54
End: 2024-11-07
Payer: COMMERCIAL

## 2024-11-07 VITALS
SYSTOLIC BLOOD PRESSURE: 134 MMHG | OXYGEN SATURATION: 98 % | DIASTOLIC BLOOD PRESSURE: 80 MMHG | HEART RATE: 81 BPM | HEIGHT: 62 IN | TEMPERATURE: 98.6 F | BODY MASS INDEX: 53.92 KG/M2 | RESPIRATION RATE: 18 BRPM | WEIGHT: 293 LBS

## 2024-11-07 DIAGNOSIS — K43.9 ABDOMINAL WALL HERNIA: Primary | ICD-10-CM

## 2024-11-07 PROCEDURE — 99214 OFFICE O/P EST MOD 30 MIN: CPT | Performed by: SURGERY

## 2024-11-07 RX ORDER — DIAZEPAM 5 MG/1
5 TABLET ORAL ONCE
COMMUNITY

## 2024-11-07 ASSESSMENT — PATIENT HEALTH QUESTIONNAIRE - PHQ9
1. LITTLE INTEREST OR PLEASURE IN DOING THINGS: NOT AT ALL
SUM OF ALL RESPONSES TO PHQ QUESTIONS 1-9: 0

## 2024-11-07 NOTE — PROGRESS NOTES
Chief Complaint   Patient presents with    Follow-up    Hernia     Seen at the request of Dr. LIZZY Brady for evaluation of possible abdominal wall hernia     Last seen 1/2024    Referred to wt management center  Has not gone yet.  She does report a recent change in her insurance and now has access to weight management through her new insurance.  Plans to use those resources.    BMI up since last visit, currently 59    Tolerating PO  BMs: normal    Rare discomfort in the upper abdomen and sometimes right upper quadrant.    Brought me her CD from [facility] of the her most recent CT scan.  I attempted to review the images but unfortunately the images and image viewer was burned into a compressed file and therefore not readable.    Unfortunately she reports that the CT shows a new lung nodule and she is therefore having a CT PET.        Physical Exam:   Abdominal exam: Soft, + hernia. non-distended, non-tender.        Her abdomen is stable overall  Having rare minimal symptoms.  Not getting worse.    Surgery is not urgent.    We reviewed our discussion from her last visit   Hernia is minimally symptomatic and broad-based.  Short term is low risk for strangulated bowel.     I had an extensive discussion with her regarding the risks, benefits, and alternatives of proceeding with a(n) Laparoscopic recurrent anterior abdominal hernia Repair with Mesh.  Risks,benefits, and alternatives were discussed including the risk of anesthesia, bleeding, infection, injury to bowel, chronic pain, and recurrence were discussed.     We discussed her risk factors including: obese, previous hernia repair, advancement flaps. These related to perioperative morbidity including the increased risk of wound infection, and wound breakdown, and hernia reoccurrence  requiring intervention.       As her symptoms are not severe.  I have suggested we delay surgery with the hopes we can improve her complication/recurence risk.   She was counceled on wt loss.

## 2024-11-07 NOTE — PROGRESS NOTES
Identified pt with two pt identifiers (name and ). Reviewed chart in preparation for visit and have obtained necessary documentation.    Rosario Luis is a 54 y.o. female Follow-up and Hernia (Seen at the request of Dr. LIZZY Brady for evaluation of possible abdominal wall hernia)  .    Vitals:    24 1303   BP: 134/80   Site: Left Upper Arm   Position: Sitting   Cuff Size: Large Adult   Pulse: 81   Resp: 18   Temp: 98.6 °F (37 °C)   TempSrc: Oral   SpO2: 98%   Weight: (!) 147.5 kg (325 lb 3.2 oz)   Height: 1.575 m (5' 2\")          1. Have you been to the ER, urgent care clinic since your last visit?  Hospitalized since your last visit?  no     2. Have you seen or consulted any other health care providers outside of the [facility] since your last visit?  Include any pap smears or colon screening.  no

## 2024-11-22 ENCOUNTER — HOSPITAL ENCOUNTER (OUTPATIENT)
Facility: HOSPITAL | Age: 54
Discharge: HOME | End: 2024-11-22
Attending: INTERNAL MEDICINE
Payer: COMMERCIAL

## 2024-11-22 ENCOUNTER — HOSPITAL ENCOUNTER (OUTPATIENT)
Facility: HOSPITAL | Age: 54
End: 2024-11-22
Attending: INTERNAL MEDICINE
Payer: COMMERCIAL

## 2024-11-22 VITALS — BODY MASS INDEX: 58.53 KG/M2 | WEIGHT: 293 LBS

## 2024-11-22 DIAGNOSIS — Z51.11 ENCOUNTER FOR ANTINEOPLASTIC CHEMOTHERAPY: ICD-10-CM

## 2024-11-22 DIAGNOSIS — C18.0 MALIGNANT NEOPLASM OF CECUM (HCC): ICD-10-CM

## 2024-11-22 DIAGNOSIS — R06.02 SHORTNESS OF BREATH: ICD-10-CM

## 2024-11-22 DIAGNOSIS — K12.30 ORAL MUCOSITIS (ULCERATIVE), UNSPECIFIED: ICD-10-CM

## 2024-11-22 DIAGNOSIS — L27.0 GENERALIZED SKIN ERUPTION DUE TO DRUGS AND MEDICAMENTS TAKEN INTERNALLY: ICD-10-CM

## 2024-11-22 LAB
GLUCOSE BLD STRIP.AUTO-MCNC: 95 MG/DL (ref 65–117)
SERVICE CMNT-IMP: NORMAL

## 2024-11-22 PROCEDURE — A9609 HC RX DIAGNOSTIC RADIOPHARMACEUTICAL: HCPCS | Performed by: INTERNAL MEDICINE

## 2024-11-22 PROCEDURE — 3430000000 HC RX DIAGNOSTIC RADIOPHARMACEUTICAL: Performed by: INTERNAL MEDICINE

## 2024-11-22 PROCEDURE — 78815 PET IMAGE W/CT SKULL-THIGH: CPT

## 2024-11-22 PROCEDURE — 82962 GLUCOSE BLOOD TEST: CPT

## 2024-11-22 RX ORDER — FLUDEOXYGLUCOSE F-18 500 MCI/ML
10 INJECTION INTRAVENOUS
Status: COMPLETED | OUTPATIENT
Start: 2024-11-22 | End: 2024-11-22

## 2024-11-22 RX ADMIN — FLUDEOXYGLUCOSE F-18 10 MILLICURIE: 500 INJECTION INTRAVENOUS at 07:43

## 2024-12-15 ENCOUNTER — OFFICE VISIT (OUTPATIENT)
Age: 54
End: 2024-12-15

## 2024-12-15 VITALS
HEART RATE: 74 BPM | BODY MASS INDEX: 57.43 KG/M2 | TEMPERATURE: 98.9 F | SYSTOLIC BLOOD PRESSURE: 154 MMHG | OXYGEN SATURATION: 100 % | WEIGHT: 293 LBS | DIASTOLIC BLOOD PRESSURE: 85 MMHG | RESPIRATION RATE: 22 BRPM

## 2024-12-15 DIAGNOSIS — B96.89 ACUTE BACTERIAL SINUSITIS: Primary | ICD-10-CM

## 2024-12-15 DIAGNOSIS — R05.1 ACUTE COUGH: ICD-10-CM

## 2024-12-15 DIAGNOSIS — J01.90 ACUTE BACTERIAL SINUSITIS: Primary | ICD-10-CM

## 2024-12-15 RX ORDER — DOXYCYCLINE HYCLATE 100 MG
100 TABLET ORAL 2 TIMES DAILY
Qty: 14 TABLET | Refills: 0 | Status: SHIPPED | OUTPATIENT
Start: 2024-12-15 | End: 2024-12-22

## 2024-12-15 RX ORDER — BENZONATATE 200 MG/1
200 CAPSULE ORAL 3 TIMES DAILY PRN
Qty: 21 CAPSULE | Refills: 0 | Status: SHIPPED | OUTPATIENT
Start: 2024-12-15 | End: 2024-12-22

## 2025-03-17 DIAGNOSIS — H10.13 ACUTE ATOPIC CONJUNCTIVITIS, BILATERAL: ICD-10-CM

## 2025-03-17 DIAGNOSIS — J45.20 MILD INTERMITTENT ASTHMA, UNCOMPLICATED: ICD-10-CM

## 2025-03-18 DIAGNOSIS — J45.20 MILD INTERMITTENT ASTHMA, UNCOMPLICATED: ICD-10-CM

## 2025-03-18 RX ORDER — MONTELUKAST SODIUM 10 MG/1
10 TABLET ORAL DAILY
Qty: 90 TABLET | Refills: 1 | Status: SHIPPED | OUTPATIENT
Start: 2025-03-18 | End: 2025-03-18 | Stop reason: SDUPTHER

## 2025-03-18 RX ORDER — FAMOTIDINE 40 MG/1
40 TABLET, FILM COATED ORAL 2 TIMES DAILY
Qty: 60 TABLET | Refills: 1 | Status: CANCELLED | OUTPATIENT
Start: 2025-03-18

## 2025-03-18 RX ORDER — CYCLOBENZAPRINE HCL 10 MG
TABLET ORAL
Qty: 90 TABLET | Refills: 1 | Status: SHIPPED | OUTPATIENT
Start: 2025-03-18 | End: 2025-03-18 | Stop reason: SDUPTHER

## 2025-03-18 NOTE — TELEPHONE ENCOUNTER
PCP: Valerie Pacekr MD    Last appt: 10/21/2024    Future Appointments   Date Time Provider Department Center   5/8/2025  9:10 AM Thaddeus Chu MD [facility] BS AMB       Requested Prescriptions     Pending Prescriptions Disp Refills    famotidine (PEPCID) 40 MG tablet 60 tablet 1     Sig: Take 1 tablet by mouth 2 times daily    cyclobenzaprine (FLEXERIL) 10 MG tablet 90 tablet 1     Sig: TAKE 1 TABLET BY MOUTH THREE TIMES A DAY AS NEEDED FOR MUSCLE SPASM    montelukast (SINGULAIR) 10 MG tablet 90 tablet 1     Sig: Take 1 tablet by mouth daily

## 2025-03-18 NOTE — TELEPHONE ENCOUNTER
PCP: Valerie Packer MD    Last appt: 9/15/2022    Future Appointments   Date Time Provider Department Center   5/8/2025  9:10 AM Thaddeus Chu MD [facility] BS AMB       Requested Prescriptions     Pending Prescriptions Disp Refills    azelastine (ASTEPRO) 137 MCG/SPRAY nasal spray 90 mL 1

## 2025-03-18 NOTE — TELEPHONE ENCOUNTER
PCP: Valerie Packer MD    Last appt: Visit date not found    Future Appointments   Date Time Provider Department Center   5/8/2025  9:10 AM Thaddeus Chu MD [facility] BS AMB       Requested Prescriptions     Pending Prescriptions Disp Refills    cyclobenzaprine (FLEXERIL) 10 MG tablet 90 tablet 0     Sig: TAKE 1 TABLET BY MOUTH THREE TIMES A DAY AS NEEDED FOR MUSCLE SPASM    montelukast (SINGULAIR) 10 MG tablet 90 tablet 0     Sig: Take 1 tablet by mouth daily

## 2025-03-19 NOTE — TELEPHONE ENCOUNTER
PCP: Valerie Packer MD    Last appt: 10/21/2024    Future Appointments   Date Time Provider Department Center   5/8/2025  9:10 AM Thaddeus Chu MD [facility] BS AMB       Requested Prescriptions     Pending Prescriptions Disp Refills    famotidine (PEPCID) 40 MG tablet 60 tablet      Sig: Take 1 tablet by mouth 2 times daily

## 2025-03-20 RX ORDER — MONTELUKAST SODIUM 10 MG/1
10 TABLET ORAL DAILY
Qty: 90 TABLET | Refills: 1 | Status: SHIPPED | OUTPATIENT
Start: 2025-03-20

## 2025-03-20 RX ORDER — CYCLOBENZAPRINE HCL 10 MG
TABLET ORAL
Qty: 30 TABLET | Refills: 1 | Status: SHIPPED | OUTPATIENT
Start: 2025-03-20

## 2025-03-20 RX ORDER — FAMOTIDINE 40 MG/1
40 TABLET, FILM COATED ORAL 2 TIMES DAILY
Qty: 60 TABLET | Refills: 3 | Status: SHIPPED | OUTPATIENT
Start: 2025-03-20

## 2025-03-20 RX ORDER — AZELASTINE HYDROCHLORIDE 137 UG/1
2 SPRAY, METERED NASAL 2 TIMES DAILY PRN
Qty: 90 ML | Refills: 3 | Status: SHIPPED | OUTPATIENT
Start: 2025-03-20

## 2025-04-17 ENCOUNTER — PATIENT MESSAGE (OUTPATIENT)
Facility: CLINIC | Age: 55
End: 2025-04-17

## 2025-04-18 ENCOUNTER — TELEMEDICINE (OUTPATIENT)
Facility: CLINIC | Age: 55
End: 2025-04-18
Payer: COMMERCIAL

## 2025-04-18 DIAGNOSIS — F41.8 SITUATIONAL ANXIETY: Primary | ICD-10-CM

## 2025-04-18 PROCEDURE — 99213 OFFICE O/P EST LOW 20 MIN: CPT | Performed by: STUDENT IN AN ORGANIZED HEALTH CARE EDUCATION/TRAINING PROGRAM

## 2025-04-18 RX ORDER — ALPRAZOLAM 0.5 MG
0.5 TABLET ORAL 2 TIMES DAILY PRN
Qty: 30 TABLET | Refills: 0 | Status: SHIPPED | OUTPATIENT
Start: 2025-04-18 | End: 2025-05-03

## 2025-04-18 SDOH — ECONOMIC STABILITY: FOOD INSECURITY: WITHIN THE PAST 12 MONTHS, YOU WORRIED THAT YOUR FOOD WOULD RUN OUT BEFORE YOU GOT MONEY TO BUY MORE.: NEVER TRUE

## 2025-04-18 SDOH — ECONOMIC STABILITY: TRANSPORTATION INSECURITY
IN THE PAST 12 MONTHS, HAS THE LACK OF TRANSPORTATION KEPT YOU FROM MEDICAL APPOINTMENTS OR FROM GETTING MEDICATIONS?: NO

## 2025-04-18 SDOH — ECONOMIC STABILITY: FOOD INSECURITY: WITHIN THE PAST 12 MONTHS, THE FOOD YOU BOUGHT JUST DIDN'T LAST AND YOU DIDN'T HAVE MONEY TO GET MORE.: NEVER TRUE

## 2025-04-18 SDOH — ECONOMIC STABILITY: INCOME INSECURITY: IN THE LAST 12 MONTHS, WAS THERE A TIME WHEN YOU WERE NOT ABLE TO PAY THE MORTGAGE OR RENT ON TIME?: NO

## 2025-04-18 SDOH — ECONOMIC STABILITY: TRANSPORTATION INSECURITY
IN THE PAST 12 MONTHS, HAS LACK OF TRANSPORTATION KEPT YOU FROM MEETINGS, WORK, OR FROM GETTING THINGS NEEDED FOR DAILY LIVING?: NO

## 2025-04-18 NOTE — PROGRESS NOTES
Rosario Luis , was evaluated through a synchronous (real-time) audio-video encounter. The patient (or guardian if applicable) is aware that this is a billable service, which includes applicable co-pays. This Virtual Visit was conducted with patient's (and/or legal guardian's) consent. Patient identification was verified, and a caregiver was present when appropriate.   [facility]  [facility] was [facility]        Rosario Luis a 54 y.o. female  has a past medical history of Acne rosacea, Allergy, unspecified not elsewhere classified, Ankle sprain, Ankle sprain, Asthma, Cancer (HCC), Chickenpox, Chronic low back pain with right-sided sciatica, Chronic otitis media, Colon cancer (HCC), Diverticulitis, Dry eye syndrome, DVT of deep femoral vein, EBV infection, Fatty liver, GERD (gastroesophageal reflux disease), Hearing loss, Heart burn, Heart murmur, Hernia of abdominal wall, Hyperglycemia, Hypothyroidism, Ill-defined condition, Intraocular pressure increase, Knee pain, left, Lung nodules, Measles, Migraine, Mumps, Neuropathy, Plantar fasciitis, bilateral, Sciatica, and Tinnitus of right ear. presents virtually today for anxiety concerns.    Subjective:  History of Present Illness    Situational Anxiety  She is currently experiencing significant stress due to the recent passing of her father, coupled with her own personal health issues. She has been utilizing Xanax as a means to manage these stressful situations but has not had a refill in some time. She is seeking a prescription to aid her in navigating the upcoming weeks. She reports no adverse effects from the medication, which was initially prescribed following the death of her mother. During that period, she experienced heightened anxiety due to her mother's passing, work-related stress, and her own cancer diagnosis. She has found Xanax to be an effective tool in managing these stressors and has not experienced any side

## 2025-04-27 DIAGNOSIS — E03.9 ACQUIRED HYPOTHYROIDISM: ICD-10-CM

## 2025-05-01 RX ORDER — LEVOTHYROXINE SODIUM 150 UG/1
150 TABLET ORAL DAILY
Qty: 90 TABLET | Refills: 1 | Status: SHIPPED | OUTPATIENT
Start: 2025-05-01

## 2025-05-22 RX ORDER — CYCLOBENZAPRINE HCL 10 MG
TABLET ORAL
Qty: 90 TABLET | Refills: 0 | Status: SHIPPED | OUTPATIENT
Start: 2025-05-22

## 2025-05-22 NOTE — TELEPHONE ENCOUNTER
PCP: Valerie Packer MD    Last appt: 4/18/2025    Future Appointments   Date Time Provider Department Center   8/7/2025  9:10 AM Thaddeus Chu MD [facility] BS AMB       Requested Prescriptions     Pending Prescriptions Disp Refills    cyclobenzaprine (FLEXERIL) 10 MG tablet [Pharmacy Med Name: CYCLOBENZAPRINE 10 MG TABLET] 90 tablet      Sig: TAKE 1 TABLET BY MOUTH 3 TIMES A DAY AS NEEDED FOR MUSCLE SPASMS

## 2025-06-19 ENCOUNTER — TRANSCRIBE ORDERS (OUTPATIENT)
Facility: HOSPITAL | Age: 55
End: 2025-06-19

## 2025-06-19 DIAGNOSIS — R06.02 SHORTNESS OF BREATH: ICD-10-CM

## 2025-06-19 DIAGNOSIS — R26.81 UNSTEADINESS ON FEET: Primary | ICD-10-CM

## 2025-06-25 NOTE — TELEPHONE ENCOUNTER
PCP: Valerie Packer MD    Last appt: 4/18/2025    Future Appointments   Date Time Provider Department Center   7/8/2025 12:30 PM [facility] 2 OPEN [facility]   8/7/2025  9:10 AM Thaddeus Chu MD [facility]   10/31/2025  8:15 AM Valerie Packer MD [facility]       Requested Prescriptions     Pending Prescriptions Disp Refills    cyclobenzaprine (FLEXERIL) 10 MG tablet [Pharmacy Med Name: CYCLOBENZAPRINE 10 MG TABLET] 90 tablet 0     Sig: TAKE 1 TABLET BY MOUTH 3 TIMES A DAY AS NEEDED FOR MUSCLE SPASMS

## 2025-06-26 RX ORDER — CYCLOBENZAPRINE HCL 10 MG
TABLET ORAL
Qty: 90 TABLET | Refills: 0 | Status: SHIPPED | OUTPATIENT
Start: 2025-06-26

## 2025-07-03 ENCOUNTER — OFFICE VISIT (OUTPATIENT)
Facility: CLINIC | Age: 55
End: 2025-07-03
Payer: COMMERCIAL

## 2025-07-03 VITALS
WEIGHT: 293 LBS | SYSTOLIC BLOOD PRESSURE: 134 MMHG | BODY MASS INDEX: 58.53 KG/M2 | OXYGEN SATURATION: 97 % | DIASTOLIC BLOOD PRESSURE: 82 MMHG | HEART RATE: 63 BPM

## 2025-07-03 DIAGNOSIS — R60.0 PERIPHERAL EDEMA: Primary | ICD-10-CM

## 2025-07-03 DIAGNOSIS — R22.2 SUBCUTANEOUS MASS OF BACK: ICD-10-CM

## 2025-07-03 DIAGNOSIS — E66.01 OBESITY, MORBID (HCC): ICD-10-CM

## 2025-07-03 DIAGNOSIS — E03.9 ACQUIRED HYPOTHYROIDISM: ICD-10-CM

## 2025-07-03 DIAGNOSIS — J45.20 MILD INTERMITTENT ASTHMA WITHOUT COMPLICATION: ICD-10-CM

## 2025-07-03 DIAGNOSIS — R73.03 PREDIABETES: ICD-10-CM

## 2025-07-03 DIAGNOSIS — Z91.81 AT HIGH RISK FOR FALLS: ICD-10-CM

## 2025-07-03 DIAGNOSIS — E78.5 DYSLIPIDEMIA: ICD-10-CM

## 2025-07-03 DIAGNOSIS — E55.9 VITAMIN D DEFICIENCY: ICD-10-CM

## 2025-07-03 PROBLEM — D50.0 ANEMIA, BLOOD LOSS: Status: RESOLVED | Noted: 2022-12-11 | Resolved: 2025-07-03

## 2025-07-03 PROBLEM — B37.0 ORAL THRUSH: Status: RESOLVED | Noted: 2024-10-21 | Resolved: 2025-07-03

## 2025-07-03 PROBLEM — R11.0 NAUSEA: Status: RESOLVED | Noted: 2023-07-17 | Resolved: 2025-07-03

## 2025-07-03 PROCEDURE — 99214 OFFICE O/P EST MOD 30 MIN: CPT | Performed by: STUDENT IN AN ORGANIZED HEALTH CARE EDUCATION/TRAINING PROGRAM

## 2025-07-03 RX ORDER — LORAZEPAM 0.5 MG/1
TABLET ORAL
COMMUNITY
Start: 2025-06-24

## 2025-07-03 RX ORDER — DOXYCYCLINE HYCLATE 100 MG
TABLET ORAL
COMMUNITY
Start: 2025-05-29

## 2025-07-03 SDOH — ECONOMIC STABILITY: FOOD INSECURITY: WITHIN THE PAST 12 MONTHS, THE FOOD YOU BOUGHT JUST DIDN'T LAST AND YOU DIDN'T HAVE MONEY TO GET MORE.: PATIENT DECLINED

## 2025-07-03 SDOH — ECONOMIC STABILITY: FOOD INSECURITY: WITHIN THE PAST 12 MONTHS, YOU WORRIED THAT YOUR FOOD WOULD RUN OUT BEFORE YOU GOT MONEY TO BUY MORE.: PATIENT DECLINED

## 2025-07-03 ASSESSMENT — PATIENT HEALTH QUESTIONNAIRE - PHQ9
SUM OF ALL RESPONSES TO PHQ QUESTIONS 1-9: 0
2. FEELING DOWN, DEPRESSED OR HOPELESS: NOT AT ALL
SUM OF ALL RESPONSES TO PHQ QUESTIONS 1-9: 0
SUM OF ALL RESPONSES TO PHQ QUESTIONS 1-9: 0
1. LITTLE INTEREST OR PLEASURE IN DOING THINGS: NOT AT ALL
SUM OF ALL RESPONSES TO PHQ QUESTIONS 1-9: 0

## 2025-07-03 NOTE — ASSESSMENT & PLAN NOTE
A1c 5.7, 6/2024. Discussed significance of this dx, and urged healthy lifestyle habits to prevent progression to DM. Rec low carb diet an weight loss.

## 2025-07-03 NOTE — ASSESSMENT & PLAN NOTE
BMI in office today 58.53. I encouraged the patient on establishing healthier eating habits and regular exercise routine. Educational Material provided. Will revisit topic during next office visit, can also consider referral to dietician and/or surgical weight loss programs.

## 2025-07-03 NOTE — ASSESSMENT & PLAN NOTE
Patient has mass on back. Asymptomatic. Given hx and PE findings concerns for lipoma. Will obtain u/s for further investigation.

## 2025-07-03 NOTE — PROGRESS NOTES
Rosario Luis is a 54 y.o. female who presents to office today for leg swelling concerns.     History of Present Illness    She has been experiencing significant life changes, including the loss of her father and a cousin's  due to a MI. Her oncologist has identified concerning areas on her right lung, which are suspected to be cancerous. She is scheduled for a PET scan on Monday to confirm this. She has noticed an increase in skin bruising on her arms, even from minor impacts. She has been feeling unsteady and has been using a cane for support. She describes a sensation of heaviness in her lower body. She has been experiencing mild shortness of breath, which she attributes to physical deconditioning. She had a severe episode of breathlessness in 03/2025 while walking to the hospital, which she believes was due to walking too fast. She has had similar episodes when walking quickly. She has not discussed her shortness of breath with her pulmonologist. She gets out of breath more easily than before, even when walking to the mailbox. She takes Advil or ibuprofen about once a week for headaches. Her last blood work was stable. She reports no abnormal bleeding or frequent nosebleeds. She has tinnitus and hearing loss in her right ear, but no recent changes in her hearing. She is nearsighted and wears prescription glasses. She has her eye pressures monitored annually and is on medication for it. She has been told that physical therapy could help her. She has previously attended physical therapy at [facility] in [facility]. She has been told that she has a heart murmur.    Since 05/2025, she has been dealing with swelling issues, particularly in her feet. She had a blood clot in her left leg in 2019, which occasionally swells. Recently, both legs have been swelling, with more fluid accumulation than usual. She twisted her right foot, which she believes exacerbated the swelling. She does not think it is a

## 2025-07-03 NOTE — ASSESSMENT & PLAN NOTE
Symptomatically stable. Thyroid cascade collected today.  Continue levothyroxine 150mcg daily.

## 2025-07-03 NOTE — PROGRESS NOTES
Chief Complaint   Patient presents with    Leg Swelling    Foot Swelling         Health Maintenance Due   Topic Date Due    Hepatitis B vaccine (1 of 3 - 19+ 3-dose series) Never done    Pneumococcal 50+ years Vaccine (1 of 2 - PCV) Never done    Shingles vaccine (1 of 2) Never done    COVID-19 Vaccine (3 - 2024-25 season) 09/01/2024    Colorectal Cancer Screen  12/29/2024    Cervical cancer screen  01/14/2025    A1C test (Diabetic or Prediabetic)  06/10/2025         \"Have you been to the ER, urgent care clinic since your last visit?  Hospitalized since your last visit?\"    NO    “Have you seen or consulted any other health care providers outside of [facility] since your last visit?”    NO    “Have you had a colorectal cancer screening such as a colonoscopy/FIT/Cologuard?    NO    Date of last Colonoscopy: 12/29/2023  No cologuard on file  Date of last FIT: 1/10/2021   No flexible sigmoidoscopy on file         “Have you had a pap smear?”    Yes 1/2025    Date of last Cervical Cancer screen (HPV or PAP): 1/14/2022

## 2025-07-03 NOTE — ASSESSMENT & PLAN NOTE
Increased edema in legs, trace edema noted from ankles to midcalf today. Recs to cut back on sodium, elevate legs, wear compression stockings. In the event her symptoms do not improve, will consider ECHO given hx of murmur and increased breathlessness.

## 2025-07-04 LAB
25(OH)D3 SERPL-MCNC: 27.3 NG/ML (ref 30–100)
ALBUMIN SERPL-MCNC: 3.9 G/DL (ref 3.5–5)
ALBUMIN/GLOB SERPL: 1.4 (ref 1.1–2.2)
ALP SERPL-CCNC: 96 U/L (ref 45–117)
ALT SERPL-CCNC: 40 U/L (ref 12–78)
ANION GAP SERPL CALC-SCNC: 9 MMOL/L (ref 2–12)
AST SERPL-CCNC: 21 U/L (ref 15–37)
BASOPHILS # BLD: 0.07 K/UL (ref 0–0.1)
BASOPHILS NFR BLD: 0.9 % (ref 0–1)
BILIRUB SERPL-MCNC: 0.6 MG/DL (ref 0.2–1)
BUN SERPL-MCNC: 13 MG/DL (ref 6–20)
BUN/CREAT SERPL: 15 (ref 12–20)
CALCIUM SERPL-MCNC: 9.5 MG/DL (ref 8.5–10.1)
CHLORIDE SERPL-SCNC: 109 MMOL/L (ref 97–108)
CHOLEST SERPL-MCNC: 218 MG/DL
CO2 SERPL-SCNC: 25 MMOL/L (ref 21–32)
CREAT SERPL-MCNC: 0.87 MG/DL (ref 0.55–1.02)
CREAT UR-MCNC: 55.4 MG/DL
DIFFERENTIAL METHOD BLD: ABNORMAL
EOSINOPHIL # BLD: 0.23 K/UL (ref 0–0.4)
EOSINOPHIL NFR BLD: 2.8 % (ref 0–7)
ERYTHROCYTE [DISTWIDTH] IN BLOOD BY AUTOMATED COUNT: 13.8 % (ref 11.5–14.5)
EST. AVERAGE GLUCOSE BLD GHB EST-MCNC: 131 MG/DL
GLOBULIN SER CALC-MCNC: 2.8 G/DL (ref 2–4)
GLUCOSE SERPL-MCNC: 110 MG/DL (ref 65–100)
HBA1C MFR BLD: 6.2 % (ref 4–5.6)
HCT VFR BLD AUTO: 41.6 % (ref 35–47)
HDLC SERPL-MCNC: 78 MG/DL
HDLC SERPL: 2.8 (ref 0–5)
HGB BLD-MCNC: 12.3 G/DL (ref 11.5–16)
IMM GRANULOCYTES # BLD AUTO: 0.05 K/UL (ref 0–0.04)
IMM GRANULOCYTES NFR BLD AUTO: 0.6 % (ref 0–0.5)
LDLC SERPL CALC-MCNC: 122 MG/DL (ref 0–100)
LYMPHOCYTES # BLD: 2.62 K/UL (ref 0.8–3.5)
LYMPHOCYTES NFR BLD: 32 % (ref 12–49)
MCH RBC QN AUTO: 29.1 PG (ref 26–34)
MCHC RBC AUTO-ENTMCNC: 29.6 G/DL (ref 30–36.5)
MCV RBC AUTO: 98.3 FL (ref 80–99)
MICROALBUMIN UR-MCNC: <0.5 MG/DL
MICROALBUMIN/CREAT UR-RTO: <9 MG/G (ref 0–30)
MONOCYTES # BLD: 0.65 K/UL (ref 0–1)
MONOCYTES NFR BLD: 7.9 % (ref 5–13)
NEUTS SEG # BLD: 4.56 K/UL (ref 1.8–8)
NEUTS SEG NFR BLD: 55.8 % (ref 32–75)
NRBC # BLD: 0 K/UL (ref 0–0.01)
NRBC BLD-RTO: 0 PER 100 WBC
PLATELET # BLD AUTO: 281 K/UL (ref 150–400)
PMV BLD AUTO: 12 FL (ref 8.9–12.9)
POTASSIUM SERPL-SCNC: 4.6 MMOL/L (ref 3.5–5.1)
PROT SERPL-MCNC: 6.7 G/DL (ref 6.4–8.2)
RBC # BLD AUTO: 4.23 M/UL (ref 3.8–5.2)
SODIUM SERPL-SCNC: 143 MMOL/L (ref 136–145)
TRIGL SERPL-MCNC: 90 MG/DL
TSH SERPL DL<=0.05 MIU/L-ACNC: 1.56 UIU/ML (ref 0.36–3.74)
VLDLC SERPL CALC-MCNC: 18 MG/DL
WBC # BLD AUTO: 8.2 K/UL (ref 3.6–11)

## 2025-07-07 ENCOUNTER — HOSPITAL ENCOUNTER (OUTPATIENT)
Age: 55
Discharge: HOME | End: 2025-07-10
Payer: COMMERCIAL

## 2025-07-07 DIAGNOSIS — R22.2 SUBCUTANEOUS MASS OF BACK: ICD-10-CM

## 2025-07-07 PROCEDURE — 76705 ECHO EXAM OF ABDOMEN: CPT

## 2025-07-08 ENCOUNTER — HOSPITAL ENCOUNTER (OUTPATIENT)
Age: 55
Discharge: HOME | End: 2025-07-11
Payer: COMMERCIAL

## 2025-07-08 VITALS — BODY MASS INDEX: 59.44 KG/M2 | WEIGHT: 293 LBS

## 2025-07-08 DIAGNOSIS — R26.81 UNSTEADINESS ON FEET: ICD-10-CM

## 2025-07-08 DIAGNOSIS — R06.02 SHORTNESS OF BREATH: ICD-10-CM

## 2025-07-08 PROCEDURE — 70553 MRI BRAIN STEM W/O & W/DYE: CPT

## 2025-07-08 PROCEDURE — A9579 GAD-BASE MR CONTRAST NOS,1ML: HCPCS | Performed by: RADIOLOGY

## 2025-07-08 PROCEDURE — 6360000004 HC RX CONTRAST MEDICATION: Performed by: RADIOLOGY

## 2025-07-08 RX ORDER — GADOTERIDOL 279.3 MG/ML
20 INJECTION INTRAVENOUS
Status: COMPLETED | OUTPATIENT
Start: 2025-07-08 | End: 2025-07-08

## 2025-07-08 RX ADMIN — GADOTERIDOL 20 ML: 279.3 INJECTION, SOLUTION INTRAVENOUS at 13:09

## 2025-07-17 NOTE — TELEPHONE ENCOUNTER
PCP: Valerie Packer MD    Last appt: 7/3/2025    Future Appointments   Date Time Provider Department Center   8/7/2025  9:10 AM Thaddeus Chu MD [facility] BS [facility]   10/31/2025  8:15 AM Valerie Packer MD [facility] DEP   1/5/2026  8:45 AM Valerie Packer MD [facility] DEP       Requested Prescriptions     Pending Prescriptions Disp Refills    famotidine (PEPCID) 40 MG tablet [Pharmacy Med Name: FAMOTIDINE 40 MG TABLET] 60 tablet 3     Sig: TAKE 1 TABLET BY MOUTH 2 TIMES A DAY

## 2025-07-22 RX ORDER — FAMOTIDINE 40 MG/1
40 TABLET, FILM COATED ORAL 2 TIMES DAILY
Qty: 60 TABLET | Refills: 8 | Status: SHIPPED | OUTPATIENT
Start: 2025-07-22

## 2025-08-07 RX ORDER — CYCLOBENZAPRINE HCL 10 MG
TABLET ORAL
Qty: 90 TABLET | Refills: 0 | Status: SHIPPED | OUTPATIENT
Start: 2025-08-07

## 2025-09-04 RX ORDER — CYCLOBENZAPRINE HCL 10 MG
TABLET ORAL
Qty: 90 TABLET | Refills: 0 | Status: SHIPPED | OUTPATIENT
Start: 2025-09-04

## (undated) DEVICE — ACCESS PLATFORM FOR MINIMALLY INVASIVE SURGERY.: Brand: GELPORT® LAPAROSCOPIC  SYSTEM

## (undated) DEVICE — SUTURE PERMAHAND SZ 3-0 L30IN NONABSORBABLE BLK SILK BRAID A304H

## (undated) DEVICE — HYPODERMIC SAFETY NEEDLE: Brand: MAGELLAN

## (undated) DEVICE — TROCAR: Brand: KII SLEEVE

## (undated) DEVICE — YANKAUER,POOLE TIP,STERILE,50/CS: Brand: MEDLINE

## (undated) DEVICE — Z DISCONTINUED PER MEDLINE LINE GAS SAMPLING O2/CO2 LNG AD 13 FT NSL W/ TBNG FILTERLINE

## (undated) DEVICE — BAG SPEC BIOHZRD 10 X 10 IN --

## (undated) DEVICE — HEX-LOCKING BLADE ELECTRODE: Brand: EDGE

## (undated) DEVICE — SUTURE VCRL SZ 3-0 L27IN ABSRB UD L26MM SH 1/2 CIR J416H

## (undated) DEVICE — STAPLER INT L75MM CUT LN L73MM STPL LN L77MM BLU B FRM 8

## (undated) DEVICE — SOLIDIFIER FLD 2OZ 1500CC N DISINF IN BTL DISP SAFESORB

## (undated) DEVICE — MARYLAND JAW LAPAROSCOPIC SEALER/DIVIDER COATED: Brand: LIGASURE

## (undated) DEVICE — ELECTRODE,RADIOTRANSLUCENT,FOAM,5PK: Brand: MEDLINE

## (undated) DEVICE — SNARE ENDOSCP POLYP MED STD AD 2.4X27X240 CM 2.8 MM OVL SENS

## (undated) DEVICE — SET ADMIN 16ML TBNG L100IN 2 Y INJ SITE IV PIGGY BK DISP (ORDER IN MULIPLES OF 48)

## (undated) DEVICE — CONTAINER SPEC 20 ML LID NEUT BUFF FORMALIN 10 % POLYPR STS

## (undated) DEVICE — SYR 10ML LUER LOK 1/5ML GRAD --

## (undated) DEVICE — TROCAR: Brand: KII FIOS FIRST ENTRY

## (undated) DEVICE — Device

## (undated) DEVICE — FIAPC® PROBE W/ FILTER 2200 A OD 2.3MM/6.9FR; L 2.2M/7.2FT: Brand: ERBE

## (undated) DEVICE — CATH IV AUTOGRD BC PNK 20GA 25 -- INSYTE

## (undated) DEVICE — FORCEPS BX L240CM JAW DIA2.4MM ORNG L CAP W/ NDL DISP RAD

## (undated) DEVICE — SUTURE SZ 0 27IN 5/8 CIR UR-6  TAPER PT VIOLET ABSRB VICRYL J603H

## (undated) DEVICE — 3M™ IOBAN™ 2 ANTIMICROBIAL INCISE DRAPE 6650EZ: Brand: IOBAN™ 2

## (undated) DEVICE — SOLUTION IRRIG 1000ML 0.9% SOD CHL USP POUR PLAS BTL

## (undated) DEVICE — TOTAL TRAY, 16FR 10ML SIL FOLEY, URN: Brand: MEDLINE

## (undated) DEVICE — FIAPC® PROBE W/ FILTER 2200 C OD 2.3MM/6.9FR; L 2.2M/7.2FT: Brand: ERBE

## (undated) DEVICE — BASIN EMSIS 16OZ GRAPHITE PLAS KID SHP MOLD GRAD FOR ORAL

## (undated) DEVICE — JELLY,LUBE,STERILE,FLIP TOP,TUBE,4-OZ: Brand: MEDLINE

## (undated) DEVICE — VISUALIZATION SYSTEM: Brand: CLEARIFY

## (undated) DEVICE — FORCEPS BX L160CM DIA8MM GRSP DISECT CUP TIP NONLOCKING ROT

## (undated) DEVICE — SUT MCRYL 4-0 27IN PS2 UD --

## (undated) DEVICE — 40580 - THE PINK PAD - ADVANCED TRENDELENBURG POSITIONING KIT: Brand: 40580 - THE PINK PAD - ADVANCED TRENDELENBURG POSITIONING KIT

## (undated) DEVICE — STAPLER INT L60MM REG TISS BLU B FRM 8 FIRING 2 ROW AUTO

## (undated) DEVICE — GLOVE ORANGE PI 7   MSG9070

## (undated) DEVICE — TIP SUCT TRNSPAR RIB SURF STD BLB RIG NVENT W/ 5IN1 CONN DYND50138] MEDLINE INDUSTRIES INC]

## (undated) DEVICE — FCPS RAD JAW 4LC 240CM W/NDL -- BX/40

## (undated) DEVICE — KIT SUTURING DEVICE M-CLOSE

## (undated) DEVICE — PACK,BASIC,SIRUS,V: Brand: MEDLINE

## (undated) DEVICE — YANKAUER,TAPERED BULBOUS TIP,W/O VENT: Brand: MEDLINE

## (undated) DEVICE — SYR 3ML LL TIP 1/10ML GRAD --

## (undated) DEVICE — DERMABOND SKIN ADH 0.7ML -- DERMABOND ADVANCED 12/BX

## (undated) DEVICE — 1200 GUARD II KIT W/5MM TUBE W/O VAC TUBE: Brand: GUARDIAN

## (undated) DEVICE — BLOCK BITE ENDOSCP AD 21 MM W/ DIL BLU LF DISP

## (undated) DEVICE — SUTURE PERMAHAND SZ 2-0 L30IN NONABSORBABLE BLK SILK W/O A305H

## (undated) DEVICE — TOWEL 4 PLY TISS 19X30 SUE WHT

## (undated) DEVICE — GENERAL LAPAROSCOPY-MRMC: Brand: MEDLINE INDUSTRIES, INC.

## (undated) DEVICE — NEONATAL-ADULT SPO2 SENSOR: Brand: NELLCOR

## (undated) DEVICE — TRAP ENDOSCP POLYP 2 CHMBR DRAWER TYP

## (undated) DEVICE — TROCARS: Brand: KII® BALLOON BLUNT TIP SYSTEM

## (undated) DEVICE — ENDOSCOPIC KIT COMPLIANCE ENDOKIT

## (undated) DEVICE — ROCKER SWITCH PENCIL HOLSTER: Brand: VALLEYLAB

## (undated) DEVICE — IV START KIT: Brand: MEDLINE

## (undated) DEVICE — SPONGE LAPAROTOMY W18XL18IN WHITE STRUNG RADIOPAQUE STERILE

## (undated) DEVICE — GLOVE SURG SZ 75 L12IN FNGR THK79MIL GRN LTX FREE

## (undated) DEVICE — SUTURE PDS II SZ 0 L60IN ABSRB VLT L48MM CTX 1/2 CIR Z990G

## (undated) DEVICE — INJECTION THERAPY NEEDLE CATHETER: Brand: INTERJECT

## (undated) DEVICE — SET GRAV CK VLV NEEDLESS ST 3 GANGED 4WAY STPCOCK HI FLO 10

## (undated) DEVICE — LEGGINGS: Brand: CONVERTORS

## (undated) DEVICE — DRAPE,ROBOTICS,STERILE: Brand: MEDLINE

## (undated) DEVICE — CUFF BLD PRSS AD CLTH SGL TB W/ BAYNT CONN ROUNDED CORNER